# Patient Record
Sex: MALE | Race: WHITE | Employment: OTHER | URBAN - METROPOLITAN AREA
[De-identification: names, ages, dates, MRNs, and addresses within clinical notes are randomized per-mention and may not be internally consistent; named-entity substitution may affect disease eponyms.]

---

## 2021-03-04 ENCOUNTER — HOSPITAL ENCOUNTER (INPATIENT)
Facility: CLINIC | Age: 81
LOS: 32 days | Discharge: NURSING, REHAB OR LONG-TERM CARE | DRG: 003 | End: 2021-04-05
Attending: INTERNAL MEDICINE | Admitting: INTERNAL MEDICINE
Payer: MEDICARE

## 2021-03-04 ENCOUNTER — TRANSFERRED RECORDS (OUTPATIENT)
Dept: HEALTH INFORMATION MANAGEMENT | Facility: CLINIC | Age: 81
End: 2021-03-04

## 2021-03-04 DIAGNOSIS — Z95.2 S/P MVR (MITRAL VALVE REPLACEMENT): Primary | ICD-10-CM

## 2021-03-04 DIAGNOSIS — Z95.1 S/P CABG (CORONARY ARTERY BYPASS GRAFT): ICD-10-CM

## 2021-03-04 DIAGNOSIS — Z98.890 STATUS POST CARDIAC SURGERY: ICD-10-CM

## 2021-03-04 DIAGNOSIS — R57.0 CARDIOGENIC SHOCK (H): ICD-10-CM

## 2021-03-04 DIAGNOSIS — I48.0 PAROXYSMAL ATRIAL FIBRILLATION (H): ICD-10-CM

## 2021-03-04 LAB
ALT SERPL-CCNC: 14 U/L (ref 18–65)
APTT PPP: 42 SEC (ref 22–37)
AST SERPL-CCNC: 35 U/L (ref 10–40)
CREAT SERPL-MCNC: 2.15 MG/DL (ref 0.8–1.5)
EJECTION FRACTION: 57 %
ERYTHROCYTE [DISTWIDTH] IN BLOOD BY AUTOMATED COUNT: 13.5 % (ref 10–15)
GLUCOSE BLDC GLUCOMTR-MCNC: 256 MG/DL (ref 70–99)
GLUCOSE SERPL-MCNC: 115 MG/DL (ref 60–99)
HCT VFR BLD AUTO: 31.3 % (ref 40–53)
HGB BLD-MCNC: 9.8 G/DL (ref 13.3–17.7)
INR PPP: 1 (ref 0.9–1.2)
KCT BLD-ACNC: 196 SEC (ref 75–150)
MCH RBC QN AUTO: 27.8 PG (ref 26.5–33)
MCHC RBC AUTO-ENTMCNC: 31.3 G/DL (ref 31.5–36.5)
MCV RBC AUTO: 89 FL (ref 78–100)
PLATELET # BLD AUTO: 323 10E9/L (ref 150–450)
POTASSIUM SERPL-SCNC: 3.7 MMOL/L (ref 3.5–5.1)
RBC # BLD AUTO: 3.53 10E12/L (ref 4.4–5.9)
WBC # BLD AUTO: 24.1 10E9/L (ref 4–11)

## 2021-03-04 PROCEDURE — B2111ZZ FLUOROSCOPY OF MULTIPLE CORONARY ARTERIES USING LOW OSMOLAR CONTRAST: ICD-10-PCS | Performed by: INTERNAL MEDICINE

## 2021-03-04 PROCEDURE — 93799 UNLISTED CV SVC/PROCEDURE: CPT

## 2021-03-04 PROCEDURE — 250N000009 HC RX 250: Performed by: INTERNAL MEDICINE

## 2021-03-04 PROCEDURE — 83605 ASSAY OF LACTIC ACID: CPT

## 2021-03-04 PROCEDURE — 250N000009 HC RX 250: Performed by: STUDENT IN AN ORGANIZED HEALTH CARE EDUCATION/TRAINING PROGRAM

## 2021-03-04 PROCEDURE — 250N000011 HC RX IP 250 OP 636: Performed by: STUDENT IN AN ORGANIZED HEALTH CARE EDUCATION/TRAINING PROGRAM

## 2021-03-04 PROCEDURE — 85027 COMPLETE CBC AUTOMATED: CPT | Performed by: STUDENT IN AN ORGANIZED HEALTH CARE EDUCATION/TRAINING PROGRAM

## 2021-03-04 PROCEDURE — 272N000001 HC OR GENERAL SUPPLY STERILE: Performed by: INTERNAL MEDICINE

## 2021-03-04 PROCEDURE — 99153 MOD SED SAME PHYS/QHP EA: CPT | Performed by: INTERNAL MEDICINE

## 2021-03-04 PROCEDURE — 84295 ASSAY OF SERUM SODIUM: CPT

## 2021-03-04 PROCEDURE — 3E043XZ INTRODUCTION OF VASOPRESSOR INTO CENTRAL VEIN, PERCUTANEOUS APPROACH: ICD-10-PCS | Performed by: INTERNAL MEDICINE

## 2021-03-04 PROCEDURE — 5A1955Z RESPIRATORY VENTILATION, GREATER THAN 96 CONSECUTIVE HOURS: ICD-10-PCS | Performed by: INTERNAL MEDICINE

## 2021-03-04 PROCEDURE — 250N000013 HC RX MED GY IP 250 OP 250 PS 637: Performed by: INTERNAL MEDICINE

## 2021-03-04 PROCEDURE — C1887 CATHETER, GUIDING: HCPCS | Performed by: INTERNAL MEDICINE

## 2021-03-04 PROCEDURE — 258N000003 HC RX IP 258 OP 636: Performed by: INTERNAL MEDICINE

## 2021-03-04 PROCEDURE — C1769 GUIDE WIRE: HCPCS | Performed by: INTERNAL MEDICINE

## 2021-03-04 PROCEDURE — 93010 ELECTROCARDIOGRAM REPORT: CPT | Performed by: INTERNAL MEDICINE

## 2021-03-04 PROCEDURE — 33990 INSJ PERQ VAD L HRT ARTERIAL: CPT | Performed by: INTERNAL MEDICINE

## 2021-03-04 PROCEDURE — 258N000003 HC RX IP 258 OP 636: Performed by: STUDENT IN AN ORGANIZED HEALTH CARE EDUCATION/TRAINING PROGRAM

## 2021-03-04 PROCEDURE — 93456 R HRT CORONARY ARTERY ANGIO: CPT | Performed by: INTERNAL MEDICINE

## 2021-03-04 PROCEDURE — 999N000157 HC STATISTIC RCP TIME EA 10 MIN

## 2021-03-04 PROCEDURE — 02703ZZ DILATION OF CORONARY ARTERY, ONE ARTERY, PERCUTANEOUS APPROACH: ICD-10-PCS | Performed by: INTERNAL MEDICINE

## 2021-03-04 PROCEDURE — 85730 THROMBOPLASTIN TIME PARTIAL: CPT | Performed by: STUDENT IN AN ORGANIZED HEALTH CARE EDUCATION/TRAINING PROGRAM

## 2021-03-04 PROCEDURE — 82803 BLOOD GASES ANY COMBINATION: CPT

## 2021-03-04 PROCEDURE — 99152 MOD SED SAME PHYS/QHP 5/>YRS: CPT | Performed by: INTERNAL MEDICINE

## 2021-03-04 PROCEDURE — 92920 PRQ TRLUML C ANGIOP 1ART&/BR: CPT | Performed by: INTERNAL MEDICINE

## 2021-03-04 PROCEDURE — 250N000011 HC RX IP 250 OP 636

## 2021-03-04 PROCEDURE — C1894 INTRO/SHEATH, NON-LASER: HCPCS | Performed by: INTERNAL MEDICINE

## 2021-03-04 PROCEDURE — 250N000011 HC RX IP 250 OP 636: Performed by: INTERNAL MEDICINE

## 2021-03-04 PROCEDURE — 94002 VENT MGMT INPAT INIT DAY: CPT

## 2021-03-04 PROCEDURE — 999N001017 HC STATISTIC GLUCOSE BY METER IP

## 2021-03-04 PROCEDURE — 99291 CRITICAL CARE FIRST HOUR: CPT | Mod: 25 | Performed by: INTERNAL MEDICINE

## 2021-03-04 PROCEDURE — C1757 CATH, THROMBECTOMY/EMBOLECT: HCPCS | Performed by: INTERNAL MEDICINE

## 2021-03-04 PROCEDURE — 93005 ELECTROCARDIOGRAM TRACING: CPT

## 2021-03-04 PROCEDURE — 02HA3RZ INSERTION OF SHORT-TERM EXTERNAL HEART ASSIST SYSTEM INTO HEART, PERCUTANEOUS APPROACH: ICD-10-PCS | Performed by: INTERNAL MEDICINE

## 2021-03-04 PROCEDURE — 82947 ASSAY GLUCOSE BLOOD QUANT: CPT

## 2021-03-04 PROCEDURE — 02C03ZZ EXTIRPATION OF MATTER FROM CORONARY ARTERY, ONE ARTERY, PERCUTANEOUS APPROACH: ICD-10-PCS | Performed by: INTERNAL MEDICINE

## 2021-03-04 PROCEDURE — 5A0221D ASSISTANCE WITH CARDIAC OUTPUT USING IMPELLER PUMP, CONTINUOUS: ICD-10-PCS | Performed by: INTERNAL MEDICINE

## 2021-03-04 PROCEDURE — 84132 ASSAY OF SERUM POTASSIUM: CPT

## 2021-03-04 PROCEDURE — 82330 ASSAY OF CALCIUM: CPT

## 2021-03-04 PROCEDURE — 82805 BLOOD GASES W/O2 SATURATION: CPT | Performed by: INTERNAL MEDICINE

## 2021-03-04 PROCEDURE — 200N000002 HC R&B ICU UMMC

## 2021-03-04 PROCEDURE — 82810 BLOOD GASES O2 SAT ONLY: CPT

## 2021-03-04 PROCEDURE — 99292 CRITICAL CARE ADDL 30 MIN: CPT | Mod: 25 | Performed by: INTERNAL MEDICINE

## 2021-03-04 PROCEDURE — 85347 COAGULATION TIME ACTIVATED: CPT

## 2021-03-04 PROCEDURE — C1725 CATH, TRANSLUMIN NON-LASER: HCPCS | Performed by: INTERNAL MEDICINE

## 2021-03-04 RX ORDER — LIDOCAINE 40 MG/G
CREAM TOPICAL
Status: DISCONTINUED | OUTPATIENT
Start: 2021-03-04 | End: 2021-03-08

## 2021-03-04 RX ORDER — NITROGLYCERIN 0.4 MG/1
0.4 TABLET SUBLINGUAL EVERY 5 MIN PRN
Status: DISCONTINUED | OUTPATIENT
Start: 2021-03-04 | End: 2021-03-05

## 2021-03-04 RX ORDER — ASPIRIN 325 MG
TABLET ORAL
Status: DISCONTINUED | OUTPATIENT
Start: 2021-03-04 | End: 2021-03-05 | Stop reason: HOSPADM

## 2021-03-04 RX ORDER — NALOXONE HYDROCHLORIDE 0.4 MG/ML
0.4 INJECTION, SOLUTION INTRAMUSCULAR; INTRAVENOUS; SUBCUTANEOUS
Status: DISCONTINUED | OUTPATIENT
Start: 2021-03-04 | End: 2021-04-05 | Stop reason: HOSPADM

## 2021-03-04 RX ORDER — MAGNESIUM HYDROXIDE/ALUMINUM HYDROXICE/SIMETHICONE 120; 1200; 1200 MG/30ML; MG/30ML; MG/30ML
30 SUSPENSION ORAL EVERY 4 HOURS PRN
Status: DISCONTINUED | OUTPATIENT
Start: 2021-03-04 | End: 2021-04-05 | Stop reason: HOSPADM

## 2021-03-04 RX ORDER — HEPARIN SODIUM 10000 [USP'U]/100ML
0-5000 INJECTION, SOLUTION INTRAVENOUS CONTINUOUS
Status: DISCONTINUED | OUTPATIENT
Start: 2021-03-04 | End: 2021-03-05 | Stop reason: ALTCHOICE

## 2021-03-04 RX ORDER — FENTANYL CITRATE 50 UG/ML
INJECTION, SOLUTION INTRAMUSCULAR; INTRAVENOUS
Status: COMPLETED
Start: 2021-03-04 | End: 2021-03-04

## 2021-03-04 RX ORDER — NOREPINEPHRINE BITARTRATE 0.06 MG/ML
0.03-0.4 INJECTION, SOLUTION INTRAVENOUS CONTINUOUS
Status: DISCONTINUED | OUTPATIENT
Start: 2021-03-04 | End: 2021-03-04

## 2021-03-04 RX ORDER — HEPARIN SODIUM 1000 [USP'U]/ML
INJECTION, SOLUTION INTRAVENOUS; SUBCUTANEOUS
Status: DISCONTINUED | OUTPATIENT
Start: 2021-03-04 | End: 2021-03-05 | Stop reason: HOSPADM

## 2021-03-04 RX ORDER — ACETAMINOPHEN 650 MG/1
650 SUPPOSITORY RECTAL EVERY 4 HOURS PRN
Status: DISCONTINUED | OUTPATIENT
Start: 2021-03-04 | End: 2021-03-05

## 2021-03-04 RX ORDER — EPINEPHRINE IN SOD CHLOR,ISO 1 MG/10 ML
SYRINGE (ML) INTRAVENOUS
Status: DISCONTINUED | OUTPATIENT
Start: 2021-03-04 | End: 2021-03-05 | Stop reason: HOSPADM

## 2021-03-04 RX ORDER — CALCIUM CHLORIDE 100 MG/ML
INJECTION INTRAVENOUS; INTRAVENTRICULAR
Status: DISCONTINUED | OUTPATIENT
Start: 2021-03-04 | End: 2021-03-05 | Stop reason: HOSPADM

## 2021-03-04 RX ORDER — MIDAZOLAM (PF) 1 MG/ML IN 0.9 % SODIUM CHLORIDE INTRAVENOUS SOLUTION
1-8 CONTINUOUS
Status: DISCONTINUED | OUTPATIENT
Start: 2021-03-04 | End: 2021-03-05

## 2021-03-04 RX ORDER — ACETAMINOPHEN 325 MG/1
650 TABLET ORAL EVERY 4 HOURS PRN
Status: DISCONTINUED | OUTPATIENT
Start: 2021-03-04 | End: 2021-03-05

## 2021-03-04 RX ORDER — NALOXONE HYDROCHLORIDE 0.4 MG/ML
0.2 INJECTION, SOLUTION INTRAMUSCULAR; INTRAVENOUS; SUBCUTANEOUS
Status: DISCONTINUED | OUTPATIENT
Start: 2021-03-04 | End: 2021-04-05 | Stop reason: HOSPADM

## 2021-03-04 RX ADMIN — FENTANYL CITRATE 50 MCG: 50 INJECTION, SOLUTION INTRAMUSCULAR; INTRAVENOUS at 22:28

## 2021-03-04 RX ADMIN — NOREPINEPHRINE BITARTRATE 0.5 MCG/KG/MIN: 1 INJECTION, SOLUTION, CONCENTRATE INTRAVENOUS at 22:31

## 2021-03-04 RX ADMIN — Medication 4 MG/HR: at 22:39

## 2021-03-04 RX ADMIN — Medication 4 UNITS/HR: at 22:25

## 2021-03-04 RX ADMIN — KETAMINE HYDROCHLORIDE 395 MG/HR: 100 INJECTION, SOLUTION, CONCENTRATE INTRAMUSCULAR; INTRAVENOUS at 22:24

## 2021-03-04 RX ADMIN — Medication 50 MCG: at 22:28

## 2021-03-04 RX ADMIN — EPINEPHRINE 0.5 MCG/KG/MIN: 1 INJECTION PARENTERAL at 22:30

## 2021-03-04 RX ADMIN — FENTANYL CITRATE 50 MCG: 50 INJECTION, SOLUTION INTRAMUSCULAR; INTRAVENOUS at 22:34

## 2021-03-04 ASSESSMENT — MIFFLIN-ST. JEOR: SCORE: 1522.13

## 2021-03-04 NOTE — Clinical Note
Impella catheter, RFA sheath, RIJV sheath and swan catheter all sutured into place. Sterile dressing applied.

## 2021-03-04 NOTE — Clinical Note
Pt arrived from floor with epi, norepi, dobutamine, vaso and versed iv gtt as of yet w/o changes to doses/rates

## 2021-03-04 NOTE — LETTER
Transition Communication Hand-off for Care Transitions to Next Level of Care Provider    Name: Jin Garrett  : 1940  MRN #: [id number]  Primary Care Provider: Ritesh Kumar     Primary Clinic: [facility] [address] [facility] [address]     Reason for Hospitalization:  Cardiogenic shock (H) [R57.0]  Admit Date/Time: 3/4/2021  9:54 PM  Discharge Date: 21  Payor Source: Payor: BCBS / Plan: BCBS PLATINUM BLUE / Product Type: PPO /            Reason for Communication Hand-off Referral: Transition of care to acute rehab    Discharge Plan:  [facility] [place]       Discharge Needs Assessment:  Needs      Most Recent Value   Equipment Currently Used at Home  tub bench, walker, standard   # of Referrals Placed by CTS  Post Acute Facilities          LEONEL Barton    AVS/Discharge Summary is the source of truth; this is a helpful guide for improved communication of patient story

## 2021-03-05 ENCOUNTER — APPOINTMENT (OUTPATIENT)
Dept: CT IMAGING | Facility: CLINIC | Age: 81
DRG: 003 | End: 2021-03-05
Attending: STUDENT IN AN ORGANIZED HEALTH CARE EDUCATION/TRAINING PROGRAM
Payer: MEDICARE

## 2021-03-05 ENCOUNTER — APPOINTMENT (OUTPATIENT)
Dept: CARDIOLOGY | Facility: CLINIC | Age: 81
DRG: 003 | End: 2021-03-05
Attending: STUDENT IN AN ORGANIZED HEALTH CARE EDUCATION/TRAINING PROGRAM
Payer: MEDICARE

## 2021-03-05 ENCOUNTER — APPOINTMENT (OUTPATIENT)
Dept: GENERAL RADIOLOGY | Facility: CLINIC | Age: 81
DRG: 003 | End: 2021-03-05
Attending: STUDENT IN AN ORGANIZED HEALTH CARE EDUCATION/TRAINING PROGRAM
Payer: MEDICARE

## 2021-03-05 ENCOUNTER — APPOINTMENT (OUTPATIENT)
Dept: GENERAL RADIOLOGY | Facility: CLINIC | Age: 81
DRG: 003 | End: 2021-03-05
Attending: INTERNAL MEDICINE
Payer: MEDICARE

## 2021-03-05 ENCOUNTER — APPOINTMENT (OUTPATIENT)
Dept: GENERAL RADIOLOGY | Facility: CLINIC | Age: 81
DRG: 003 | End: 2021-03-05
Attending: THORACIC SURGERY (CARDIOTHORACIC VASCULAR SURGERY)
Payer: MEDICARE

## 2021-03-05 ENCOUNTER — APPOINTMENT (OUTPATIENT)
Dept: ULTRASOUND IMAGING | Facility: CLINIC | Age: 81
DRG: 003 | End: 2021-03-05
Attending: INTERNAL MEDICINE
Payer: MEDICARE

## 2021-03-05 ENCOUNTER — ANESTHESIA EVENT (OUTPATIENT)
Dept: SURGERY | Facility: CLINIC | Age: 81
DRG: 003 | End: 2021-03-05
Payer: MEDICARE

## 2021-03-05 ENCOUNTER — ANESTHESIA (OUTPATIENT)
Dept: SURGERY | Facility: CLINIC | Age: 81
DRG: 003 | End: 2021-03-05
Payer: MEDICARE

## 2021-03-05 ENCOUNTER — APPOINTMENT (OUTPATIENT)
Dept: ULTRASOUND IMAGING | Facility: CLINIC | Age: 81
DRG: 003 | End: 2021-03-05
Attending: STUDENT IN AN ORGANIZED HEALTH CARE EDUCATION/TRAINING PROGRAM
Payer: MEDICARE

## 2021-03-05 LAB
ALBUMIN SERPL-MCNC: 1.5 G/DL (ref 3.4–5)
ALBUMIN SERPL-MCNC: 1.5 G/DL (ref 3.4–5)
ALBUMIN SERPL-MCNC: 1.8 G/DL (ref 3.4–5)
ALBUMIN SERPL-MCNC: 1.8 G/DL (ref 3.4–5)
ALBUMIN SERPL-MCNC: 1.9 G/DL (ref 3.4–5)
ALBUMIN UR-MCNC: NEGATIVE MG/DL
ALP SERPL-CCNC: 46 U/L (ref 40–150)
ALP SERPL-CCNC: 46 U/L (ref 40–150)
ALP SERPL-CCNC: 51 U/L (ref 40–150)
ALP SERPL-CCNC: 68 U/L (ref 40–150)
ALP SERPL-CCNC: 74 U/L (ref 40–150)
ALT SERPL W P-5'-P-CCNC: 18 U/L (ref 0–70)
ALT SERPL W P-5'-P-CCNC: 19 U/L (ref 0–70)
ALT SERPL W P-5'-P-CCNC: 19 U/L (ref 0–70)
ALT SERPL W P-5'-P-CCNC: 20 U/L (ref 0–70)
ALT SERPL W P-5'-P-CCNC: 22 U/L (ref 0–70)
AMORPH CRY #/AREA URNS HPF: ABNORMAL /HPF
ANGLE RATE OF CLOT GROWTH: 58.1 DEG (ref 59–74)
ANGLE RATE OF CLOT GROWTH: 74.9 DEG (ref 59–74)
ANGLE RATE OF CLOT GROWTH: 75 DEG (ref 59–74)
ANGLE RATE OF CLOT GROWTH: ABNORMAL DEG (ref 59–74)
ANGLE RATE OF CLOT STRENGTH: 64.3 DEGREES (ref 53–72)
ANION GAP SERPL CALCULATED.3IONS-SCNC: 10 MMOL/L (ref 3–14)
ANION GAP SERPL CALCULATED.3IONS-SCNC: 10 MMOL/L (ref 3–14)
ANION GAP SERPL CALCULATED.3IONS-SCNC: 13 MMOL/L (ref 3–14)
ANION GAP SERPL CALCULATED.3IONS-SCNC: 13 MMOL/L (ref 3–14)
ANION GAP SERPL CALCULATED.3IONS-SCNC: 8 MMOL/L (ref 3–14)
APPEARANCE UR: ABNORMAL
APTT PPP: 42 SEC (ref 22–37)
APTT PPP: 51 SEC (ref 22–37)
APTT PPP: 97 SEC (ref 22–37)
APTT PPP: >240 SEC (ref 22–37)
APTT PPP: >240 SEC (ref 22–37)
AST SERPL W P-5'-P-CCNC: 125 U/L (ref 0–45)
AST SERPL W P-5'-P-CCNC: 143 U/L (ref 0–45)
AST SERPL W P-5'-P-CCNC: 155 U/L (ref 0–45)
AST SERPL W P-5'-P-CCNC: 62 U/L (ref 0–45)
AST SERPL W P-5'-P-CCNC: 78 U/L (ref 0–45)
BACTERIA #/AREA URNS HPF: ABNORMAL /HPF
BASE DEFICIT BLDA-SCNC: 1.7 MMOL/L
BASE DEFICIT BLDA-SCNC: 10.3 MMOL/L
BASE DEFICIT BLDA-SCNC: 10.3 MMOL/L
BASE DEFICIT BLDA-SCNC: 13.2 MMOL/L
BASE DEFICIT BLDA-SCNC: 13.2 MMOL/L
BASE DEFICIT BLDA-SCNC: 2.7 MMOL/L
BASE DEFICIT BLDA-SCNC: 8.9 MMOL/L
BASE DEFICIT BLDV-SCNC: 12.4 MMOL/L
BASE DEFICIT BLDV-SCNC: 14.6 MMOL/L
BASE DEFICIT BLDV-SCNC: 15 MMOL/L
BASE DEFICIT BLDV-SCNC: 15 MMOL/L
BASE DEFICIT BLDV-SCNC: 8.1 MMOL/L
BASE EXCESS BLDA CALC-SCNC: 0.3 MMOL/L
BASE EXCESS BLDA CALC-SCNC: 0.6 MMOL/L
BASE EXCESS BLDV CALC-SCNC: 0.3 MMOL/L
BASE EXCESS BLDV CALC-SCNC: 2 MMOL/L
BASOPHILS # BLD AUTO: 0 10E9/L (ref 0–0.2)
BASOPHILS # BLD AUTO: 0 10E9/L (ref 0–0.2)
BASOPHILS NFR BLD AUTO: 0.1 %
BASOPHILS NFR BLD AUTO: 0.2 %
BILIRUB SERPL-MCNC: 0.5 MG/DL (ref 0.2–1.3)
BILIRUB SERPL-MCNC: 0.6 MG/DL (ref 0.2–1.3)
BILIRUB SERPL-MCNC: 0.8 MG/DL (ref 0.2–1.3)
BILIRUB SERPL-MCNC: 1 MG/DL (ref 0.2–1.3)
BILIRUB SERPL-MCNC: 1.1 MG/DL (ref 0.2–1.3)
BILIRUB UR QL STRIP: NEGATIVE
BLD PROD TYP BPU: NORMAL
BLD UNIT ID BPU: 0
BLOOD PRODUCT CODE: NORMAL
BPU ID: NORMAL
BUN SERPL-MCNC: 39 MG/DL (ref 7–30)
BUN SERPL-MCNC: 40 MG/DL (ref 7–30)
BUN SERPL-MCNC: 42 MG/DL (ref 7–30)
BUN SERPL-MCNC: 49 MG/DL (ref 7–30)
BUN SERPL-MCNC: 49 MG/DL (ref 7–30)
CA-I BLD-MCNC: 4.4 MG/DL (ref 4.4–5.2)
CA-I BLD-MCNC: 4.5 MG/DL (ref 4.4–5.2)
CA-I BLD-MCNC: 4.6 MG/DL (ref 4.4–5.2)
CA-I BLD-MCNC: 4.7 MG/DL (ref 4.4–5.2)
CALCIUM SERPL-MCNC: 7.6 MG/DL (ref 8.5–10.1)
CALCIUM SERPL-MCNC: 7.8 MG/DL (ref 8.5–10.1)
CALCIUM SERPL-MCNC: 7.8 MG/DL (ref 8.5–10.1)
CALCIUM SERPL-MCNC: 8 MG/DL (ref 8.5–10.1)
CALCIUM SERPL-MCNC: 8 MG/DL (ref 8.5–10.1)
CHLORIDE SERPL-SCNC: 109 MMOL/L (ref 94–109)
CHLORIDE SERPL-SCNC: 111 MMOL/L (ref 94–109)
CHLORIDE SERPL-SCNC: 113 MMOL/L (ref 94–109)
CHLORIDE SERPL-SCNC: 114 MMOL/L (ref 94–109)
CHLORIDE SERPL-SCNC: 115 MMOL/L (ref 94–109)
CI HYPERCOAGULATION INDEX: 1.9 RATIO (ref 0–3)
CI HYPERCOAGULATION INDEX: 1.9 RATIO (ref 0–3)
CI HYPERCOAGULATION INDEX: ABNORMAL RATIO (ref 0–3)
CI HYPOCOAGULATION INDEX: 1.1 RATIO (ref 0–3)
CI HYPOCOAGULATION INDEX: 7.4 RATIO (ref 0–3)
CLOT LYSIS 30M P MA LENFR BLD TEG: 0 % (ref 0–8)
CLOT LYSIS 30M P MA LENFR BLD TEG: 0 % (ref 0–8)
CLOT LYSIS 30M P MA LENFR BLD TEG: 18 % (ref 0–8)
CLOT LYSIS 30M P MA LENFR BLD TEG: ABNORMAL % (ref 0–8)
CLOT STRENGTH BLD TEG: 15.2 KD/SC (ref 5.3–13.2)
CLOT STRENGTH BLD TEG: 15.8 KD/SC (ref 5.3–13.2)
CLOT STRENGTH BLD TEG: 8.4 KD/SC (ref 5.3–13.2)
CLOT STRENGTH BLD TEG: ABNORMAL KD/SC (ref 5.3–13.2)
CO2 SERPL-SCNC: 16 MMOL/L (ref 20–32)
CO2 SERPL-SCNC: 17 MMOL/L (ref 20–32)
CO2 SERPL-SCNC: 22 MMOL/L (ref 20–32)
CO2 SERPL-SCNC: 23 MMOL/L (ref 20–32)
CO2 SERPL-SCNC: 24 MMOL/L (ref 20–32)
COLOR UR AUTO: ABNORMAL
CORTIS SERPL-MCNC: 31.8 UG/DL (ref 4–22)
CREAT SERPL-MCNC: 2.03 MG/DL (ref 0.66–1.25)
CREAT SERPL-MCNC: 2.13 MG/DL (ref 0.66–1.25)
CREAT SERPL-MCNC: 2.14 MG/DL (ref 0.66–1.25)
CREAT SERPL-MCNC: 2.49 MG/DL (ref 0.66–1.25)
CREAT SERPL-MCNC: 2.52 MG/DL (ref 0.66–1.25)
CRP SERPL-MCNC: 160 MG/L (ref 0–8)
D DIMER PPP FEU-MCNC: 1.8 UG/ML FEU (ref 0–0.5)
DIFFERENTIAL METHOD BLD: ABNORMAL
DIFFERENTIAL METHOD BLD: ABNORMAL
EOSINOPHIL # BLD AUTO: 0 10E9/L (ref 0–0.7)
EOSINOPHIL # BLD AUTO: 0.1 10E9/L (ref 0–0.7)
EOSINOPHIL NFR BLD AUTO: 0 %
EOSINOPHIL NFR BLD AUTO: 0.2 %
ERYTHROCYTE [DISTWIDTH] IN BLOOD BY AUTOMATED COUNT: 13.5 % (ref 10–15)
ERYTHROCYTE [DISTWIDTH] IN BLOOD BY AUTOMATED COUNT: 13.6 % (ref 10–15)
ERYTHROCYTE [DISTWIDTH] IN BLOOD BY AUTOMATED COUNT: 13.7 % (ref 10–15)
ERYTHROCYTE [DISTWIDTH] IN BLOOD BY AUTOMATED COUNT: 13.7 % (ref 10–15)
ERYTHROCYTE [DISTWIDTH] IN BLOOD BY AUTOMATED COUNT: 13.8 % (ref 10–15)
ERYTHROCYTE [DISTWIDTH] IN BLOOD BY AUTOMATED COUNT: 14.3 % (ref 10–15)
ERYTHROCYTE [SEDIMENTATION RATE] IN BLOOD BY WESTERGREN METHOD: 18 MM/H (ref 0–20)
FIBRINOGEN PPP-MCNC: 377 MG/DL (ref 200–420)
FIBRINOGEN PPP-MCNC: 428 MG/DL (ref 200–420)
FIBRINOGEN PPP-MCNC: 668 MG/DL (ref 200–420)
G ACTUAL CLOT STRENGTH: 9.7 KD/SC (ref 4.5–11)
GFR SERPL CREATININE-BSD FRML MDRD: 23 ML/MIN/{1.73_M2}
GFR SERPL CREATININE-BSD FRML MDRD: 23 ML/MIN/{1.73_M2}
GFR SERPL CREATININE-BSD FRML MDRD: 28 ML/MIN/{1.73_M2}
GFR SERPL CREATININE-BSD FRML MDRD: 28 ML/MIN/{1.73_M2}
GFR SERPL CREATININE-BSD FRML MDRD: 30 ML/MIN/{1.73_M2}
GLUCOSE BLD-MCNC: 119 MG/DL (ref 70–99)
GLUCOSE BLD-MCNC: 125 MG/DL (ref 70–99)
GLUCOSE BLDC GLUCOMTR-MCNC: 108 MG/DL (ref 70–99)
GLUCOSE BLDC GLUCOMTR-MCNC: 113 MG/DL (ref 70–99)
GLUCOSE BLDC GLUCOMTR-MCNC: 123 MG/DL (ref 70–99)
GLUCOSE BLDC GLUCOMTR-MCNC: 127 MG/DL (ref 70–99)
GLUCOSE BLDC GLUCOMTR-MCNC: 267 MG/DL (ref 70–99)
GLUCOSE BLDC GLUCOMTR-MCNC: 300 MG/DL (ref 70–99)
GLUCOSE BLDC GLUCOMTR-MCNC: 330 MG/DL (ref 70–99)
GLUCOSE BLDC GLUCOMTR-MCNC: 330 MG/DL (ref 70–99)
GLUCOSE BLDC GLUCOMTR-MCNC: 374 MG/DL (ref 70–99)
GLUCOSE BLDC GLUCOMTR-MCNC: 375 MG/DL (ref 70–99)
GLUCOSE BLDC GLUCOMTR-MCNC: 383 MG/DL (ref 70–99)
GLUCOSE BLDC GLUCOMTR-MCNC: 392 MG/DL (ref 70–99)
GLUCOSE BLDC GLUCOMTR-MCNC: 69 MG/DL (ref 70–99)
GLUCOSE SERPL-MCNC: 120 MG/DL (ref 70–99)
GLUCOSE SERPL-MCNC: 130 MG/DL (ref 70–99)
GLUCOSE SERPL-MCNC: 356 MG/DL (ref 70–99)
GLUCOSE SERPL-MCNC: 371 MG/DL (ref 70–99)
GLUCOSE SERPL-MCNC: 66 MG/DL (ref 70–99)
GLUCOSE UR STRIP-MCNC: NEGATIVE MG/DL
GRAN CASTS #/AREA URNS LPF: 1 /LPF
HBA1C MFR BLD: 5.7 % (ref 0–5.6)
HCO3 BLD-SCNC: 14 MMOL/L (ref 21–28)
HCO3 BLD-SCNC: 14 MMOL/L (ref 21–28)
HCO3 BLD-SCNC: 17 MMOL/L (ref 21–28)
HCO3 BLD-SCNC: 17 MMOL/L (ref 21–28)
HCO3 BLD-SCNC: 18 MMOL/L (ref 21–28)
HCO3 BLD-SCNC: 22 MMOL/L (ref 21–28)
HCO3 BLD-SCNC: 24 MMOL/L (ref 21–28)
HCO3 BLD-SCNC: 25 MMOL/L (ref 21–28)
HCO3 BLD-SCNC: 25 MMOL/L (ref 21–28)
HCO3 BLDV-SCNC: 12 MMOL/L (ref 21–28)
HCO3 BLDV-SCNC: 13 MMOL/L (ref 21–28)
HCO3 BLDV-SCNC: 14 MMOL/L (ref 21–28)
HCO3 BLDV-SCNC: 15 MMOL/L (ref 21–28)
HCO3 BLDV-SCNC: 19 MMOL/L (ref 21–28)
HCO3 BLDV-SCNC: 19 MMOL/L (ref 21–28)
HCO3 BLDV-SCNC: 26 MMOL/L (ref 21–28)
HCT VFR BLD AUTO: 25.7 % (ref 40–53)
HCT VFR BLD AUTO: 26.9 % (ref 40–53)
HCT VFR BLD AUTO: 27.1 % (ref 40–53)
HCT VFR BLD AUTO: 28.4 % (ref 40–53)
HCT VFR BLD AUTO: 30.5 % (ref 40–53)
HCT VFR BLD AUTO: 31.2 % (ref 40–53)
HGB BLD-MCNC: 10.5 G/DL (ref 13.3–17.7)
HGB BLD-MCNC: 10.5 G/DL (ref 13.3–17.7)
HGB BLD-MCNC: 8 G/DL (ref 13.3–17.7)
HGB BLD-MCNC: 8.4 G/DL (ref 13.3–17.7)
HGB BLD-MCNC: 8.9 G/DL (ref 13.3–17.7)
HGB BLD-MCNC: 9 G/DL (ref 13.3–17.7)
HGB FREE PLAS-MCNC: 70 MG/DL
HGB UR QL STRIP: ABNORMAL
IMM GRANULOCYTES # BLD: 0.2 10E9/L (ref 0–0.4)
IMM GRANULOCYTES # BLD: 0.3 10E9/L (ref 0–0.4)
IMM GRANULOCYTES NFR BLD: 0.7 %
IMM GRANULOCYTES NFR BLD: 1 %
INR PPP: 1.49 (ref 0.86–1.14)
INR PPP: 1.51 (ref 0.86–1.14)
INR PPP: 1.68 (ref 0.86–1.14)
INR PPP: 2.28 (ref 0.86–1.14)
INTERPRETATION ECG - MUSE: NORMAL
K TIME TO SPEC CLOT STRENGTH: 1 MIN (ref 1–3)
K TIME TO SPEC CLOT STRENGTH: 1.1 MIN (ref 1–3)
K TIME TO SPEC CLOT STRENGTH: 1.8 MINUTE (ref 1–3)
K TIME TO SPEC CLOT STRENGTH: 2.8 MIN (ref 1–3)
K TIME TO SPEC CLOT STRENGTH: ABNORMAL MIN (ref 1–3)
KCT BLD-ACNC: 123 SEC (ref 75–150)
KCT BLD-ACNC: 144 SEC (ref 75–150)
KCT BLD-ACNC: 164 SEC (ref 75–150)
KCT BLD-ACNC: 221 SEC (ref 75–150)
KETONES UR STRIP-MCNC: NEGATIVE MG/DL
LACTATE BLD-SCNC: 2.1 MMOL/L (ref 0.7–2)
LACTATE BLD-SCNC: 2.1 MMOL/L (ref 0.7–2)
LACTATE BLD-SCNC: 2.6 MMOL/L (ref 0.7–2)
LACTATE BLD-SCNC: 5.9 MMOL/L (ref 0.7–2)
LACTATE BLD-SCNC: 6.6 MMOL/L (ref 0.7–2)
LACTATE BLD-SCNC: 7.6 MMOL/L (ref 0.7–2)
LACTATE BLD-SCNC: 7.6 MMOL/L (ref 0.7–2)
LACTATE BLD-SCNC: 7.7 MMOL/L (ref 0.7–2)
LACTATE BLD-SCNC: 7.8 MMOL/L (ref 0.7–2)
LDH SERPL L TO P-CCNC: 330 U/L (ref 85–227)
LDH SERPL L TO P-CCNC: 402 U/L (ref 85–227)
LDH SERPL L TO P-CCNC: 663 U/L (ref 85–227)
LEUKOCYTE ESTERASE UR QL STRIP: ABNORMAL
LY30 LYSIS AT 30 MINUTES: 1.1 % (ref 0–8)
LY60 LYSIS AT 60 MINUTES: 2.2 % (ref 0–15)
LY60 LYSIS AT 60 MINUTES: 2.6 % (ref 0–15)
LY60 LYSIS AT 60 MINUTES: 26.9 % (ref 0–15)
LY60 LYSIS AT 60 MINUTES: 4.7 % (ref 0–15)
LY60 LYSIS AT 60 MINUTES: ABNORMAL % (ref 0–15)
LYMPHOCYTES # BLD AUTO: 1.3 10E9/L (ref 0.8–5.3)
LYMPHOCYTES # BLD AUTO: 1.4 10E9/L (ref 0.8–5.3)
LYMPHOCYTES NFR BLD AUTO: 5.3 %
LYMPHOCYTES NFR BLD AUTO: 5.7 %
MA MAXIMUM CLOT STRENGTH: 62.7 MM (ref 55–74)
MA MAXIMUM CLOT STRENGTH: 66 MM (ref 50–70)
MA MAXIMUM CLOT STRENGTH: 75.2 MM (ref 55–74)
MA MAXIMUM CLOT STRENGTH: 75.9 MM (ref 55–74)
MA MAXIMUM CLOT STRENGTH: ABNORMAL MM (ref 55–74)
MAGNESIUM SERPL-MCNC: 1.7 MG/DL (ref 1.6–2.3)
MAGNESIUM SERPL-MCNC: 1.7 MG/DL (ref 1.6–2.3)
MAGNESIUM SERPL-MCNC: 2 MG/DL (ref 1.6–2.3)
MAGNESIUM SERPL-MCNC: 2.2 MG/DL (ref 1.6–2.3)
MAGNESIUM SERPL-MCNC: 2.4 MG/DL (ref 1.6–2.3)
MCH RBC QN AUTO: 27.9 PG (ref 26.5–33)
MCH RBC QN AUTO: 28 PG (ref 26.5–33)
MCH RBC QN AUTO: 28.6 PG (ref 26.5–33)
MCH RBC QN AUTO: 28.9 PG (ref 26.5–33)
MCH RBC QN AUTO: 29.1 PG (ref 26.5–33)
MCH RBC QN AUTO: 29.5 PG (ref 26.5–33)
MCHC RBC AUTO-ENTMCNC: 31 G/DL (ref 31.5–36.5)
MCHC RBC AUTO-ENTMCNC: 31.1 G/DL (ref 31.5–36.5)
MCHC RBC AUTO-ENTMCNC: 31.7 G/DL (ref 31.5–36.5)
MCHC RBC AUTO-ENTMCNC: 33.1 G/DL (ref 31.5–36.5)
MCHC RBC AUTO-ENTMCNC: 33.7 G/DL (ref 31.5–36.5)
MCHC RBC AUTO-ENTMCNC: 34.4 G/DL (ref 31.5–36.5)
MCV RBC AUTO: 86 FL (ref 78–100)
MCV RBC AUTO: 86 FL (ref 78–100)
MCV RBC AUTO: 87 FL (ref 78–100)
MCV RBC AUTO: 90 FL (ref 78–100)
MONOCYTES # BLD AUTO: 1.5 10E9/L (ref 0–1.3)
MONOCYTES # BLD AUTO: 1.7 10E9/L (ref 0–1.3)
MONOCYTES NFR BLD AUTO: 6.2 %
MONOCYTES NFR BLD AUTO: 6.7 %
MUCOUS THREADS #/AREA URNS LPF: PRESENT /LPF
NEUTROPHILS # BLD AUTO: 18.9 10E9/L (ref 1.6–8.3)
NEUTROPHILS # BLD AUTO: 23.4 10E9/L (ref 1.6–8.3)
NEUTROPHILS NFR BLD AUTO: 86.5 %
NEUTROPHILS NFR BLD AUTO: 87.4 %
NITRATE UR QL: NEGATIVE
NRBC # BLD AUTO: 0 10*3/UL
NRBC BLD AUTO-RTO: 0 /100
NUM BPU REQUESTED: 4
NUM BPU REQUESTED: 8
O2/TOTAL GAS SETTING VFR VENT: 100 %
O2/TOTAL GAS SETTING VFR VENT: 50 %
O2/TOTAL GAS SETTING VFR VENT: ABNORMAL %
OXYHGB MFR BLD: 93 % (ref 92–100)
OXYHGB MFR BLD: 97 % (ref 92–100)
OXYHGB MFR BLD: 98 % (ref 92–100)
OXYHGB MFR BLD: 98 % (ref 92–100)
OXYHGB MFR BLD: 99 % (ref 92–100)
OXYHGB MFR BLDV: 59 %
OXYHGB MFR BLDV: 67 %
OXYHGB MFR BLDV: 73 %
OXYHGB MFR BLDV: 74 %
OXYHGB MFR BLDV: 78 %
OXYHGB MFR BLDV: 80 %
OXYHGB MFR BLDV: 98 %
PCO2 BLD: 37 MM HG (ref 35–45)
PCO2 BLD: 37 MM HG (ref 35–45)
PCO2 BLD: 38 MM HG (ref 35–45)
PCO2 BLD: 39 MM HG (ref 35–45)
PCO2 BLD: 39 MM HG (ref 35–45)
PCO2 BLD: 40 MM HG (ref 35–45)
PCO2 BLD: 40 MM HG (ref 35–45)
PCO2 BLD: 41 MM HG (ref 35–45)
PCO2 BLD: 43 MM HG (ref 35–45)
PCO2 BLDV: 13 MM HG (ref 40–50)
PCO2 BLDV: 33 MM HG (ref 40–50)
PCO2 BLDV: 36 MM HG (ref 40–50)
PCO2 BLDV: 42 MM HG (ref 40–50)
PCO2 BLDV: 43 MM HG (ref 40–50)
PCO2 BLDV: 46 MM HG (ref 40–50)
PCO2 BLDV: 47 MM HG (ref 40–50)
PH BLD: 7.18 PH (ref 7.35–7.45)
PH BLD: 7.19 PH (ref 7.35–7.45)
PH BLD: 7.21 PH (ref 7.35–7.45)
PH BLD: 7.22 PH (ref 7.35–7.45)
PH BLD: 7.24 PH (ref 7.35–7.45)
PH BLD: 7.37 PH (ref 7.35–7.45)
PH BLD: 7.37 PH (ref 7.35–7.45)
PH BLD: 7.42 PH (ref 7.35–7.45)
PH BLD: 7.44 PH (ref 7.35–7.45)
PH BLDV: 7.1 PH (ref 7.32–7.43)
PH BLDV: 7.15 PH (ref 7.32–7.43)
PH BLDV: 7.17 PH (ref 7.32–7.43)
PH BLDV: 7.17 PH (ref 7.32–7.43)
PH BLDV: 7.23 PH (ref 7.32–7.43)
PH BLDV: 7.38 PH (ref 7.32–7.43)
PH BLDV: 7.78 PH (ref 7.32–7.43)
PH UR STRIP: 5 PH (ref 5–7)
PHOSPHATE SERPL-MCNC: 1.9 MG/DL (ref 2.5–4.5)
PHOSPHATE SERPL-MCNC: 3 MG/DL (ref 2.5–4.5)
PHOSPHATE SERPL-MCNC: 3.6 MG/DL (ref 2.5–4.5)
PHOSPHATE SERPL-MCNC: 4.5 MG/DL (ref 2.5–4.5)
PLATELET # BLD AUTO: 130 10E9/L (ref 150–450)
PLATELET # BLD AUTO: 132 10E9/L (ref 150–450)
PLATELET # BLD AUTO: 151 10E9/L (ref 150–450)
PLATELET # BLD AUTO: 152 10E9/L (ref 150–450)
PLATELET # BLD AUTO: 277 10E9/L (ref 150–450)
PLATELET # BLD AUTO: 288 10E9/L (ref 150–450)
PLATELET # BLD AUTO: 293 10E9/L (ref 150–450)
PO2 BLD: 124 MM HG (ref 80–105)
PO2 BLD: 129 MM HG (ref 80–105)
PO2 BLD: 131 MM HG (ref 80–105)
PO2 BLD: 204 MM HG (ref 80–105)
PO2 BLD: 264 MM HG (ref 80–105)
PO2 BLD: 63 MM HG (ref 80–105)
PO2 BLD: 72 MM HG (ref 80–105)
PO2 BLD: 78 MM HG (ref 80–105)
PO2 BLD: 94 MM HG (ref 80–105)
PO2 BLDV: 249 MM HG (ref 25–47)
PO2 BLDV: 38 MM HG (ref 25–47)
PO2 BLDV: 42 MM HG (ref 25–47)
PO2 BLDV: 44 MM HG (ref 25–47)
PO2 BLDV: 47 MM HG (ref 25–47)
PO2 BLDV: 52 MM HG (ref 25–47)
PO2 BLDV: 54 MM HG (ref 25–47)
POTASSIUM SERPL-SCNC: 3.2 MMOL/L (ref 3.4–5.3)
POTASSIUM SERPL-SCNC: 3.2 MMOL/L (ref 3.4–5.3)
POTASSIUM SERPL-SCNC: 3.4 MMOL/L (ref 3.4–5.3)
POTASSIUM SERPL-SCNC: 4 MMOL/L (ref 3.4–5.3)
POTASSIUM SERPL-SCNC: 5 MMOL/L (ref 3.4–5.3)
PROCALCITONIN SERPL-MCNC: 30.59 NG/ML
PROT SERPL-MCNC: 3.6 G/DL (ref 6.8–8.8)
PROT SERPL-MCNC: 3.8 G/DL (ref 6.8–8.8)
PROT SERPL-MCNC: 3.8 G/DL (ref 6.8–8.8)
PROT SERPL-MCNC: 5 G/DL (ref 6.8–8.8)
PROT SERPL-MCNC: 5.2 G/DL (ref 6.8–8.8)
R TIME UNTIL CLOT FORMS: 16.4 MIN (ref 4–9)
R TIME UNTIL CLOT FORMS: 7.3 MIN (ref 4–9)
R TIME UNTIL CLOT FORMS: 7.6 MIN (ref 4–9)
R TIME UNTIL CLOT FORMS: 8.5 MINUTE (ref 5–10)
R TIME UNTIL CLOT FORMS: >90 MIN (ref 4–9)
RADIOLOGIST FLAGS: ABNORMAL
RBC # BLD AUTO: 2.86 10E12/L (ref 4.4–5.9)
RBC # BLD AUTO: 3.01 10E12/L (ref 4.4–5.9)
RBC # BLD AUTO: 3.08 10E12/L (ref 4.4–5.9)
RBC # BLD AUTO: 3.15 10E12/L (ref 4.4–5.9)
RBC # BLD AUTO: 3.56 10E12/L (ref 4.4–5.9)
RBC # BLD AUTO: 3.61 10E12/L (ref 4.4–5.9)
RBC #/AREA URNS AUTO: 21 /HPF (ref 0–2)
SODIUM SERPL-SCNC: 139 MMOL/L (ref 133–144)
SODIUM SERPL-SCNC: 141 MMOL/L (ref 133–144)
SODIUM SERPL-SCNC: 146 MMOL/L (ref 133–144)
SODIUM SERPL-SCNC: 146 MMOL/L (ref 133–144)
SODIUM SERPL-SCNC: 147 MMOL/L (ref 133–144)
SOURCE: ABNORMAL
SP GR UR STRIP: 1.02 (ref 1–1.03)
SQUAMOUS #/AREA URNS AUTO: 2 /HPF (ref 0–1)
TRANSFUSION STATUS PATIENT QL: NORMAL
TROPONIN I SERPL-MCNC: 27.75 UG/L (ref 0–0.04)
TROPONIN I SERPL-MCNC: 8.93 UG/L (ref 0–0.04)
TROPONIN I SERPL-MCNC: 9.48 UG/L (ref 0–0.04)
UFH PPP CHRO-ACNC: 0.24 IU/ML
UFH PPP CHRO-ACNC: 0.35 IU/ML
UFH PPP CHRO-ACNC: <0.1 IU/ML
UFH PPP CHRO-ACNC: >1.1 IU/ML
UFH PPP CHRO-ACNC: >1.1 IU/ML
UROBILINOGEN UR STRIP-MCNC: NORMAL MG/DL (ref 0–2)
WBC # BLD AUTO: 18.9 10E9/L (ref 4–11)
WBC # BLD AUTO: 21.8 10E9/L (ref 4–11)
WBC # BLD AUTO: 22.4 10E9/L (ref 4–11)
WBC # BLD AUTO: 25.6 10E9/L (ref 4–11)
WBC # BLD AUTO: 26.8 10E9/L (ref 4–11)
WBC # BLD AUTO: 29.5 10E9/L (ref 4–11)
WBC #/AREA URNS AUTO: 12 /HPF (ref 0–5)

## 2021-03-05 PROCEDURE — 80347 BENZODIAZEPINES 13 OR MORE: CPT | Performed by: INTERNAL MEDICINE

## 2021-03-05 PROCEDURE — 258N000003 HC RX IP 258 OP 636: Performed by: THORACIC SURGERY (CARDIOTHORACIC VASCULAR SURGERY)

## 2021-03-05 PROCEDURE — C1713 ANCHOR/SCREW BN/BN,TIS/BN: HCPCS | Performed by: THORACIC SURGERY (CARDIOTHORACIC VASCULAR SURGERY)

## 2021-03-05 PROCEDURE — 93005 ELECTROCARDIOGRAM TRACING: CPT

## 2021-03-05 PROCEDURE — 250N000009 HC RX 250: Performed by: STUDENT IN AN ORGANIZED HEALTH CARE EDUCATION/TRAINING PROGRAM

## 2021-03-05 PROCEDURE — 86900 BLOOD TYPING SEROLOGIC ABO: CPT | Performed by: SURGERY

## 2021-03-05 PROCEDURE — 82330 ASSAY OF CALCIUM: CPT | Performed by: STUDENT IN AN ORGANIZED HEALTH CARE EDUCATION/TRAINING PROGRAM

## 2021-03-05 PROCEDURE — 86900 BLOOD TYPING SEROLOGIC ABO: CPT | Performed by: THORACIC SURGERY (CARDIOTHORACIC VASCULAR SURGERY)

## 2021-03-05 PROCEDURE — 85027 COMPLETE CBC AUTOMATED: CPT | Performed by: INTERNAL MEDICINE

## 2021-03-05 PROCEDURE — 86850 RBC ANTIBODY SCREEN: CPT | Performed by: THORACIC SURGERY (CARDIOTHORACIC VASCULAR SURGERY)

## 2021-03-05 PROCEDURE — 88305 TISSUE EXAM BY PATHOLOGIST: CPT | Mod: TC | Performed by: THORACIC SURGERY (CARDIOTHORACIC VASCULAR SURGERY)

## 2021-03-05 PROCEDURE — 80053 COMPREHEN METABOLIC PANEL: CPT | Performed by: STUDENT IN AN ORGANIZED HEALTH CARE EDUCATION/TRAINING PROGRAM

## 2021-03-05 PROCEDURE — 85025 COMPLETE CBC W/AUTO DIFF WBC: CPT | Performed by: INTERNAL MEDICINE

## 2021-03-05 PROCEDURE — 250N000009 HC RX 250

## 2021-03-05 PROCEDURE — 258N000003 HC RX IP 258 OP 636: Performed by: STUDENT IN AN ORGANIZED HEALTH CARE EDUCATION/TRAINING PROGRAM

## 2021-03-05 PROCEDURE — 80354 DRUG SCREENING FENTANYL: CPT | Performed by: INTERNAL MEDICINE

## 2021-03-05 PROCEDURE — P9016 RBC LEUKOCYTES REDUCED: HCPCS | Performed by: THORACIC SURGERY (CARDIOTHORACIC VASCULAR SURGERY)

## 2021-03-05 PROCEDURE — 85396 CLOTTING ASSAY WHOLE BLOOD: CPT | Performed by: INTERNAL MEDICINE

## 2021-03-05 PROCEDURE — 250N000011 HC RX IP 250 OP 636: Performed by: STUDENT IN AN ORGANIZED HEALTH CARE EDUCATION/TRAINING PROGRAM

## 2021-03-05 PROCEDURE — 74018 RADEX ABDOMEN 1 VIEW: CPT

## 2021-03-05 PROCEDURE — 250N000011 HC RX IP 250 OP 636: Performed by: INTERNAL MEDICINE

## 2021-03-05 PROCEDURE — 83605 ASSAY OF LACTIC ACID: CPT | Performed by: SURGERY

## 2021-03-05 PROCEDURE — 85520 HEPARIN ASSAY: CPT | Performed by: SURGERY

## 2021-03-05 PROCEDURE — 93970 EXTREMITY STUDY: CPT | Mod: 26 | Performed by: RADIOLOGY

## 2021-03-05 PROCEDURE — 250N000009 HC RX 250: Performed by: NURSE ANESTHETIST, CERTIFIED REGISTERED

## 2021-03-05 PROCEDURE — 250N000009 HC RX 250: Performed by: INTERNAL MEDICINE

## 2021-03-05 PROCEDURE — 250N000011 HC RX IP 250 OP 636: Performed by: PHYSICIAN ASSISTANT

## 2021-03-05 PROCEDURE — 85652 RBC SED RATE AUTOMATED: CPT | Performed by: INTERNAL MEDICINE

## 2021-03-05 PROCEDURE — 999N000065 XR CHEST PORT 1 VW

## 2021-03-05 PROCEDURE — 93925 LOWER EXTREMITY STUDY: CPT | Mod: 26 | Performed by: RADIOLOGY

## 2021-03-05 PROCEDURE — 71045 X-RAY EXAM CHEST 1 VIEW: CPT | Mod: 26 | Performed by: RADIOLOGY

## 2021-03-05 PROCEDURE — 84484 ASSAY OF TROPONIN QUANT: CPT | Performed by: INTERNAL MEDICINE

## 2021-03-05 PROCEDURE — 82805 BLOOD GASES W/O2 SATURATION: CPT | Performed by: STUDENT IN AN ORGANIZED HEALTH CARE EDUCATION/TRAINING PROGRAM

## 2021-03-05 PROCEDURE — 410N000003 HC PER-PERFUSION 1ST 30 MIN: Performed by: THORACIC SURGERY (CARDIOTHORACIC VASCULAR SURGERY)

## 2021-03-05 PROCEDURE — 84100 ASSAY OF PHOSPHORUS: CPT | Performed by: INTERNAL MEDICINE

## 2021-03-05 PROCEDURE — 06BQ4ZZ EXCISION OF LEFT SAPHENOUS VEIN, PERCUTANEOUS ENDOSCOPIC APPROACH: ICD-10-PCS | Performed by: THORACIC SURGERY (CARDIOTHORACIC VASCULAR SURGERY)

## 2021-03-05 PROCEDURE — 84100 ASSAY OF PHOSPHORUS: CPT | Performed by: STUDENT IN AN ORGANIZED HEALTH CARE EDUCATION/TRAINING PROGRAM

## 2021-03-05 PROCEDURE — 999N001017 HC STATISTIC GLUCOSE BY METER IP

## 2021-03-05 PROCEDURE — 272N000001 HC OR GENERAL SUPPLY STERILE: Performed by: THORACIC SURGERY (CARDIOTHORACIC VASCULAR SURGERY)

## 2021-03-05 PROCEDURE — 85049 AUTOMATED PLATELET COUNT: CPT | Performed by: INTERNAL MEDICINE

## 2021-03-05 PROCEDURE — 83735 ASSAY OF MAGNESIUM: CPT | Performed by: STUDENT IN AN ORGANIZED HEALTH CARE EDUCATION/TRAINING PROGRAM

## 2021-03-05 PROCEDURE — 258N000003 HC RX IP 258 OP 636: Performed by: INTERNAL MEDICINE

## 2021-03-05 PROCEDURE — 86140 C-REACTIVE PROTEIN: CPT | Performed by: INTERNAL MEDICINE

## 2021-03-05 PROCEDURE — 250N000011 HC RX IP 250 OP 636: Performed by: ANESTHESIOLOGY

## 2021-03-05 PROCEDURE — 93306 TTE W/DOPPLER COMPLETE: CPT | Mod: 26 | Performed by: INTERNAL MEDICINE

## 2021-03-05 PROCEDURE — 258N000003 HC RX IP 258 OP 636: Performed by: PHYSICIAN ASSISTANT

## 2021-03-05 PROCEDURE — 250N000009 HC RX 250: Performed by: PHYSICIAN ASSISTANT

## 2021-03-05 PROCEDURE — 80053 COMPREHEN METABOLIC PANEL: CPT | Performed by: INTERNAL MEDICINE

## 2021-03-05 PROCEDURE — 272N000088 HC PUMP APP ADULT PERFUSION: Performed by: THORACIC SURGERY (CARDIOTHORACIC VASCULAR SURGERY)

## 2021-03-05 PROCEDURE — 93925 LOWER EXTREMITY STUDY: CPT

## 2021-03-05 PROCEDURE — 200N000002 HC R&B ICU UMMC

## 2021-03-05 PROCEDURE — 82330 ASSAY OF CALCIUM: CPT | Performed by: SURGERY

## 2021-03-05 PROCEDURE — 87086 URINE CULTURE/COLONY COUNT: CPT | Performed by: INTERNAL MEDICINE

## 2021-03-05 PROCEDURE — 250N000009 HC RX 250: Performed by: SURGERY

## 2021-03-05 PROCEDURE — 83735 ASSAY OF MAGNESIUM: CPT | Performed by: INTERNAL MEDICINE

## 2021-03-05 PROCEDURE — 250N000025 HC SEVOFLURANE, PER MIN: Performed by: THORACIC SURGERY (CARDIOTHORACIC VASCULAR SURGERY)

## 2021-03-05 PROCEDURE — 278N000051 HC OR IMPLANT GENERAL: Performed by: THORACIC SURGERY (CARDIOTHORACIC VASCULAR SURGERY)

## 2021-03-05 PROCEDURE — 999N000063 XR CHEST PORT 1 VW

## 2021-03-05 PROCEDURE — 82803 BLOOD GASES ANY COMBINATION: CPT | Performed by: STUDENT IN AN ORGANIZED HEALTH CARE EDUCATION/TRAINING PROGRAM

## 2021-03-05 PROCEDURE — 85610 PROTHROMBIN TIME: CPT | Performed by: INTERNAL MEDICINE

## 2021-03-05 PROCEDURE — 999N000065 XR ABDOMEN PORT 1 VW

## 2021-03-05 PROCEDURE — 88305 TISSUE EXAM BY PATHOLOGIST: CPT | Mod: 26 | Performed by: PATHOLOGY

## 2021-03-05 PROCEDURE — C9132 KCENTRA, PER I.U.: HCPCS | Performed by: NURSE ANESTHETIST, CERTIFIED REGISTERED

## 2021-03-05 PROCEDURE — 86316 IMMUNOASSAY TUMOR OTHER: CPT | Performed by: INTERNAL MEDICINE

## 2021-03-05 PROCEDURE — 84484 ASSAY OF TROPONIN QUANT: CPT | Performed by: STUDENT IN AN ORGANIZED HEALTH CARE EDUCATION/TRAINING PROGRAM

## 2021-03-05 PROCEDURE — 02PA0RZ REMOVAL OF SHORT-TERM EXTERNAL HEART ASSIST SYSTEM FROM HEART, OPEN APPROACH: ICD-10-PCS | Performed by: THORACIC SURGERY (CARDIOTHORACIC VASCULAR SURGERY)

## 2021-03-05 PROCEDURE — 410N000004: Performed by: THORACIC SURGERY (CARDIOTHORACIC VASCULAR SURGERY)

## 2021-03-05 PROCEDURE — 250N000013 HC RX MED GY IP 250 OP 250 PS 637: Performed by: INTERNAL MEDICINE

## 2021-03-05 PROCEDURE — 85730 THROMBOPLASTIN TIME PARTIAL: CPT | Performed by: INTERNAL MEDICINE

## 2021-03-05 PROCEDURE — 85520 HEPARIN ASSAY: CPT | Performed by: INTERNAL MEDICINE

## 2021-03-05 PROCEDURE — 83605 ASSAY OF LACTIC ACID: CPT | Performed by: STUDENT IN AN ORGANIZED HEALTH CARE EDUCATION/TRAINING PROGRAM

## 2021-03-05 PROCEDURE — 272N000555 HC SENSOR NIRS OXIMETER, ADULT

## 2021-03-05 PROCEDURE — 94003 VENT MGMT INPAT SUBQ DAY: CPT

## 2021-03-05 PROCEDURE — 83615 LACTATE (LD) (LDH) ENZYME: CPT | Performed by: INTERNAL MEDICINE

## 2021-03-05 PROCEDURE — 86901 BLOOD TYPING SEROLOGIC RH(D): CPT | Performed by: THORACIC SURGERY (CARDIOTHORACIC VASCULAR SURGERY)

## 2021-03-05 PROCEDURE — 83615 LACTATE (LD) (LDH) ENZYME: CPT | Performed by: STUDENT IN AN ORGANIZED HEALTH CARE EDUCATION/TRAINING PROGRAM

## 2021-03-05 PROCEDURE — 999N000157 HC STATISTIC RCP TIME EA 10 MIN

## 2021-03-05 PROCEDURE — 85004 AUTOMATED DIFF WBC COUNT: CPT | Performed by: STUDENT IN AN ORGANIZED HEALTH CARE EDUCATION/TRAINING PROGRAM

## 2021-03-05 PROCEDURE — 999N001063 HC STATISTIC THAWING COMPONENT: Performed by: INTERNAL MEDICINE

## 2021-03-05 PROCEDURE — P9041 ALBUMIN (HUMAN),5%, 50ML: HCPCS | Performed by: STUDENT IN AN ORGANIZED HEALTH CARE EDUCATION/TRAINING PROGRAM

## 2021-03-05 PROCEDURE — 272N000231 HC CANNULA, VENOUS CENTRAL

## 2021-03-05 PROCEDURE — 74018 RADEX ABDOMEN 1 VIEW: CPT | Mod: 26 | Performed by: RADIOLOGY

## 2021-03-05 PROCEDURE — 272N000085 HC PACK CELL SAVER CSP: Performed by: THORACIC SURGERY (CARDIOTHORACIC VASCULAR SURGERY)

## 2021-03-05 PROCEDURE — 85730 THROMBOPLASTIN TIME PARTIAL: CPT | Performed by: SURGERY

## 2021-03-05 PROCEDURE — 85347 COAGULATION TIME ACTIVATED: CPT

## 2021-03-05 PROCEDURE — 85520 HEPARIN ASSAY: CPT | Performed by: STUDENT IN AN ORGANIZED HEALTH CARE EDUCATION/TRAINING PROGRAM

## 2021-03-05 PROCEDURE — P9037 PLATE PHERES LEUKOREDU IRRAD: HCPCS | Performed by: INTERNAL MEDICINE

## 2021-03-05 PROCEDURE — 85027 COMPLETE CBC AUTOMATED: CPT | Performed by: SURGERY

## 2021-03-05 PROCEDURE — 258N000001 HC RX 258: Performed by: STUDENT IN AN ORGANIZED HEALTH CARE EDUCATION/TRAINING PROGRAM

## 2021-03-05 PROCEDURE — 83051 HEMOGLOBIN PLASMA: CPT | Performed by: INTERNAL MEDICINE

## 2021-03-05 PROCEDURE — 85610 PROTHROMBIN TIME: CPT | Performed by: SURGERY

## 2021-03-05 PROCEDURE — 87040 BLOOD CULTURE FOR BACTERIA: CPT | Performed by: INTERNAL MEDICINE

## 2021-03-05 PROCEDURE — 5A1522G EXTRACORPOREAL OXYGENATION, MEMBRANE, PERIPHERAL VENO-ARTERIAL: ICD-10-PCS | Performed by: THORACIC SURGERY (CARDIOTHORACIC VASCULAR SURGERY)

## 2021-03-05 PROCEDURE — 250N000009 HC RX 250: Performed by: THORACIC SURGERY (CARDIOTHORACIC VASCULAR SURGERY)

## 2021-03-05 PROCEDURE — C9460 INJECTION, CANGRELOR: HCPCS | Performed by: INTERNAL MEDICINE

## 2021-03-05 PROCEDURE — 85379 FIBRIN DEGRADATION QUANT: CPT | Performed by: INTERNAL MEDICINE

## 2021-03-05 PROCEDURE — 250N000011 HC RX IP 250 OP 636: Performed by: SURGERY

## 2021-03-05 PROCEDURE — 93010 ELECTROCARDIOGRAM REPORT: CPT | Mod: 76 | Performed by: INTERNAL MEDICINE

## 2021-03-05 PROCEDURE — 36415 COLL VENOUS BLD VENIPUNCTURE: CPT | Performed by: INTERNAL MEDICINE

## 2021-03-05 PROCEDURE — 999N000185 HC STATISTIC TRANSPORT TIME EA 15 MIN

## 2021-03-05 PROCEDURE — 85384 FIBRINOGEN ACTIVITY: CPT | Performed by: SURGERY

## 2021-03-05 PROCEDURE — 85730 THROMBOPLASTIN TIME PARTIAL: CPT | Performed by: STUDENT IN AN ORGANIZED HEALTH CARE EDUCATION/TRAINING PROGRAM

## 2021-03-05 PROCEDURE — 81001 URINALYSIS AUTO W/SCOPE: CPT | Performed by: STUDENT IN AN ORGANIZED HEALTH CARE EDUCATION/TRAINING PROGRAM

## 2021-03-05 PROCEDURE — 86923 COMPATIBILITY TEST ELECTRIC: CPT | Performed by: THORACIC SURGERY (CARDIOTHORACIC VASCULAR SURGERY)

## 2021-03-05 PROCEDURE — 83036 HEMOGLOBIN GLYCOSYLATED A1C: CPT | Performed by: STUDENT IN AN ORGANIZED HEALTH CARE EDUCATION/TRAINING PROGRAM

## 2021-03-05 PROCEDURE — 360N000079 HC SURGERY LEVEL 6, PER MIN: Performed by: THORACIC SURGERY (CARDIOTHORACIC VASCULAR SURGERY)

## 2021-03-05 PROCEDURE — 71250 CT THORAX DX C-: CPT

## 2021-03-05 PROCEDURE — 80307 DRUG TEST PRSMV CHEM ANLYZR: CPT | Performed by: INTERNAL MEDICINE

## 2021-03-05 PROCEDURE — 99207 PR NON-BILLABLE SERV PER CHARTING: CPT | Performed by: SURGERY

## 2021-03-05 PROCEDURE — 82533 TOTAL CORTISOL: CPT | Performed by: SURGERY

## 2021-03-05 PROCEDURE — 85027 COMPLETE CBC AUTOMATED: CPT | Performed by: STUDENT IN AN ORGANIZED HEALTH CARE EDUCATION/TRAINING PROGRAM

## 2021-03-05 PROCEDURE — 021009W BYPASS CORONARY ARTERY, ONE ARTERY FROM AORTA WITH AUTOLOGOUS VENOUS TISSUE, OPEN APPROACH: ICD-10-PCS | Performed by: THORACIC SURGERY (CARDIOTHORACIC VASCULAR SURGERY)

## 2021-03-05 PROCEDURE — 93970 EXTREMITY STUDY: CPT

## 2021-03-05 PROCEDURE — 02RG08Z REPLACEMENT OF MITRAL VALVE WITH ZOOPLASTIC TISSUE, OPEN APPROACH: ICD-10-PCS | Performed by: THORACIC SURGERY (CARDIOTHORACIC VASCULAR SURGERY)

## 2021-03-05 PROCEDURE — 80053 COMPREHEN METABOLIC PANEL: CPT | Performed by: SURGERY

## 2021-03-05 PROCEDURE — 999N000208 ECHOCARDIOGRAM COMPLETE

## 2021-03-05 PROCEDURE — 82947 ASSAY GLUCOSE BLOOD QUANT: CPT | Performed by: SURGERY

## 2021-03-05 PROCEDURE — 250N000024 HC ISOFLURANE, PER MIN: Performed by: THORACIC SURGERY (CARDIOTHORACIC VASCULAR SURGERY)

## 2021-03-05 PROCEDURE — 85396 CLOTTING ASSAY WHOLE BLOOD: CPT | Performed by: STUDENT IN AN ORGANIZED HEALTH CARE EDUCATION/TRAINING PROGRAM

## 2021-03-05 PROCEDURE — 84145 PROCALCITONIN (PCT): CPT | Performed by: INTERNAL MEDICINE

## 2021-03-05 PROCEDURE — 36415 COLL VENOUS BLD VENIPUNCTURE: CPT | Performed by: THORACIC SURGERY (CARDIOTHORACIC VASCULAR SURGERY)

## 2021-03-05 PROCEDURE — 258N000003 HC RX IP 258 OP 636: Performed by: ANESTHESIOLOGY

## 2021-03-05 PROCEDURE — P9059 PLASMA, FRZ BETWEEN 8-24HOUR: HCPCS | Performed by: INTERNAL MEDICINE

## 2021-03-05 PROCEDURE — 82805 BLOOD GASES W/O2 SATURATION: CPT | Performed by: SURGERY

## 2021-03-05 PROCEDURE — 71250 CT THORAX DX C-: CPT | Mod: 26 | Performed by: RADIOLOGY

## 2021-03-05 PROCEDURE — 250N000015 HC RX FACTOR IP MED 636 OP 636: Performed by: NURSE ANESTHETIST, CERTIFIED REGISTERED

## 2021-03-05 PROCEDURE — 250N000011 HC RX IP 250 OP 636: Performed by: THORACIC SURGERY (CARDIOTHORACIC VASCULAR SURGERY)

## 2021-03-05 PROCEDURE — 83735 ASSAY OF MAGNESIUM: CPT | Performed by: SURGERY

## 2021-03-05 PROCEDURE — 370N000017 HC ANESTHESIA TECHNICAL FEE, PER MIN: Performed by: THORACIC SURGERY (CARDIOTHORACIC VASCULAR SURGERY)

## 2021-03-05 PROCEDURE — 272N000237 HC CARDIOHELP CIRCUIT

## 2021-03-05 PROCEDURE — 250N000011 HC RX IP 250 OP 636: Performed by: NURSE ANESTHETIST, CERTIFIED REGISTERED

## 2021-03-05 PROCEDURE — 74176 CT ABD & PELVIS W/O CONTRAST: CPT | Mod: 26 | Performed by: RADIOLOGY

## 2021-03-05 PROCEDURE — 5A1221Z PERFORMANCE OF CARDIAC OUTPUT, CONTINUOUS: ICD-10-PCS | Performed by: THORACIC SURGERY (CARDIOTHORACIC VASCULAR SURGERY)

## 2021-03-05 PROCEDURE — 272N000225 HC CANNULA, ARTERIAL CENTRAL

## 2021-03-05 PROCEDURE — 85384 FIBRINOGEN ACTIVITY: CPT | Performed by: INTERNAL MEDICINE

## 2021-03-05 DEVICE — SU DEVICE COR-KNOT MINI 4X14MM 031350: Type: IMPLANTABLE DEVICE | Site: CHEST | Status: FUNCTIONAL

## 2021-03-05 DEVICE — VALVE MITRAL EPIC STENTED PORCINE 31MM E100-31M-00
Type: IMPLANTABLE DEVICE | Site: CHEST | Status: NON-FUNCTIONAL
Removed: 2021-03-09

## 2021-03-05 RX ORDER — NICOTINE POLACRILEX 4 MG
15-30 LOZENGE BUCCAL
Status: CANCELLED | OUTPATIENT
Start: 2021-03-05

## 2021-03-05 RX ORDER — LIDOCAINE 40 MG/G
CREAM TOPICAL
Status: DISCONTINUED | OUTPATIENT
Start: 2021-03-05 | End: 2021-03-14

## 2021-03-05 RX ORDER — VANCOMYCIN HYDROCHLORIDE 1 G/200ML
1000 INJECTION, SOLUTION INTRAVENOUS SEE ADMIN INSTRUCTIONS
Status: DISCONTINUED | OUTPATIENT
Start: 2021-03-05 | End: 2021-03-05 | Stop reason: HOSPADM

## 2021-03-05 RX ORDER — SODIUM CHLORIDE 9 MG/ML
INJECTION, SOLUTION INTRAVENOUS CONTINUOUS
Status: DISCONTINUED | OUTPATIENT
Start: 2021-03-05 | End: 2021-03-08

## 2021-03-05 RX ORDER — NICOTINE POLACRILEX 4 MG
15-30 LOZENGE BUCCAL
Status: DISCONTINUED | OUTPATIENT
Start: 2021-03-05 | End: 2021-03-16

## 2021-03-05 RX ORDER — AMLODIPINE BESYLATE 5 MG/1
5 TABLET ORAL DAILY
Status: ON HOLD | COMMUNITY
End: 2021-04-05

## 2021-03-05 RX ORDER — DEXTROSE MONOHYDRATE, SODIUM CHLORIDE, AND POTASSIUM CHLORIDE 50; 1.49; 4.5 G/1000ML; G/1000ML; G/1000ML
INJECTION, SOLUTION INTRAVENOUS CONTINUOUS
Status: DISCONTINUED | OUTPATIENT
Start: 2021-03-05 | End: 2021-03-06

## 2021-03-05 RX ORDER — DEXTROSE MONOHYDRATE 100 MG/ML
INJECTION, SOLUTION INTRAVENOUS CONTINUOUS PRN
Status: DISCONTINUED | OUTPATIENT
Start: 2021-03-05 | End: 2021-03-08

## 2021-03-05 RX ORDER — ASPIRIN 81 MG/1
162 TABLET, CHEWABLE ORAL
Status: DISCONTINUED | OUTPATIENT
Start: 2021-03-05 | End: 2021-03-05 | Stop reason: HOSPADM

## 2021-03-05 RX ORDER — OXYCODONE HYDROCHLORIDE 5 MG/1
5-10 TABLET ORAL EVERY 6 HOURS PRN
Status: ON HOLD | COMMUNITY
End: 2021-04-05

## 2021-03-05 RX ORDER — CEFAZOLIN SODIUM 1 G/3ML
1 INJECTION, POWDER, FOR SOLUTION INTRAMUSCULAR; INTRAVENOUS SEE ADMIN INSTRUCTIONS
Status: DISCONTINUED | OUTPATIENT
Start: 2021-03-05 | End: 2021-03-05 | Stop reason: HOSPADM

## 2021-03-05 RX ORDER — NOREPINEPHRINE BITARTRATE 0.06 MG/ML
INJECTION, SOLUTION INTRAVENOUS CONTINUOUS PRN
Status: COMPLETED | OUTPATIENT
Start: 2021-03-05 | End: 2021-03-05

## 2021-03-05 RX ORDER — ASPIRIN 81 MG/1
81 TABLET, CHEWABLE ORAL
Status: DISCONTINUED | OUTPATIENT
Start: 2021-03-05 | End: 2021-03-05 | Stop reason: HOSPADM

## 2021-03-05 RX ORDER — CHLORHEXIDINE GLUCONATE ORAL RINSE 1.2 MG/ML
15 SOLUTION DENTAL 2 TIMES DAILY
Status: DISCONTINUED | OUTPATIENT
Start: 2021-03-05 | End: 2021-03-16

## 2021-03-05 RX ORDER — POTASSIUM CHLORIDE 29.8 MG/ML
20 INJECTION INTRAVENOUS ONCE
Status: COMPLETED | OUTPATIENT
Start: 2021-03-05 | End: 2021-03-05

## 2021-03-05 RX ORDER — HEPARIN SODIUM 200 [USP'U]/100ML
3 INJECTION, SOLUTION INTRAVENOUS CONTINUOUS
Status: DISCONTINUED | OUTPATIENT
Start: 2021-03-05 | End: 2021-03-05

## 2021-03-05 RX ORDER — VANCOMYCIN HYDROCHLORIDE 1 G/200ML
1000 INJECTION, SOLUTION INTRAVENOUS
Status: COMPLETED | OUTPATIENT
Start: 2021-03-05 | End: 2021-03-05

## 2021-03-05 RX ORDER — METHOCARBAMOL 750 MG/1
750 TABLET, FILM COATED ORAL 4 TIMES DAILY PRN
Status: ON HOLD | COMMUNITY
End: 2021-04-05

## 2021-03-05 RX ORDER — ACETAMINOPHEN 325 MG/1
975 TABLET ORAL ONCE
Status: DISCONTINUED | OUTPATIENT
Start: 2021-03-05 | End: 2021-03-05 | Stop reason: HOSPADM

## 2021-03-05 RX ORDER — SODIUM CHLORIDE, SODIUM LACTATE, POTASSIUM CHLORIDE, CALCIUM CHLORIDE 600; 310; 30; 20 MG/100ML; MG/100ML; MG/100ML; MG/100ML
INJECTION, SOLUTION INTRAVENOUS CONTINUOUS
Status: DISCONTINUED | OUTPATIENT
Start: 2021-03-05 | End: 2021-03-08 | Stop reason: CLARIF

## 2021-03-05 RX ORDER — HEPARIN SODIUM 10000 [USP'U]/100ML
INJECTION, SOLUTION INTRAVENOUS CONTINUOUS
Status: DISCONTINUED | OUTPATIENT
Start: 2021-03-05 | End: 2021-03-06

## 2021-03-05 RX ORDER — ASPIRIN 81 MG/1
162 TABLET, CHEWABLE ORAL DAILY
Status: DISCONTINUED | OUTPATIENT
Start: 2021-03-06 | End: 2021-03-06

## 2021-03-05 RX ORDER — OXYCODONE HYDROCHLORIDE 5 MG/1
5 TABLET ORAL
Status: DISCONTINUED | OUTPATIENT
Start: 2021-03-05 | End: 2021-03-14

## 2021-03-05 RX ORDER — ALBUMIN, HUMAN INJ 5% 5 %
12.5 SOLUTION INTRAVENOUS
Status: DISCONTINUED | OUTPATIENT
Start: 2021-03-05 | End: 2021-04-05 | Stop reason: HOSPADM

## 2021-03-05 RX ORDER — NOREPINEPHRINE BITARTRATE 0.06 MG/ML
0.03-0.4 INJECTION, SOLUTION INTRAVENOUS CONTINUOUS
Status: DISCONTINUED | OUTPATIENT
Start: 2021-03-05 | End: 2021-03-05

## 2021-03-05 RX ORDER — LIDOCAINE 40 MG/G
CREAM TOPICAL
Status: DISCONTINUED | OUTPATIENT
Start: 2021-03-05 | End: 2021-03-05 | Stop reason: HOSPADM

## 2021-03-05 RX ORDER — DEXMEDETOMIDINE HYDROCHLORIDE 4 UG/ML
0.2-1.2 INJECTION, SOLUTION INTRAVENOUS CONTINUOUS
Status: DISCONTINUED | OUTPATIENT
Start: 2021-03-05 | End: 2021-03-05 | Stop reason: HOSPADM

## 2021-03-05 RX ORDER — PROTAMINE SULFATE 10 MG/ML
INJECTION, SOLUTION INTRAVENOUS PRN
Status: DISCONTINUED | OUTPATIENT
Start: 2021-03-05 | End: 2021-03-05

## 2021-03-05 RX ORDER — MAGNESIUM SULFATE HEPTAHYDRATE 40 MG/ML
2 INJECTION, SOLUTION INTRAVENOUS DAILY PRN
Status: DISCONTINUED | OUTPATIENT
Start: 2021-03-05 | End: 2021-03-05

## 2021-03-05 RX ORDER — CALCIUM CHLORIDE 100 MG/ML
INJECTION INTRAVENOUS; INTRAVENTRICULAR PRN
Status: DISCONTINUED | OUTPATIENT
Start: 2021-03-05 | End: 2021-03-05

## 2021-03-05 RX ORDER — METOPROLOL SUCCINATE 25 MG/1
25 TABLET, EXTENDED RELEASE ORAL DAILY
Status: ON HOLD | COMMUNITY
End: 2021-04-05

## 2021-03-05 RX ORDER — DEXTROSE MONOHYDRATE 25 G/50ML
25-50 INJECTION, SOLUTION INTRAVENOUS
Status: CANCELLED | OUTPATIENT
Start: 2021-03-05

## 2021-03-05 RX ORDER — MAGNESIUM SULFATE HEPTAHYDRATE 40 MG/ML
2 INJECTION, SOLUTION INTRAVENOUS ONCE
Status: COMPLETED | OUTPATIENT
Start: 2021-03-05 | End: 2021-03-05

## 2021-03-05 RX ORDER — ACETAMINOPHEN 325 MG/1
650 TABLET ORAL EVERY 4 HOURS PRN
Status: DISCONTINUED | OUTPATIENT
Start: 2021-03-08 | End: 2021-03-14

## 2021-03-05 RX ORDER — CEFAZOLIN SODIUM 2 G/100ML
2 INJECTION, SOLUTION INTRAVENOUS
Status: DISCONTINUED | OUTPATIENT
Start: 2021-03-05 | End: 2021-03-05 | Stop reason: HOSPADM

## 2021-03-05 RX ORDER — MAGNESIUM SULFATE HEPTAHYDRATE 40 MG/ML
4 INJECTION, SOLUTION INTRAVENOUS EVERY 4 HOURS PRN
Status: DISCONTINUED | OUTPATIENT
Start: 2021-03-05 | End: 2021-03-05

## 2021-03-05 RX ORDER — NOREPINEPHRINE BITARTRATE 0.06 MG/ML
INJECTION, SOLUTION INTRAVENOUS
Status: COMPLETED
Start: 2021-03-05 | End: 2021-03-05

## 2021-03-05 RX ORDER — POTASSIUM CHLORIDE 29.8 MG/ML
20 INJECTION INTRAVENOUS
Status: DISCONTINUED | OUTPATIENT
Start: 2021-03-05 | End: 2021-03-05

## 2021-03-05 RX ORDER — MUPIROCIN 20 MG/G
0.5 OINTMENT TOPICAL 2 TIMES DAILY
Status: DISPENSED | OUTPATIENT
Start: 2021-03-05 | End: 2021-03-10

## 2021-03-05 RX ORDER — FUROSEMIDE 10 MG/ML
40 INJECTION INTRAMUSCULAR; INTRAVENOUS ONCE
Status: COMPLETED | OUTPATIENT
Start: 2021-03-05 | End: 2021-03-05

## 2021-03-05 RX ORDER — SODIUM CHLORIDE, SODIUM GLUCONATE, SODIUM ACETATE, POTASSIUM CHLORIDE AND MAGNESIUM CHLORIDE 526; 502; 368; 37; 30 MG/100ML; MG/100ML; MG/100ML; MG/100ML; MG/100ML
INJECTION, SOLUTION INTRAVENOUS CONTINUOUS PRN
Status: DISCONTINUED | OUTPATIENT
Start: 2021-03-05 | End: 2021-03-05

## 2021-03-05 RX ORDER — ACETAMINOPHEN 325 MG/1
975 TABLET ORAL EVERY 8 HOURS
Status: DISPENSED | OUTPATIENT
Start: 2021-03-05 | End: 2021-03-08

## 2021-03-05 RX ORDER — FAMOTIDINE 20 MG/1
20 TABLET, FILM COATED ORAL
Status: DISCONTINUED | OUTPATIENT
Start: 2021-03-05 | End: 2021-03-05

## 2021-03-05 RX ORDER — PANTOPRAZOLE SODIUM 40 MG/1
40 TABLET, DELAYED RELEASE ORAL DAILY
Status: DISCONTINUED | OUTPATIENT
Start: 2021-03-06 | End: 2021-04-05 | Stop reason: HOSPADM

## 2021-03-05 RX ORDER — HEPARIN SODIUM 10000 [USP'U]/100ML
10-50 INJECTION, SOLUTION INTRAVENOUS CONTINUOUS
Status: DISCONTINUED | OUTPATIENT
Start: 2021-03-05 | End: 2021-03-06

## 2021-03-05 RX ORDER — SODIUM CHLORIDE, SODIUM GLUCONATE, SODIUM ACETATE, POTASSIUM CHLORIDE AND MAGNESIUM CHLORIDE 526; 502; 368; 37; 30 MG/100ML; MG/100ML; MG/100ML; MG/100ML; MG/100ML
1-3 INJECTION, SOLUTION INTRAVENOUS CONTINUOUS
Status: DISCONTINUED | OUTPATIENT
Start: 2021-03-05 | End: 2021-03-06

## 2021-03-05 RX ORDER — HYDRALAZINE HYDROCHLORIDE 20 MG/ML
10 INJECTION INTRAMUSCULAR; INTRAVENOUS EVERY 30 MIN PRN
Status: DISCONTINUED | OUTPATIENT
Start: 2021-03-05 | End: 2021-04-05 | Stop reason: HOSPADM

## 2021-03-05 RX ORDER — FIBRINOGEN (HUMAN) 700-1300MG
1050 KIT INTRAVENOUS
Status: DISCONTINUED | OUTPATIENT
Start: 2021-03-05 | End: 2021-03-06

## 2021-03-05 RX ORDER — NOREPINEPHRINE BITARTRATE 0.06 MG/ML
0.03-0.4 INJECTION, SOLUTION INTRAVENOUS CONTINUOUS
Status: DISCONTINUED | OUTPATIENT
Start: 2021-03-05 | End: 2021-03-10

## 2021-03-05 RX ORDER — PHENYLEPHRINE HCL IN 0.9% NACL 50MG/250ML
0.5-6 PLASTIC BAG, INJECTION (ML) INTRAVENOUS CONTINUOUS
Status: DISCONTINUED | OUTPATIENT
Start: 2021-03-05 | End: 2021-03-05

## 2021-03-05 RX ORDER — DOBUTAMINE HYDROCHLORIDE 200 MG/100ML
10 INJECTION INTRAVENOUS CONTINUOUS
Status: DISCONTINUED | OUTPATIENT
Start: 2021-03-05 | End: 2021-03-06

## 2021-03-05 RX ORDER — HEPARIN SODIUM 1000 [USP'U]/ML
INJECTION, SOLUTION INTRAVENOUS; SUBCUTANEOUS PRN
Status: DISCONTINUED | OUTPATIENT
Start: 2021-03-05 | End: 2021-03-05

## 2021-03-05 RX ORDER — LIDOCAINE 40 MG/G
CREAM TOPICAL
Status: DISCONTINUED | OUTPATIENT
Start: 2021-03-05 | End: 2021-03-10

## 2021-03-05 RX ORDER — IOPAMIDOL 755 MG/ML
INJECTION, SOLUTION INTRAVASCULAR
Status: DISCONTINUED | OUTPATIENT
Start: 2021-03-05 | End: 2021-03-05 | Stop reason: HOSPADM

## 2021-03-05 RX ORDER — LOSARTAN POTASSIUM AND HYDROCHLOROTHIAZIDE 25; 100 MG/1; MG/1
1 TABLET ORAL DAILY
Status: ON HOLD | COMMUNITY
End: 2021-04-05

## 2021-03-05 RX ORDER — PHENYLEPHRINE HCL IN 0.9% NACL 50MG/250ML
0.5-6 PLASTIC BAG, INJECTION (ML) INTRAVENOUS CONTINUOUS
Status: DISCONTINUED | OUTPATIENT
Start: 2021-03-05 | End: 2021-03-06

## 2021-03-05 RX ORDER — GABAPENTIN 100 MG/1
100 CAPSULE ORAL
Status: DISCONTINUED | OUTPATIENT
Start: 2021-03-05 | End: 2021-03-05 | Stop reason: HOSPADM

## 2021-03-05 RX ORDER — MIDAZOLAM (PF) 1 MG/ML IN 0.9 % SODIUM CHLORIDE INTRAVENOUS SOLUTION
1-8 CONTINUOUS
Status: DISCONTINUED | OUTPATIENT
Start: 2021-03-05 | End: 2021-03-07

## 2021-03-05 RX ORDER — DEXTROSE MONOHYDRATE 100 MG/ML
INJECTION, SOLUTION INTRAVENOUS CONTINUOUS PRN
Status: CANCELLED | OUTPATIENT
Start: 2021-03-05

## 2021-03-05 RX ORDER — NOREPINEPHRINE BITARTRATE 0.06 MG/ML
.01-.4 INJECTION, SOLUTION INTRAVENOUS CONTINUOUS PRN
Status: DISCONTINUED | OUTPATIENT
Start: 2021-03-05 | End: 2021-03-05

## 2021-03-05 RX ORDER — DEXTROSE MONOHYDRATE 25 G/50ML
25-50 INJECTION, SOLUTION INTRAVENOUS
Status: DISCONTINUED | OUTPATIENT
Start: 2021-03-05 | End: 2021-03-16

## 2021-03-05 RX ORDER — CHLORHEXIDINE GLUCONATE ORAL RINSE 1.2 MG/ML
10 SOLUTION DENTAL ONCE
Status: DISCONTINUED | OUTPATIENT
Start: 2021-03-05 | End: 2021-03-05 | Stop reason: HOSPADM

## 2021-03-05 RX ADMIN — DEXTROSE MONOHYDRATE 25 ML: 500 INJECTION PARENTERAL at 15:39

## 2021-03-05 RX ADMIN — PROTAMINE SULFATE 300 MG: 10 INJECTION, SOLUTION INTRAVENOUS at 12:28

## 2021-03-05 RX ADMIN — CANGRELOR 4 MCG/KG/MIN: 50 INJECTION, POWDER, LYOPHILIZED, FOR SOLUTION INTRAVENOUS at 00:53

## 2021-03-05 RX ADMIN — AMINOCAPROIC ACID 1 G/HR: 250 INJECTION, SOLUTION INTRAVENOUS at 10:19

## 2021-03-05 RX ADMIN — HUMAN INSULIN 20 UNITS/HR: 100 INJECTION, SOLUTION SUBCUTANEOUS at 07:14

## 2021-03-05 RX ADMIN — HUMAN INSULIN 8 UNITS/HR: 100 INJECTION, SOLUTION SUBCUTANEOUS at 04:12

## 2021-03-05 RX ADMIN — POTASSIUM CHLORIDE 20 MEQ: 29.8 INJECTION, SOLUTION INTRAVENOUS at 04:36

## 2021-03-05 RX ADMIN — SODIUM CHLORIDE, POTASSIUM CHLORIDE, SODIUM LACTATE AND CALCIUM CHLORIDE 1000 ML: 600; 310; 30; 20 INJECTION, SOLUTION INTRAVENOUS at 21:54

## 2021-03-05 RX ADMIN — POTASSIUM CHLORIDE 20 MEQ: 29.8 INJECTION, SOLUTION INTRAVENOUS at 07:23

## 2021-03-05 RX ADMIN — ANGIOTENSIN II 20 NG/KG/MIN: 2.5 INJECTION INTRAVENOUS at 09:53

## 2021-03-05 RX ADMIN — HEPARIN SODIUM 900 UNITS/HR: 10000 INJECTION, SOLUTION INTRAVENOUS at 03:33

## 2021-03-05 RX ADMIN — SODIUM BICARBONATE 50 MEQ: 84 INJECTION INTRAVENOUS at 03:07

## 2021-03-05 RX ADMIN — ROCURONIUM BROMIDE 30 MG: 10 INJECTION INTRAVENOUS at 13:09

## 2021-03-05 RX ADMIN — ALBUMIN HUMAN 12.5 G: 0.05 INJECTION, SOLUTION INTRAVENOUS at 23:38

## 2021-03-05 RX ADMIN — CEFEPIME HYDROCHLORIDE 2 G: 2 INJECTION, POWDER, FOR SOLUTION INTRAVENOUS at 17:17

## 2021-03-05 RX ADMIN — HEPARIN SODIUM 12.1 ML/HR: 10000 INJECTION INTRAVENOUS; SUBCUTANEOUS at 01:17

## 2021-03-05 RX ADMIN — PROTHROMBIN, COAGULATION FACTOR VII HUMAN, COAGULATION FACTOR IX HUMAN, COAGULATION FACTOR X HUMAN, PROTEIN C, PROTEIN S HUMAN, AND WATER 1110 UNITS: KIT at 13:12

## 2021-03-05 RX ADMIN — DOBUTAMINE HYDROCHLORIDE 10 MCG/KG/MIN: 200 INJECTION INTRAVENOUS at 07:14

## 2021-03-05 RX ADMIN — HUMAN INSULIN: 100 INJECTION, SOLUTION SUBCUTANEOUS at 09:59

## 2021-03-05 RX ADMIN — PROTHROMBIN, COAGULATION FACTOR VII HUMAN, COAGULATION FACTOR IX HUMAN, COAGULATION FACTOR X HUMAN, PROTEIN C, PROTEIN S HUMAN, AND WATER 1110 UNITS: KIT at 12:32

## 2021-03-05 RX ADMIN — DOBUTAMINE HYDROCHLORIDE 10 MCG/KG/MIN: 200 INJECTION INTRAVENOUS at 02:22

## 2021-03-05 RX ADMIN — POTASSIUM CHLORIDE 20 MEQ: 29.8 INJECTION, SOLUTION INTRAVENOUS at 17:38

## 2021-03-05 RX ADMIN — Medication 6 UNITS/HR: at 07:14

## 2021-03-05 RX ADMIN — FUROSEMIDE 40 MG: 10 INJECTION, SOLUTION INTRAVENOUS at 06:18

## 2021-03-05 RX ADMIN — Medication 0.5 MCG/KG/MIN: at 06:34

## 2021-03-05 RX ADMIN — Medication: at 14:15

## 2021-03-05 RX ADMIN — CALCIUM CHLORIDE 1000 MG: 100 INJECTION INTRAVENOUS; INTRAVENTRICULAR at 12:45

## 2021-03-05 RX ADMIN — SODIUM BICARBONATE 50 MEQ: 84 INJECTION INTRAVENOUS at 07:49

## 2021-03-05 RX ADMIN — FUROSEMIDE 40 MG: 10 INJECTION, SOLUTION INTRAVENOUS at 03:38

## 2021-03-05 RX ADMIN — Medication 50 MCG/HR: at 06:08

## 2021-03-05 RX ADMIN — SODIUM CHLORIDE, SODIUM GLUCONATE, SODIUM ACETATE, POTASSIUM CHLORIDE AND MAGNESIUM CHLORIDE: 526; 502; 368; 37; 30 INJECTION, SOLUTION INTRAVENOUS at 08:15

## 2021-03-05 RX ADMIN — AMINOCAPROIC ACID 7.5 G: 250 INJECTION, SOLUTION INTRAVENOUS at 09:18

## 2021-03-05 RX ADMIN — EPINEPHRINE 0.5 MCG/KG/MIN: 1 INJECTION PARENTERAL at 02:55

## 2021-03-05 RX ADMIN — SODIUM CHLORIDE, POTASSIUM CHLORIDE, SODIUM LACTATE AND CALCIUM CHLORIDE 1000 ML: 600; 310; 30; 20 INJECTION, SOLUTION INTRAVENOUS at 18:54

## 2021-03-05 RX ADMIN — Medication 4 MG/HR: at 16:59

## 2021-03-05 RX ADMIN — MUPIROCIN 0.5 G: 20 OINTMENT TOPICAL at 23:38

## 2021-03-05 RX ADMIN — VANCOMYCIN HYDROCHLORIDE 1000 MG: 1 INJECTION, SOLUTION INTRAVENOUS at 09:43

## 2021-03-05 RX ADMIN — HUMAN INSULIN 20 UNITS/HR: 100 INJECTION, SOLUTION SUBCUTANEOUS at 08:15

## 2021-03-05 RX ADMIN — HEPARIN SODIUM 32000 UNITS: 1000 INJECTION INTRAVENOUS; SUBCUTANEOUS at 09:17

## 2021-03-05 RX ADMIN — ALBUMIN HUMAN 12.5 G: 0.05 INJECTION, SOLUTION INTRAVENOUS at 21:53

## 2021-03-05 RX ADMIN — CALCIUM CHLORIDE 500 MG: 100 INJECTION INTRAVENOUS; INTRAVENTRICULAR at 13:28

## 2021-03-05 RX ADMIN — Medication 8 UNITS/HR: at 02:18

## 2021-03-05 RX ADMIN — ROCURONIUM BROMIDE 50 MG: 10 INJECTION INTRAVENOUS at 09:40

## 2021-03-05 RX ADMIN — SODIUM CHLORIDE, SODIUM GLUCONATE, SODIUM ACETATE, POTASSIUM CHLORIDE AND MAGNESIUM CHLORIDE: 526; 502; 368; 37; 30 INJECTION, SOLUTION INTRAVENOUS at 13:55

## 2021-03-05 RX ADMIN — SODIUM CHLORIDE: 9 INJECTION, SOLUTION INTRAVENOUS at 03:07

## 2021-03-05 RX ADMIN — CHLORHEXIDINE GLUCONATE 0.12% ORAL RINSE 15 ML: 1.2 LIQUID ORAL at 19:48

## 2021-03-05 RX ADMIN — Medication 0.5 MCG/KG/MIN: at 06:35

## 2021-03-05 RX ADMIN — MAGNESIUM SULFATE HEPTAHYDRATE 2 G: 40 INJECTION, SOLUTION INTRAVENOUS at 05:41

## 2021-03-05 RX ADMIN — SODIUM CHLORIDE, POTASSIUM CHLORIDE, SODIUM LACTATE AND CALCIUM CHLORIDE 1000 ML: 600; 310; 30; 20 INJECTION, SOLUTION INTRAVENOUS at 18:26

## 2021-03-05 RX ADMIN — ALBUMIN HUMAN 12.5 G: 0.05 INJECTION, SOLUTION INTRAVENOUS at 21:41

## 2021-03-05 RX ADMIN — SODIUM CHLORIDE, POTASSIUM CHLORIDE, SODIUM LACTATE AND CALCIUM CHLORIDE 1000 ML: 600; 310; 30; 20 INJECTION, SOLUTION INTRAVENOUS at 17:06

## 2021-03-05 RX ADMIN — SODIUM BICARBONATE 50 MEQ: 84 INJECTION INTRAVENOUS at 07:50

## 2021-03-05 RX ADMIN — Medication 50 MEQ: at 07:49

## 2021-03-05 ASSESSMENT — ACTIVITIES OF DAILY LIVING (ADL)
ADLS_ACUITY_SCORE: 20
ADLS_ACUITY_SCORE: 22
ADLS_ACUITY_SCORE: 22
ADLS_ACUITY_SCORE: 20

## 2021-03-05 NOTE — PROGRESS NOTES
ETT pulled out 3cm per MD order. ETT moved from 26 to 23 @ lips. BBS. No issues noted post reposition.     Mary Jane Abilio, RT

## 2021-03-05 NOTE — H&P
CV ICU H&P  3/5/2021      CO-MORBIDITIES:   Patient Active Problem List   Diagnosis     Cardiogenic shock (H)       ASSESSMENT: Jin Garrett is a 80 year old male with PMH of HTN, AAA, HLD and rheumatoid arthritis. He presented to an OSH with shortness of breath and atypical chest pain. Work-up revealed an elevated troponin, hypotension and hypoxia. TTE showed severe MR with concern for posterior flail leaflet due to to either papillary muscle rupture or torn chordae, EF was preserved. On arrival at [facility] an Impella was emergently placed in cath lab. 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 Porras on 3/5/21. Returned from the OR on central ECMO with an open chest, intubated and sedated.     PLAN:  Neuro/ pain/ sedation:  Post-operative pain  - Monitor neurological status. Notify the MD for any acute changes in exam.  - Fentanyl gtt for pain.  - Midazolam gtt for sedation.    Pulmonary care:  Acute post-operative respiratory failure  - MV  - Titrate FiO2 for SpO2 >92%    Cardiovascular:  HTN  AAA  HLD  A fib  Severe mitral regurgitation s/p MVR  CAD with 100% occlusion of the mid LCx s/p single vessel CABG   Cardiogenic shock  Vasoplegic shock   VA ECMO (central)   - Monitor hemodynamic status.   - MAP >65  - Vasopressors: epi, norepi, vaso and angiotensin gtts    GI/Nutrition:   - NPO except ice chips and medications.  - Nutrition consult for tomorrow    Fluids/ Electrolytes/Renal:  ADAN   - TKO for IV fluid hydration.  - Urine output is adequate so far.  - Will continue to monitor intake and output.  - BMP in the AM    Endocrine:  Stress hyperglycemia    - Insulin gtt  - Keep blood glucose <180    ID/ Antibiotics:  Leukocytosis - likely secondary to physiologic stress  - Complete perioperative antibiotics    Heme:  Hemorraghic shock      - Hgb goal >8  - Post-op Hgb 10.5  - CBC in the AM   - Restart cangrelor when cleared by CVTS  - Restart heparin gtt tomorrow    Prophylaxis:    - Mechanical  prophylaxis for DVT.   - No chemical DVT prophylaxis due to high risk of bleeding.   - Protonix qd    Lines/ tubes/ drains:  - MAC  - [employer]  - Arterial line  - Central ECMO cannulas  - 3 mediastinal CTs  - 2 pleural CTs  - Alfaro    Disposition:  - CV ICU.     Patient seen, findings and plan discussed with CV ICU staff, Dr. León.    BRYANT Tinajero (CA2)  Anesthesiology Resident  *[phone]      ====================================    HPI:   Jin Garrett is a 80 year old male with PMH of HTN, AAA, HLD and rheumatoid arthritis. He presented to an OSH with shortness of breath and atypical chest pain. Work-up revealed an elevated troponin, hypotension and hypoxia. TTE showed severe MR with concern for posterior flail leaflet due to to either papillary muscle rupture or torn chordae, EF was preserved. On arrival at [facility] an Impella was emergently placed in cath lab. 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 Porras on 3/5/21.    Intraoperative course was notable for being unable to come off VA ECMO. EBL was 1L for which the patient received 6 units of pRBCs, 2 units of platelets and 2L crystalloid.      PAST MEDICAL HISTORY: No past medical history on file.    PAST SURGICAL HISTORY: No past surgical history on file.    FAMILY HISTORY: No family history on file.    SOCIAL HISTORY:   Social History     Tobacco Use     Smoking status: Not on file   Substance Use Topics     Alcohol use: Not on file         OBJECTIVE:  1. VITAL SIGNS:   Temp:  [95.5  F (35.3  C)-100.6  F (38.1  C)] 100.6  F (38.1  C)  Pulse:  [] 109  Resp:  [] 20  BP: (84-99)/(51-61) 99/61  MAP:  [41 mmHg-73 mmHg] 66 mmHg  Arterial Line BP: ()/(15-57) 104/50  FiO2 (%):  [50 %-100 %] 50 %  SpO2:  [78 %-100 %] 93 %    Ventilation Mode: CMV/AC  (Continuous Mandatory Ventilation/ Assist Control)  FiO2 (%): 50 %  Rate Set (breaths/minute): 14 breaths/min  Tidal Volume Set (mL): 400 mL  PEEP (cm H2O): 5 cmH2O  Oxygen Concentration  (%): 50 %  Resp: 20        2. INTAKE/ OUTPUT:   I/O last 3 completed shifts:  In: 6395.63 [I.V.:2619.63; Other:459]  Out: 2660 [Urine:2610; Emesis/NG output:50]        3. PHYSICAL EXAMINATION:   General: Sedated, lying in bed  Neuro: Sedated, RASS -4  Resp: Intubated and mechanically ventilated  CV: Paced  Abdomen: Soft, Non-distended, Non-tender  Incisions: Open chest, dressing intact  Extremities: warm and well perfused upper extremities, bilateral feet cool to the touch    4. INVESTIGATIONS:   Arterial Blood Gases   Recent Labs   Lab 03/05/21  1534 03/05/21  1242 03/05/21  0900 03/05/21  0546 03/05/21  0256 03/05/21  0256   WBC 21.8*  --  25.6*  --    < >  --    HGB 10.5*  --  8.4*  --    < >  --     132* 288  --    < >  --    INR  --  2.28* 1.68*  --   --   --    NA  --   --   --  141  --  139   POTASSIUM  --   --   --  3.4  --  3.2*   BUN  --   --   --  49*  --  49*   CR  --   --   --  2.52*  --  2.49*    < > = values in this interval not displayed.           5. RADIOLOGY:     =========================================

## 2021-03-05 NOTE — ANESTHESIA PROCEDURE NOTES
Perioperative TASH Report  Anesthesia Information  TASH probe placed and report generated by: : Roshan Moeller MD Tolly, Brian Thomas, MD  Images for this study have been archived.     Echocardiogram Comments: PreCPB: lvef 50%, all regions moving with slight hypokinesis of lateral region. Normal RV systolic function. Severe MR, anterior jet, coanda. Flail posterior leaflet, prolapsing pap muscle attached. PV flow reversal during systole. In a fib, RHETT quivering, no clot or smoke. Mild TR. Mild AI likely due to impella in place. impella tip~4.7 cm into LV cavity directed towards mitral valve as opposed to IVS. Outflow color clearing in asc aorta. Large pleural effusions bilaterally. Atheromas in thoracic desc aorta. Unable to appreciate any PFO on CFD preCPB and surgeon notified of this.    Preanesthesia Checklist:  Patient identified, IV assessed, risks and benefits discussed, monitors and equipment assessed, procedure being performed at surgeon's request and anesthesia consent obtained.  General Procedure Information  Modalities:  3D, CW Doppler, PW Doppler, Color flow mapping and 2D        Post Intervention Findings  Procedure(s) performed:  Mitral Valve Repair/Replace and ECMO Placement. .   Regional wall motion:   Surgeon(s) notified of all postintervention findings:  Yes  .  .  .   .  .  .  .  .  .  .    PostCPB: on central VA ecmo, s/p bioMVR, single vessel cabg to Cx territory - lvef 20%, severe hypokinesis in infterior anterolatero and inferolatero regions. LV impella in place, tip difficult to see with bioMVR shadowing, but appears to be near the inferolateral wall under MV. Normal RV systolic function. BioMVR well seated, all leaflets moving, no PVL noted, laminar flow, MG 2 mmHg (assessed while off full CPB before ecmo conversion). Mild TR. AV unchanged. Pleural effusion small on L, gone on Right. No ao dissection.     Echocardiogram Comments  PreCPB: lvef 50%, all regions moving with slight  hypokinesis of lateral region. Normal RV systolic function. Severe MR, anterior jet, coanda. Flail posterior leaflet, prolapsing pap muscle attached. PV flow reversal during systole. In a fib, RHETT quivering, no clot or smoke. Mild TR. Mild AI likely due to impella in place. impella tip~4.7 cm into LV cavity directed towards mitral valve as opposed to IVS. Outflow color clearing in asc aorta. Large pleural effusions bilaterally. Atheromas in thoracic desc aorta. Unable to appreciate any PFO on CFD preCPB and surgeon notified of this.

## 2021-03-05 NOTE — PLAN OF CARE
Admitted/transferred from: [facility]  Reason for admission/transfer: cardiogenic shock  2 RN skin assessment: completed by Lillie TOM & Cherri ESPINOZA  Result of skin assessment and interventions/actions: No PU present  Height, weight, drug calc weight: done  Patient belongings (see Flowsheet)   MDRO education added to care plan: yes    Admitted 2230, Sent to cath lab around 2330 for angio & Impella placement.     -4 RASS, sedated w/ 5 versed, 50 fent. -120s w/ frequent PACs. Levo @ 0.5, epi @ 0.5, vaso @ 6, dobutamine @ 10. Phenyl available if needed. Impella in L. Groin @ P7, frequent suction alarms, Fellow aware. L groin site continues to ooze. CVP 13, PA 46/20, wedge 18, SvO2 67%, K+ 3.2 & Mag 1.7 replaced. CMV - 100% FiO2, , RR 14, PEEP 16. OG to LIS. Abd & chest CT completed, no critical results. Heparin @ 900 units/hr, 10A recheck @ 1000. Insulin gtt @ 20 units/hr. 80mg lasix given, moderate UO through izquierdo.       ?

## 2021-03-05 NOTE — ANESTHESIA CARE TRANSFER NOTE
Patient: Jin Garrett    Procedure(s):  REPLACEMENT, MITRAL VALVE OPEN ENDOSCOPIC VEIN HARVEST LEFT LEG CORONARY ARTERY BYPASS GRAFT X1 AND REMOVAL OF IMPELLA PLACEMENT OF EXTRACORPOREAL MEMBRANE OXYGENATOR AND REPAIR OF LEFT FEMORAL ARTERY  CORONARY ARTERY BYPASS GRAFT (CABG)    Diagnosis: Mitral valve regurgitation due to acute myocardial infarction without rupture of papillary muscle or chordae tendineae (H) [I34.0, I21.9]  Diagnosis Additional Information: No value filed.    Anesthesia Type:   No value filed.     Note:    Oropharynx: ventilatory support and oral gastic tube in place  Level of Consciousness: iatrogenic sedation      Independent Airway: airway patency not satisfactory and stable  Dentition: dentition unchanged  Vital Signs Stable: post-procedure vital signs reviewed and stable  Report to RN Given: handoff report given  Patient transferred to: ICU  Comments: Pt remains on the ventilator on full ECMO support. VSS throughout transport and upon arrival to the PACU. Pt care report given to receiving RN. All questions answered.   ICU Handoff: Call for PAUSE to initiate/utilize ICU HANDOFF, Identified Patient, Identified Responsible Provider, Reviewed the Pertinent Medical History, Discussed Surgical Course, Reviewed Intra-OP Anesthesia Management and Issues during Anesthesia, Set Expectations for Post Procedure Period and Allowed Opportunity for Questions and Acknowledgement of Understanding      Vitals: (Last set prior to Anesthesia Care Transfer)  CRNA VITALS  3/5/2021 1445 - 3/5/2021 1525      3/5/2021             Resp Rate (observed):  14    Resp Rate (set):  14        Electronically Signed By: TIMOTHY Cardona CRNA  March 5, 2021  3:25 PM

## 2021-03-05 NOTE — PHARMACY-ADMISSION MEDICATION HISTORY
Admission medication history interview status for the 3/4/2021 admission is complete. See Epic admission navigator for allergy information, pharmacy, prior to admission medications and immunization status.     Medication history interview sources:  [employer] insurance fills. Unable to interview patient at this time given clinical status & care everywhere has no recent records    Changes made to PTA medication list (reason)  Added: all medicaitons  Deleted: n/a  Changed: n/a    Additional medication history information (including reliability of information, actions taken by pharmacist): could clarify medications with patient after extubation.       Prior to Admission medications    Medication Sig Last Dose Taking? Auth Provider   amitriptyline (ELAVIL) 25 MG tablet Take 25 mg by mouth At Bedtime  Yes Unknown, Entered By History   amLODIPine (NORVASC) 5 MG tablet Take 5 mg by mouth daily  Yes Unknown, Entered By History   losartan-hydrochlorothiazide (HYZAAR) 100-25 MG tablet Take 1 tablet by mouth daily  Yes Unknown, Entered By History   methocarbamol (ROBAXIN) 750 MG tablet Take 750 mg by mouth 4 times daily as needed for muscle spasms . . Filled 2/23/2021 x60 tablets  Yes Unknown, Entered By History   metoprolol succinate ER (TOPROL-XL) 25 MG 24 hr tablet Take 25 mg by mouth daily  Yes Unknown, Entered By History   oxyCODONE (ROXICODONE) 5 MG tablet Take 5-10 mg by mouth every 6 hours as needed for severe pain . Filled 2/23/2021 x30 tablets  Yes Unknown, Entered By History         Medication history completed by: Alena Fernandez [facility]

## 2021-03-05 NOTE — ANESTHESIA PREPROCEDURE EVALUATION
Anesthesia Pre-Procedure Evaluation    Late entry due to emergency nature of case. Did consent daugther over phone    Patient: Jin Garrett   MRN: [id number] : 1940        Preoperative Diagnosis: Mitral valve regurgitation due to acute myocardial infarction without rupture of papillary muscle or chordae tendineae (H) [I34.0, I21.9]   Procedure : Procedure(s):  REPLACEMENT, MITRAL VALVE, OPEN  CORONARY ARTERY BYPASS GRAFT (CABG)     No past medical history on file.   No past surgical history on file.   Not on File   Social History     Tobacco Use     Smoking status: Not on file   Substance Use Topics     Alcohol use: Not on file      Wt Readings from Last 1 Encounters:   21 79 kg (174 lb 2.6 oz)        Anesthesia Evaluation   Pt has had prior anesthetic.     No history of anesthetic complications       ROS/MED HX  ENT/Pulmonary: Comment: Inbated, sedated      Neurologic:  - neg neurologic ROS  (-) no CVA   Cardiovascular: Comment: Acute MR, likely pap rupture, 2/2 periop MI after recent hip surgery. ballon angio of LCx last night. LV impella. Max vasopressors. Also in a fib?    (+) --CAD ---Previous cardiac testing   Echo: Date: Results:     Interpretation Summary  Global and regional left ventricular function is normal with an EF of 55-60%.  Right ventricular function, chamber size, wall motion, and thickness are  normal.  Ruptured Chordae with flail posterior mitral leaflet. MR difficult to assess.  Impella device in place.  Ascending aorta 4 cm.  Dilation of the inferior vena cava is present with abnormal respiratory  variation in diameter.  No pericardial effusion is present.  The atrial septum is intact as assessed by agitated saline bubble study .  There is no prior study for direct comparison.  _____________________________________________________________________________  Stress Test: Date: Results:    ECG Reviewed: Date: Results:    Cath: Date: Results:  Impella Insertion:                 Successful insertion of an Impella CP mechanical assist device via the left femoral artery with device flow of 3.8 L/min.     Coronary angiogram: right-dominant circulation.                LM: angiographically normal                LAD: 30% distal LAD lesion                Circumflex: 100% occlusion of the mid circumflex just after bifurcation of first obtuse marginal                RCA: 40% proximal RCA stenosis     Percutaneous coronary intervention:                (1) Aspiration thrombectomy using a Penumbra catheter                (2) Balloon angioplasty (Emerge 2.0x12mm) of the mid-circumflex with residual 70% stenosis and restoration of PRETTY grade III flow     Pulmonary artery catheterization:                Moderately elevated left and right ventricular filling pressures and mild pulmonary hypertension.      Iliofemoral angiogram:                Small iatrogenic retrograde dissection of the left common iliac artery without compromise of flow in the vessel distal to it.    METS/Exercise Tolerance:  Comment: >4 mets prior to surgery according to family.   Hematologic:       Musculoskeletal: Comment: ra per family, unclear which meds is on      GI/Hepatic: Comment: daugther denied gasttric or esophageal pathology that would preclude maria del carmen    (+) GERD, Asymptomatic on medication,     Renal/Genitourinary:  - neg Renal ROS     Endo:  - neg endo ROS     Psychiatric/Substance Use:    (-) psychiatric history   Infectious Disease:     (+) Recent Fever,     Malignancy:  - neg malignancy ROS     Other:            Physical Exam    Airway   unable to assess          Respiratory Devices and Support    ETT:      Dental    unable to assess        Cardiovascular    unable to assess         Pulmonary           (+) decreased breath sounds           OUTSIDE LABS:  CBC:   Lab Results   Component Value Date    WBC 25.6 (H) 03/05/2021    WBC 29.5 (H) 03/05/2021    HGB 8.4 (L) 03/05/2021    HGB 9.0 (L) 03/05/2021    HCT 27.1 (L)  03/05/2021    HCT 28.4 (L) 03/05/2021     03/05/2021     03/05/2021     BMP:   Lab Results   Component Value Date     03/05/2021     03/05/2021    POTASSIUM 3.4 03/05/2021    POTASSIUM 3.2 (L) 03/05/2021    CHLORIDE 111 (H) 03/05/2021    CHLORIDE 109 03/05/2021    CO2 17 (L) 03/05/2021    CO2 16 (L) 03/05/2021    BUN 49 (H) 03/05/2021    BUN 49 (H) 03/05/2021    CR 2.52 (H) 03/05/2021    CR 2.49 (H) 03/05/2021     (H) 03/05/2021     (H) 03/05/2021     COAGS:   Lab Results   Component Value Date    PTT >240 (HH) 03/05/2021    INR 1.68 (H) 03/05/2021    FIBR 668 (H) 03/05/2021     POC:   Lab Results   Component Value Date     (H) 03/05/2021     HEPATIC:   Lab Results   Component Value Date    ALBUMIN 1.8 (L) 03/05/2021    PROTTOTAL 5.2 (L) 03/05/2021    ALT 22 03/05/2021    AST 78 (H) 03/05/2021    ALKPHOS 74 03/05/2021    BILITOTAL 0.6 03/05/2021     OTHER:   Lab Results   Component Value Date    PH 7.24 (L) 03/05/2021    LACT 5.9 (HH) 03/05/2021    A1C 5.7 (H) 03/05/2021    PALLAVI 7.8 (L) 03/05/2021    PHOS 3.0 03/05/2021    MAG 1.7 03/05/2021       Anesthesia Plan    ASA Status:  4, emergent    NPO Status:  NPO Appropriate    Anesthesia Type: General.     - Airway: ETT   Induction: Inhalation.   Maintenance: Inhalation.   Techniques and Equipment:     - Lines/Monitors: 2nd IV, Arterial Line, Central Line, PAC, CVP, BIS, NIRS, TASH            TASH Absolute Contra-indication: NONE            TASH Relative Contra-indication: NONE     - Blood: Blood in Room, T&S, T&C, PRBC, Cell Saver, FFP, PLT     Consents    Anesthesia Plan(s) and associated risks, benefits, and realistic alternatives discussed. Questions answered and patient/representative(s) expressed understanding.     - Discussed with:  Other (See Comment)      - Extended Intubation/Ventilatory Support Discussed: Yes.      - Patient is DNR/DNI Status: No    Use of blood products discussed: Yes.     - Discussed with: Other  (see comment).     - Consented: consented to blood products     Postoperative Care    Pain management: IV analgesics.     - Plan for long acting post-op opioid use        Comments:    Consented daughter jenna who is designated decision maker and signed surgical consent            Roshan Moeller MD

## 2021-03-05 NOTE — PHARMACY-VANCOMYCIN DOSING SERVICE
Pharmacy Vancomycin Initial Note  Date of Service 2021  Patient's  1940  80 year old, male    Indication: open chest prophylaxis    Current estimated CrCl = Estimated Creatinine Clearance: 30.8 mL/min (A) (based on SCr of 2.14 mg/dL (H)).    Creatinine for last 3 days  3/5/2021:  2:56 AM Creatinine 2.49 mg/dL;  5:46 AM Creatinine 2.52 mg/dL;  3:34 PM Creatinine 2.14 mg/dL    Recent Vancomycin Level(s) for last 3 days  No results found for requested labs within last 72 hours.      Vancomycin IV Administrations (past 72 hours)                   vancomycin (VANCOCIN) 1000 mg in dextrose 5% 200 mL PREMIX (mg) 1,000 mg Given 21 0943                Nephrotoxins and other renal medications (From now, onward)    Start     Dose/Rate Route Frequency Ordered Stop    21 164  vancomycin place john - receiving intermittent dosing      1 each Intravenous SEE ADMIN INSTRUCTIONS 21 16421 0630  norepinephrine (LEVOPHED) 16 mg in  mL infusion CENTRAL LINE      0.03-0.4 mcg/kg/min × 79 kg (Dosing Weight)  2.2-29.6 mL/hr  Intravenous CONTINUOUS 21 0603      21 2230  vasopressin 40 units in NS 40 mL (PITRESSIN) infusion      0.5-6 Units/hr  0.5-6 mL/hr  Intravenous CONTINUOUS 21 2219            Contrast Orders - past 72 hours (72h ago, onward)    Start     Dose/Rate Route Frequency Ordered Stop    21 0041  iopamidol (ISOVUE-370) solution  Status:  Discontinued        ONCE PRN 21 0042 21 0108                Plan:  1.  Start vancomycin  Intermittent dosing in setting of ADAN. Recieved 1 dose already today in the OR.   2.  Goal Trough Level: 15-20 mg/L empirically  3.  Pharmacy will check trough levels as appropriate in 1-3 Days.    4. Serum creatinine levels will be ordered daily for the first week of therapy and at least twice weekly for subsequent weeks.    5. Shunk method utilized to dose vancomycin therapy: Method 2    Alena Fernandez  RPH

## 2021-03-05 NOTE — ANESTHESIA PROCEDURE NOTES
TASH Probe Insertion Note:    Staff -   Anesthesiologist:  Roshan Moeller MD  Performed By: anesthesiologist    Probe Status PRE Insertion: NO obvious damage  Probe type:  Adult 3D    Bite block used:   Yes  Insertion Technique: Jaw Lift  Insertion complications: None obvious    Billing Report:TSAH report by Anesthesiologist (See Separate Report note)    Probe Status POST Removal: NO obvious damage    Comments: Lube packet in mouth, NG suctioned and removed prior to insertion

## 2021-03-05 NOTE — BRIEF OP NOTE
[facility]    Brief Operative Note    Pre-operative diagnosis: Mitral valve regurgitation due to acute myocardial infarction without rupture of papillary muscle or chordae tendineae (H) [I34.0, I21.9]  Post-operative diagnosis Same as pre-operative diagnosis    Procedure: Procedure(s):  REPLACEMENT, MITRAL VALVE OPEN ENDOSCOPIC VEIN HARVEST LEFT LEG CORONARY ARTERY BYPASS GRAFT X1 AND REMOVAL OF EMPELLA PLACEMENT OF EXTRACORPOREAL MEMBRANE OXYGENATOR  CORONARY ARTERY BYPASS GRAFT (CABG), repair of left femoral artery  Surgeon: Surgeon(s) and Role:     * Daquan Porras MD - Primary     * David Paulino MD - Assisting     * Ranjana Rush PA-C - Assisting  Anesthesia: General   Estimated blood loss: 1L  Drains: 3 mediastinal tubes, 2 pleural tubes  Specimens:   ID Type Source Tests Collected by Time Destination   A : MITRAL VALVE PAPILLARY MUSCLE Tissue Heart SURGICAL PATHOLOGY EXAM Daquan Porras MD 3/5/2021 10:34 AM    B : POSTERIOR MITRAL VALVE LEAFLET Tissue Heart SURGICAL PATHOLOGY EXAM Daquan Porras MD 3/5/2021 10:43 AM      Findings:   See final operative note.  Complications: None.  Implants:   Implant Name Type Inv. Item Serial No.  Lot No. LRB No. Used Action   IMP KIT SUTURE COR-KNOT MINI [id number] Metallic Hardware/[place] IMP KIT SUTURE COR-KNOT MINI 4X14MM [id number]  [employer]  N/A 16 Implanted   VALVE MITRAL EPIC STENTED PORCINE 31MM [id number] Valve VALVE MITRAL EPIC STENTED PORCINE 31MM [id number]  [employer] [id number] N/A 1 Implanted

## 2021-03-05 NOTE — H&P
CARDIAC ICU  History & Physical    Jin Garrett MRN: [id number]  Age: 80 year old, : 1940  Date of Admission:3/4/2021  Primary care provider: No primary care provider on file.         History of Present Illness     81 y/o with PMHx significant for HTN, AAA, ?PFO, RA and HLD was transferred from OSH because of cardiogenic shock secondary to acute mitral regurgitation.     History obtained from paper chart and family as patient intubated. About one week ago patient had procedure on left leg/hip that family was unable to name. There were no complication with this procedure. However, on the day of admission patient woke up at around midnight feeling clammy, cold and sweaty. Daughter was with him. He took BP measurement and it was 80/50s. He also felt short of breath. He had mild right sided chest pain. Because of this, daughter took patient to the ED. Upon arrival, his chest pain had worsened and was radiating across his chest. In the ED he also reported cough and fatigue over the two days. Vital signs on arrival were HR in the 120s and in afib, BP 80/60 and O2 sats in the 80s. He was placed on BiPAP which improved his breathing. Echocardiogram at OSH showed normal LVEF, but severe mitral regurg with posterior flail leaflet and ruptured papillary muscle or torn chordae. Labs were significant for troponin of 13.     His respiratory status became more tenuous as he was tachypneic and therefore rapid sequence intubation was performed. He was then transferred here. On arrival, he had MAP in the 40s, HR 120sin afib and O2 sats were 95%. Patient was on dobutamine, norepi and ketamine. Bedside echo indeed showed hyperdynamic LV without wall motion abnormality and severe eccentric MR with posterior valve excursion into the LA. Patient taken urgently to cath lab for coronary angiogram and placement of mechanical support device.         Past Medical History     HTN  HLD  AAA  PFO  RA      Past Surgical History     I  have reviewed this patient's past surgical history and commented on sigificant events within the hospitals        Social History     This patient has no significant social history       Family History     This patient has no significant family history       Allergies     All allergies reviewed and addressed       Medications     Current Facility-Administered Medications   Medication     acetaminophen (TYLENOL) Suppository 650 mg     acetaminophen (TYLENOL) tablet 650 mg     alum & mag hydroxide-simethicone (MAALOX) suspension 30 mL     cangrelor (KENGREAL) 50 mg in sodium chloride 0.9 % 250 mL infusion     DOBUTamine 500 mg in dextrose 5% 250 mL (adult std conc) premix     EPINEPHrine (ADRENALIN) 16 mg in sodium chloride 0.9 % 250 mL infusion     fentaNYL (SUBLIMAZE) infusion     Give   of usual dose of LONG ACTING insulin AM of procedure IF diabetic     heparin (porcine) 100 unit/mL in 0.45% Sodium Chloride ANTICOAGULANT infusion     heparin 12,500 Units in D5W 500 mL LEFT Impella purge infusion     heparin bolus from infusion pump     HOLD: Insulin - RAPID/SHORT acting AM of procedure IF diabetic     HOLD: Insulin - REGULAR AM of procedure IF diabetic     HOLD: Oral hypoglycemics AM of procedure IF diabetic     lidocaine (LMX4) cream     lidocaine 1 % 0.1-1 mL     May take oral meds with a sip of water, the morning of TASH procedure.     Medication Considerations - To maintain platelet inhibition after discontinuation of cangrelor (KENGREAL) [see admin instructions]     medication instruction     midazolam (VERSED) drip - ADULT 100 mg/100 mL in NS PRE-MIX     naloxone (NARCAN) injection 0.2 mg    Or     naloxone (NARCAN) injection 0.4 mg    Or     naloxone (NARCAN) injection 0.2 mg    Or     naloxone (NARCAN) injection 0.4 mg     nitroGLYcerin (NITROSTAT) sublingual tablet 0.4 mg     norepinephrine (LEVOPHED) 4 mg/250 mL infusion ADULT (PERIPHERAL)     Patient is already receiving anticoagulation with heparin, enoxaparin  "(LOVENOX), warfarin (COUMADIN)  or other anticoagulant medication     phenylephrine (JESUS-SYNEPHRINE) 50 mg in NaCl 0.9 % 250 mL infusion     sodium chloride (PF) 0.9% PF flush 3 mL     sodium chloride (PF) 0.9% PF flush 3 mL     sodium chloride (PF) 0.9% PF flush 3 mL     sodium chloride (PF) 0.9% PF flush 3 mL     sodium chloride 0.9% infusion     vasopressin 40 units in NS 40 mL (PITRESSIN) infusion            Ventilator     Ventilation Mode: CMV/AC  (Continuous Mandatory Ventilation/ Assist Control)  FiO2 (%): 70 %  Rate Set (breaths/minute): 12 breaths/min  Tidal Volume Set (mL): 480 mL  PEEP (cm H2O): 12 cmH2O  Oxygen Concentration (%): 60 %  Resp: 26       Intake/Output    Intake/Output Summary (Last 24 hours) at 3/5/2021 0033  Last data filed at 3/4/2021 2300  Gross per 24 hour   Intake 644.64 ml   Output 20 ml   Net 624.64 ml           Physical Exam     Vital signs:  Temp: 98.5  F (36.9  C) Temp src: Axillary BP: 92/51 Pulse: 122   Resp: 26 SpO2: 91 % O2 Device: Mechanical Ventilator   Height: 180.3 cm (5' 11\") Weight: 79 kg (174 lb 2.6 oz)  Estimated body mass index is 24.29 kg/m  as calculated from the following:    Height as of this encounter: 1.803 m (5' 11\").    Weight as of this encounter: 79 kg (174 lb 2.6 oz).      Gen: Intubated, responds to noxious stimuli,  NEURO: GCS 7  HEENT:AT/ NC, PERRL b/l,  sclera anicteric  PULM/THORAX:Coarse breath sounds anteriorly  CV: tachycardic, irregular rhythm, 3/6 systolic murmur in left lower sternal border  ABD:  soft, nontender, non-distended. +bs, bowel sounds x 4, no organomegaly  SKIN: no petechiae, no evidence of trauma  EXT:  Cold extremities, palpable pulses bilaterally, no edema, no cyanosis  SKIN: Capillary refill sluggish     Labs       Pertinent Admission Labs:  Results for orders placed or performed during the hospital encounter of 03/04/21 (from the past 24 hour(s))   Partial thromboplastin time   Result Value Ref Range    PTT 42 (H) 22 - 37 sec "   CBC with platelets   Result Value Ref Range    WBC 24.1 (H) 4.0 - 11.0 10e9/L    RBC Count 3.53 (L) 4.4 - 5.9 10e12/L    Hemoglobin 9.8 (L) 13.3 - 17.7 g/dL    Hematocrit 31.3 (L) 40.0 - 53.0 %    MCV 89 78 - 100 fl    MCH 27.8 26.5 - 33.0 pg    MCHC 31.3 (L) 31.5 - 36.5 g/dL    RDW 13.5 10.0 - 15.0 %    Platelet Count 323 150 - 450 10e9/L   Blood gas arterial and oxyhgb   Result Value Ref Range    pH Arterial 7.21 (L) 7.35 - 7.45 pH    pCO2 Arterial 43 35 - 45 mm Hg    pO2 Arterial 78 (L) 80 - 105 mm Hg    Bicarbonate Arterial 17 (L) 21 - 28 mmol/L    FIO2 100.0     Oxyhemoglobin Arterial 93 92 - 100 %    Base Deficit Art 10.3 mmol/L   Glucose by meter   Result Value Ref Range    Glucose 256 (H) 70 - 99 mg/dL   Activated clotting time POCT   Result Value Ref Range    Activated Clot Time 196 (H) 75 - 150 sec   Activated clotting time POCT   Result Value Ref Range    Activated Clot Time 221 (H) 75 - 150 sec         Coronary angiogram 03/05/21  [facility] 03/05/21  Coronary angiogram: right-dominant circulation.                LM: angiographically normal                LAD: 30% distal LAD lesion                Circumflex: 100% occlusion of the mid circumflex just after bifurcation of first obtuse marginal                RCA: 40% proximal RCA stenosis    Percutaneous coronary intervention:                (1) Aspiration thrombectomy using a Penumbra catheter                (2) Balloon angioplasty (Emerge 2.0x12mm) of the mid-circumflex with residual 70% stenosis and restoration of PRETTY grade III flow     RA 17/15/15  PA 40/25/32  PCWP 20/25/20      Assessment and Plan      81 y/o with PMHx significant for HTN, AAA, ?PFO, RA and HLD was transferred from OSH because of cardiogenic shock secondary to acute mitral regurgitation.     Neurologic  #Pain   #Sedation  - Fentanyl infusion with PRN  - Midazolam with RAAS -1 -2    Cardiovascular  #Cardiogenic shock stage D/E  #Acute mitral regurgitation  #Possible acute MI  No ST  elevation on EKG at outside hospital. However, he did present in cardiogenic shock with worsening chest pain and acute mitral regurg. Troponin was 13 at OSH as well. Interestingly, echo did not show any wall motion abnormality and LV function was hyperdynamic. On arrival here he was taken urgently to the Cath lab and Impella was placed. This improved his BP and overall hemodynamics. Coronary angiogram showed 100% of mid LCx. Impella currently at P8. As for mitral regurg, it is possible rupture was due to ischemic causes. However, without wall motion abnormality and the degree of cardiogenic shock makes this questionable. Endocarditis can't be ruled out, daughter stated patient did not have fever or signs of sepsis at home. At this point ischemic causes appear the most plausibly.   - Hemodynamics every 4hrs  - Lactic acid every 2hrs  - CVTS consulted for MV repair/replacement with possible CABG  - TTE/TASH pending  - Blood cultures pending  - Strict I/Os  - Daily weights  - Wean inotropes as able  - Continue regular intensity heparin drip  - Continue cangrelor per protocol until time of surgery  - No oral antiplt  - Groin sites to be checked   - Daily LDH and serial Hgb due to Impella  - Lasix 40mg IV given, will assess further diuresing based on hemodynamics      #Afib (CHADVASC 4)  - Continue to monitor for now, rate at acceptable range  - Anticoagulation and rhythm to be addressed following surgery    #Left CFA dissection, small  Iatrogenic during urgent angiogram.   - Monitor extremities    #?PFO  This was mentioned during signout from OSH. However echo report from such hospital showed negative color doppler evidence of atrial shunting.   - Echo with bubble ordered    Respiratory  #Acute hypoxic respiratory failure  #Pulmonary edema  Secondary to acute severe MR.   - Lung protective vent settings  - Conditional ABGs  - Diuresis as above    Gastrointestinal  No active issues.     Tube Feeds  Will consult  nutritionist once out of OR    Genitourinary  Alfaro per nursing.    Infectious Disease  No evidence of sepsis, however blood cultures pending due to sudden MR presentation.     Hematology  #Leukocytosis  #Neutrophilia  Probably from physiological stress.   - Continue to trend  - Blood cultures as above    #Acute anemia  Multifactorial, some degree of hemolysis from Impella, blood loss from procedures, oozing at impella site.   - Trend Hgb  - Transfuse if < 8g/dL    Endocrine  #Hyperglycemia  - Insulin drip    Renal/Electolytes/FEN  #ADAN, prerenal  #HAGMA with respiratory Acidosis  #Elevated lactic acid, type A  Patient with increased Cr due to cardiogenic shock. This is driving lactic acid as well. Does have concomitant respiratory acidosis.    - Adjust vent settings to allow compensation  - Inotrope as above  - Diuresis as above  - Trend Cr    #Hypokalemia  From transcellular shifts by b-agonists, possible renal loss as well.  - On replacement    Skin Care  Per nursing    PPX: DVT on Heparin drip, GI none    CODE: FULL    Family: Daughter Pamela updated @ 3900        Thom Go, [facility]  Cardiology Fellow

## 2021-03-05 NOTE — CONSULTS
Consult Note    The vascular service team was called to OR 12 to evaluate arterial flow in both lower extremities. The patient is under general anesthesia, and Dr. Porras has just completed an emergency mitral valve repair for acute MR. The patient is currently on AV ECMO. There is a 6 Fr sheath in the right femoral artery and an Impela device through a sheath in the left femoral artery. The patient is currently on multiple inotropic agents for cardiovascular support. There is no history of claudication by report. Examination of both feet demonstrates minor trophic changes with loss of skin appendages bilaterally, but no evidence of tissue loss. There is a two-phase Doppler signal obtained in both popliteal arteries but no signals at the DP or PT arteries distally. The clinical picture suggests severe distal arterial vasospasm associated with ECMO and inotropic agents. Recommend: (1) removal of the Impela device from the left femoral artery and weaning the inotropic agents as soon as possible and (2) obtain formal lower extremity arterial ultrasound studies to document flow in the distal extremities. No indication for vascular intervention at this time.    David Paulino MD

## 2021-03-05 NOTE — PROGRESS NOTES
ECLS Cannulation Note:    Date on: 3/5/2021  Time on: 1221  Surgeon: Vern    Arterial Cannula: 20 Fr. In the Aorta      Venous Cannula: 34 Fr. In the Right Atrium      Card[employer]Help Oxygenator Lot #: [id number]  Circuit Lot #: [id number]  Console Serial Number: [id number] / console #4    Cannulation was performed in the OR, placement was verified by CXR, cannulas are in good position.  ISTAT and blood cooler are at bedside. Patient's blood type is O, positive.    Lebron Lynne, RT  ECMO Specialist  3/5/2021 4:32 PM

## 2021-03-05 NOTE — OP NOTE
OPERATIVE DATE: 3/5/2021    PRE-OPERATIVE DIAGNOSIS:  1) Acute severe mitral insufficiency  2) Acute coronary syndrome with 100% mid-circumflex occlusion  3) Cardiogenic shock requiring Impella  Patient Active Problem List   Diagnosis     Cardiogenic shock (H)     POST-OPERATIVE DIAGNOSIS:  1) Acute severe mitral insufficiency  2) Acute coronary syndrome with 100% mid-circumflex occlusion  3) Cardiogenic shock requiring Impella  Patient Active Problem List   Diagnosis     Cardiogenic shock (H)     PROCEDURE:  1) Mitral valve replacement - 31mm St. Tee Epic   2) Coronary artery bypass grafting x 1 - reversed saphenous vein graft to obtuse marginal artery 3  3) Endoscopic vein harvest left leg  4) Removal of Impella and repair of left femoral artery  5) Transesophageal echocardiogram  6) Conversion to central VA-ECMO    SURGEON: Daquan Porras MD    ASSISTANT: Beverly Mckeon MD; Ranjana Rush PA-C    ANESTHESIA: GETA    ESTIMATED BLOOD LOSS: 1000cc    OPERATIVE FINDINGS:  1) 100% occlusion of mid-circumflex artery  2) Ruptured posterior papillary muscle head  3) Flail segment of P2 mitral valve leaflet  4) Severe mitral insufficiency    INDICATIONS:  Mr. Jin Garrett is a 80 year old male admitted with acute severe mitral insufficiency due to ischemic papillary muscle rupture.  We were asked to evaluate for emergency surgery.  Risks and benefits of the operation were explained to the patients family including, but not limited to, bleeding, infection, stroke and even death.  They understood these risks and agreed to proceed electively.    OPERATIVE REPORT:  The patient was transferred to the operating room and positioned supine on the OR table.  General anesthesia was initiated by the anesthesia team.  Endotracheal intubation and central venous access was performed by anesthesia.  The patients neck, chest, abdomen and bilateral lower extremities were clipped, prepped and draped in sterile fashion.  A pre-procedure  time-out was performed confirming the correct patient, correct site and correct procedure.    Median sternotomy was made with reciprocating saw.  The bone was made hemostatic with cautery and bone wax.        The patient was given 300 mg/kg IV heparin.  Pledgeted 2-0 ethibond pursestring was made in the ascending aorta.  A 20F EOPA arterial cannula was placed here and connected to the bypass circuit.  Bicaval venous canulation was performed with a 24F right angle venous cannula in the SVC and 28F straight venous cannula in the IVC.  Next the aorto-pulmonary window was developed.  A 4-0 prolene pursestring was made in the ascending aorta.  A DLP root vent / antegrade cardioplegia catheter was placed here and connected to the cardioplegia circuit.  Cardiopulmonary bypass was initiated with good flows.  Flow on the circuit was decreased.  A large aortic cross clamp was applied.  Flow on the circuit was resumed.  1000cc of antegrade cold blood cardioplegia was delivered with good diastolic arrest.  Caval snares were secured.  A right atriotomy was made.  Retrograde cardioplegia catheter was placed directly.  An additional 300cc of retrograde cold blood cardioplegia was used to test the retrograde.  We focused our attention on the bypass grafting first.  An arteriotomy was made in the obtuse marginal artery 3.  A segment of reversed saphenous vein was anastomosed to the arteriotomy in end to side fashion using running 7-0 prolene.  The graft was flushed with cardioplegia.  The graft was sized to fit to the ascending aorta.  An arteriotomy was made in the ascending aorta and extended with 4mm punch.  The vein graft was then anastomosed to the ascending aorta using running 6-0 prolene. Next a left atriotomy was made via trans-septal approach.  The mitral valve was exposed.  There was a ruptured segment of posterior papillary muscle and flail segment of P2.  This was resected and sent to pathology.  The anterior leaflet was  excised.  The chordal apparatus was preserved.  Pledgeted 2-0 Tevdek stitches were placed in the annulus circumferentially.  The annulus was sized to 31mm.  Next a 31mm St. Tee Epic Bioprosthetic Mitral Valve was opened.  The valve stitches were placed through the sewing cuff.  The valve was seated and secured with CoreKnot device.  The left atriotomy was closed in 2 layers using running 4-0 prolene.  The left side of the heart was de-aired with a vent in the right atrium, and de-airing maneuvers.    A retrograde hot shot of warm blood cardiplegia was delivered.  After completion of the hot shot, flow on the bypass circuit was decreased and the aortic cross clamp was removed.  Flow on the bypass circuit was resumed.  The retrograde cardioplegia catheter was removed.  The pursestring was tied.  The site was over-sewn with a 4-0 prolene.  Ventricular pacing wires were placed and delivered through the anterior abdominal wall and secured to the skin with 0-ethibond stitches.  The patient had normal sinus rhythm and was backup paced at 50 bpm.      The left pleural space was suctioned dry.  The lungs were ventilated bilaterally.  TASH showed no intra-cardiac air.  The DLP root vent / antegrade cardioplegia catheter was removed.  Intravenous calcium was administered.  Flow on the bypass circuit was weaned to off.    We attempted to wean vasopressor and inotropic support, however, the patient was requiring excessively high doses.  I decided to convert to central VA ECMO.  Cardiopulmonary bypass was reinitiated.  A 34F right angle venous cannula was placed in the right atrium.  This was connected to the ECMO circuit.  The patient was weaned from cardiopulmonary bypass.  The arterial cannula was then connected to the ECMO circuit.  ECMO was initiated with good flows.  The heparin was reversed with protamine.  The venous cannulae were removed.  Pursestring at this site was tied.  This was over-sewed with 4-0 prolene.  The  remaining pump blood volume was returned.      The chest was packed open with laparotomy sponges.  Bilateral 28F pleural chest tubes were placed.  Two 32F and one 28F mediastinal chest tubes were placed. The sternal retractor was removed.  These were delivered through the anterior abdominal wall and secured to the skin with 0-ethibond stitches.      The wound was made hemostatic with cautery.  The subcutaneous tissue, sternal edges, thymic fat, pericardial edges and all anastomotic and cannulation sites were inspected and hemostatic.    The peripheral arterial signals were checked and there was no doppler signal present in either extremity.  Dr. Paulino was called to the room for evaluation.  We decided to remove the left femoral impella.  A longitudinal skin incision was made.  The artery was isolated.  The impella and sheath were removed.  The artery was clamped.  The arteriotomy was repaired using 5-0 prolene. The wound was made hemostatic then closed in layers using vicryl stitches.  The subcutaneous tissue was closed with 2-0 vicryl stitches.  The skin was closed with 3-0 vicryl.  Dermabond skin glue was applied.  A sterile dressing was applied.      The patient was then transferred from the operating bed to an ICU bed and transferred to the ICU in critical, but stable, condition.    All needle, sponge and instrument counts were correct at the end of the case.    Daquan Porras  Cardiothoracic Surgery  Pager: [phone]

## 2021-03-05 NOTE — ANESTHESIA POSTPROCEDURE EVALUATION
Patient: Jin Garrett    Procedure(s):  REPLACEMENT, MITRAL VALVE OPEN ENDOSCOPIC VEIN HARVEST LEFT LEG CORONARY ARTERY BYPASS GRAFT X1 AND REMOVAL OF IMPELLA PLACEMENT OF EXTRACORPOREAL MEMBRANE OXYGENATOR AND REPAIR OF LEFT FEMORAL ARTERY  CORONARY ARTERY BYPASS GRAFT (CABG)    Diagnosis:Mitral valve regurgitation due to acute myocardial infarction without rupture of papillary muscle or chordae tendineae (H) [I34.0, I21.9]  Diagnosis Additional Information: No value filed.    Anesthesia Type:  No value filed.    Note:  Disposition: ICU            ICU Sign Out: Anesthesiologist/ICU physician sign out WAS performed   Postop Pain Control: Uneventful            Sign Out: Well controlled pain   PONV: No   Neuro/Psych: Uneventful            Sign Out: PLANNED postop sedation   Airway/Respiratory: Uneventful            Sign Out: AIRWAY IN SITU/Resp. Support               Airway in situ/Resp. Support: ETT                 Reason: Planned Pre-op   CV/Hemodynamics: Uneventful            Sign Out: OTHER CV status               Blood Pressure: Pressors               Rate/Rhythm: AFib               Perfusion:  ECMO   Other NRE: NONE   DID A NON-ROUTINE EVENT OCCUR? No         Last vitals:  Vitals:    03/05/21 0800 03/05/21 0815 03/05/21 1532   BP:      Pulse: 123 120 109   Resp: 16 20    Temp: 38  C (100.4  F) 38.1  C (100.6  F)    SpO2: 99% 99% 93%       Last vitals prior to Anesthesia Care Transfer:  CRNA VITALS  3/5/2021 1445 - 3/5/2021 1535      3/5/2021             Resp Rate (observed):  14    Resp Rate (set):  14          Electronically Signed By: Roshan Moeller MD  March 5, 2021  3:35 PM

## 2021-03-06 ENCOUNTER — APPOINTMENT (OUTPATIENT)
Dept: GENERAL RADIOLOGY | Facility: CLINIC | Age: 81
DRG: 003 | End: 2021-03-06
Attending: INTERNAL MEDICINE
Payer: MEDICARE

## 2021-03-06 LAB
ALBUMIN SERPL-MCNC: 2.5 G/DL (ref 3.4–5)
ALBUMIN SERPL-MCNC: 2.5 G/DL (ref 3.4–5)
ALP SERPL-CCNC: 46 U/L (ref 40–150)
ALP SERPL-CCNC: 47 U/L (ref 40–150)
ALT SERPL W P-5'-P-CCNC: 20 U/L (ref 0–70)
ALT SERPL W P-5'-P-CCNC: 21 U/L (ref 0–70)
ANGLE RATE OF CLOT STRENGTH: 74.2 DEGREES (ref 53–72)
ANION GAP SERPL CALCULATED.3IONS-SCNC: 10 MMOL/L (ref 3–14)
ANION GAP SERPL CALCULATED.3IONS-SCNC: 13 MMOL/L (ref 3–14)
ANION GAP SERPL CALCULATED.3IONS-SCNC: 14 MMOL/L (ref 3–14)
ANION GAP SERPL CALCULATED.3IONS-SCNC: 16 MMOL/L (ref 3–14)
APTT PPP: 38 SEC (ref 22–37)
APTT PPP: 45 SEC (ref 22–37)
APTT PPP: 46 SEC (ref 22–37)
APTT PPP: 96 SEC (ref 22–37)
APTT PPP: >240 SEC (ref 22–37)
AST SERPL W P-5'-P-CCNC: 109 U/L (ref 0–45)
AST SERPL W P-5'-P-CCNC: 94 U/L (ref 0–45)
AT III ACT/NOR PPP CHRO: 49 % (ref 85–135)
BACTERIA SPEC CULT: NO GROWTH
BASE DEFICIT BLDA-SCNC: 3.1 MMOL/L
BASE DEFICIT BLDA-SCNC: 4.4 MMOL/L
BASE DEFICIT BLDA-SCNC: 4.8 MMOL/L
BASE DEFICIT BLDA-SCNC: 4.8 MMOL/L
BASE DEFICIT BLDA-SCNC: 4.9 MMOL/L
BASE DEFICIT BLDA-SCNC: 5.2 MMOL/L
BASE DEFICIT BLDA-SCNC: 5.7 MMOL/L
BASE DEFICIT BLDA-SCNC: 5.9 MMOL/L
BASE DEFICIT BLDA-SCNC: 5.9 MMOL/L
BASE DEFICIT BLDA-SCNC: 6 MMOL/L
BASE DEFICIT BLDA-SCNC: 6.2 MMOL/L
BASE DEFICIT BLDA-SCNC: 6.5 MMOL/L
BASE DEFICIT BLDA-SCNC: 7.1 MMOL/L
BASE DEFICIT BLDV-SCNC: 3.7 MMOL/L
BASE DEFICIT BLDV-SCNC: 4.9 MMOL/L
BASE DEFICIT BLDV-SCNC: 6.8 MMOL/L
BILIRUB SERPL-MCNC: 1.3 MG/DL (ref 0.2–1.3)
BILIRUB SERPL-MCNC: 1.8 MG/DL (ref 0.2–1.3)
BLD PROD TYP BPU: NORMAL
BLD UNIT ID BPU: 0
BLOOD PRODUCT CODE: NORMAL
BPU ID: NORMAL
BUN SERPL-MCNC: 40 MG/DL (ref 7–30)
BUN SERPL-MCNC: 41 MG/DL (ref 7–30)
BUN SERPL-MCNC: 44 MG/DL (ref 7–30)
BUN SERPL-MCNC: 46 MG/DL (ref 7–30)
CA-I BLD-MCNC: 4.3 MG/DL (ref 4.4–5.2)
CA-I BLD-MCNC: 4.4 MG/DL (ref 4.4–5.2)
CALCIUM SERPL-MCNC: 7.8 MG/DL (ref 8.5–10.1)
CALCIUM SERPL-MCNC: 7.8 MG/DL (ref 8.5–10.1)
CALCIUM SERPL-MCNC: 7.9 MG/DL (ref 8.5–10.1)
CALCIUM SERPL-MCNC: 8 MG/DL (ref 8.5–10.1)
CHLORIDE SERPL-SCNC: 111 MMOL/L (ref 94–109)
CHLORIDE SERPL-SCNC: 112 MMOL/L (ref 94–109)
CI HYPERCOAGULATION INDEX: 1.8 RATIO (ref 0–3)
CO2 SERPL-SCNC: 17 MMOL/L (ref 20–32)
CO2 SERPL-SCNC: 19 MMOL/L (ref 20–32)
CO2 SERPL-SCNC: 20 MMOL/L (ref 20–32)
CO2 SERPL-SCNC: 20 MMOL/L (ref 20–32)
CREAT SERPL-MCNC: 2.11 MG/DL (ref 0.66–1.25)
CREAT SERPL-MCNC: 2.34 MG/DL (ref 0.66–1.25)
CREAT SERPL-MCNC: 2.56 MG/DL (ref 0.66–1.25)
CREAT SERPL-MCNC: 2.66 MG/DL (ref 0.66–1.25)
CRP SERPL-MCNC: 250 MG/L (ref 0–8)
ERYTHROCYTE [DISTWIDTH] IN BLOOD BY AUTOMATED COUNT: 14.7 % (ref 10–15)
ERYTHROCYTE [DISTWIDTH] IN BLOOD BY AUTOMATED COUNT: 14.9 % (ref 10–15)
ERYTHROCYTE [DISTWIDTH] IN BLOOD BY AUTOMATED COUNT: 15 % (ref 10–15)
ERYTHROCYTE [DISTWIDTH] IN BLOOD BY AUTOMATED COUNT: 15 % (ref 10–15)
ERYTHROCYTE [DISTWIDTH] IN BLOOD BY AUTOMATED COUNT: 15.2 % (ref 10–15)
ERYTHROCYTE [SEDIMENTATION RATE] IN BLOOD BY WESTERGREN METHOD: 39 MM/H (ref 0–20)
FIBRINOGEN PPP-MCNC: 439 MG/DL (ref 200–420)
FIBRINOGEN PPP-MCNC: 470 MG/DL (ref 200–420)
G ACTUAL CLOT STRENGTH: 9.2 KD/SC (ref 4.5–11)
GFR SERPL CREATININE-BSD FRML MDRD: 22 ML/MIN/{1.73_M2}
GFR SERPL CREATININE-BSD FRML MDRD: 23 ML/MIN/{1.73_M2}
GFR SERPL CREATININE-BSD FRML MDRD: 25 ML/MIN/{1.73_M2}
GFR SERPL CREATININE-BSD FRML MDRD: 29 ML/MIN/{1.73_M2}
GLUCOSE BLD-MCNC: 111 MG/DL (ref 70–99)
GLUCOSE BLD-MCNC: 118 MG/DL (ref 70–99)
GLUCOSE BLD-MCNC: 93 MG/DL (ref 70–99)
GLUCOSE BLD-MCNC: 97 MG/DL (ref 70–99)
GLUCOSE BLDC GLUCOMTR-MCNC: 114 MG/DL (ref 70–99)
GLUCOSE BLDC GLUCOMTR-MCNC: 117 MG/DL (ref 70–99)
GLUCOSE BLDC GLUCOMTR-MCNC: 120 MG/DL (ref 70–99)
GLUCOSE BLDC GLUCOMTR-MCNC: 120 MG/DL (ref 70–99)
GLUCOSE BLDC GLUCOMTR-MCNC: 125 MG/DL (ref 70–99)
GLUCOSE BLDC GLUCOMTR-MCNC: 96 MG/DL (ref 70–99)
GLUCOSE BLDC GLUCOMTR-MCNC: 97 MG/DL (ref 70–99)
GLUCOSE BLDC GLUCOMTR-MCNC: 97 MG/DL (ref 70–99)
GLUCOSE SERPL-MCNC: 110 MG/DL (ref 70–99)
GLUCOSE SERPL-MCNC: 123 MG/DL (ref 70–99)
GLUCOSE SERPL-MCNC: 94 MG/DL (ref 70–99)
GLUCOSE SERPL-MCNC: 96 MG/DL (ref 70–99)
HCO3 BLD-SCNC: 17 MMOL/L (ref 21–28)
HCO3 BLD-SCNC: 18 MMOL/L (ref 21–28)
HCO3 BLD-SCNC: 19 MMOL/L (ref 21–28)
HCO3 BLD-SCNC: 19 MMOL/L (ref 21–28)
HCO3 BLD-SCNC: 20 MMOL/L (ref 21–28)
HCO3 BLD-SCNC: 21 MMOL/L (ref 21–28)
HCO3 BLD-SCNC: 22 MMOL/L (ref 21–28)
HCO3 BLDA-SCNC: 19 MMOL/L (ref 21–28)
HCO3 BLDA-SCNC: 20 MMOL/L (ref 21–28)
HCO3 BLDV-SCNC: 18 MMOL/L (ref 21–28)
HCO3 BLDV-SCNC: 20 MMOL/L (ref 21–28)
HCO3 BLDV-SCNC: 22 MMOL/L (ref 21–28)
HCT VFR BLD AUTO: 23.6 % (ref 40–53)
HCT VFR BLD AUTO: 25 % (ref 40–53)
HCT VFR BLD AUTO: 25.4 % (ref 40–53)
HCT VFR BLD AUTO: 25.6 % (ref 40–53)
HCT VFR BLD AUTO: 25.6 % (ref 40–53)
HGB BLD-MCNC: 7.9 G/DL (ref 13.3–17.7)
HGB BLD-MCNC: 8.4 G/DL (ref 13.3–17.7)
HGB BLD-MCNC: 8.4 G/DL (ref 13.3–17.7)
HGB BLD-MCNC: 8.6 G/DL (ref 13.3–17.7)
HGB BLD-MCNC: 8.7 G/DL (ref 13.3–17.7)
HGB FREE PLAS-MCNC: 40 MG/DL
INR PPP: 1.3 (ref 0.86–1.14)
INR PPP: 1.47 (ref 0.86–1.14)
INR PPP: 1.48 (ref 0.86–1.14)
INR PPP: 1.51 (ref 0.86–1.14)
K TIME TO SPEC CLOT STRENGTH: 1.1 MINUTE (ref 1–3)
KCT BLD-ACNC: 119 SEC (ref 75–150)
KCT BLD-ACNC: 184 SEC (ref 75–150)
KCT BLD-ACNC: 188 SEC (ref 75–150)
LACTATE BLD-SCNC: 1.4 MMOL/L (ref 0.7–2)
LACTATE BLD-SCNC: 1.6 MMOL/L (ref 0.7–2)
LACTATE BLD-SCNC: 1.7 MMOL/L (ref 0.7–2)
LDH SERPL L TO P-CCNC: 545 U/L (ref 85–227)
LY30 LYSIS AT 30 MINUTES: 0.4 % (ref 0–8)
LY60 LYSIS AT 60 MINUTES: 2.9 % (ref 0–15)
Lab: NORMAL
MA MAXIMUM CLOT STRENGTH: 64.7 MM (ref 50–70)
MAGNESIUM SERPL-MCNC: 2.2 MG/DL (ref 1.6–2.3)
MAGNESIUM SERPL-MCNC: 2.2 MG/DL (ref 1.6–2.3)
MAGNESIUM SERPL-MCNC: 2.3 MG/DL (ref 1.6–2.3)
MCH RBC QN AUTO: 28.8 PG (ref 26.5–33)
MCH RBC QN AUTO: 29.1 PG (ref 26.5–33)
MCH RBC QN AUTO: 29.4 PG (ref 26.5–33)
MCH RBC QN AUTO: 29.4 PG (ref 26.5–33)
MCH RBC QN AUTO: 29.8 PG (ref 26.5–33)
MCHC RBC AUTO-ENTMCNC: 33.1 G/DL (ref 31.5–36.5)
MCHC RBC AUTO-ENTMCNC: 33.5 G/DL (ref 31.5–36.5)
MCHC RBC AUTO-ENTMCNC: 33.6 G/DL (ref 31.5–36.5)
MCHC RBC AUTO-ENTMCNC: 33.6 G/DL (ref 31.5–36.5)
MCHC RBC AUTO-ENTMCNC: 34 G/DL (ref 31.5–36.5)
MCV RBC AUTO: 87 FL (ref 78–100)
MCV RBC AUTO: 88 FL (ref 78–100)
MCV RBC AUTO: 88 FL (ref 78–100)
O2/TOTAL GAS SETTING VFR VENT: 50 %
OXYHGB MFR BLD: 94 % (ref 92–100)
OXYHGB MFR BLD: 96 % (ref 92–100)
OXYHGB MFR BLD: 97 % (ref 92–100)
OXYHGB MFR BLD: 97 % (ref 92–100)
OXYHGB MFR BLD: 98 % (ref 92–100)
OXYHGB MFR BLDA: 97 % (ref 75–100)
OXYHGB MFR BLDA: 98 % (ref 75–100)
OXYHGB MFR BLDV: 61 %
OXYHGB MFR BLDV: 63 %
OXYHGB MFR BLDV: 70 %
PCO2 BLD: 29 MM HG (ref 35–45)
PCO2 BLD: 30 MM HG (ref 35–45)
PCO2 BLD: 30 MM HG (ref 35–45)
PCO2 BLD: 31 MM HG (ref 35–45)
PCO2 BLD: 31 MM HG (ref 35–45)
PCO2 BLD: 36 MM HG (ref 35–45)
PCO2 BLD: 36 MM HG (ref 35–45)
PCO2 BLD: 37 MM HG (ref 35–45)
PCO2 BLD: 39 MM HG (ref 35–45)
PCO2 BLDA: 32 MM HG (ref 35–45)
PCO2 BLDA: 39 MM HG (ref 35–45)
PCO2 BLDV: 34 MM HG (ref 40–50)
PCO2 BLDV: 35 MM HG (ref 40–50)
PCO2 BLDV: 43 MM HG (ref 40–50)
PH BLD: 7.31 PH (ref 7.35–7.45)
PH BLD: 7.33 PH (ref 7.35–7.45)
PH BLD: 7.34 PH (ref 7.35–7.45)
PH BLD: 7.35 PH (ref 7.35–7.45)
PH BLD: 7.36 PH (ref 7.35–7.45)
PH BLD: 7.36 PH (ref 7.35–7.45)
PH BLD: 7.37 PH (ref 7.35–7.45)
PH BLD: 7.38 PH (ref 7.35–7.45)
PH BLD: 7.39 PH (ref 7.35–7.45)
PH BLD: 7.4 PH (ref 7.35–7.45)
PH BLD: 7.42 PH (ref 7.35–7.45)
PH BLDA: 7.33 PH (ref 7.35–7.45)
PH BLDA: 7.38 PH (ref 7.35–7.45)
PH BLDV: 7.32 PH (ref 7.32–7.43)
PH BLDV: 7.34 PH (ref 7.32–7.43)
PH BLDV: 7.37 PH (ref 7.32–7.43)
PHOSPHATE SERPL-MCNC: 4.4 MG/DL (ref 2.5–4.5)
PHOSPHATE SERPL-MCNC: 5.2 MG/DL (ref 2.5–4.5)
PLATELET # BLD AUTO: 106 10E9/L (ref 150–450)
PLATELET # BLD AUTO: 107 10E9/L (ref 150–450)
PLATELET # BLD AUTO: 125 10E9/L (ref 150–450)
PLATELET # BLD AUTO: 96 10E9/L (ref 150–450)
PLATELET # BLD AUTO: 98 10E9/L (ref 150–450)
PO2 BLD: 102 MM HG (ref 80–105)
PO2 BLD: 105 MM HG (ref 80–105)
PO2 BLD: 107 MM HG (ref 80–105)
PO2 BLD: 109 MM HG (ref 80–105)
PO2 BLD: 119 MM HG (ref 80–105)
PO2 BLD: 130 MM HG (ref 80–105)
PO2 BLD: 158 MM HG (ref 80–105)
PO2 BLD: 76 MM HG (ref 80–105)
PO2 BLD: 91 MM HG (ref 80–105)
PO2 BLD: 97 MM HG (ref 80–105)
PO2 BLD: 98 MM HG (ref 80–105)
PO2 BLDA: 113 MM HG (ref 80–105)
PO2 BLDA: 120 MM HG (ref 80–105)
PO2 BLDV: 35 MM HG (ref 25–47)
PO2 BLDV: 35 MM HG (ref 25–47)
PO2 BLDV: 38 MM HG (ref 25–47)
POTASSIUM SERPL-SCNC: 5.1 MMOL/L (ref 3.4–5.3)
POTASSIUM SERPL-SCNC: 5.2 MMOL/L (ref 3.4–5.3)
POTASSIUM SERPL-SCNC: 5.4 MMOL/L (ref 3.4–5.3)
POTASSIUM SERPL-SCNC: 5.4 MMOL/L (ref 3.4–5.3)
POTASSIUM SERPL-SCNC: 5.5 MMOL/L (ref 3.4–5.3)
PROT SERPL-MCNC: 4.6 G/DL (ref 6.8–8.8)
PROT SERPL-MCNC: 4.6 G/DL (ref 6.8–8.8)
R TIME UNTIL CLOT FORMS: 5.5 MINUTE (ref 5–10)
RBC # BLD AUTO: 2.69 10E12/L (ref 4.4–5.9)
RBC # BLD AUTO: 2.86 10E12/L (ref 4.4–5.9)
RBC # BLD AUTO: 2.92 10E12/L (ref 4.4–5.9)
RBC # BLD AUTO: 2.92 10E12/L (ref 4.4–5.9)
RBC # BLD AUTO: 2.96 10E12/L (ref 4.4–5.9)
SODIUM SERPL-SCNC: 143 MMOL/L (ref 133–144)
SODIUM SERPL-SCNC: 144 MMOL/L (ref 133–144)
SODIUM SERPL-SCNC: 144 MMOL/L (ref 133–144)
SODIUM SERPL-SCNC: 145 MMOL/L (ref 133–144)
SPECIMEN SOURCE: NORMAL
TRANSFUSION STATUS PATIENT QL: NORMAL
TRANSFUSION STATUS PATIENT QL: NORMAL
UFH PPP CHRO-ACNC: 0.82 IU/ML
UFH PPP CHRO-ACNC: <0.1 IU/ML
VANCOMYCIN SERPL-MCNC: 5.8 MG/L
WBC # BLD AUTO: 19 10E9/L (ref 4–11)
WBC # BLD AUTO: 19.3 10E9/L (ref 4–11)
WBC # BLD AUTO: 19.9 10E9/L (ref 4–11)
WBC # BLD AUTO: 21.8 10E9/L (ref 4–11)
WBC # BLD AUTO: 21.9 10E9/L (ref 4–11)

## 2021-03-06 PROCEDURE — 85027 COMPLETE CBC AUTOMATED: CPT | Performed by: INTERNAL MEDICINE

## 2021-03-06 PROCEDURE — 85300 ANTITHROMBIN III ACTIVITY: CPT | Performed by: STUDENT IN AN ORGANIZED HEALTH CARE EDUCATION/TRAINING PROGRAM

## 2021-03-06 PROCEDURE — 99291 CRITICAL CARE FIRST HOUR: CPT | Mod: GC | Performed by: ANESTHESIOLOGY

## 2021-03-06 PROCEDURE — 85520 HEPARIN ASSAY: CPT | Performed by: STUDENT IN AN ORGANIZED HEALTH CARE EDUCATION/TRAINING PROGRAM

## 2021-03-06 PROCEDURE — 85396 CLOTTING ASSAY WHOLE BLOOD: CPT | Performed by: SURGERY

## 2021-03-06 PROCEDURE — 71045 X-RAY EXAM CHEST 1 VIEW: CPT | Mod: 26 | Performed by: RADIOLOGY

## 2021-03-06 PROCEDURE — 85384 FIBRINOGEN ACTIVITY: CPT | Performed by: STUDENT IN AN ORGANIZED HEALTH CARE EDUCATION/TRAINING PROGRAM

## 2021-03-06 PROCEDURE — 99231 SBSQ HOSP IP/OBS SF/LOW 25: CPT | Performed by: SURGERY

## 2021-03-06 PROCEDURE — 85610 PROTHROMBIN TIME: CPT | Performed by: SURGERY

## 2021-03-06 PROCEDURE — 83735 ASSAY OF MAGNESIUM: CPT | Performed by: STUDENT IN AN ORGANIZED HEALTH CARE EDUCATION/TRAINING PROGRAM

## 2021-03-06 PROCEDURE — 80053 COMPREHEN METABOLIC PANEL: CPT | Performed by: SURGERY

## 2021-03-06 PROCEDURE — 999N000157 HC STATISTIC RCP TIME EA 10 MIN

## 2021-03-06 PROCEDURE — 85610 PROTHROMBIN TIME: CPT | Performed by: STUDENT IN AN ORGANIZED HEALTH CARE EDUCATION/TRAINING PROGRAM

## 2021-03-06 PROCEDURE — 99221 1ST HOSP IP/OBS SF/LOW 40: CPT | Mod: GC | Performed by: INTERNAL MEDICINE

## 2021-03-06 PROCEDURE — 80048 BASIC METABOLIC PNL TOTAL CA: CPT | Performed by: INTERNAL MEDICINE

## 2021-03-06 PROCEDURE — 80053 COMPREHEN METABOLIC PANEL: CPT | Performed by: STUDENT IN AN ORGANIZED HEALTH CARE EDUCATION/TRAINING PROGRAM

## 2021-03-06 PROCEDURE — 82947 ASSAY GLUCOSE BLOOD QUANT: CPT | Performed by: SURGERY

## 2021-03-06 PROCEDURE — 200N000002 HC R&B ICU UMMC

## 2021-03-06 PROCEDURE — 258N000003 HC RX IP 258 OP 636: Performed by: SURGERY

## 2021-03-06 PROCEDURE — 82805 BLOOD GASES W/O2 SATURATION: CPT | Performed by: SURGERY

## 2021-03-06 PROCEDURE — 250N000013 HC RX MED GY IP 250 OP 250 PS 637: Performed by: SURGERY

## 2021-03-06 PROCEDURE — 84100 ASSAY OF PHOSPHORUS: CPT | Performed by: STUDENT IN AN ORGANIZED HEALTH CARE EDUCATION/TRAINING PROGRAM

## 2021-03-06 PROCEDURE — 85730 THROMBOPLASTIN TIME PARTIAL: CPT | Performed by: STUDENT IN AN ORGANIZED HEALTH CARE EDUCATION/TRAINING PROGRAM

## 2021-03-06 PROCEDURE — 250N000013 HC RX MED GY IP 250 OP 250 PS 637: Performed by: INTERNAL MEDICINE

## 2021-03-06 PROCEDURE — 85730 THROMBOPLASTIN TIME PARTIAL: CPT | Performed by: INTERNAL MEDICINE

## 2021-03-06 PROCEDURE — 84132 ASSAY OF SERUM POTASSIUM: CPT | Performed by: STUDENT IN AN ORGANIZED HEALTH CARE EDUCATION/TRAINING PROGRAM

## 2021-03-06 PROCEDURE — 82805 BLOOD GASES W/O2 SATURATION: CPT | Performed by: THORACIC SURGERY (CARDIOTHORACIC VASCULAR SURGERY)

## 2021-03-06 PROCEDURE — 85652 RBC SED RATE AUTOMATED: CPT | Performed by: STUDENT IN AN ORGANIZED HEALTH CARE EDUCATION/TRAINING PROGRAM

## 2021-03-06 PROCEDURE — 82947 ASSAY GLUCOSE BLOOD QUANT: CPT | Performed by: THORACIC SURGERY (CARDIOTHORACIC VASCULAR SURGERY)

## 2021-03-06 PROCEDURE — 85730 THROMBOPLASTIN TIME PARTIAL: CPT | Performed by: SURGERY

## 2021-03-06 PROCEDURE — 85610 PROTHROMBIN TIME: CPT | Performed by: INTERNAL MEDICINE

## 2021-03-06 PROCEDURE — P9041 ALBUMIN (HUMAN),5%, 50ML: HCPCS | Performed by: STUDENT IN AN ORGANIZED HEALTH CARE EDUCATION/TRAINING PROGRAM

## 2021-03-06 PROCEDURE — 250N000011 HC RX IP 250 OP 636: Performed by: THORACIC SURGERY (CARDIOTHORACIC VASCULAR SURGERY)

## 2021-03-06 PROCEDURE — 85027 COMPLETE CBC AUTOMATED: CPT | Performed by: STUDENT IN AN ORGANIZED HEALTH CARE EDUCATION/TRAINING PROGRAM

## 2021-03-06 PROCEDURE — 258N000003 HC RX IP 258 OP 636: Performed by: STUDENT IN AN ORGANIZED HEALTH CARE EDUCATION/TRAINING PROGRAM

## 2021-03-06 PROCEDURE — 250N000011 HC RX IP 250 OP 636: Performed by: STUDENT IN AN ORGANIZED HEALTH CARE EDUCATION/TRAINING PROGRAM

## 2021-03-06 PROCEDURE — 250N000009 HC RX 250: Performed by: INTERNAL MEDICINE

## 2021-03-06 PROCEDURE — 83605 ASSAY OF LACTIC ACID: CPT | Performed by: THORACIC SURGERY (CARDIOTHORACIC VASCULAR SURGERY)

## 2021-03-06 PROCEDURE — 80048 BASIC METABOLIC PNL TOTAL CA: CPT | Performed by: STUDENT IN AN ORGANIZED HEALTH CARE EDUCATION/TRAINING PROGRAM

## 2021-03-06 PROCEDURE — 82330 ASSAY OF CALCIUM: CPT | Performed by: SURGERY

## 2021-03-06 PROCEDURE — 85347 COAGULATION TIME ACTIVATED: CPT

## 2021-03-06 PROCEDURE — 87070 CULTURE OTHR SPECIMN AEROBIC: CPT | Performed by: SURGERY

## 2021-03-06 PROCEDURE — 999N001017 HC STATISTIC GLUCOSE BY METER IP

## 2021-03-06 PROCEDURE — P9016 RBC LEUKOCYTES REDUCED: HCPCS | Performed by: THORACIC SURGERY (CARDIOTHORACIC VASCULAR SURGERY)

## 2021-03-06 PROCEDURE — 85027 COMPLETE CBC AUTOMATED: CPT | Performed by: SURGERY

## 2021-03-06 PROCEDURE — 80202 ASSAY OF VANCOMYCIN: CPT | Performed by: STUDENT IN AN ORGANIZED HEALTH CARE EDUCATION/TRAINING PROGRAM

## 2021-03-06 PROCEDURE — 83615 LACTATE (LD) (LDH) ENZYME: CPT | Performed by: STUDENT IN AN ORGANIZED HEALTH CARE EDUCATION/TRAINING PROGRAM

## 2021-03-06 PROCEDURE — 33949 ECMO/ECLS DAILY MGMT ARTERY: CPT

## 2021-03-06 PROCEDURE — 90947 DIALYSIS REPEATED EVAL: CPT

## 2021-03-06 PROCEDURE — 83735 ASSAY OF MAGNESIUM: CPT | Performed by: SURGERY

## 2021-03-06 PROCEDURE — 93005 ELECTROCARDIOGRAM TRACING: CPT

## 2021-03-06 PROCEDURE — 250N000011 HC RX IP 250 OP 636: Performed by: INTERNAL MEDICINE

## 2021-03-06 PROCEDURE — 83605 ASSAY OF LACTIC ACID: CPT | Performed by: SURGERY

## 2021-03-06 PROCEDURE — 94003 VENT MGMT INPAT SUBQ DAY: CPT

## 2021-03-06 PROCEDURE — 82330 ASSAY OF CALCIUM: CPT | Performed by: THORACIC SURGERY (CARDIOTHORACIC VASCULAR SURGERY)

## 2021-03-06 PROCEDURE — 272N000078 HC NUTRITION PRODUCT INTERMEDIATE LITER

## 2021-03-06 PROCEDURE — 82805 BLOOD GASES W/O2 SATURATION: CPT | Performed by: STUDENT IN AN ORGANIZED HEALTH CARE EDUCATION/TRAINING PROGRAM

## 2021-03-06 PROCEDURE — 83051 HEMOGLOBIN PLASMA: CPT | Performed by: STUDENT IN AN ORGANIZED HEALTH CARE EDUCATION/TRAINING PROGRAM

## 2021-03-06 PROCEDURE — 86140 C-REACTIVE PROTEIN: CPT | Performed by: STUDENT IN AN ORGANIZED HEALTH CARE EDUCATION/TRAINING PROGRAM

## 2021-03-06 PROCEDURE — 272N000083 HC NUTRITION PRODUCT SEMIELEM INTERMED LITER

## 2021-03-06 PROCEDURE — 71045 X-RAY EXAM CHEST 1 VIEW: CPT

## 2021-03-06 PROCEDURE — 258N000003 HC RX IP 258 OP 636: Performed by: THORACIC SURGERY (CARDIOTHORACIC VASCULAR SURGERY)

## 2021-03-06 PROCEDURE — 250N000011 HC RX IP 250 OP 636: Performed by: SURGERY

## 2021-03-06 PROCEDURE — 999N000015 HC STATISTIC ARTERIAL MONITORING DAILY

## 2021-03-06 PROCEDURE — G0463 HOSPITAL OUTPT CLINIC VISIT: HCPCS

## 2021-03-06 PROCEDURE — 250N000013 HC RX MED GY IP 250 OP 250 PS 637: Performed by: STUDENT IN AN ORGANIZED HEALTH CARE EDUCATION/TRAINING PROGRAM

## 2021-03-06 RX ORDER — MAGNESIUM SULFATE HEPTAHYDRATE 40 MG/ML
2 INJECTION, SOLUTION INTRAVENOUS EVERY 6 HOURS PRN
Status: DISCONTINUED | OUTPATIENT
Start: 2021-03-06 | End: 2021-03-11

## 2021-03-06 RX ORDER — HEPARIN SODIUM 10000 [USP'U]/100ML
500 INJECTION, SOLUTION INTRAVENOUS CONTINUOUS
Status: DISCONTINUED | OUTPATIENT
Start: 2021-03-06 | End: 2021-03-06

## 2021-03-06 RX ORDER — ASPIRIN 81 MG/1
81 TABLET, CHEWABLE ORAL DAILY
Status: DISCONTINUED | OUTPATIENT
Start: 2021-03-07 | End: 2021-04-05 | Stop reason: HOSPADM

## 2021-03-06 RX ORDER — FUROSEMIDE 10 MG/ML
120 INJECTION INTRAMUSCULAR; INTRAVENOUS ONCE
Status: DISCONTINUED | OUTPATIENT
Start: 2021-03-06 | End: 2021-03-06

## 2021-03-06 RX ORDER — AMIODARONE HYDROCHLORIDE 200 MG/1
200 TABLET ORAL 3 TIMES DAILY
Status: DISCONTINUED | OUTPATIENT
Start: 2021-03-06 | End: 2021-03-12

## 2021-03-06 RX ORDER — POTASSIUM CHLORIDE 29.8 MG/ML
20 INJECTION INTRAVENOUS EVERY 6 HOURS PRN
Status: DISCONTINUED | OUTPATIENT
Start: 2021-03-06 | End: 2021-03-11

## 2021-03-06 RX ORDER — FUROSEMIDE 10 MG/ML
20 INJECTION INTRAMUSCULAR; INTRAVENOUS ONCE
Status: DISCONTINUED | OUTPATIENT
Start: 2021-03-06 | End: 2021-03-06

## 2021-03-06 RX ORDER — DEXTROSE MONOHYDRATE 100 MG/ML
INJECTION, SOLUTION INTRAVENOUS CONTINUOUS PRN
Status: DISCONTINUED | OUTPATIENT
Start: 2021-03-06 | End: 2021-03-17

## 2021-03-06 RX ORDER — POLYETHYLENE GLYCOL 3350 17 G/17G
17 POWDER, FOR SOLUTION ORAL DAILY
Status: DISCONTINUED | OUTPATIENT
Start: 2021-03-06 | End: 2021-03-07

## 2021-03-06 RX ORDER — ALBUMIN, HUMAN INJ 5% 5 %
12.5 SOLUTION INTRAVENOUS ONCE
Status: COMPLETED | OUTPATIENT
Start: 2021-03-06 | End: 2021-03-06

## 2021-03-06 RX ORDER — DEXTROSE MONOHYDRATE 100 MG/ML
INJECTION, SOLUTION INTRAVENOUS CONTINUOUS PRN
Status: DISCONTINUED | OUTPATIENT
Start: 2021-03-06 | End: 2021-03-06

## 2021-03-06 RX ORDER — AMOXICILLIN 250 MG
1 CAPSULE ORAL AT BEDTIME
Status: DISCONTINUED | OUTPATIENT
Start: 2021-03-06 | End: 2021-03-07

## 2021-03-06 RX ORDER — FUROSEMIDE 10 MG/ML
100 INJECTION INTRAMUSCULAR; INTRAVENOUS ONCE
Status: DISCONTINUED | OUTPATIENT
Start: 2021-03-06 | End: 2021-03-06

## 2021-03-06 RX ORDER — HEPARIN SODIUM 10000 [USP'U]/100ML
10-50 INJECTION, SOLUTION INTRAVENOUS CONTINUOUS
Status: DISCONTINUED | OUTPATIENT
Start: 2021-03-06 | End: 2021-03-10

## 2021-03-06 RX ADMIN — HEPARIN SODIUM 500 UNITS/HR: 10000 INJECTION, SOLUTION INTRAVENOUS at 09:16

## 2021-03-06 RX ADMIN — CALCIUM CHLORIDE, MAGNESIUM CHLORIDE, SODIUM CHLORIDE, SODIUM BICARBONATE, POTASSIUM CHLORIDE AND SODIUM PHOSPHATE DIBASIC DIHYDRATE 12.5 ML/KG/HR: 3.68; 3.05; 6.34; 3.09; .314; .187 INJECTION INTRAVENOUS at 19:02

## 2021-03-06 RX ADMIN — ALBUMIN HUMAN 12.5 G: 0.05 INJECTION, SOLUTION INTRAVENOUS at 06:29

## 2021-03-06 RX ADMIN — ACETAMINOPHEN 975 MG: 325 TABLET, FILM COATED ORAL at 16:26

## 2021-03-06 RX ADMIN — CHLORHEXIDINE GLUCONATE 0.12% ORAL RINSE 15 ML: 1.2 LIQUID ORAL at 08:26

## 2021-03-06 RX ADMIN — DOCUSATE SODIUM 50 MG AND SENNOSIDES 8.6 MG 1 TABLET: 8.6; 5 TABLET, FILM COATED ORAL at 22:14

## 2021-03-06 RX ADMIN — CALCIUM CHLORIDE, MAGNESIUM CHLORIDE, SODIUM CHLORIDE, SODIUM BICARBONATE, POTASSIUM CHLORIDE AND SODIUM PHOSPHATE DIBASIC DIHYDRATE: 3.68; 3.05; 6.34; 3.09; .314; .187 INJECTION INTRAVENOUS at 13:34

## 2021-03-06 RX ADMIN — VANCOMYCIN HYDROCHLORIDE 1500 MG: 10 INJECTION, POWDER, LYOPHILIZED, FOR SOLUTION INTRAVENOUS at 09:12

## 2021-03-06 RX ADMIN — AMIODARONE HYDROCHLORIDE 200 MG: 200 TABLET ORAL at 13:42

## 2021-03-06 RX ADMIN — EPINEPHRINE 0.05 MCG/KG/MIN: 1 INJECTION PARENTERAL at 18:30

## 2021-03-06 RX ADMIN — CHLORHEXIDINE GLUCONATE 0.12% ORAL RINSE 15 ML: 1.2 LIQUID ORAL at 19:42

## 2021-03-06 RX ADMIN — POLYETHYLENE GLYCOL 3350 17 G: 17 POWDER, FOR SOLUTION ORAL at 09:57

## 2021-03-06 RX ADMIN — ALBUMIN HUMAN 12.5 G: 0.05 INJECTION, SOLUTION INTRAVENOUS at 01:25

## 2021-03-06 RX ADMIN — Medication 2 MG/HR: at 19:30

## 2021-03-06 RX ADMIN — ALBUMIN HUMAN 12.5 G: 0.05 INJECTION, SOLUTION INTRAVENOUS at 01:24

## 2021-03-06 RX ADMIN — AMIODARONE HYDROCHLORIDE 200 MG: 200 TABLET ORAL at 19:42

## 2021-03-06 RX ADMIN — MUPIROCIN 0.5 G: 20 OINTMENT TOPICAL at 19:42

## 2021-03-06 RX ADMIN — Medication 2 UNITS/HR: at 05:15

## 2021-03-06 RX ADMIN — CEFEPIME 2 G: 2 INJECTION, POWDER, FOR SOLUTION INTRAVENOUS at 16:26

## 2021-03-06 RX ADMIN — ASPIRIN 162 MG: 81 TABLET, CHEWABLE ORAL at 08:24

## 2021-03-06 RX ADMIN — SODIUM CHLORIDE 500 ML: 9 INJECTION, SOLUTION INTRAVENOUS at 08:25

## 2021-03-06 RX ADMIN — CALCIUM CHLORIDE, MAGNESIUM CHLORIDE, SODIUM CHLORIDE, SODIUM BICARBONATE, POTASSIUM CHLORIDE AND SODIUM PHOSPHATE DIBASIC DIHYDRATE 12.5 ML/KG/HR: 3.68; 3.05; 6.34; 3.09; .314; .187 INJECTION INTRAVENOUS at 13:33

## 2021-03-06 RX ADMIN — MUPIROCIN 0.5 G: 20 OINTMENT TOPICAL at 08:25

## 2021-03-06 RX ADMIN — ACETAMINOPHEN 975 MG: 325 TABLET, FILM COATED ORAL at 08:24

## 2021-03-06 RX ADMIN — PANTOPRAZOLE SODIUM 40 MG: 40 TABLET, DELAYED RELEASE ORAL at 08:25

## 2021-03-06 RX ADMIN — AMIODARONE HYDROCHLORIDE 200 MG: 200 TABLET ORAL at 09:57

## 2021-03-06 ASSESSMENT — ACTIVITIES OF DAILY LIVING (ADL)
ADLS_ACUITY_SCORE: 22

## 2021-03-06 ASSESSMENT — MIFFLIN-ST. JEOR: SCORE: 1643.13

## 2021-03-06 NOTE — CONSULTS
[facility]  WOC Nurse Inpatient Adult Pressure Injury Prevention Assessment: ECMO  Initial     Positioning Tolerance: Fair  Date of ECMO cannulation: 3/5/21  Presence of Ischemia: No  Location of ischemia: N/A    Pressure Injury Prevention Interventions In Place:  Optifoam Dressing under ECMO Cannula, Z flow Positioner under head, Pillows for repositioning, TAPs Wedge Positioners in use, Heel off-loading boots, Pillows under calves for heel suspension, Mepilex Sacral Dressing and Dressing to sacrum for prevention   Current support surface: Standard  Low air loss mattress       Pressure Injury Prevention Interventions Added:  None and all interventions in place currently        Plan of Care for Positioning and Pressure Injury Prevention  Reposition patient every 1-2 hours using TAP Wedges  Position head on Z flow positioner, mold indentation at areas of pressure points.  Pad ECMO IJ cannula with Optifoam (#[id number]) along face and scalp between skin and cannula and under Coban head wraps    Pad ECMO groin and chest cannula under rigid connectors with Optifoam or Soft cloth  Heel off-loading Boots at all times  Sacral Mepilex for Prevention, change every 5 days and prn  Low Air loss mattress    Patient History:   According to medical record: Jin Garrett is a 80 year old male with PMH of HTN, AAA, HLD and rheumatoid arthritis. He presented to an OSH with shortness of breath and atypical chest pain. Work-up revealed an elevated troponin, hypotension and hypoxia. TTE showed severe MR with concern for posterior flail leaflet due to to either papillary muscle rupture or torn chordae, EF was preserved. On arrival at [facility] an Impella was emergently placed in cath lab. 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 Porras on 3/5/21. Returned from the OR on central ECMO with an open chest, intubated and sedated.     Current Diet / Nutrition:     None      Output:    I/O  last 3 completed shifts:  In: 8072.49 [I.V.:1717.49; NG/GT:275; IV Piggyback:4500]  Out: 4399 [Urine:3529; Chest Tube:870]  Containment: of urine/stool: Incontinence Protocol and Urinary Catheter    Risk Assessment:   Sensory Perception: 1-->completely limited  Moisture: 4-->rarely moist  Activity: 1-->bedfast  Mobility: 1-->completely immobile  Nutrition: 1-->very poor  Friction and Shear: 1-->problem  Raman Score: 9    Labs:    Recent Labs   Lab 03/06/21  1612 03/06/21  1154 03/06/21  0342 03/06/21  0342 03/05/21  0409 03/05/21  0409   ALBUMIN  --  2.5*  --  2.5*   < >  --    HGB 8.7* 8.4*   < > 8.6*   < > 9.0*   INR 1.51* 1.48*  --  1.47*   < >  --    WBC 21.9* 19.9*   < > 19.3*   < > 29.5*   A1C  --   --   --   --   --  5.7*   CRP  --   --   --  250.0*   < >  --     < > = values in this interval not displayed.       Focused Assessment:  Chest ECMO cannula; patient also with small scab on left upper lip present on admission.     Pressure Injury Present::No    Education provided to: nurse  Discussed importance of:their role in pressure injury prevention  Discussed plan of care with Nurse  WOC Nurse follow-up plan:weekly    Ioana Rothman RN, CWOCN

## 2021-03-06 NOTE — PHARMACY-CONSULT NOTE
Pharmacy Tube Feeding Consult    Medication reviewed for administration by feeding tube and for potential food/drug interactions.    Recommendation: No changes are needed at this time.     Pharmacy will continue to follow as new medications are ordered.    Messi Gupta, PharmD, BCCCP

## 2021-03-06 NOTE — PROGRESS NOTES
Admitted/transferred from: OR  Reason for admission/transfer: post op CABG/MV replacement  2 RN skin assessment: completed by Dominique STONE   Result of skin assessment and interventions/actions: drains/devices, incisions, scar/incision from recent back surgery  Height, weight, drug calc weight: Done  Patient belongings: none  MDRO education added to care plan: NA    Pt arrived from OR around 1530, in critical condition. Chest open, on central  ECMO. Sedated, RASS -4/-5 on versed/fent. HR paced at 100. Few episodes of chugging, gave 3L of LR total between 1530 and 1900. Flows around 4. 1 short run of VT, self resolved. CMV 12/450/7 50%. CT output from meds thick and needing frequent stripping. Having 200-600ml/hr of UOP- MDs aware. Venous gas from [place] port not accurate - MDs notified. Plan to pull swan tonight. Daughter updated on plan of care.   ?

## 2021-03-06 NOTE — PROGRESS NOTES
CLINICAL NUTRITION SERVICES - BRIEF NOTE    Received provider consult for nutrition education with comments post op cardiovascular surgery (automatic consult on post-op order set). S/p mitral valve replacement and single vessel CABG on 3/5. Nutrition education to be completed as able/appropriate (as pt s/p CABG), pt currently intubated/sedated and on ECMO.    RD will follow per LOS protocol or if re-consulted.     Bessy Jorge RD, LD  Weekend/Holiday RD Pager: [phone]

## 2021-03-06 NOTE — PROGRESS NOTES
ECMO [place] STUDY  3/6/2021    Pressors: levophed 0.06, epinephrine 0.06    Flow  MAP HR O2 Sat  MVO2    4L 73 85 93%  62    3L 59 88 95%  56  2.5L 53 87 92%  56    Hemodynamic study only; no echo.     Tisha Veronica DNP APRN CNP   [phone]

## 2021-03-06 NOTE — PROVIDER NOTIFICATION
Patient has MAP decline to mid 30's when ECMO circuit began to chug. LR bolus and Albumin Bolus started with pressor increase. MD notified and to bedside. Recovered with fluid and able to restore previous ECMO flows.

## 2021-03-06 NOTE — CONSULTS
Nephrology Initial Consult  March 6, 2021      Jin Garrett MRN:[id number] YOB: 1940  Date of Admission:3/4/2021  Primary care provider: Ritesh Kumar  Requesting physician: Daquan Porras    ASSESSMENT AND RECOMMENDATIONS:   ADAN in the setting of cardiogenic shock on ECMO  Hyperkalemia  Unknown baseline, no h/o CKD per daughter. 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 Cr 2.5 at presentation, decreased to 2.1 today which was probably dilutional as pt was aggressively resuscitated yesterday and now back up to 2.6. Etiology likely ATN in the setting of shock, no obstruction per CT and urinalysis showed with granular casts. UOP has declined and K rising. pH remains normal but bicarbonate with decreasing trend. Will elect to start CRRT and discussed with daughter Pamela who agreed to proceed. Will give high dose IV diuretic in the interim to address hyperkalemia.  - IV lasix 120 mg x1 ordered  - CRRT orders placed with 4K baths and standard clearance  - Goal UF I=Os over next 24 hrs, pt appears close to euvolemic on exam but remains net pos 7L for admission  - Labs per CRRT protocol  - Avoid nephrotoxins  - Renally dose medications  - Renal diet  - Daily weights  - Strict I/Os    Recommendations were communicated to primary team verbally    Seen and discussed with Dr. Jeramie Potts MD   [phone]  I have seen and examined the patient and reviewed all current labs and findings. I concur with the assessment and plan as it is outlined. Any changes to the note made by me are in bold. Kamla Bobo MD MS FNKF    REASON FOR CONSULT: ADAN    HISTORY OF PRESENT ILLNESS:  Admitting provider and nursing notes reviewed  Jin Garrett is a 80 year old with PMH HTN, AAA, HLD, RA who presented to OSH with SOB and atypical CP. TTE showed severe MR with ?posterior flail leaflet, EF preserved. On arrival at [facility] an  Impella was emergently placed. S/p MVR and single vessel CABG on 3/5/21. Returned from the OR on central ECMO with an open chest. Pt remains intubated and history limited. Had good UOP yesterday in response to lasix but UOP declined today.     PAST MEDICAL HISTORY:  See above    MEDICATIONS:  PTA Meds  Prior to Admission medications    Medication Sig Last Dose Taking? Auth Provider   amitriptyline (ELAVIL) 25 MG tablet Take 25 mg by mouth At Bedtime  Yes Unknown, Entered By History   amLODIPine (NORVASC) 5 MG tablet Take 5 mg by mouth daily  Yes Unknown, Entered By History   losartan-hydrochlorothiazide (HYZAAR) 100-25 MG tablet Take 1 tablet by mouth daily  Yes Unknown, Entered By History   methocarbamol (ROBAXIN) 750 MG tablet Take 750 mg by mouth 4 times daily as needed for muscle spasms . . Filled 2/23/2021 x60 tablets  Yes Unknown, Entered By History   metoprolol succinate ER (TOPROL-XL) 25 MG 24 hr tablet Take 25 mg by mouth daily  Yes Unknown, Entered By History   oxyCODONE (ROXICODONE) 5 MG tablet Take 5-10 mg by mouth every 6 hours as needed for severe pain . Filled 2/23/2021 x30 tablets  Yes Unknown, Entered By History      Current Meds    acetaminophen  975 mg Oral or Feeding Tube Q8H     amiodarone  200 mg Oral or NG Tube TID     artificial tears   Both Eyes Q8H     [START ON 3/7/2021] aspirin  81 mg Oral or NG Tube Daily     ceFEPIme (MAXIPIME) IV  2 g Intravenous Q24H     chlorhexidine  15 mL Swish & Spit BID     furosemide  120 mg Intravenous Once     mupirocin  0.5 g Both Nostrils BID     pantoprazole  40 mg Oral or NG Tube Daily    Or     pantoprazole  40 mg Oral Daily     polyethylene glycol  17 g Oral Daily     senna-docusate  1 tablet Oral At Bedtime     vancomycin place john - receiving intermittent dosing  1 each Intravenous See Admin Instructions     Infusion Meds    dextrose       dextrose       CRRT replacement solution       EPINEPHrine 0.06 mcg/kg/min (03/06/21 1300)     fentaNYL 50  mcg/hr (03/06/21 1300)     heparin 500 Units/hr (03/06/21 1200)     insulin (regular) 20 Units/hr (03/05/21 0800)     lactated ringers       - MEDICATION INSTRUCTIONS -       midazolam 2 mg/hr (03/06/21 1300)     - MEDICATION INSTRUCTIONS -       norepinephrine 0.05 mcg/kg/min (03/06/21 1300)     CRRT replacement solution       CRRT replacement solution       BETA BLOCKER NOT PRESCRIBED       sodium chloride 75 mL/hr at 03/05/21 0307     vasopressin 0.5 Units/hr (03/06/21 1300)       ALLERGIES:  No Known Allergies    REVIEW OF SYSTEMS:  Unable to obtain as pt intubated    SOCIAL HISTORY:   Social History     Socioeconomic History     Marital status: Single     Spouse name: Not on file     Number of children: Not on file     Years of education: Not on file     Highest education level: Not on file   Occupational History     Not on file   Social Needs     Financial resource strain: Not on file     Food insecurity     Worry: Not on file     Inability: Not on file     Transportation needs     Medical: Not on file     Non-medical: Not on file   Tobacco Use     Smoking status: Not on file   Substance and Sexual Activity     Alcohol use: Not on file     Drug use: Not on file     Sexual activity: Not on file   Lifestyle     Physical activity     Days per week: Not on file     Minutes per session: Not on file     Stress: Not on file   Relationships     Social connections     Talks on phone: Not on file     Gets together: Not on file     Attends Jew service: Not on file     Active member of club or organization: Not on file     Attends meetings of clubs or organizations: Not on file     Relationship status: Not on file     Intimate partner violence     Fear of current or ex partner: Not on file     Emotionally abused: Not on file     Physically abused: Not on file     Forced sexual activity: Not on file   Other Topics Concern     Not on file   Social History Narrative     Not on file       FAMILY MEDICAL HISTORY:  "  Unable to obtain as pt intubated    PHYSICAL EXAM:   Temp  Av.4  F (36.9  C)  Min: 95.5  F (35.3  C)  Max: 100.6  F (38.1  C)  Arterial Line BP  Min: 41/36  Max: 115/78  Arterial Line MAP (mmHg)  Av mmHg  Min: 38 mmHg  Max: 95 mmHg Arterial Line Location 2: Right radial    Pulse  Av.1  Min: 83  Max: 129 Resp  Av.8  Min: 12  Max: 105  FiO2 (%)  Av.9 %  Min: 50 %  Max: 100 %  SpO2  Av.3 %  Min: 78 %  Max: 100 %    CVP (mmHg): 10 mmHg  BP 99/61   Pulse 87   Temp 98.4  F (36.9  C)   Resp 12   Ht 1.803 m (5' 11\")   Wt 91.1 kg (200 lb 13.4 oz)   SpO2 94%   BMI 28.01 kg/m     Date 21 07 - 21 0659   Shift 4879-8007 7351-0515 8526-3061 24 Hour Total   INTAKE   I.V. 422.25   422.25   NG/   245   IV Piggyback 500   500   Colloid 250   250   Enteral 20   20   Blood Components 300   300   Shift Total(mL/kg) 1737.25(19.07)   1737.25(19.07)   OUTPUT   Urine 79   79   Chest Tube(mL/kg) 120(1.32)   120(1.32)   Shift Total(mL/kg) 199(2.18)   199(2.18)   Weight (kg) 91.1 91.1 91.1 91.1      Admit Weight: 79 kg (174 lb 2.6 oz)     GENERAL APPEARANCE: lying in bed, sedated  EYES:  pupils equal  PULM: lungs clear to auscultation bilaterally, intubated  CV: regular rhythm, normal rate, no rub     -edema none  GI: soft, non-tender, non-distended, bowel sounds normal  INTEGUMENT: no obvious rash on exposed surfaces  NEURO:  Sedated  Access: ECMO      LABS:   CMP  Recent Labs   Lab 21  1154 21  0704 21  0342 21  2232 21  1814 21  1534 21  0546   *  --  144 146* 147* 146* 141   POTASSIUM 5.5* 5.4* 5.4* 5.0 4.0 3.2* 3.4   CHLORIDE 112*  --  111* 113* 115* 114* 111*   CO2 17*  --  20 22 24 23 17*   ANIONGAP 16*  --  13 10 8 10 13   *  111*  --  123*  118* 130*  125* 120*  119* 66* 356*   BUN 46*  --  40* 39* 42* 40* 49*   CR 2.66*  --  2.11* 2.03* 2.13* 2.14* 2.52*   GFRESTIMATED 22*  --  29* 30* 28* 28* 23*   GFRESTBLACK 25*  " --  33* 35* 33* 33* 27*   PALLAVI 7.9*  --  8.0* 7.8* 8.0* 8.0* 7.8*   MAG 2.2  --  2.2 2.0 2.2 2.4* 1.7   PHOS  --   --  4.4 3.6  --  1.9* 3.0   PROTTOTAL 4.6*  --  4.6* 3.8* 3.6* 3.8* 5.2*   ALBUMIN 2.5*  --  2.5* 1.9* 1.5* 1.5* 1.8*   BILITOTAL 1.8*  --  1.3 1.1 1.0 0.8 0.6   ALKPHOS 47  --  46 46 51 46 74   AST 94*  --  109* 125* 155* 143* 78*   ALT 20  --  21 18 20 19 22     CBC  Recent Labs   Lab 03/06/21  1154 03/06/21  0910 03/06/21 0342 03/05/21 2232   HGB 8.4* 7.9* 8.6* 8.9*   WBC 19.9* 19.0* 19.3* 18.9*   RBC 2.92* 2.69* 2.96* 3.08*   HCT 25.4* 23.6* 25.6* 26.9*   MCV 87 88 87 87   MCH 28.8 29.4 29.1 28.9   MCHC 33.1 33.5 33.6 33.1   RDW 14.9 15.0 14.7 14.3   PLT 98* 96* 125* 130*     INR  Recent Labs   Lab 03/06/21  1154 03/06/21  1008 03/06/21  0342 03/05/21  2232 03/05/21  1814   INR 1.48*  --  1.47* 1.49* 1.51*   PTT 45* 46* 38* 42* 97*     ABG  Recent Labs   Lab 03/06/21  1004 03/06/21  0818 03/06/21  0607 03/06/21  0342   PH 7.39 7.33* 7.31* 7.35   PCO2 29* 36 39 37   PO2 158* 105 98 119*   HCO3 18* 19* 20* 20*   O2PER 50  50 50.0 50.0 50.0      URINE STUDIES  Recent Labs   Lab Test 03/05/21  0755   COLOR Light Yellow   APPEARANCE Slightly Cloudy   URINEGLC Negative   URINEBILI Negative   URINEKETONE Negative   SG 1.019   UBLD Moderate*   URINEPH 5.0   PROTEIN Negative   NITRITE Negative   LEUKEST Small*   RBCU 21*   WBCU 12*     No lab results found.  PTH  No lab results found.  IRON STUDIES  No lab results found.    IMAGING:  All imaging studies reviewed by me.     EXAMINATION: CT CHEST ABDOMEN PELVIS W/O CONTRAST, 3/5/2021 5:35 AM     TECHNIQUE:  Helical CT images from the lung apices through the  symphysis pubis were obtained without contrast.  Coronal and sagittal  reformatted images were generated at a workstation for further  assessment.     COMPARISON: Same day chest and abdominal radiographs     HISTORY: Concern for pneumoperitoneum on abdominal radiograph     FINDINGS:     Lines and tubes:  Endotracheal tube tip approximately 1 cm above the  tito. Enteric tube tip and side-port in the stomach. Left femoral  approach Impella device with inflow in the left ventricle and outflow  in the proximal ascending aorta. Right IJ Gerber-Vimal catheter in the  right interlobar artery. Right femoral approach arterial catheter tip  in the distal aorta. Alfaro catheter within the bladder.     Lungs and pleural: No pneumothorax. Moderate bilateral pleural  effusions, left greater than right. Diffuse smooth interlobular septal  thickening with left greater than right predominantly bronchocentric  groundglass and consolidative opacities. Bilateral dependent  atelectasis.  Heart and mediastinum: Central tracheobronchial tree is patent.  Asymmetrically enlarged left atrium. No pericardial effusion.   Moderate coronary calcifications.  Thoracic vasculature: Limited assessment on this noncontrast exam.  Ectatic ascending aorta up to 4.2 cm. The pulmonary artery is dilated  to 3.7 cm. Severe aortic atherosclerosis.   Lymph nodes: Prominent mediastinal lymph nodes, likely reactive. No  suspicious lymphadenopathy.  Thyroid: No suspicious nodules.     Liver: Subcentimeter hypodensities throughout the right lobe, too  small to characterize on CT.  Gallbladder: Distended gallbladder with few punctate cholelithiasis  versus sludge without evidence of acute cholecystitis.  Spleen: Unremarkable.  Pancreas: Unremarkable.  Adrenal glands: No discrete nodules.  Kidneys: Persistent bilateral nephrograms. No hydronephrosis. Contrast excretion in the bilateral collecting systems. Approximately 1 cm indeterminate right interpolar cyst. Additional bilateral  subcentimeter hypodensities, too small to characterize though favored  benign cysts. Bilateral perirenal stranding.  Bladder / Pelvic organs: Post-instrumentation air in the bladder.  Prostatomegaly. No pelvic masses.  Bowel: No abnormally distended large or small bowel.  Diffusely  thickened colon without significant stranding or pneumatosis. Colonic  diverticulosis without evidence of acute diverticulitis.  Lymph nodes: No suspicious lymphadenopathy.  Peritoneum / Retroperitoneum: No pneumoperitoneum. Trace fluid about the mesentery and along the bilateral lateral conal fascia. Small  fat-containing umbilical hernia.  Abdominal vasculature: Limited assessment on this noncontrast exam. No abdominal aortic aneurysm. Severe aortoiliac atherosclerosis. Engorged  IVC. Small 3.0 x 1.4 cm hematoma overlying the left external iliac  artery.     Bones and soft tissues: Degenerative changes of the spine. Left  sacroiliac hardware without evidence of acute complication. No acute  fracture or suspicious appearing osseous lesion. Mild diffuse  anasarca.    IMPRESSION:   1. No pneumoperitoneum.  2. Moderate bilateral pleural effusions, left greater than right, with  diffuse smooth interlobular septal thickening and left greater than  right bronchocentric groundglass and consolidative opacities,  consistent with pulmonary edema in the setting of acute mitral  regurgitation.   3. Nonspecific diffusely thickened appearance of the colon may be due  to underdistention. No pericolonic fat stranding to suggest colitis.  4. Endotracheal tube to approximately 1 cm above the tito. Recommend retracting 2-4 cm.  5.  Additional incidental/chronic findings as noted above.    __________________________________________________________  Procedure  Bubble Echocardiogram with two-dimensional, color and spectral Doppler performed.  __________________________________________________________     Interpretation Summary  Global and regional left ventricular function is normal with an EF of 55-60%. Right ventricular function, chamber size, wall motion, and thickness are normal.  Ruptured Chordae with flail posterior mitral leaflet. MR difficult to assess. Impella device in place. Ascending aorta 4 cm.  Dilation of the  inferior vena cava is present with abnormal respiratory  variation in diameter.  No pericardial effusion is present.  The atrial septum is intact as assessed by agitated saline bubble study .  There is no prior study for direct comparison.    Ramses Potts MD

## 2021-03-06 NOTE — PHARMACY-VANCOMYCIN DOSING SERVICE
Pharmacy Vancomycin Note  Date of Service 2021  Patient's  1940   80 year old, male    Indication: open chest prophylaxis  Goal Trough Level: 15-20 mg/L  Day of Therapy: 2  Current Vancomycin regimen:  Intermittent dosing     Current estimated CrCl = Estimated Creatinine Clearance: 32.2 mL/min (A) (based on SCr of 2.11 mg/dL (H)).    Creatinine for last 3 days  3/5/2021:  2:56 AM Creatinine 2.49 mg/dL;  5:46 AM Creatinine 2.52 mg/dL;  3:34 PM Creatinine 2.14 mg/dL;  6:14 PM Creatinine 2.13 mg/dL; 10:32 PM Creatinine 2.03 mg/dL  3/6/2021:  3:42 AM Creatinine 2.11 mg/dL    Recent Vancomycin Levels (past 3 days)  3/6/2021:  3:42 AM Vancomycin Level 5.8 mg/L    Vancomycin IV Administrations (past 72 hours)                   vancomycin (VANCOCIN) 1000 mg in dextrose 5% 200 mL PREMIX (mg) 1,000 mg Given 21 0943                Nephrotoxins and other renal medications (From now, onward)    Start     Dose/Rate Route Frequency Ordered Stop    21 0700  vancomycin 1500 mg in 0.9% NaCl 250 ml intermittent infusion 1,500 mg      1,500 mg  over 90 Minutes Intravenous ONCE 21 0658      21  vasopressin 40 units in NS 40 mL (PITRESSIN) infusion      0.5-4 Units/hr  0.5-4 mL/hr  Intravenous CONTINUOUS PRN 21 164  vancomycin place john - receiving intermittent dosing      1 each Intravenous SEE ADMIN INSTRUCTIONS 21 1647      21 0630  norepinephrine (LEVOPHED) 16 mg in  mL infusion CENTRAL LINE      0.03-0.4 mcg/kg/min × 79 kg (Dosing Weight)  2.2-29.6 mL/hr  Intravenous CONTINUOUS 21 0603               Contrast Orders - past 72 hours (72h ago, onward)    Start     Dose/Rate Route Frequency Ordered Stop    21 0041  iopamidol (ISOVUE-370) solution  Status:  Discontinued        ONCE PRN 21 0042 21 0108          Interpretation of levels and current regimen:  Trough level is  Subtherapeutic (18 hr level = 5.8 mg/L)    Has  serum creatinine changed > 50% in last 72 hours: No    Urine output:  good urine output      Plan:  1.  Will give 1500 mg IV once. Future dosing based on levels and renal funciton trend  2.  Pharmacy will check trough levels as appropriate in 1-3 Days.    3. Serum creatinine levels will be ordered daily for the first week of therapy and at least twice weekly for subsequent weeks.      Messi Gupta RPH        .

## 2021-03-06 NOTE — PROGRESS NOTES
ECMO Shift Summary:      ECMO Equipment:  Console serial number: [id number]  #4  Circuit Lot number: [id number]  Oxygenator Lot number: [id number]      Patient remains on V/A ECMO, all equipment is functioning and alarms are appropriately set. RPM's 3300 with flow range 4.04-4.16 L/min. Sweep gas is at 3 LPM and FiO2 70%. Circuit remains free of air, clot and fibrin. Cannulas are secure with no bleeding from site. Pt has an open chest and is centrally cannulated.  Extremities are warm.     Significant Shift Events: Pt arrived this afternoon from the OR. 3rd Lactated Ringers being given for circuit chugging.    Vent settings:  Ventilation Mode: CMV/AC  (Continuous Mandatory Ventilation/ Assist Control)  FiO2 (%): 50 %  Rate Set (breaths/minute): 14 breaths/min  Tidal Volume Set (mL): 400 mL  PEEP (cm H2O): 5 cmH2O  Oxygen Concentration (%): 50 %  Resp: 12  .    Heparin is off per Dr Porras, .    Urine output is brisk, blood loss was from chest tubes 260ml. No product given.      Intake/Output Summary (Last 24 hours) at 3/5/2021 1830  Last data filed at 3/5/2021 1800  Gross per 24 hour   Intake 8301.6 ml   Output 4045 ml   Net 4256.6 ml       ECHO:  No results found for this or any previous visit.No results found for this or any previous visit.    CXR:  Recent Results (from the past 24 hour(s))   Cardiac Catheterization    Narrative    Impella Insertion:   Successful insertion of an Impella CP mechanical assist device via the   left femoral artery with device flow of 3.8 L/min.    Coronary angiogram: right-dominant circulation.   LM: angiographically normal   LAD: 30% distal LAD lesion   Circumflex: 100% occlusion of the mid circumflex just after bifurcation   of first obtuse marginal   RCA: 40% proximal RCA stenosis    Percutaneous coronary intervention:   (1) Aspiration thrombectomy using a Penumbra catheter   (2) Balloon angioplasty (Emerge 2.0x12mm) of the mid-circumflex with   residual 70% stenosis and  restoration of PRETTY grade III flow    Pulmonary artery catheterization:   Moderately elevated left and right ventricular filling pressures and mild   pulmonary hypertension.     Iliofemoral angiogram:   Small iatrogenic retrograde dissection of the right common iliac artery   without compromise of flow in the vessel distal to it.   XR Chest Port 1 View    Narrative    Chest radiograph 3/5/2021 3:50 AM    HISTORY: Impella, ET tube, Collins-Vimal    COMPARISON: None available    FINDINGS:  Single AP view of the chest. Endotracheal tube tip 2.5 cm above the  tito. Right IJ Collins-Vimal catheter tip overlying the right pulmonary  artery. Inferior approach Impella inflow tip overlying the left  ventricle with outflow marker over the proximal ascending aorta.  Enteric tube courses beyond the field-of-view. Trachea is midline.  Cardiac silhouette is within normal limits. Enlarged pulmonary  arteries. No pneumothorax. The costophrenic angles are collimated out  of view. Left greater than right diffuse mixed interstitial and  airspace opacities.      Impression    IMPRESSION:  1. Endotracheal tube tip 2.5 cm above the tito.  2. Inferior approach Impella inflow tip overlying the left ventricle  with outflow marker over the proximal ascending aorta.   3. Right IJ Collins-Vimal catheter tip overlying the right pulmonary  artery.  4. Enlarged pulmonary arteries with left greater than right diffuse  mixed interstitial and airspace opacities, favored pulmonary edema  given known acute mitral regurgitation.    I have personally reviewed the examination and initial interpretation  and I agree with the findings.    LYNNE RASMUSSEN MD   XR Abdomen Port 1 View   Result Value    Radiologist flags Unexplained pneumoperitoneum (AA)    Narrative    Abdominal radiograph 3/5/2021 4:24 AM    HISTORY: OG placement    COMPARISON: Same-day chest x-ray    FINDINGS:  Supine view of the abdomen. Impella catheter tip overlying the left  ventricle. Enteric  tube tip and side-port overlying the stomach. No  cardiomegaly. Mild mixed interstitial and airspace opacities in the  visualized lung bases. Gaseous distention of small and large bowel  with suspicion of pneumoperitoneum. No definite portal venous gas or  pneumatosis. Scoliotic and degenerative spine.      Impression    IMPRESSION:  1. Gaseous distention of small and large bowel with suspicion of  pneumoperitoneum. Recommend CT for further evaluation.  2. Enteric tube tip and side-port overlying the stomach.    [Critical Result: Unexplained pneumoperitoneum]    Finding was identified on 3/5/2021 4:24 AM.     Dr. Valentine was contacted by Dr. Zuniga at 3/5/2021 4:32 AM and  verbalized understanding of the critical finding.     I have personally reviewed the examination and initial interpretation  and I agree with the findings.    LYNNE RASMUSSEN MD   CT Chest Abdomen Pelvis w/o Contrast    Narrative    EXAMINATION: CT CHEST ABDOMEN PELVIS W/O CONTRAST, 3/5/2021 5:35 AM    TECHNIQUE:  Helical CT images from the lung apices through the  symphysis pubis were obtained without contrast.  Coronal and sagittal  reformatted images were generated at a workstation for further  assessment.    COMPARISON: Same day chest and abdominal radiographs    HISTORY: Concern for pneumoperitoneum on abdominal radiograph    FINDINGS:    Lines and tubes: Endotracheal tube tip approximately 1 cm above the  tito. Enteric tube tip and side-port in the stomach. Left femoral  approach Impella device with inflow in the left ventricle and outflow  in the proximal ascending aorta. Right IJ Austin-Vimal catheter in the  right interlobar artery. Right femoral approach arterial catheter tip  in the distal aorta. Alfaro catheter within the bladder.    Lungs and pleural: No pneumothorax. Moderate bilateral pleural  effusions, left greater than right. Diffuse smooth interlobular septal  thickening with left greater than right predominantly  bronchocentric  groundglass and consolidative opacities. Bilateral dependent  atelectasis.  Heart and mediastinum: Central tracheobronchial tree is patent.  Asymmetrically enlarged left atrium. No pericardial effusion.   Moderate coronary calcifications.  Thoracic vasculature: Limited assessment on this noncontrast exam.  Ectatic ascending aorta up to 4.2 cm. The pulmonary artery is dilated  to 3.7 cm. Severe aortic atherosclerosis.   Lymph nodes: Prominent mediastinal lymph nodes, likely reactive. No  suspicious lymphadenopathy.  Thyroid: No suspicious nodules.    Liver: Subcentimeter hypodensities throughout the right lobe, too  small to characterize on CT.  Gallbladder: Distended gallbladder with few punctate cholelithiasis  versus sludge without evidence of acute cholecystitis.  Spleen: Unremarkable.  Pancreas: Unremarkable.  Adrenal glands: No discrete nodules.  Kidneys: Persistent bilateral nephrograms. No hydronephrosis. Contrast  excretion in the bilateral collecting systems. Approximately 1 cm  indeterminate right interpolar cyst. Additional bilateral  subcentimeter hypodensities, too small to characterize though favored  benign cysts. Bilateral perirenal stranding.  Bladder / Pelvic organs: Post-instrumentation air in the bladder.  Prostatomegaly. No pelvic masses.  Bowel: No abnormally distended large or small bowel. Diffusely  thickened colon without significant stranding or pneumatosis. Colonic  diverticulosis without evidence of acute diverticulitis.  Lymph nodes: No suspicious lymphadenopathy.  Peritoneum / Retroperitoneum: No pneumoperitoneum. Trace fluid about  the mesentery and along the bilateral lateral conal fascia. Small  fat-containing umbilical hernia.  Abdominal vasculature: Limited assessment on this noncontrast exam. No  abdominal aortic aneurysm. Severe aortoiliac atherosclerosis. Engorged  IVC. Small 3.0 x 1.4 cm hematoma overlying the left external iliac  artery.    Bones and soft  tissues: Degenerative changes of the spine. Left  sacroiliac hardware without evidence of acute complication. No acute  fracture or suspicious appearing osseous lesion. Mild diffuse  anasarca.      Impression    IMPRESSION:   1. No pneumoperitoneum.  2. Moderate bilateral pleural effusions, left greater than right, with  diffuse smooth interlobular septal thickening and left greater than  right bronchocentric groundglass and consolidative opacities,  consistent with pulmonary edema in the setting of acute mitral  regurgitation.   3. Nonspecific diffusely thickened appearance of the colon may be due  to underdistention. No pericolonic fat stranding to suggest colitis.  4. Endotracheal tube to approximately 1 cm above the tito. Recommend  retracting 2-4 cm.  5.  Additional incidental/chronic findings as noted above.    I have personally reviewed the examination and initial interpretation  and I agree with the findings.    LYNNE RASMUSSEN MD   Echocardiogram Complete    Narrative    [id number]  [id number]  [id number]  [id number]^MANN^JEANETTE                                                                          Version 2        [facility],[place]  Echocardiography Laboratory  [address] [place] [id number]     Name: YADY BARNES  MRN: [id number]  : 1940  Study Date: 2021 07:00 AM  Age: 80 yrs  Gender: Male  Patient Location: [facility]  Reason For Study: Shock, Mitral Regurgitation  Ordering Physician: JEANTETE DARNELL  Referring Physician: BEKAH BOWIE  Performed By: Saad Deleon     BSA: 2.0 m2  Height: 71 in  Weight: 174 lb  HR: 111  BP: 98/50 mmHg  _____________________________________________________________________________  __        Procedure  Bubble Echocardiogram with two-dimensional, color and spectral Doppler  performed.  _____________________________________________________________________________  __        Interpretation Summary  Global and regional left ventricular  function is normal with an EF of 55-60%.  Right ventricular function, chamber size, wall motion, and thickness are  normal.  Ruptured Chordae with flail posterior mitral leaflet. MR difficult to assess.  Impella device in place.  Ascending aorta 4 cm.  Dilation of the inferior vena cava is present with abnormal respiratory  variation in diameter.  No pericardial effusion is present.  The atrial septum is intact as assessed by agitated saline bubble study .  There is no prior study for direct comparison.  _____________________________________________________________________________  __        Left Ventricle  Global and regional left ventricular function is normal with an EF of 55-60%.  Left ventricular wall thickness is normal. Left ventricular size is normal.  Left ventricular diastolic function is not assessable. No regional wall motion  abnormalities are seen.     Right Ventricle  Right ventricular function, chamber size, wall motion, and thickness are  normal.     Atria  The atria cannot be assessed. The atrial septum is intact as assessed by  agitated saline bubble study .     Mitral Valve  Ruptured Chordae with flail posterior mitral leaflet. MR difficult to assess.  Mild mitral annular calcification is present.        Aortic Valve  Mild aortic valve calcification is present. Impella device in place.     Tricuspid Valve  Trace to mild tricuspid insufficiency is present. Pulmonary artery systolic  pressure cannot be assessed.     Pulmonic Valve  The pulmonic valve cannot be assessed.     Vessels  The pulmonary artery cannot be assessed. Ascending aorta 4 cm. Dilation of the  inferior vena cava is present with abnormal respiratory variation in diameter.     Pericardium  No pericardial effusion is present.        Compared to Previous Study  There is no prior study for direct comparison.  _____________________________________________________________________________  __  MMode/2D Measurements & Calculations  IVSd:  0.78 cm     LVIDd: 5.2 cm  LVIDs: 4.3 cm  LVPWd: 1.1 cm  FS: 18.4 %  LV mass(C)d: 181.3 grams  LV mass(C)dI: 91.2 grams/m2  RWT: 0.43           _____________________________________________________________________________  __           Report approved by: Kaci Marcano 03/05/2021 08:31 AM      XR Chest Port 1 View    Narrative    EXAM: XR CHEST PORT 1 VW  3/5/2021 1:39 PM     HISTORY:  incorrect needle count       COMPARISON:  Chest x-ray 3/5/2021    FINDINGS: AP radiograph of the chest. Endotracheal tube projecting  over the mid trachea approximately 4 cm cephalic to the tito. Right  IJ Frederick-Vimal catheter with tip in right main pulmonary artery.  Bilateral chest tubes overlying the mid to lower lung fields  bilaterally. Mediastinal drains. Surgical clip overlying the  mediastinum. No curvilinear metallic density is visualized in the  chest or visualized upper abdomen.    The cardiomediastinal silhouette is within normal limits.  Atherosclerotic calcifications of the aortic arch. Perihilar and left  lung predominant mixed interstitial and airspace opacities likely  representing pulmonary edema versus infection. No appreciable  pneumothorax. No significant pleural effusion. Chronic appearing right  second rib fracture. Visualized upper abdomen is unremarkable.      Impression    IMPRESSION:  1. No curvilinear foreign body in the chest are partially visualized  upper abdomen.  2. Perihilar and left lung mixed interstitial and airspace opacities  concerning for pulmonary edema versus infection.  3. No appreciable pneumothorax with bilateral chest tubes and  mediastinal drains in place.  4. Right IJ Frederick-Vimal catheter with tip in the right main pulmonary  artery.    Findings were discussed via telephone with operating room personnel  with OLGA Hammer by Dr. Nuñez on 3/5/2021 at 1400 hours.    I have personally reviewed the examination and initial interpretation  and I agree with the findings.    HERMELINDA KANG MD    XR Chest Port 1 View    Narrative    Exam: XR CHEST PORT 1 , 3/5/2021 4:14 PM    Indication: Check endotracheal tube placement and ECLS cannula  placement. DO NOT log-roll patient.  Place film under patient using  patient safety handling process.    Comparison: 3/5/2021    Findings:   Endotracheal tube at the tito and could be pulled back. Enteric tube  seen coursing through the mediastinum. Tip projects off the film.  There are bibasilar chest tubes and 2 mediastinal chest tubes. Chest  appears open with a lap sponge marker along the right mediastinum.  ECMO cannulas are in place. Pulmonary arterial catheter tip in the  right lower lobe pulmonary artery. This is from a right internal  jugular vein approach.    Heart is within normal limits. Overall improved aeration of the lung  but still with perihilar opacities suggesting edema.      Impression    Impression:   1. Chest is open. LAP-BAND sponge marker along the right mediastinum.  2. Endotracheal tube at the tito and should be pulled back.  3. Chest tubes appear stable.  4. Enteric tube seen coursing through the mediastinum. Tip projects  off the film.  5. Improved pulmonary edema.    PEPE NARVAEZ MD   XR Abdomen Port 1 View    Narrative    Exam: XR ABDOMEN PORT 1 , 3/5/2021 4:15 PM    Indication: og placement    Comparison: Same day CT    Findings:   Portable views of the abdomen. Enteric tube tip in sidehole project  over the stomach. Multiple partially visualized mediastinal drains and  chest tubes. Postoperative changes in the chest with mitral valve  replacement. Nonobstructive bowel gas pattern. Advanced degenerative  changes of the spine. Pelvic phleboliths. Right groin catheter tip  projects in the head low abdomen.      Impression    Impression:   1. Enteric tube tip and sidehole projected over the stomach.  2. Nonobstructive bowel gas pattern.    I have personally reviewed the examination and initial interpretation  and I agree with the  findings.    REJI LYNN MD   US Lower Extremity Arterial Duplex Bilateral    Narrative    Exam: Duplex ultrasound of bilateral lower extremity arteries dated  3/5/2021 4:45 PM     Clinical information: Baseline to assess blood flow to Lower  Extremities (VA ECMO)     Comparison: None    Technique: Grayscale (B-mode), color Doppler, and duplex spectral  Doppler ultrasound of the lower extremity arteries. Velocity  measurements obtained with angle correction of 60 degrees or less.    Ordering provider: BEKAH BOWIE    Findings:     Right lower extremity:   The right common femoral and profunda femoris arteries are obscured by  overlying sheath.    Proximal SFA: Velocity: 39 cm/sec.   Mid SFA:Velocity:  38 cm/sec.   Distal SFA: Velocity: 26 cm/sec.   Popliteal artery: Velocity: 26 cm/sec.   PTA ankle: Velocity: 29 cm/sec.   PATRICIA ankle: Velocity: 12 cm/sec.     Diffusely abnormal monophasic waveforms from VA ECMO.    Left lower extremity:    Common femoral artery: Velocity: 36 cm/sec.   Deep femoral artery: Velocity: 42 cm/sec.   Proximal SFA: Velocity: 46 cm/sec.   Mid SFA:Velocity:  36 cm/sec.   Distal SFA: Velocity: 18 cm/sec.   Popliteal artery: Velocity: 21 cm/sec.   PTA ankle: Velocity: 11 cm/sec.   PATRICIA ankle: Velocity: 10 cm/sec.     Diffusely abnormal monophasic waveforms from VA ECMO.      Impression    Impression:   Patent arterial Doppler ultrasound evaluation of the bilateral lower  extremities with diffusely abnormal waveforms, which is expected in  the setting of VA ECMO.     I have personally reviewed the examination and initial interpretation  and I agree with the findings.    SAPPHIRE PANTOJA,    US Lower Extremity Venous Duplex Bilateral    Narrative    EXAMINATION: US LOWER EXTREMITY VENOUS DUPLEX BILATERAL  3/5/2021 4:46  PM      CLINICAL HISTORY: dvt    COMPARISON: None        PROCEDURE COMMENTS: Ultrasound was performed of the deep venous system  of the right and left lower extremity  using grayscale, color, and  spectral Doppler.    FINDINGS:  The right common femoral vein was obscured by overlying bandage. The  right femoral, popliteal, and posterior tibial veins are visualized  and are patent. Venous waveforms are abnormal due to ECMO cannulation.  There is normal response to compression.    The left common femoral, femoral, popliteal, and posterior tibial  veins are visualized and are patent. Venous waveforms are abnormal due  to ECMO cannulation. There is normal response to compression.      Impression    IMPRESSION:  No deep vein thrombosis in the visualized right or left lower  extremity.    I have personally reviewed the examination and initial interpretation  and I agree with the findings.    SAPPHIRE PANTOJA, DO       Labs:  Recent Labs   Lab 03/05/21  1713 03/05/21  1534 03/05/21  0547 03/05/21  0546 03/05/21  0409 03/05/21  0256   PH  --  7.42  --  7.24* 7.22* 7.18*   PCO2  --  38  --  41 40 39   PO2  --  264*  --  124* 63* 72*   HCO3  --  25  --  18* 17* 14*   O2PER 50 50 100.0 100.0 100.0 100.0  100.0       Lab Results   Component Value Date    HGB 10.5 (L) 03/05/2021    PHGB 70 (H) 03/05/2021     03/05/2021    FIBR 377 03/05/2021    INR 2.28 (H) 03/05/2021    PTT 51 (H) 03/05/2021    DD 1.8 (H) 03/05/2021         Plan is possible turndown and washout on Sunday.      Lebron Lynne, RT  ECMO Specialist  3/5/2021 6:30 PM

## 2021-03-06 NOTE — PROVIDER NOTIFICATION
03/06/21 1000 03/06/21 1100 03/06/21 1200   Urethral Catheter   Placement Date: 03/04/21   Pre-existing: Yes  Inserted by: OSH   Urine Output 7 mL 4 mL 1 mL   Dr. Britney Arriola notified of continued low UOP - Nephrology consult placed.

## 2021-03-06 NOTE — PROGRESS NOTES
ECMO Shift Summary: 12N      ECMO Equipment:  Console serial number: [id number]  #4  Circuit Lot number: [id number]  Oxygenator Lot number: [id number]      Patient remains on [facility] ECMO, all equipment is functioning and alarms are appropriately set. RPM's 3250 with flow range 4-4.29 L/min. Sweep gas is at 3 LPM and FiO2 60%. Circuit remains free of air and clot. Fibrin at connectors, remains off heparin. Cannulas are secure with no bleeding from site. Extremities are cool to touch.     Significant Shift Events: Patient has need 1 L of Albumin for chugging and pressors were increased for low blood pressures.  RPMs were decreased until volume went in to stabilize flow.    Vent settings:  Ventilation Mode: CMV/AC  (Continuous Mandatory Ventilation/ Assist Control)  FiO2 (%): 50 %  Rate Set (breaths/minute): 12 breaths/min  Tidal Volume Set (mL): 400 mL  PEEP (cm H2O): 5 cmH2O  Oxygen Concentration (%): 50 %  Resp: 13  .    No heparin running.  ACT range 119-144.    Urine output is brisk approximately 155, blood loss was none.1 liter of Albumin    Intake/Output Summary (Last 24 hours) at 3/6/2021 0503  Last data filed at 3/6/2021 0500  Gross per 24 hour   Intake 54187.94 ml   Output 6265 ml   Net 4538.94 ml       ECHO:  No results found for this or any previous visit.No results found for this or any previous visit.    CXR:  Recent Results (from the past 24 hour(s))   CT Chest Abdomen Pelvis w/o Contrast    Narrative    EXAMINATION: CT CHEST ABDOMEN PELVIS W/O CONTRAST, 3/5/2021 5:35 AM    TECHNIQUE:  Helical CT images from the lung apices through the  symphysis pubis were obtained without contrast.  Coronal and sagittal  reformatted images were generated at a workstation for further  assessment.    COMPARISON: Same day chest and abdominal radiographs    HISTORY: Concern for pneumoperitoneum on abdominal radiograph    FINDINGS:    Lines and tubes: Endotracheal tube tip approximately 1 cm above the  tito. Enteric tube tip  and side-port in the stomach. Left femoral  approach Impella device with inflow in the left ventricle and outflow  in the proximal ascending aorta. Right IJ [place]-Vimal catheter in the  right interlobar artery. Right femoral approach arterial catheter tip  in the distal aorta. Alfaro catheter within the bladder.    Lungs and pleural: No pneumothorax. Moderate bilateral pleural  effusions, left greater than right. Diffuse smooth interlobular septal  thickening with left greater than right predominantly bronchocentric  groundglass and consolidative opacities. Bilateral dependent  atelectasis.  Heart and mediastinum: Central tracheobronchial tree is patent.  Asymmetrically enlarged left atrium. No pericardial effusion.   Moderate coronary calcifications.  Thoracic vasculature: Limited assessment on this noncontrast exam.  Ectatic ascending aorta up to 4.2 cm. The pulmonary artery is dilated  to 3.7 cm. Severe aortic atherosclerosis.   Lymph nodes: Prominent mediastinal lymph nodes, likely reactive. No  suspicious lymphadenopathy.  Thyroid: No suspicious nodules.    Liver: Subcentimeter hypodensities throughout the right lobe, too  small to characterize on CT.  Gallbladder: Distended gallbladder with few punctate cholelithiasis  versus sludge without evidence of acute cholecystitis.  Spleen: Unremarkable.  Pancreas: Unremarkable.  Adrenal glands: No discrete nodules.  Kidneys: Persistent bilateral nephrograms. No hydronephrosis. Contrast  excretion in the bilateral collecting systems. Approximately 1 cm  indeterminate right interpolar cyst. Additional bilateral  subcentimeter hypodensities, too small to characterize though favored  benign cysts. Bilateral perirenal stranding.  Bladder / Pelvic organs: Post-instrumentation air in the bladder.  Prostatomegaly. No pelvic masses.  Bowel: No abnormally distended large or small bowel. Diffusely  thickened colon without significant stranding or pneumatosis.  Colonic  diverticulosis without evidence of acute diverticulitis.  Lymph nodes: No suspicious lymphadenopathy.  Peritoneum / Retroperitoneum: No pneumoperitoneum. Trace fluid about  the mesentery and along the bilateral lateral conal fascia. Small  fat-containing umbilical hernia.  Abdominal vasculature: Limited assessment on this noncontrast exam. No  abdominal aortic aneurysm. Severe aortoiliac atherosclerosis. Engorged  IVC. Small 3.0 x 1.4 cm hematoma overlying the left external iliac  artery.    Bones and soft tissues: Degenerative changes of the spine. Left  sacroiliac hardware without evidence of acute complication. No acute  fracture or suspicious appearing osseous lesion. Mild diffuse  anasarca.      Impression    IMPRESSION:   1. No pneumoperitoneum.  2. Moderate bilateral pleural effusions, left greater than right, with  diffuse smooth interlobular septal thickening and left greater than  right bronchocentric groundglass and consolidative opacities,  consistent with pulmonary edema in the setting of acute mitral  regurgitation.   3. Nonspecific diffusely thickened appearance of the colon may be due  to underdistention. No pericolonic fat stranding to suggest colitis.  4. Endotracheal tube to approximately 1 cm above the tito. Recommend  retracting 2-4 cm.  5.  Additional incidental/chronic findings as noted above.    I have personally reviewed the examination and initial interpretation  and I agree with the findings.    LYNNE RASMUSSEN MD   Echocardiogram Complete    Narrative    [id number]  [id number]  [id number]  [id number]^DARNELL^JEANETTE                                                                          Version 2        [facility],[place]  Echocardiography Laboratory  [address] [place] [id number]     Name: YADY BARNES  MRN: [id number]  : 1940  Study Date: 2021 07:00 AM  Age: 80 yrs  Gender: Male  Patient Location: [facility]  Reason For Study: Shock, Mitral  Regurgitation  Ordering Physician: JEANETTE DARNELL  Referring Physician: BEKAH BOWIE  Performed By: Saad Deleon     BSA: 2.0 m2  Height: 71 in  Weight: 174 lb  HR: 111  BP: 98/50 mmHg  _____________________________________________________________________________  __        Procedure  Bubble Echocardiogram with two-dimensional, color and spectral Doppler  performed.  _____________________________________________________________________________  __        Interpretation Summary  Global and regional left ventricular function is normal with an EF of 55-60%.  Right ventricular function, chamber size, wall motion, and thickness are  normal.  Ruptured Chordae with flail posterior mitral leaflet. MR difficult to assess.  Impella device in place.  Ascending aorta 4 cm.  Dilation of the inferior vena cava is present with abnormal respiratory  variation in diameter.  No pericardial effusion is present.  The atrial septum is intact as assessed by agitated saline bubble study .  There is no prior study for direct comparison.  _____________________________________________________________________________  __        Left Ventricle  Global and regional left ventricular function is normal with an EF of 55-60%.  Left ventricular wall thickness is normal. Left ventricular size is normal.  Left ventricular diastolic function is not assessable. No regional wall motion  abnormalities are seen.     Right Ventricle  Right ventricular function, chamber size, wall motion, and thickness are  normal.     Atria  The atria cannot be assessed. The atrial septum is intact as assessed by  agitated saline bubble study .     Mitral Valve  Ruptured Chordae with flail posterior mitral leaflet. MR difficult to assess.  Mild mitral annular calcification is present.        Aortic Valve  Mild aortic valve calcification is present. Impella device in place.     Tricuspid Valve  Trace to mild tricuspid insufficiency is present. Pulmonary artery  systolic  pressure cannot be assessed.     Pulmonic Valve  The pulmonic valve cannot be assessed.     Vessels  The pulmonary artery cannot be assessed. Ascending aorta 4 cm. Dilation of the  inferior vena cava is present with abnormal respiratory variation in diameter.     Pericardium  No pericardial effusion is present.        Compared to Previous Study  There is no prior study for direct comparison.  _____________________________________________________________________________  __  MMode/2D Measurements & Calculations  IVSd: 0.78 cm     LVIDd: 5.2 cm  LVIDs: 4.3 cm  LVPWd: 1.1 cm  FS: 18.4 %  LV mass(C)d: 181.3 grams  LV mass(C)dI: 91.2 grams/m2  RWT: 0.43           _____________________________________________________________________________  __           Report approved by: Kaci Marcano 03/05/2021 08:31 AM      XR Chest Port 1 View    Narrative    EXAM: XR CHEST PORT 1 VW  3/5/2021 1:39 PM     HISTORY:  incorrect needle count       COMPARISON:  Chest x-ray 3/5/2021    FINDINGS: AP radiograph of the chest. Endotracheal tube projecting  over the mid trachea approximately 4 cm cephalic to the tito. Right  IJ [place]-Vimal catheter with tip in right main pulmonary artery.  Bilateral chest tubes overlying the mid to lower lung fields  bilaterally. Mediastinal drains. Surgical clip overlying the  mediastinum. No curvilinear metallic density is visualized in the  chest or visualized upper abdomen.    The cardiomediastinal silhouette is within normal limits.  Atherosclerotic calcifications of the aortic arch. Perihilar and left  lung predominant mixed interstitial and airspace opacities likely  representing pulmonary edema versus infection. No appreciable  pneumothorax. No significant pleural effusion. Chronic appearing right  second rib fracture. Visualized upper abdomen is unremarkable.      Impression    IMPRESSION:  1. No curvilinear foreign body in the chest are partially visualized  upper abdomen.  2. Perihilar  and left lung mixed interstitial and airspace opacities  concerning for pulmonary edema versus infection.  3. No appreciable pneumothorax with bilateral chest tubes and  mediastinal drains in place.  4. Right IJ [place]-Vimal catheter with tip in the right main pulmonary  artery.    Findings were discussed via telephone with operating room personnel  with OLGA Hammer by Dr. Nuñez on 3/5/2021 at 1400 hours.    I have personally reviewed the examination and initial interpretation  and I agree with the findings.    HERMELINDA KANG MD   XR Chest Port 1 View    Narrative    Exam: XR CHEST PORT 1 VW, 3/5/2021 4:14 PM    Indication: Check endotracheal tube placement and ECLS cannula  placement. DO NOT log-roll patient.  Place film under patient using  patient safety handling process.    Comparison: 3/5/2021    Findings:   Endotracheal tube at the tito and could be pulled back. Enteric tube  seen coursing through the mediastinum. Tip projects off the film.  There are bibasilar chest tubes and 2 mediastinal chest tubes. Chest  appears open with a lap sponge marker along the right mediastinum.  ECMO cannulas are in place. Pulmonary arterial catheter tip in the  right lower lobe pulmonary artery. This is from a right internal  jugular vein approach.    Heart is within normal limits. Overall improved aeration of the lung  but still with perihilar opacities suggesting edema.      Impression    Impression:   1. Chest is open. LAP-BAND sponge marker along the right mediastinum.  2. Endotracheal tube at the tito and should be pulled back.  3. Chest tubes appear stable.  4. Enteric tube seen coursing through the mediastinum. Tip projects  off the film.  5. Improved pulmonary edema.    PEPE NARVAEZ MD   XR Abdomen Port 1 View    Narrative    Exam: XR ABDOMEN PORT 1 VW, 3/5/2021 4:15 PM    Indication: og placement    Comparison: Same day CT    Findings:   Portable views of the abdomen. Enteric tube tip in sidehole project  over the  stomach. Multiple partially visualized mediastinal drains and  chest tubes. Postoperative changes in the chest with mitral valve  replacement. Nonobstructive bowel gas pattern. Advanced degenerative  changes of the spine. Pelvic phleboliths. Right groin catheter tip  projects in the head low abdomen.      Impression    Impression:   1. Enteric tube tip and sidehole projected over the stomach.  2. Nonobstructive bowel gas pattern.    I have personally reviewed the examination and initial interpretation  and I agree with the findings.    REJI LYNN MD   US Lower Extremity Arterial Duplex Bilateral    Narrative    Exam: Duplex ultrasound of bilateral lower extremity arteries dated  3/5/2021 4:45 PM     Clinical information: Baseline to assess blood flow to Lower  Extremities ([facility] ECMO)     Comparison: None    Technique: Grayscale (B-mode), color Doppler, and duplex spectral  Doppler ultrasound of the lower extremity arteries. Velocity  measurements obtained with angle correction of 60 degrees or less.    Ordering provider: BEKAH BOWIE    Findings:     Right lower extremity:   The right common femoral and profunda femoris arteries are obscured by  overlying sheath.    Proximal SFA: Velocity: 39 cm/sec.   Mid SFA:Velocity:  38 cm/sec.   Distal SFA: Velocity: 26 cm/sec.   Popliteal artery: Velocity: 26 cm/sec.   PTA ankle: Velocity: 29 cm/sec.   PATRICIA ankle: Velocity: 12 cm/sec.     Diffusely abnormal monophasic waveforms from VA ECMO.    Left lower extremity:    Common femoral artery: Velocity: 36 cm/sec.   Deep femoral artery: Velocity: 42 cm/sec.   Proximal SFA: Velocity: 46 cm/sec.   Mid SFA:Velocity:  36 cm/sec.   Distal SFA: Velocity: 18 cm/sec.   Popliteal artery: Velocity: 21 cm/sec.   PTA ankle: Velocity: 11 cm/sec.   PATRICIA ankle: Velocity: 10 cm/sec.     Diffusely abnormal monophasic waveforms from VA ECMO.      Impression    Impression:   Patent arterial Doppler ultrasound evaluation of the bilateral  lower  extremities with diffusely abnormal waveforms, which is expected in  the setting of VA ECMO.     I have personally reviewed the examination and initial interpretation  and I agree with the findings.    SAPPHIRE PANTOJA, DO   US Lower Extremity Venous Duplex Bilateral    Narrative    EXAMINATION: US LOWER EXTREMITY VENOUS DUPLEX BILATERAL  3/5/2021 4:46  PM      CLINICAL HISTORY: dvt    COMPARISON: None        PROCEDURE COMMENTS: Ultrasound was performed of the deep venous system  of the right and left lower extremity using grayscale, color, and  spectral Doppler.    FINDINGS:  The right common femoral vein was obscured by overlying bandage. The  right femoral, popliteal, and posterior tibial veins are visualized  and are patent. Venous waveforms are abnormal due to ECMO cannulation.  There is normal response to compression.    The left common femoral, femoral, popliteal, and posterior tibial  veins are visualized and are patent. Venous waveforms are abnormal due  to ECMO cannulation. There is normal response to compression.      Impression    IMPRESSION:  No deep vein thrombosis in the visualized right or left lower  extremity.    I have personally reviewed the examination and initial interpretation  and I agree with the findings.    SAPPHIRE PANTOJA DO   XR Chest Port 1 View    Narrative    Exam: XR CHEST PORT 1 VW, 3/5/2021 7:07 PM    Indication: Mallard placement (pulled back 2 cm)    Comparison: Same-day    Findings:   Right internal jugular [place]-Vimal catheter tip projects over the distal  right pulmonary artery. Stable ECMO cannulae, mediastinal drains, and  bilateral chest tubes. Lap sponges project over the mediastinum.  Endotracheal tube tip at the mid thoracic trachea. Enteric tube  courses into the stomach with tip at the fundus. Stable  cardiomediastinal silhouette. No appreciable pleural effusion or  pneumothorax. Stable perihilar predominant mixed interstitial and  patchy airspace opacities.       Impression    Impression: Right internal jugular [place]-Vimal catheter tip now projects  at the distal right pulmonary artery, slightly retracted since prior.  No other significant change.    SAPPHIRE PANTOJA, DO       Labs:  Recent Labs   Lab 03/06/21  0342 03/06/21  0212 03/06/21  0007 03/05/21  2232   PH 7.35 7.34* 7.36 7.37   PCO2 37 39 39 39   PO2 119* 76* 97 129*   HCO3 20* 21 22 22   O2PER 50.0 50.0 50.0 50       Lab Results   Component Value Date    HGB 8.6 (L) 03/06/2021    PHGB 40 (H) 03/06/2021     (L) 03/06/2021    FIBR 439 (H) 03/06/2021    INR 1.47 (H) 03/06/2021    PTT 38 (H) 03/06/2021    DD 1.8 (H) 03/05/2021         Plan is remain on [facility] ECMO.      Haydee Marquez, RRT  ECMO Specialist  3/6/2021 5:03 AM

## 2021-03-06 NOTE — PROGRESS NOTES
Major Shift Events:  One episode of chugging overnight with significant map drop. Responded to fluid and pressor. Able to titrate down to doses more consistent to the start of the shift. 1 Liter of LR and 1 L of albumin for chugging and now 250 albumin for low UoP. Patient did follow simple commands around 4am. Sedation increased after for comfort. Chest tube output minimal    Plan: watch electrolytes closely as potassium has been rising. Evaluate for turndown when able.     For vital signs and complete assessments, please see documentation flowsheets.

## 2021-03-06 NOTE — PROGRESS NOTES
"VASCULAR SURGERY PROGRESS NOTE    Subjective:  Unable to elicit given he is sedated on ECMO.     Objective:    Intake/Output Summary (Last 24 hours) at 3/6/2021 1120  Last data filed at 3/6/2021 1100  Gross per 24 hour   Intake 71028.95 ml   Output 5778 ml   Net 5921.95 ml     PHYSICAL EXAM:  BP 99/61   Pulse 88   Temp 98.2  F (36.8  C)   Resp 12   Ht 1.803 m (5' 11\")   Wt 91.1 kg (200 lb 13.4 oz)   SpO2 95%   BMI 28.01 kg/m    Patient intubated on ECMO   Mechanical ventilation  Patient with DP, PT signals bilaterally       Imaging:   Duplex ultrasound of bilateral lower extremity arteries (3/5/2021)  Exam: Duplex ultrasound of bilateral lower extremity arteries dated  3/5/2021 4:45 PM      Clinical information: Baseline to assess blood flow to Lower  Extremities (VA ECMO)      Comparison: None     Technique: Grayscale (B-mode), color Doppler, and duplex spectral  Doppler ultrasound of the lower extremity arteries. Velocity  measurements obtained with angle correction of 60 degrees or less.     Ordering provider: BEKAH BOWIE     Findings:      Right lower extremity:   The right common femoral and profunda femoris arteries are obscured by  overlying sheath.     Proximal SFA: Velocity: 39 cm/sec.   Mid SFA:Velocity:  38 cm/sec.   Distal SFA: Velocity: 26 cm/sec.   Popliteal artery: Velocity: 26 cm/sec.   PTA ankle: Velocity: 29 cm/sec.   PATRICIA ankle: Velocity: 12 cm/sec.      Diffusely abnormal monophasic waveforms from VA ECMO.     Left lower extremity:     Common femoral artery: Velocity: 36 cm/sec.   Deep femoral artery: Velocity: 42 cm/sec.   Proximal SFA: Velocity: 46 cm/sec.   Mid SFA:Velocity:  36 cm/sec.   Distal SFA: Velocity: 18 cm/sec.   Popliteal artery: Velocity: 21 cm/sec.   PTA ankle: Velocity: 11 cm/sec.   PATRICIA ankle: Velocity: 10 cm/sec.      Diffusely abnormal monophasic waveforms from VA ECMO.                                                                      Impression:   Patent arterial " Doppler ultrasound evaluation of the bilateral lower  extremities with diffusely abnormal waveforms, which is expected in  the setting of VA ECMO.      I have personally reviewed the examination and initial interpretation  and I agree with the findings.    ASSESSMENT:  80 year old male with PMH of HTN, AAA, HLD and rheumatoid arthritis. He presented to an OSH with shortness of breath and atypical chest pain. Work-up revealed an elevated troponin, hypotension and hypoxia. TTE showed severe MR with concern for posterior flail leaflet due to to either papillary muscle rupture or torn chordae, EF was preserved. On arrival at [facility] an Impella was emergently placed in cath lab. 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 Porras on 3/5/21. Returned from the OR on central ECMO with an open chest, intubated and sedated.       PLAN:  Reviewed arterial duplex from 3/5/2021. He has  patent arterial doppler ultrasound evaluation of the bilateral lower extremities with diffusely abnormal waveforms, which is expected in the setting of VA ECMO.     No intervention by Vascular Surgery  Care per primary team  Please call with any questions or concerns    Discussed him with Dr. Oconnell and Dr. Ty.    Law Rapp MD  Vascular Surgery

## 2021-03-06 NOTE — PROGRESS NOTES
CV ICU PROGRESS NOTE  March 6, 2021      CO-MORBIDITIES:   Cardiogenic shock (H)  (primary encounter diagnosis)    ASSESSMENT: Jin Garrett is a 80 year old male with PMH of HTN, AAA, HLD and rheumatoid arthritis. He presented to an OSH with shortness of breath and atypical chest pain. TTE showed severe MR with concern for posterior flail leaflet, EF was preserved. On arrival at [facility] an Impella was emergently placed in cath lab. 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 Porras on 3/5/21. Returned from the OR on central ECMO with an open chest, intubated and sedated.     TODAY'S PROGRESS:   - Fluid resuscitate as needed  - Prophylactic amiodarone  - Start fixed rate heparin gtt   - Consult nephrology  - Nutrition consult, okay to feed via NG  - Possible ECMO turn down study per CVTS     PLAN:  Neuro/ pain/ sedation:  Post-operative pain  - Monitor neurological status. Notify the MD for any acute changes in exam.  - Fentanyl gtt for pain.  - Midazolam gtt for sedation.     Pulmonary care:  Acute post-operative respiratory failure  - MV  - Titrate FiO2 for SpO2 >92%     Cardiovascular:  HTN  AAA  HLD  A fib  Severe mitral regurgitation s/p MVR  CAD with 100% occlusion of the mid LCx s/p single vessel CABG   Cardiogenic shock  Vasoplegic shock   VA ECMO (central)   - Monitor hemodynamic status.   - MAP >65  - Vasopressors: epi, norepi, vaso and angiotensin gtts  - Prophylactic amiodarone  - Possible ECMO turn down study per CVTS      GI/Nutrition:   - Start TF via OG     Fluids/ Electrolytes/Renal:  ADAN   Hyperkalemia  - TKO for IV fluid hydration.  - Urine output downtrending overnight  - Will continue to monitor intake and output.  - Continue to monitor electrolytes  - Consult nephrology      Endocrine:  Stress hyperglycemia    - Insulin gtt  - Keep blood glucose <180     ID/ Antibiotics:  Leukocytosis - likely secondary to physiologic stress  - Complete perioperative antibiotics: cefepime   -  Monitor CBC and fever curve     Heme:  Hemorraghic shock      - Hgb goal >8  - Post-op Hgb 10.5  - CBC q6 hours  - Start heparin gtt 500 fixed rate     Prophylaxis:    - Mechanical prophylaxis for DVT.   - Bowel regimen  - Heparin gtt  - Protonix qd     Lines/ tubes/ drains:  - MAC    - Arterial line  - ETT  - OG  - Central ECMO cannulas  - 3 mediastinal CTs  - 2 pleural CTs  - Alfaro     Disposition:  - CV ICU.      Patient seen, findings and plan discussed with CV ICU staff, Dr. León.     BRYANT Tinajero ([facility])  Anesthesiology Resident  *[id number]    ====================================    SUBJECTIVE:   Intubated and sedated    OBJECTIVE:   1. VITAL SIGNS:   Temp:  [97.7  F (36.5  C)-100.6  F (38.1  C)] 98.6  F (37  C)  Pulse:  [100-123] 100  Resp:  [12-24] 14  BP: (99)/(61) 99/61  MAP:  [38 mmHg-95 mmHg] 80 mmHg  Arterial Line BP: ()/(36-93) 90/78  FiO2 (%):  [50 %-99 %] 50 %  SpO2:  [88 %-100 %] 98 %  Ventilation Mode: CMV/AC  (Continuous Mandatory Ventilation/ Assist Control)  FiO2 (%): 50 %  Rate Set (breaths/minute): 12 breaths/min  Tidal Volume Set (mL): 400 mL  PEEP (cm H2O): 5 cmH2O  Oxygen Concentration (%): 50 %  Resp: 14      2. INTAKE/ OUTPUT:   I/O last 3 completed shifts:  In: 05088.01 [I.V.:3157.01; Other:459; IV Piggyback:4000]  Out: 5640 [Urine:5165; Emesis/NG output:50; Chest Tube:425]    3. PHYSICAL EXAMINATION:   General: lying in bed  Neuro: Sedated, not following commands at this time  Resp: Mechanically ventilated CTAB  CV: RRR  Abdomen: Soft, Non-distended, Non-tender  Incisions: open chest, dressing in place  Extremities: warm and and well perfused upper extremities, lower extremities warn, difficult to feel pulses in the feet    4. INVESTIGATIONS:   Arterial Blood Gases   Recent Labs   Lab 03/06/21  0607 03/06/21  0342 03/06/21  0212 03/06/21  0007   PH 7.31* 7.35 7.34* 7.36   PCO2 39 37 39 39   PO2 98 119* 76* 97   HCO3 20* 20* 21 22     Complete Blood Count   Recent Labs   Lab  21  1534   WBC 19.3* 18.9* 22.4* 21.8*   HGB 8.6* 8.9* 10.5* 10.5*   * 130* 151 152     Basic Metabolic Panel  Recent Labs   Lab 21  1534    146* 147* 146*   POTASSIUM 5.4* 5.0 4.0 3.2*   CHLORIDE 111* 113* 115* 114*   CO2 20 22 24 23   BUN 40* 39* 42* 40*   CR 2.11* 2.03* 2.13* 2.14*   *  118* 130*  125* 120*  119* 66*     Liver Function Tests  Recent Labs   Lab 21  1534 21  1242   * 125* 155* 143*  --    ALT   --    ALKPHOS 46 46 51 46  --    BILITOTAL 1.3 1.1 1.0 0.8  --    ALBUMIN 2.5* 1.9* 1.5* 1.5*  --    INR 1.47* 1.49* 1.51*  --  2.28*     Pancreatic Enzymes  No lab results found in last 7 days.  Coagulation Profile  Recent Labs   Lab 21  1242   INR 1.47* 1.49* 1.51* 2.28*   PTT 38* 42* 97* 51*         5. RADIOLOGY:   Recent Results (from the past 24 hour(s))   Echocardiogram Complete    Narrative    [id number]  [id number]  [id number]  [id number]^MANN^JEANETTE                                                                          Version 2        [facility],[place]  Echocardiography Laboratory  [address] [place] [id number]     Name: YADY BARNES  MRN: [id number]  : 1940  Study Date: 2021 07:00 AM  Age: 80 yrs  Gender: Male  Patient Location: [facility]  Reason For Study: Shock, Mitral Regurgitation  Ordering Physician: JEANETTE DARNELL  Referring Physician: BEKAH BOWIE By: Saad Deleon     BSA: 2.0 m2  Height: 71 in  Weight: 174 lb  HR: 111  BP: 98/50 mmHg  _____________________________________________________________________________  __        Procedure  Bubble Echocardiogram with two-dimensional, color and spectral  Doppler  performed.  _____________________________________________________________________________  __        Interpretation Summary  Global and regional left ventricular function is normal with an EF of 55-60%.  Right ventricular function, chamber size, wall motion, and thickness are  normal.  Ruptured Chordae with flail posterior mitral leaflet. MR difficult to assess.  Impella device in place.  Ascending aorta 4 cm.  Dilation of the inferior vena cava is present with abnormal respiratory  variation in diameter.  No pericardial effusion is present.  The atrial septum is intact as assessed by agitated saline bubble study .  There is no prior study for direct comparison.  _____________________________________________________________________________  __        Left Ventricle  Global and regional left ventricular function is normal with an EF of 55-60%.  Left ventricular wall thickness is normal. Left ventricular size is normal.  Left ventricular diastolic function is not assessable. No regional wall motion  abnormalities are seen.     Right Ventricle  Right ventricular function, chamber size, wall motion, and thickness are  normal.     Atria  The atria cannot be assessed. The atrial septum is intact as assessed by  agitated saline bubble study .     Mitral Valve  Ruptured Chordae with flail posterior mitral leaflet. MR difficult to assess.  Mild mitral annular calcification is present.        Aortic Valve  Mild aortic valve calcification is present. Impella device in place.     Tricuspid Valve  Trace to mild tricuspid insufficiency is present. Pulmonary artery systolic  pressure cannot be assessed.     Pulmonic Valve  The pulmonic valve cannot be assessed.     Vessels  The pulmonary artery cannot be assessed. Ascending aorta 4 cm. Dilation of the  inferior vena cava is present with abnormal respiratory variation in diameter.     Pericardium  No pericardial effusion is present.        Compared to Previous Study  There  is no prior study for direct comparison.  _____________________________________________________________________________  __  MMode/2D Measurements & Calculations  IVSd: 0.78 cm     LVIDd: 5.2 cm  LVIDs: 4.3 cm  LVPWd: 1.1 cm  FS: 18.4 %  LV mass(C)d: 181.3 grams  LV mass(C)dI: 91.2 grams/m2  RWT: 0.43           _____________________________________________________________________________  __           Report approved by: Kaci Marcano 03/05/2021 08:31 AM      XR Chest Port 1 View    Narrative    EXAM: XR CHEST PORT 1 VW  3/5/2021 1:39 PM     HISTORY:  incorrect needle count       COMPARISON:  Chest x-ray 3/5/2021    FINDINGS: AP radiograph of the chest. Endotracheal tube projecting  over the mid trachea approximately 4 cm cephalic to the tito. Right  IJ Olney-Vimal catheter with tip in right main pulmonary artery.  Bilateral chest tubes overlying the mid to lower lung fields  bilaterally. Mediastinal drains. Surgical clip overlying the  mediastinum. No curvilinear metallic density is visualized in the  chest or visualized upper abdomen.    The cardiomediastinal silhouette is within normal limits.  Atherosclerotic calcifications of the aortic arch. Perihilar and left  lung predominant mixed interstitial and airspace opacities likely  representing pulmonary edema versus infection. No appreciable  pneumothorax. No significant pleural effusion. Chronic appearing right  second rib fracture. Visualized upper abdomen is unremarkable.      Impression    IMPRESSION:  1. No curvilinear foreign body in the chest are partially visualized  upper abdomen.  2. Perihilar and left lung mixed interstitial and airspace opacities  concerning for pulmonary edema versus infection.  3. No appreciable pneumothorax with bilateral chest tubes and  mediastinal drains in place.  4. Right IJ Olney-Vimal catheter with tip in the right main pulmonary  artery.    Findings were discussed via telephone with operating room personnel  with Harman  RN by Dr. Nuñez on 3/5/2021 at 1400 hours.    I have personally reviewed the examination and initial interpretation  and I agree with the findings.    HERMELINDA KANG MD   XR Chest Port 1 View    Narrative    Exam: XR CHEST PORT 1 VW, 3/5/2021 4:14 PM    Indication: Check endotracheal tube placement and ECLS cannula  placement. DO NOT log-roll patient.  Place film under patient using  patient safety handling process.    Comparison: 3/5/2021    Findings:   Endotracheal tube at the tito and could be pulled back. Enteric tube  seen coursing through the mediastinum. Tip projects off the film.  There are bibasilar chest tubes and 2 mediastinal chest tubes. Chest  appears open with a lap sponge marker along the right mediastinum.  ECMO cannulas are in place. Pulmonary arterial catheter tip in the  right lower lobe pulmonary artery. This is from a right internal  jugular vein approach.    Heart is within normal limits. Overall improved aeration of the lung  but still with perihilar opacities suggesting edema.      Impression    Impression:   1. Chest is open. LAP-BAND sponge marker along the right mediastinum.  2. Endotracheal tube at the tito and should be pulled back.  3. Chest tubes appear stable.  4. Enteric tube seen coursing through the mediastinum. Tip projects  off the film.  5. Improved pulmonary edema.    PEPE NARVAEZ MD   XR Abdomen Port 1 View    Narrative    Exam: XR ABDOMEN PORT 1 , 3/5/2021 4:15 PM    Indication: og placement    Comparison: Same day CT    Findings:   Portable views of the abdomen. Enteric tube tip in sidehole project  over the stomach. Multiple partially visualized mediastinal drains and  chest tubes. Postoperative changes in the chest with mitral valve  replacement. Nonobstructive bowel gas pattern. Advanced degenerative  changes of the spine. Pelvic phleboliths. Right groin catheter tip  projects in the head low abdomen.      Impression    Impression:   1. Enteric tube tip and  sidehole projected over the stomach.  2. Nonobstructive bowel gas pattern.    I have personally reviewed the examination and initial interpretation  and I agree with the findings.    REJI LYNN MD   US Lower Extremity Arterial Duplex Bilateral    Narrative    Exam: Duplex ultrasound of bilateral lower extremity arteries dated  3/5/2021 4:45 PM     Clinical information: Baseline to assess blood flow to Lower  Extremities (VA ECMO)     Comparison: None    Technique: Grayscale (B-mode), color Doppler, and duplex spectral  Doppler ultrasound of the lower extremity arteries. Velocity  measurements obtained with angle correction of 60 degrees or less.    Ordering provider: BEKAH BOWIE    Findings:     Right lower extremity:   The right common femoral and profunda femoris arteries are obscured by  overlying sheath.    Proximal SFA: Velocity: 39 cm/sec.   Mid SFA:Velocity:  38 cm/sec.   Distal SFA: Velocity: 26 cm/sec.   Popliteal artery: Velocity: 26 cm/sec.   PTA ankle: Velocity: 29 cm/sec.   PATRICIA ankle: Velocity: 12 cm/sec.     Diffusely abnormal monophasic waveforms from VA ECMO.    Left lower extremity:    Common femoral artery: Velocity: 36 cm/sec.   Deep femoral artery: Velocity: 42 cm/sec.   Proximal SFA: Velocity: 46 cm/sec.   Mid SFA:Velocity:  36 cm/sec.   Distal SFA: Velocity: 18 cm/sec.   Popliteal artery: Velocity: 21 cm/sec.   PTA ankle: Velocity: 11 cm/sec.   PATRICIA ankle: Velocity: 10 cm/sec.     Diffusely abnormal monophasic waveforms from VA ECMO.      Impression    Impression:   Patent arterial Doppler ultrasound evaluation of the bilateral lower  extremities with diffusely abnormal waveforms, which is expected in  the setting of VA ECMO.     I have personally reviewed the examination and initial interpretation  and I agree with the findings.    SAPPHIRE PANTOJA, DO   US Lower Extremity Venous Duplex Bilateral    Narrative    EXAMINATION: US LOWER EXTREMITY VENOUS DUPLEX BILATERAL  3/5/2021  4:46  PM      CLINICAL HISTORY: dvt    COMPARISON: None        PROCEDURE COMMENTS: Ultrasound was performed of the deep venous system  of the right and left lower extremity using grayscale, color, and  spectral Doppler.    FINDINGS:  The right common femoral vein was obscured by overlying bandage. The  right femoral, popliteal, and posterior tibial veins are visualized  and are patent. Venous waveforms are abnormal due to ECMO cannulation.  There is normal response to compression.    The left common femoral, femoral, popliteal, and posterior tibial  veins are visualized and are patent. Venous waveforms are abnormal due  to ECMO cannulation. There is normal response to compression.      Impression    IMPRESSION:  No deep vein thrombosis in the visualized right or left lower  extremity.    I have personally reviewed the examination and initial interpretation  and I agree with the findings.    SAPPHIRE PANTOJA DO   XR Chest Port 1 View    Narrative    Exam: XR CHEST PORT 1 VW, 3/5/2021 7:07 PM    Indication: Java placement (pulled back 2 cm)    Comparison: Same-day    Findings:   Right internal jugular Java-Vimal catheter tip projects over the distal  right pulmonary artery. Stable ECMO cannulae, mediastinal drains, and  bilateral chest tubes. Lap sponges project over the mediastinum.  Endotracheal tube tip at the mid thoracic trachea. Enteric tube  courses into the stomach with tip at the fundus. Stable  cardiomediastinal silhouette. No appreciable pleural effusion or  pneumothorax. Stable perihilar predominant mixed interstitial and  patchy airspace opacities.      Impression    Impression: Right internal jugular Java-Vimal catheter tip now projects  at the distal right pulmonary artery, slightly retracted since prior.  No other significant change.    SAPPHIRE PANTOJA DO   XR Chest Port 1 View    Narrative    Chest radiograph 3/6/2021 5:47 AM    HISTORY: Postop chest    COMPARISON: 3/5/2021    FINDINGS:  Single AP view  of the chest. Endotracheal tube tip overlying the mid  thoracic trachea. Interval removal of Dupree-Vimal catheter with right IJ  central sheath tip overlying the mid SVC. Enteric tube tip and  side-port overlying the stomach. Bilateral chest tubes and mediastinal  drains in similar position. Redemonstrated lap sponge markers  overlying the mediastinum. Central VA ECMO catheters in similar  position. Trachea is midline. Cardiac silhouette is similar in size.  No pneumothorax. No significant right pleural effusion. The left  costophrenic angle is collimated out of view. Largely unchanged left  greater than right perihilar predominant mixed interstitial and  airspace opacities. Increased retrocardiac opacities.      Impression    IMPRESSION:  1. Interval removal of Dupree-Vimal catheter with right IJ central sheath  tip overlying the mid SVC. Remaining support devices in similar  position.  2. Largely unchanged left greater than right perihilar predominant  mixed interstitial and airspace opacities.  3. Increased retrocardiac opacities.       =========================================

## 2021-03-06 NOTE — PLAN OF CARE
Major Shift Events:     1 unit PRBCs and 500ml NS given this AM per Dr. Porras. Sedated w/ Versed/fent, occasionally grimaces, has not followed commands/opened eyes. Pacer put on standby, underlying rhythm sinus. ECMO flow 4L, sweep of 3. 1 chugging event this AM before fluids, none since. Turn down not successful- MAP 50s when at flow of 2.5. TF initiated. UOP diminished throughout the day- CRRT started. Hep gtt initiated.     Plan: continue to titrate pressors for MAP >65. CRRT. Titrate hep gtt to ACT goal of 180-200. Daughter at bedside, updated on POC.       For vital signs and complete assessments, please see documentation flowsheets.        Problem: Dysrhythmia (Acute Coronary Syndrome)  Goal: Normalized Cardiac Rhythm  Outcome: Improving  Intervention: Monitor and Manage Cardiac Rhythm Effect  Recent Flowsheet Documentation  Taken 3/6/2021 1200 by Bridgette Mireles, OLGA  VTE Prevention/Management:    bleeding precautions maintained    bleeding risk assessed    anticoagulant therapy initiated  Taken 3/6/2021 0800 by Bridgette Mireles RN  VTE Prevention/Management:    bleeding precautions maintained    bleeding risk assessed     Problem: Adult Inpatient Plan of Care  Goal: Readiness for Transition of Care  Outcome: No Change     Problem: Hemodynamic Instability (Acute Coronary Syndrome)  Goal: Effective Cardiac Pump Function  Outcome: No Change

## 2021-03-06 NOTE — PLAN OF CARE
OT: Cx, per discussion w/ RN, pt not medically appropriate for therapy today. OT will reschedule eval for 3/8

## 2021-03-06 NOTE — PROGRESS NOTES
"CLINICAL NUTRITION SERVICES - ASSESSMENT NOTE     Nutrition Prescription    RECOMMENDATIONS FOR MDs/PROVIDERS TO ORDER:  Adjustments to free water flushes per provider discretion    Malnutrition Status:    Unable to determine due to unable to interview pt or complete NFPA at this time due to pt requiring other cares    Recommendations already ordered by Registered Dietitian (RD):  Start Impact Peptide 1.5 @ 10 mL/hr and advance by 10 mL q8h as tolerated to goal rate 70 mL/hr  -- Do not advance TF rate unless K+/Mg++ >/= nrml and phos >1.9    -- 30 mL q4h free water flushes for FT patency (does not provide full hydration)    Goal: Impact Peptide @ goal 70 ml/hr (1680 ml/day) to provide 2520 kcals (28 kcal/kg/day), 158 g PRO (1.7 g/kg/day), 1294 ml free H2O, 108 g Fat (50% from MCTs), 235 g CHO and no Fiber daily.     Future/Additional Recommendations:  1. Monitor K+ trends vs need for lower K+ formula  2. Heart healthy diet ed once pt extubates/diet advances as appropriate     REASON FOR ASSESSMENT  Jin Garrett is a/an 80 year old male assessed by the dietitian for Provider Order - Registered Dietitian to Assess and Order TF per Medical Nutrition Therapy Protocol (comments: \"Ok to feed gastric\")    NUTRITION HISTORY  Obtained info from chart, pt intubated/sedated and on central VA ECMO. POD1 mitral valve replacement and CABG.    CURRENT NUTRITION ORDERS  Diet: no diet ordered    Nutrition Support: provider ordered - Nutren 1.5 @ 10 mL/hr via OG (advancement comments \"start trickle feeds and advance as tolerated per nitruion\"    Intake/Tolerance: MD ordered above TF this morning, orders for patient to be in reverse Trendelenburg position    LABS  Labs reviewed  - K+ 5.4 (H), K+ was low yesterday and received replacements at 0436, 0723, and 1738; suspect could be slightly elevated today 2/2 replacements yesterday?  -  Mg++ and Phos WNL    MEDICATIONS  Medications reviewed  - Pressors (epi, levo, vaso)  - Insulin " "gtt    ANTHROPOMETRICS  Height: 180.3 cm (5' 11\")  Most Recent Weight: 91.1 kg (200 lb 13.4 oz)    IBW: 78.2 kg   BMI: Overweight BMI 25-29.9  Weight History:   Wt Readings from Last 10 Encounters:   03/06/21 91.1 kg (200 lb 13.4 oz)      Dosing Weight: 91 kg (actual)    ASSESSED NUTRITION NEEDS  Estimated Energy Needs: 8152-0411 kcals/day (25 - 30 kcals/kg)  Justification: Maintenance  Estimated Protein Needs: 137-182 grams protein/day (1.5 - 2 grams of pro/kg)  Justification: Hypercatabolism with critical illness, ECMO   Estimated Fluid Needs: per provider pending fluid status    PHYSICAL FINDINGS  See malnutrition section below.    MALNUTRITION  % Intake: Unable to assess  % Weight Loss: Unable to assess  Subcutaneous Fat Loss: Unable to assess  Muscle Loss: Unable to assess  Fluid Accumulation/Edema: None noted per chart review  Malnutrition Diagnosis: Unable to determine due to unable to interview pt or complete NFPA at this time due to pt requiring other cares    NUTRITION DIAGNOSIS  Increased nutrient needs (protein) related to increased estimated needs due to hypercatabolism with critical illness and on ECMO as evidenced by Estimated Protein Needs: 137-182 grams protein/day (1.5 - 2 grams of pro/kg)      INTERVENTIONS  Implementation  Nutrition Education: Not appropriate at this time due to patient condition   Enteral Nutrition and Feeding tube flush - initiate    Goals  Once at goal TF rate, total avg nutritional intake to meet a minimum of 25 kcal/kg and 1.5 g PRO/kg daily (per dosing wt 91 kg).     Monitoring/Evaluation  Progress toward goals will be monitored and evaluated per protocol.     Bessy Jorge RD, LD  Weekend/Holiday RD Pager: [phone]   "

## 2021-03-06 NOTE — PROGRESS NOTES
"CRRT INITIATION NOTE    Consent for CRRT Completed:  YES  Patient s Vascular Access: Catheter              Placement Confirmed: YES: ECMO    DATA:  Procedure:  CVVHDF  Start Time:  1359  Machine#:  6  Filter:  M150  Blood Flow:  200  ML/min  Pre-Replacement Solution:  Phoxillum BK 4/2.5  Post-Replacement Solution:  Phoxillum BK 4/2.5  Dialysate Solution:  Phoxillum BK 4/2.5  Pre-Replacement Solution Rate:  1000 mL/hr  Post-Replacement Solution Rate:  200 mL/hr  Dialysate Flow Rate:  1000 mL/hr   Patient Removal Rate:  0 mL/hr  Anticoagulation Type and Rate:  NA    ASSESSMENT:  How Patient Tolerated Initiation:   Vital Signs:  BP 99/61   Pulse 89   Temp 98.4  F (36.9  C)   Resp 12   Ht 1.803 m (5' 11\")   Wt 91.1 kg (200 lb 13.4 oz)   SpO2 99%   BMI 28.01 kg/m    Initial Pressures:  Access:  44  Filter:  98  Return:  64  TMP:  61  Change in Filter Pressure:  13      INTERVENTIONS:  CVVHDF initiated per orders.  Consent obtained by Renal Fellow and scanned into chart.  Access via ECMO circuit.    PLAN:  Continue with current plan of care, please contact CRRT resource with any questions or concerns at [phone]          "

## 2021-03-07 ENCOUNTER — APPOINTMENT (OUTPATIENT)
Dept: GENERAL RADIOLOGY | Facility: CLINIC | Age: 81
DRG: 003 | End: 2021-03-07
Attending: STUDENT IN AN ORGANIZED HEALTH CARE EDUCATION/TRAINING PROGRAM
Payer: MEDICARE

## 2021-03-07 LAB
ABO + RH BLD: NORMAL
ABO + RH BLD: NORMAL
ALBUMIN SERPL-MCNC: 2.3 G/DL (ref 3.4–5)
ALBUMIN UR-MCNC: 30 MG/DL
ALP SERPL-CCNC: 56 U/L (ref 40–150)
ALT SERPL W P-5'-P-CCNC: 24 U/L (ref 0–70)
ANION GAP SERPL CALCULATED.3IONS-SCNC: 10 MMOL/L (ref 3–14)
ANION GAP SERPL CALCULATED.3IONS-SCNC: 4 MMOL/L (ref 3–14)
ANION GAP SERPL CALCULATED.3IONS-SCNC: 5 MMOL/L (ref 3–14)
ANION GAP SERPL CALCULATED.3IONS-SCNC: 8 MMOL/L (ref 3–14)
APPEARANCE UR: ABNORMAL
APTT PPP: 112 SEC (ref 22–37)
APTT PPP: 124 SEC (ref 22–37)
APTT PPP: 152 SEC (ref 22–37)
APTT PPP: 99 SEC (ref 22–37)
AST SERPL W P-5'-P-CCNC: 90 U/L (ref 0–45)
AT III ACT/NOR PPP CHRO: 51 % (ref 85–135)
BASE DEFICIT BLDA-SCNC: 0.4 MMOL/L
BASE DEFICIT BLDA-SCNC: 0.6 MMOL/L
BASE DEFICIT BLDA-SCNC: 1.4 MMOL/L
BASE DEFICIT BLDA-SCNC: 2.6 MMOL/L
BASE DEFICIT BLDA-SCNC: 3.4 MMOL/L
BASE DEFICIT BLDA-SCNC: 3.4 MMOL/L
BASE DEFICIT BLDA-SCNC: 3.5 MMOL/L
BASE DEFICIT BLDA-SCNC: 4.1 MMOL/L
BASE DEFICIT BLDA-SCNC: 4.3 MMOL/L
BASE DEFICIT BLDV-SCNC: 2.2 MMOL/L
BASE EXCESS BLDA CALC-SCNC: 0 MMOL/L
BASE EXCESS BLDA CALC-SCNC: 0.7 MMOL/L
BASE EXCESS BLDA CALC-SCNC: 0.8 MMOL/L
BASE EXCESS BLDA CALC-SCNC: 2 MMOL/L
BASE EXCESS BLDA CALC-SCNC: 2.6 MMOL/L
BASE EXCESS BLDV CALC-SCNC: 1.6 MMOL/L
BILIRUB SERPL-MCNC: 0.8 MG/DL (ref 0.2–1.3)
BILIRUB UR QL STRIP: NEGATIVE
BLD GP AB SCN SERPL QL: NORMAL
BLD PROD TYP BPU: NORMAL
BLD UNIT ID BPU: 0
BLD UNIT ID BPU: 0
BLOOD BANK CMNT PATIENT-IMP: NORMAL
BLOOD PRODUCT CODE: NORMAL
BLOOD PRODUCT CODE: NORMAL
BPU ID: NORMAL
BPU ID: NORMAL
BUN SERPL-MCNC: 35 MG/DL (ref 7–30)
BUN SERPL-MCNC: 36 MG/DL (ref 7–30)
BUN SERPL-MCNC: 38 MG/DL (ref 7–30)
BUN SERPL-MCNC: 38 MG/DL (ref 7–30)
CA-I BLD-MCNC: 4.3 MG/DL (ref 4.4–5.2)
CA-I BLD-MCNC: 4.7 MG/DL (ref 4.4–5.2)
CALCIUM SERPL-MCNC: 7.7 MG/DL (ref 8.5–10.1)
CALCIUM SERPL-MCNC: 7.7 MG/DL (ref 8.5–10.1)
CALCIUM SERPL-MCNC: 8 MG/DL (ref 8.5–10.1)
CALCIUM SERPL-MCNC: 8.6 MG/DL (ref 8.5–10.1)
CHLORIDE SERPL-SCNC: 108 MMOL/L (ref 94–109)
CHLORIDE SERPL-SCNC: 109 MMOL/L (ref 94–109)
CHLORIDE SERPL-SCNC: 110 MMOL/L (ref 94–109)
CHLORIDE SERPL-SCNC: 111 MMOL/L (ref 94–109)
CO2 SERPL-SCNC: 22 MMOL/L (ref 20–32)
CO2 SERPL-SCNC: 24 MMOL/L (ref 20–32)
CO2 SERPL-SCNC: 25 MMOL/L (ref 20–32)
CO2 SERPL-SCNC: 26 MMOL/L (ref 20–32)
COLOR UR AUTO: YELLOW
CREAT SERPL-MCNC: 1.98 MG/DL (ref 0.66–1.25)
CREAT SERPL-MCNC: 2.04 MG/DL (ref 0.66–1.25)
CREAT SERPL-MCNC: 2.13 MG/DL (ref 0.66–1.25)
CREAT SERPL-MCNC: 2.25 MG/DL (ref 0.66–1.25)
CRP SERPL-MCNC: 380 MG/L (ref 0–8)
ERYTHROCYTE [DISTWIDTH] IN BLOOD BY AUTOMATED COUNT: 15.2 % (ref 10–15)
ERYTHROCYTE [DISTWIDTH] IN BLOOD BY AUTOMATED COUNT: 15.6 % (ref 10–15)
ERYTHROCYTE [DISTWIDTH] IN BLOOD BY AUTOMATED COUNT: 16.1 % (ref 10–15)
ERYTHROCYTE [DISTWIDTH] IN BLOOD BY AUTOMATED COUNT: 16.7 % (ref 10–15)
ERYTHROCYTE [SEDIMENTATION RATE] IN BLOOD BY WESTERGREN METHOD: 60 MM/H (ref 0–20)
FIBRINOGEN PPP-MCNC: 543 MG/DL (ref 200–420)
FIBRINOGEN PPP-MCNC: 588 MG/DL (ref 200–420)
GFR SERPL CREATININE-BSD FRML MDRD: 26 ML/MIN/{1.73_M2}
GFR SERPL CREATININE-BSD FRML MDRD: 28 ML/MIN/{1.73_M2}
GFR SERPL CREATININE-BSD FRML MDRD: 30 ML/MIN/{1.73_M2}
GFR SERPL CREATININE-BSD FRML MDRD: 31 ML/MIN/{1.73_M2}
GLUCOSE BLD-MCNC: 115 MG/DL (ref 70–99)
GLUCOSE BLD-MCNC: 123 MG/DL (ref 70–99)
GLUCOSE BLD-MCNC: 123 MG/DL (ref 70–99)
GLUCOSE BLD-MCNC: 92 MG/DL (ref 70–99)
GLUCOSE BLDC GLUCOMTR-MCNC: 102 MG/DL (ref 70–99)
GLUCOSE BLDC GLUCOMTR-MCNC: 107 MG/DL (ref 70–99)
GLUCOSE BLDC GLUCOMTR-MCNC: 124 MG/DL (ref 70–99)
GLUCOSE BLDC GLUCOMTR-MCNC: 139 MG/DL (ref 70–99)
GLUCOSE BLDC GLUCOMTR-MCNC: 91 MG/DL (ref 70–99)
GLUCOSE BLDC GLUCOMTR-MCNC: 94 MG/DL (ref 70–99)
GLUCOSE SERPL-MCNC: 115 MG/DL (ref 70–99)
GLUCOSE SERPL-MCNC: 126 MG/DL (ref 70–99)
GLUCOSE SERPL-MCNC: 127 MG/DL (ref 70–99)
GLUCOSE SERPL-MCNC: 93 MG/DL (ref 70–99)
GLUCOSE UR STRIP-MCNC: NEGATIVE MG/DL
GRAM STN SPEC: NORMAL
GRAM STN SPEC: NORMAL
HCO3 BLD-SCNC: 21 MMOL/L (ref 21–28)
HCO3 BLD-SCNC: 21 MMOL/L (ref 21–28)
HCO3 BLD-SCNC: 22 MMOL/L (ref 21–28)
HCO3 BLD-SCNC: 22 MMOL/L (ref 21–28)
HCO3 BLD-SCNC: 23 MMOL/L (ref 21–28)
HCO3 BLD-SCNC: 23 MMOL/L (ref 21–28)
HCO3 BLD-SCNC: 24 MMOL/L (ref 21–28)
HCO3 BLD-SCNC: 24 MMOL/L (ref 21–28)
HCO3 BLD-SCNC: 25 MMOL/L (ref 21–28)
HCO3 BLD-SCNC: 26 MMOL/L (ref 21–28)
HCO3 BLD-SCNC: 26 MMOL/L (ref 21–28)
HCO3 BLD-SCNC: 27 MMOL/L (ref 21–28)
HCO3 BLDA-SCNC: 22 MMOL/L (ref 21–28)
HCO3 BLDA-SCNC: 26 MMOL/L (ref 21–28)
HCO3 BLDV-SCNC: 23 MMOL/L (ref 21–28)
HCO3 BLDV-SCNC: 27 MMOL/L (ref 21–28)
HCT VFR BLD AUTO: 23.4 % (ref 40–53)
HCT VFR BLD AUTO: 24.1 % (ref 40–53)
HCT VFR BLD AUTO: 24.3 % (ref 40–53)
HCT VFR BLD AUTO: 24.4 % (ref 40–53)
HGB BLD-MCNC: 7.9 G/DL (ref 13.3–17.7)
HGB BLD-MCNC: 7.9 G/DL (ref 13.3–17.7)
HGB BLD-MCNC: 8 G/DL (ref 13.3–17.7)
HGB BLD-MCNC: 8.1 G/DL (ref 13.3–17.7)
HGB FREE PLAS-MCNC: 30 MG/DL
HGB UR QL STRIP: ABNORMAL
INR PPP: 1.28 (ref 0.86–1.14)
INR PPP: 1.32 (ref 0.86–1.14)
INR PPP: 1.33 (ref 0.86–1.14)
INR PPP: 1.34 (ref 0.86–1.14)
INTERPRETATION ECG - MUSE: NORMAL
INTERPRETATION ECG - MUSE: NORMAL
KCT BLD-ACNC: 115 SEC (ref 75–150)
KCT BLD-ACNC: 131 SEC (ref 75–150)
KCT BLD-ACNC: 168 SEC (ref 75–150)
KCT BLD-ACNC: 172 SEC (ref 75–150)
KCT BLD-ACNC: 176 SEC (ref 75–150)
KCT BLD-ACNC: 180 SEC (ref 75–150)
KCT BLD-ACNC: 184 SEC (ref 75–150)
KCT BLD-ACNC: 184 SEC (ref 75–150)
KCT BLD-ACNC: 192 SEC (ref 75–150)
KCT BLD-ACNC: 192 SEC (ref 75–150)
KCT BLD-ACNC: 213 SEC (ref 75–150)
KETONES UR STRIP-MCNC: ABNORMAL MG/DL
LACTATE BLD-SCNC: 1.1 MMOL/L (ref 0.7–2)
LACTATE BLD-SCNC: 1.2 MMOL/L (ref 0.7–2)
LACTATE BLD-SCNC: 1.2 MMOL/L (ref 0.7–2)
LACTATE BLD-SCNC: 1.3 MMOL/L (ref 0.7–2)
LDH SERPL L TO P-CCNC: 523 U/L (ref 85–227)
LEUKOCYTE ESTERASE UR QL STRIP: ABNORMAL
Lab: NORMAL
MAGNESIUM SERPL-MCNC: 2.4 MG/DL (ref 1.6–2.3)
MAGNESIUM SERPL-MCNC: 2.4 MG/DL (ref 1.6–2.3)
MCH RBC QN AUTO: 28.6 PG (ref 26.5–33)
MCH RBC QN AUTO: 28.7 PG (ref 26.5–33)
MCH RBC QN AUTO: 28.9 PG (ref 26.5–33)
MCH RBC QN AUTO: 30 PG (ref 26.5–33)
MCHC RBC AUTO-ENTMCNC: 32.8 G/DL (ref 31.5–36.5)
MCHC RBC AUTO-ENTMCNC: 32.8 G/DL (ref 31.5–36.5)
MCHC RBC AUTO-ENTMCNC: 33.3 G/DL (ref 31.5–36.5)
MCHC RBC AUTO-ENTMCNC: 33.8 G/DL (ref 31.5–36.5)
MCV RBC AUTO: 87 FL (ref 78–100)
MCV RBC AUTO: 87 FL (ref 78–100)
MCV RBC AUTO: 88 FL (ref 78–100)
MCV RBC AUTO: 89 FL (ref 78–100)
MUCOUS THREADS #/AREA URNS LPF: PRESENT /LPF
NITRATE UR QL: NEGATIVE
NUM BPU REQUESTED: 10
O2/TOTAL GAS SETTING VFR VENT: 50 %
OXYHGB MFR BLD: 93 % (ref 92–100)
OXYHGB MFR BLD: 95 % (ref 92–100)
OXYHGB MFR BLD: 95 % (ref 92–100)
OXYHGB MFR BLD: 96 % (ref 92–100)
OXYHGB MFR BLD: 97 % (ref 92–100)
OXYHGB MFR BLD: 98 % (ref 92–100)
OXYHGB MFR BLDA: 96 % (ref 75–100)
OXYHGB MFR BLDA: 98 % (ref 75–100)
OXYHGB MFR BLDV: 58 %
OXYHGB MFR BLDV: 65 %
PCO2 BLD: 35 MM HG (ref 35–45)
PCO2 BLD: 36 MM HG (ref 35–45)
PCO2 BLD: 37 MM HG (ref 35–45)
PCO2 BLD: 39 MM HG (ref 35–45)
PCO2 BLD: 40 MM HG (ref 35–45)
PCO2 BLD: 41 MM HG (ref 35–45)
PCO2 BLD: 49 MM HG (ref 35–45)
PCO2 BLDA: 38 MM HG (ref 35–45)
PCO2 BLDA: 41 MM HG (ref 35–45)
PCO2 BLDV: 39 MM HG (ref 40–50)
PCO2 BLDV: 44 MM HG (ref 40–50)
PH BLD: 7.33 PH (ref 7.35–7.45)
PH BLD: 7.33 PH (ref 7.35–7.45)
PH BLD: 7.35 PH (ref 7.35–7.45)
PH BLD: 7.37 PH (ref 7.35–7.45)
PH BLD: 7.37 PH (ref 7.35–7.45)
PH BLD: 7.38 PH (ref 7.35–7.45)
PH BLD: 7.4 PH (ref 7.35–7.45)
PH BLD: 7.41 PH (ref 7.35–7.45)
PH BLD: 7.43 PH (ref 7.35–7.45)
PH BLD: 7.44 PH (ref 7.35–7.45)
PH BLD: 7.44 PH (ref 7.35–7.45)
PH BLD: 7.45 PH (ref 7.35–7.45)
PH BLDA: 7.37 PH (ref 7.35–7.45)
PH BLDA: 7.41 PH (ref 7.35–7.45)
PH BLDV: 7.37 PH (ref 7.32–7.43)
PH BLDV: 7.39 PH (ref 7.32–7.43)
PH UR STRIP: 5 PH (ref 5–7)
PHOSPHATE SERPL-MCNC: 4.2 MG/DL (ref 2.5–4.5)
PHOSPHATE SERPL-MCNC: 5.7 MG/DL (ref 2.5–4.5)
PLATELET # BLD AUTO: 102 10E9/L (ref 150–450)
PLATELET # BLD AUTO: 112 10E9/L (ref 150–450)
PLATELET # BLD AUTO: 96 10E9/L (ref 150–450)
PLATELET # BLD AUTO: 98 10E9/L (ref 150–450)
PO2 BLD: 102 MM HG (ref 80–105)
PO2 BLD: 102 MM HG (ref 80–105)
PO2 BLD: 108 MM HG (ref 80–105)
PO2 BLD: 108 MM HG (ref 80–105)
PO2 BLD: 116 MM HG (ref 80–105)
PO2 BLD: 140 MM HG (ref 80–105)
PO2 BLD: 76 MM HG (ref 80–105)
PO2 BLD: 83 MM HG (ref 80–105)
PO2 BLD: 88 MM HG (ref 80–105)
PO2 BLD: 96 MM HG (ref 80–105)
PO2 BLD: 98 MM HG (ref 80–105)
PO2 BLD: 99 MM HG (ref 80–105)
PO2 BLDA: 101 MM HG (ref 80–105)
PO2 BLDA: 131 MM HG (ref 80–105)
PO2 BLDV: 33 MM HG (ref 25–47)
PO2 BLDV: 37 MM HG (ref 25–47)
POTASSIUM SERPL-SCNC: 4.8 MMOL/L (ref 3.4–5.3)
POTASSIUM SERPL-SCNC: 4.8 MMOL/L (ref 3.4–5.3)
POTASSIUM SERPL-SCNC: 5 MMOL/L (ref 3.4–5.3)
POTASSIUM SERPL-SCNC: 5.2 MMOL/L (ref 3.4–5.3)
PROCALCITONIN SERPL-MCNC: 23.26 NG/ML
PROT SERPL-MCNC: 4.9 G/DL (ref 6.8–8.8)
RBC # BLD AUTO: 2.63 10E12/L (ref 4.4–5.9)
RBC # BLD AUTO: 2.75 10E12/L (ref 4.4–5.9)
RBC # BLD AUTO: 2.8 10E12/L (ref 4.4–5.9)
RBC # BLD AUTO: 2.8 10E12/L (ref 4.4–5.9)
RBC #/AREA URNS AUTO: 22 /HPF (ref 0–2)
SODIUM SERPL-SCNC: 140 MMOL/L (ref 133–144)
SODIUM SERPL-SCNC: 141 MMOL/L (ref 133–144)
SOURCE: ABNORMAL
SP GR UR STRIP: 1.02 (ref 1–1.03)
SPECIMEN EXP DATE BLD: NORMAL
SPECIMEN SOURCE: NORMAL
SQUAMOUS #/AREA URNS AUTO: 0 /HPF (ref 0–1)
TRANSFUSION STATUS PATIENT QL: NORMAL
UFH PPP CHRO-ACNC: 0.3 IU/ML
UROBILINOGEN UR STRIP-MCNC: NORMAL MG/DL (ref 0–2)
WBC # BLD AUTO: 18.2 10E9/L (ref 4–11)
WBC # BLD AUTO: 19.2 10E9/L (ref 4–11)
WBC # BLD AUTO: 20.1 10E9/L (ref 4–11)
WBC # BLD AUTO: 21.4 10E9/L (ref 4–11)
WBC #/AREA URNS AUTO: 13 /HPF (ref 0–5)

## 2021-03-07 PROCEDURE — 85384 FIBRINOGEN ACTIVITY: CPT | Performed by: INTERNAL MEDICINE

## 2021-03-07 PROCEDURE — 36415 COLL VENOUS BLD VENIPUNCTURE: CPT | Performed by: THORACIC SURGERY (CARDIOTHORACIC VASCULAR SURGERY)

## 2021-03-07 PROCEDURE — 999N000157 HC STATISTIC RCP TIME EA 10 MIN

## 2021-03-07 PROCEDURE — 85300 ANTITHROMBIN III ACTIVITY: CPT | Performed by: STUDENT IN AN ORGANIZED HEALTH CARE EDUCATION/TRAINING PROGRAM

## 2021-03-07 PROCEDURE — 82805 BLOOD GASES W/O2 SATURATION: CPT | Performed by: THORACIC SURGERY (CARDIOTHORACIC VASCULAR SURGERY)

## 2021-03-07 PROCEDURE — 87040 BLOOD CULTURE FOR BACTERIA: CPT | Performed by: THORACIC SURGERY (CARDIOTHORACIC VASCULAR SURGERY)

## 2021-03-07 PROCEDURE — 84100 ASSAY OF PHOSPHORUS: CPT | Performed by: INTERNAL MEDICINE

## 2021-03-07 PROCEDURE — 250N000011 HC RX IP 250 OP 636: Performed by: INTERNAL MEDICINE

## 2021-03-07 PROCEDURE — 85610 PROTHROMBIN TIME: CPT | Performed by: INTERNAL MEDICINE

## 2021-03-07 PROCEDURE — 82805 BLOOD GASES W/O2 SATURATION: CPT | Performed by: SURGERY

## 2021-03-07 PROCEDURE — 250N000013 HC RX MED GY IP 250 OP 250 PS 637: Performed by: INTERNAL MEDICINE

## 2021-03-07 PROCEDURE — 999N000015 HC STATISTIC ARTERIAL MONITORING DAILY

## 2021-03-07 PROCEDURE — 85610 PROTHROMBIN TIME: CPT | Performed by: STUDENT IN AN ORGANIZED HEALTH CARE EDUCATION/TRAINING PROGRAM

## 2021-03-07 PROCEDURE — 85347 COAGULATION TIME ACTIVATED: CPT

## 2021-03-07 PROCEDURE — 82330 ASSAY OF CALCIUM: CPT | Performed by: SURGERY

## 2021-03-07 PROCEDURE — 85520 HEPARIN ASSAY: CPT | Performed by: STUDENT IN AN ORGANIZED HEALTH CARE EDUCATION/TRAINING PROGRAM

## 2021-03-07 PROCEDURE — 83605 ASSAY OF LACTIC ACID: CPT | Performed by: SURGERY

## 2021-03-07 PROCEDURE — P9016 RBC LEUKOCYTES REDUCED: HCPCS | Performed by: THORACIC SURGERY (CARDIOTHORACIC VASCULAR SURGERY)

## 2021-03-07 PROCEDURE — 84145 PROCALCITONIN (PCT): CPT | Performed by: STUDENT IN AN ORGANIZED HEALTH CARE EDUCATION/TRAINING PROGRAM

## 2021-03-07 PROCEDURE — 272N000083 HC NUTRITION PRODUCT SEMIELEM INTERMED LITER

## 2021-03-07 PROCEDURE — 999N001017 HC STATISTIC GLUCOSE BY METER IP

## 2021-03-07 PROCEDURE — 94003 VENT MGMT INPAT SUBQ DAY: CPT

## 2021-03-07 PROCEDURE — 250N000011 HC RX IP 250 OP 636: Performed by: STUDENT IN AN ORGANIZED HEALTH CARE EDUCATION/TRAINING PROGRAM

## 2021-03-07 PROCEDURE — 85652 RBC SED RATE AUTOMATED: CPT | Performed by: STUDENT IN AN ORGANIZED HEALTH CARE EDUCATION/TRAINING PROGRAM

## 2021-03-07 PROCEDURE — 83605 ASSAY OF LACTIC ACID: CPT | Performed by: THORACIC SURGERY (CARDIOTHORACIC VASCULAR SURGERY)

## 2021-03-07 PROCEDURE — 82330 ASSAY OF CALCIUM: CPT | Performed by: THORACIC SURGERY (CARDIOTHORACIC VASCULAR SURGERY)

## 2021-03-07 PROCEDURE — 250N000013 HC RX MED GY IP 250 OP 250 PS 637: Performed by: SURGERY

## 2021-03-07 PROCEDURE — 250N000011 HC RX IP 250 OP 636: Performed by: THORACIC SURGERY (CARDIOTHORACIC VASCULAR SURGERY)

## 2021-03-07 PROCEDURE — 82947 ASSAY GLUCOSE BLOOD QUANT: CPT | Performed by: SURGERY

## 2021-03-07 PROCEDURE — 71045 X-RAY EXAM CHEST 1 VIEW: CPT

## 2021-03-07 PROCEDURE — 99291 CRITICAL CARE FIRST HOUR: CPT | Mod: GC | Performed by: ANESTHESIOLOGY

## 2021-03-07 PROCEDURE — 258N000003 HC RX IP 258 OP 636: Performed by: INTERNAL MEDICINE

## 2021-03-07 PROCEDURE — 258N000003 HC RX IP 258 OP 636: Performed by: STUDENT IN AN ORGANIZED HEALTH CARE EDUCATION/TRAINING PROGRAM

## 2021-03-07 PROCEDURE — 81001 URINALYSIS AUTO W/SCOPE: CPT | Performed by: INTERNAL MEDICINE

## 2021-03-07 PROCEDURE — 87070 CULTURE OTHR SPECIMN AEROBIC: CPT | Performed by: INTERNAL MEDICINE

## 2021-03-07 PROCEDURE — 84100 ASSAY OF PHOSPHORUS: CPT | Performed by: STUDENT IN AN ORGANIZED HEALTH CARE EDUCATION/TRAINING PROGRAM

## 2021-03-07 PROCEDURE — 86140 C-REACTIVE PROTEIN: CPT | Performed by: STUDENT IN AN ORGANIZED HEALTH CARE EDUCATION/TRAINING PROGRAM

## 2021-03-07 PROCEDURE — 83615 LACTATE (LD) (LDH) ENZYME: CPT | Performed by: STUDENT IN AN ORGANIZED HEALTH CARE EDUCATION/TRAINING PROGRAM

## 2021-03-07 PROCEDURE — 33949 ECMO/ECLS DAILY MGMT ARTERY: CPT

## 2021-03-07 PROCEDURE — 87070 CULTURE OTHR SPECIMN AEROBIC: CPT | Performed by: SURGERY

## 2021-03-07 PROCEDURE — 87086 URINE CULTURE/COLONY COUNT: CPT | Performed by: INTERNAL MEDICINE

## 2021-03-07 PROCEDURE — 99233 SBSQ HOSP IP/OBS HIGH 50: CPT | Mod: GC | Performed by: INTERNAL MEDICINE

## 2021-03-07 PROCEDURE — 250N000013 HC RX MED GY IP 250 OP 250 PS 637: Performed by: STUDENT IN AN ORGANIZED HEALTH CARE EDUCATION/TRAINING PROGRAM

## 2021-03-07 PROCEDURE — 83735 ASSAY OF MAGNESIUM: CPT | Performed by: STUDENT IN AN ORGANIZED HEALTH CARE EDUCATION/TRAINING PROGRAM

## 2021-03-07 PROCEDURE — 87205 SMEAR GRAM STAIN: CPT | Performed by: INTERNAL MEDICINE

## 2021-03-07 PROCEDURE — 200N000002 HC R&B ICU UMMC

## 2021-03-07 PROCEDURE — 83735 ASSAY OF MAGNESIUM: CPT | Performed by: INTERNAL MEDICINE

## 2021-03-07 PROCEDURE — 250N000009 HC RX 250: Performed by: INTERNAL MEDICINE

## 2021-03-07 PROCEDURE — 85730 THROMBOPLASTIN TIME PARTIAL: CPT | Performed by: INTERNAL MEDICINE

## 2021-03-07 PROCEDURE — 85384 FIBRINOGEN ACTIVITY: CPT | Performed by: STUDENT IN AN ORGANIZED HEALTH CARE EDUCATION/TRAINING PROGRAM

## 2021-03-07 PROCEDURE — 90947 DIALYSIS REPEATED EVAL: CPT

## 2021-03-07 PROCEDURE — 85027 COMPLETE CBC AUTOMATED: CPT | Performed by: INTERNAL MEDICINE

## 2021-03-07 PROCEDURE — 85027 COMPLETE CBC AUTOMATED: CPT | Performed by: STUDENT IN AN ORGANIZED HEALTH CARE EDUCATION/TRAINING PROGRAM

## 2021-03-07 PROCEDURE — 258N000003 HC RX IP 258 OP 636: Performed by: THORACIC SURGERY (CARDIOTHORACIC VASCULAR SURGERY)

## 2021-03-07 PROCEDURE — 83051 HEMOGLOBIN PLASMA: CPT | Performed by: STUDENT IN AN ORGANIZED HEALTH CARE EDUCATION/TRAINING PROGRAM

## 2021-03-07 PROCEDURE — 85730 THROMBOPLASTIN TIME PARTIAL: CPT | Performed by: STUDENT IN AN ORGANIZED HEALTH CARE EDUCATION/TRAINING PROGRAM

## 2021-03-07 PROCEDURE — 80048 BASIC METABOLIC PNL TOTAL CA: CPT | Performed by: INTERNAL MEDICINE

## 2021-03-07 PROCEDURE — 80053 COMPREHEN METABOLIC PANEL: CPT | Performed by: STUDENT IN AN ORGANIZED HEALTH CARE EDUCATION/TRAINING PROGRAM

## 2021-03-07 PROCEDURE — 71045 X-RAY EXAM CHEST 1 VIEW: CPT | Mod: 26 | Performed by: RADIOLOGY

## 2021-03-07 PROCEDURE — 82947 ASSAY GLUCOSE BLOOD QUANT: CPT | Performed by: THORACIC SURGERY (CARDIOTHORACIC VASCULAR SURGERY)

## 2021-03-07 RX ORDER — POLYETHYLENE GLYCOL 3350 17 G/17G
17 POWDER, FOR SOLUTION ORAL 2 TIMES DAILY
Status: DISCONTINUED | OUTPATIENT
Start: 2021-03-07 | End: 2021-03-12

## 2021-03-07 RX ORDER — AMOXICILLIN 250 MG
1 CAPSULE ORAL 2 TIMES DAILY
Status: DISCONTINUED | OUTPATIENT
Start: 2021-03-07 | End: 2021-03-09

## 2021-03-07 RX ORDER — PROPOFOL 10 MG/ML
5-75 INJECTION, EMULSION INTRAVENOUS CONTINUOUS
Status: DISCONTINUED | OUTPATIENT
Start: 2021-03-07 | End: 2021-03-10

## 2021-03-07 RX ADMIN — CEFEPIME 2 G: 2 INJECTION, POWDER, FOR SOLUTION INTRAVENOUS at 04:09

## 2021-03-07 RX ADMIN — PROPOFOL 10 MCG/KG/MIN: 10 INJECTION, EMULSION INTRAVENOUS at 10:14

## 2021-03-07 RX ADMIN — AMIODARONE HYDROCHLORIDE 200 MG: 200 TABLET ORAL at 19:36

## 2021-03-07 RX ADMIN — AMIODARONE HYDROCHLORIDE 200 MG: 200 TABLET ORAL at 08:29

## 2021-03-07 RX ADMIN — HEPARIN SODIUM 1150 UNITS/HR: 10000 INJECTION, SOLUTION INTRAVENOUS at 08:28

## 2021-03-07 RX ADMIN — CALCIUM CHLORIDE, MAGNESIUM CHLORIDE, SODIUM CHLORIDE, SODIUM BICARBONATE, POTASSIUM CHLORIDE AND SODIUM PHOSPHATE DIBASIC DIHYDRATE 12.5 ML/KG/HR: 3.68; 3.05; 6.34; 3.09; .314; .187 INJECTION INTRAVENOUS at 00:08

## 2021-03-07 RX ADMIN — ACETAMINOPHEN 975 MG: 325 TABLET, FILM COATED ORAL at 16:02

## 2021-03-07 RX ADMIN — ASPIRIN 81 MG CHEWABLE TABLET 81 MG: 81 TABLET CHEWABLE at 08:29

## 2021-03-07 RX ADMIN — POLYETHYLENE GLYCOL 3350 17 G: 17 POWDER, FOR SOLUTION ORAL at 19:36

## 2021-03-07 RX ADMIN — CALCIUM CHLORIDE, MAGNESIUM CHLORIDE, SODIUM CHLORIDE, SODIUM BICARBONATE, POTASSIUM CHLORIDE AND SODIUM PHOSPHATE DIBASIC DIHYDRATE 12.5 ML/KG/HR: 3.68; 3.05; 6.34; 3.09; .314; .187 INJECTION INTRAVENOUS at 05:02

## 2021-03-07 RX ADMIN — CALCIUM CHLORIDE, MAGNESIUM CHLORIDE, SODIUM CHLORIDE, SODIUM BICARBONATE, POTASSIUM CHLORIDE AND SODIUM PHOSPHATE DIBASIC DIHYDRATE 12.5 ML/KG/HR: 3.68; 3.05; 6.34; 3.09; .314; .187 INJECTION INTRAVENOUS at 10:10

## 2021-03-07 RX ADMIN — CEFEPIME 2 G: 2 INJECTION, POWDER, FOR SOLUTION INTRAVENOUS at 16:02

## 2021-03-07 RX ADMIN — CALCIUM CHLORIDE, MAGNESIUM CHLORIDE, SODIUM CHLORIDE, SODIUM BICARBONATE, POTASSIUM CHLORIDE AND SODIUM PHOSPHATE DIBASIC DIHYDRATE 12.5 ML/KG/HR: 3.68; 3.05; 6.34; 3.09; .314; .187 INJECTION INTRAVENOUS at 20:38

## 2021-03-07 RX ADMIN — CHLORHEXIDINE GLUCONATE 0.12% ORAL RINSE 15 ML: 1.2 LIQUID ORAL at 21:15

## 2021-03-07 RX ADMIN — MUPIROCIN 0.5 G: 20 OINTMENT TOPICAL at 08:30

## 2021-03-07 RX ADMIN — PROPOFOL 20 MCG/KG/MIN: 10 INJECTION, EMULSION INTRAVENOUS at 22:01

## 2021-03-07 RX ADMIN — ACETAMINOPHEN 975 MG: 325 TABLET, FILM COATED ORAL at 08:29

## 2021-03-07 RX ADMIN — AMIODARONE HYDROCHLORIDE 200 MG: 200 TABLET ORAL at 14:03

## 2021-03-07 RX ADMIN — CALCIUM CHLORIDE, MAGNESIUM CHLORIDE, SODIUM CHLORIDE, SODIUM BICARBONATE, POTASSIUM CHLORIDE AND SODIUM PHOSPHATE DIBASIC DIHYDRATE 12.5 ML/KG/HR: 3.68; 3.05; 6.34; 3.09; .314; .187 INJECTION INTRAVENOUS at 15:18

## 2021-03-07 RX ADMIN — ACETAMINOPHEN 975 MG: 325 TABLET, FILM COATED ORAL at 00:09

## 2021-03-07 RX ADMIN — PANTOPRAZOLE SODIUM 40 MG: 40 TABLET, DELAYED RELEASE ORAL at 08:29

## 2021-03-07 RX ADMIN — POLYETHYLENE GLYCOL 3350 17 G: 17 POWDER, FOR SOLUTION ORAL at 08:29

## 2021-03-07 RX ADMIN — EPINEPHRINE 0.04 MCG/KG/MIN: 1 INJECTION PARENTERAL at 08:30

## 2021-03-07 RX ADMIN — CALCIUM CHLORIDE, MAGNESIUM CHLORIDE, SODIUM CHLORIDE, SODIUM BICARBONATE, POTASSIUM CHLORIDE AND SODIUM PHOSPHATE DIBASIC DIHYDRATE 12.5 ML/KG/HR: 3.68; 3.05; 6.34; 3.09; .314; .187 INJECTION INTRAVENOUS at 10:12

## 2021-03-07 RX ADMIN — CALCIUM CHLORIDE, MAGNESIUM CHLORIDE, DEXTROSE MONOHYDRATE, LACTIC ACID, SODIUM CHLORIDE, SODIUM BICARBONATE AND POTASSIUM CHLORIDE 12.5 ML/KG/HR: 5.15; 2.03; 22; 5.4; 6.46; 3.09; .157 INJECTION INTRAVENOUS at 22:01

## 2021-03-07 RX ADMIN — MUPIROCIN 0.5 G: 20 OINTMENT TOPICAL at 21:15

## 2021-03-07 RX ADMIN — Medication 750 UNITS: at 02:12

## 2021-03-07 RX ADMIN — CALCIUM CHLORIDE, MAGNESIUM CHLORIDE, DEXTROSE MONOHYDRATE, LACTIC ACID, SODIUM CHLORIDE, SODIUM BICARBONATE AND POTASSIUM CHLORIDE 12.5 ML/KG/HR: 5.15; 2.03; 22; 5.4; 6.46; 3.09; .157 INJECTION INTRAVENOUS at 11:47

## 2021-03-07 RX ADMIN — DOCUSATE SODIUM 50 MG AND SENNOSIDES 8.6 MG 1 TABLET: 8.6; 5 TABLET, FILM COATED ORAL at 19:36

## 2021-03-07 RX ADMIN — CHLORHEXIDINE GLUCONATE 0.12% ORAL RINSE 15 ML: 1.2 LIQUID ORAL at 08:29

## 2021-03-07 RX ADMIN — CALCIUM CHLORIDE 1 G: 100 INJECTION, SOLUTION INTRAVENOUS at 17:01

## 2021-03-07 RX ADMIN — CALCIUM CHLORIDE, MAGNESIUM CHLORIDE, DEXTROSE MONOHYDRATE, LACTIC ACID, SODIUM CHLORIDE, SODIUM BICARBONATE AND POTASSIUM CHLORIDE 12.5 ML/KG/HR: 5.15; 2.03; 22; 5.4; 6.46; 3.09; .157 INJECTION INTRAVENOUS at 17:03

## 2021-03-07 RX ADMIN — CALCIUM CHLORIDE, MAGNESIUM CHLORIDE, SODIUM CHLORIDE, SODIUM BICARBONATE, POTASSIUM CHLORIDE AND SODIUM PHOSPHATE DIBASIC DIHYDRATE: 3.68; 3.05; 6.34; 3.09; .314; .187 INJECTION INTRAVENOUS at 15:28

## 2021-03-07 RX ADMIN — VANCOMYCIN HYDROCHLORIDE 1500 MG: 10 INJECTION, POWDER, LYOPHILIZED, FOR SOLUTION INTRAVENOUS at 13:53

## 2021-03-07 ASSESSMENT — ACTIVITIES OF DAILY LIVING (ADL)
ADLS_ACUITY_SCORE: 22

## 2021-03-07 ASSESSMENT — MIFFLIN-ST. JEOR: SCORE: 1634.13

## 2021-03-07 NOTE — PLAN OF CARE
Major Shift Events:     Pt switched from Versed to Prop. Fent for pain. No eye opening, command following, or movements of extremities. Pt occasionally grimaces. HR Sinus, MAP >65 on epi gtt. TV increased to 500. ECMO flows 4L, sweep 1-2. 1 unit PRBCs given for low Hbg. No chugging. TF advanced per order. No stool. 0-10ml UOP/hr. CRRT goal I=O.     Plan:     Turn down tmrw, possible OR on Tuesday for washout and chest closure per Dr. Porras.       For vital signs and complete assessments, please see documentation flowsheets.    Problem: Adult Inpatient Plan of Care  Goal: Readiness for Transition of Care  Outcome: No Change     Problem: Hemodynamic Instability (Acute Coronary Syndrome)  Goal: Effective Cardiac Pump Function  Outcome: Improving

## 2021-03-07 NOTE — PROGRESS NOTES
"  Nephrology Progress Note  03/07/2021         Assessment & Recommendations:   ADAN in the setting of cardiogenic shock on ECMO  Hyperkalemia  Unknown baseline, no h/o CKD per daughter. 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 Cr 2.5 at presentation, decreased to 2.1 today which was probably dilutional as pt was aggressively resuscitated yesterday and now back up to 2.6. Etiology likely ATN in the setting of shock, no obstruction per CT and urinalysis showed with granular casts. UOP declined and K rising 3/6 and CRRT started  - CRRT ordered  - Goal UF 0-500 net neg over next 24 hrs, pt appears close to euvolemic on exam but remains net pos for admission  - Labs per CRRT protocol  - Avoid nephrotoxins  - Renally dose medications  - Renal diet  - Daily weights  - Strict I/Os  k is 5. Change to 2 K bath for part of our fluid. Bicarbonate is normal today at 25.     Recommendations were communicated to primary team verbally    Seen and discussed with Dr. Jeramie Potts MD   [phone]  I have seen and examined the patient and reviewed all current labs and findings. I concur with the assessment and plan as it is outlined. Any changes to the note made by me are in bold. Kamla Bobo MD MS FNKF      Interval History :   Nursing and provider notes from last 24 hours reviewed.  In the last 24 hours Jin Garrett remained clinically stable and no acute events noted. Remains intubated on CRRT.    Review of Systems:   Unable as pt intubated    Physical Exam:   I/O last 3 completed shifts:  In: 3114.98 [I.V.:1198.98; Other:21; NG/GT:535; IV Piggyback:500]  Out: 1600 [Urine:159; Other:621; Chest Tube:820]   BP 99/61   Pulse 77   Temp 98.2  F (36.8  C)   Resp 13   Ht 1.803 m (5' 11\")   Wt 90.2 kg (198 lb 13.7 oz)   SpO2 99%   BMI 27.73 kg/m       GENERAL APPEARANCE: lying in bed, sedated  PULM: lungs clear to auscultation bilaterally, " intubated  CV: regular rhythm, normal rate     -edema none  GI: soft, non-tender, non-distended  INTEGUMENT: no obvious rash on exposed surfaces  NEURO: Sedated  Access: via ECMO    Labs:   All labs reviewed by me  Electrolytes/Renal -   Recent Labs   Lab Test 03/07/21  1002 03/07/21  0353 03/06/21 2202 03/06/21  1612 03/06/21  1154 03/06/21 0342 03/06/21 0342    140 143 144 145*  --  144   POTASSIUM 5.0 5.2 5.1 5.2 5.5*   < > 5.4*   CHLORIDE 109 108 112* 112* 112*  --  111*   CO2 25 22 20 19* 17*  --  20   BUN 38* 38* 41* 44* 46*  --  40*   CR 2.13* 2.25* 2.34* 2.56* 2.66*  --  2.11*   *  115* 93  92 94  93 96  97 110*  111*  --  123*  118*   PALLAVI 7.7* 7.7* 7.8* 7.8* 7.9*  --  8.0*   MAG  --  2.4*  --  2.3 2.2  --  2.2   PHOS  --  5.7*  --  5.2*  --   --  4.4    < > = values in this interval not displayed.       CBC -   Recent Labs   Lab Test 03/07/21  1002 03/07/21 0353 03/06/21 2202   WBC 20.1* 21.4* 21.8*   HGB 7.9* 7.9* 8.4*   * 112* 106*       LFTs -   Recent Labs   Lab Test 03/07/21  0353 03/06/21  1154 03/06/21 0342   ALKPHOS 56 47 46   BILITOTAL 0.8 1.8* 1.3   ALT 24 20 21   AST 90* 94* 109*   PROTTOTAL 4.9* 4.6* 4.6*   ALBUMIN 2.3* 2.5* 2.5*       Iron Panel - No lab results found.      Imaging:  All imaging studies reviewed by me.     Current Medications:    acetaminophen  975 mg Oral or Feeding Tube Q8H     amiodarone  200 mg Oral or NG Tube TID     aspirin  81 mg Oral or NG Tube Daily     ceFEPIme (MAXIPIME) IV  2 g Intravenous Q12H     chlorhexidine  15 mL Swish & Spit BID     mupirocin  0.5 g Both Nostrils BID     pantoprazole  40 mg Oral or NG Tube Daily    Or     pantoprazole  40 mg Oral Daily     polyethylene glycol  17 g Oral BID     senna-docusate  1 tablet Oral BID     vancomycin (VANCOCIN) IV  1,500 mg Intravenous Q24H       dextrose       dextrose       CRRT replacement solution 12.5 mL/kg/hr (03/07/21 1147)     EPINEPHrine 0.04 mcg/kg/min (03/07/21 1100)      fentaNYL 50 mcg/hr (03/07/21 1100)     HEParin 1,150 Units/hr (03/07/21 1000)     insulin (regular) 20 Units/hr (03/05/21 0800)     lactated ringers       - MEDICATION INSTRUCTIONS -       - MEDICATION INSTRUCTIONS -       norepinephrine Stopped (03/07/21 0845)     CRRT replacement solution 200 mL/hr at 03/07/21 1144     CRRT replacement solution 12.5 mL/kg/hr (03/07/21 1144)     propofol (DIPRIVAN) infusion 10 mcg/kg/min (03/07/21 1100)     BETA BLOCKER NOT PRESCRIBED       sodium chloride 75 mL/hr at 03/05/21 0307     vasopressin Stopped (03/06/21 1345)     Ramses Potts MD

## 2021-03-07 NOTE — PROGRESS NOTES
"CRRT STATUS NOTE    DATA:  Time:  6:20 AM  Pressures WNL: Yes  Filter Status:  WDL    Problems Reported/Alarms Noted:  None    Supplies Present: Yes    ASSESSMENT:  Patient Net Fluid Balance:  Net positive 7.4 liters since admit, net even since midnight  Vital Signs: BP 99/61   Pulse 81   Temp 98.2  F (36.8  C) (Bladder)   Resp 14   Ht 1.803 m (5' 11\")   Wt 91.1 kg (200 lb 13.4 oz)   SpO2 96%   BMI 28.01 kg/m    Labs: K+ 5.2, Creat 2.25, Lactate 1.3, WBC 21.4, Hgb 7.9  Goals of Therapy:  I=O    INTERVENTIONS:   None    PLAN:  Continue current plan of care.  Monitor K+, may need to switch to 2K bath.  Notify CRRT RN with issues and concerns #[phone].     "

## 2021-03-07 NOTE — PROGRESS NOTES
Major Shift Events: No fluid or product given overnight. Able to titrate pressors down slightly. Sedation increased for overbreathing and grimace with cares/turns. CRRT for I=O. Potassium remains above 5, lower K bath?    Plan: continue resting on ECMO re evaluate for turndown Monday.   For vital signs and complete assessments, please see documentation flowsheets.

## 2021-03-07 NOTE — PROGRESS NOTES
ECMO Shift Summary: 0700 to 1900      ECMO Equipment:  Console serial number: [id number]  #4  Circuit Lot number: [id number]  Oxygenator Lot number: [id number]    Patient remains on VA ECMO, all equipment is functioning and alarms are appropriately set. RPM's 3250 with flow range ~4 L/min. Sweep gas is at 3 LPM and FiO2 50%. Circuit remains free of air and clot. Fibrin is at the 3 and 9 o'clock spots on the oxygenator, and at the connectors. Cannulas are secure with no bleeding from site. Extremities are warm.    Significant Shift Events: Hemodynamic turndown was done.    Vent settings:  Ventilation Mode: CMV/AC  (Continuous Mandatory Ventilation/ Assist Control)  FiO2 (%): 50 %  Rate Set (breaths/minute): 12 breaths/min  Tidal Volume Set (mL): 450 mL  PEEP (cm H2O): 5 cmH2O  Oxygen Concentration (%): 50 %  Resp: 12  .    Heparin is running at 750 u/hr, ACT range 192 seconds.    Urine output is small, the patient is on CRRT, blood loss was minimal. Product given included 1 unit of PRBCs, and 500 ml of NS.      Intake/Output Summary (Last 24 hours) at 3/6/2021 1816  Last data filed at 3/6/2021 1800  Gross per 24 hour   Intake 4506.83 ml   Output 3164 ml   Net 1342.83 ml       ECHO:  No results found for this or any previous visit.No results found for this or any previous visit.    CXR:  Recent Results (from the past 24 hour(s))   XR Chest Port 1 View    Narrative    Exam: XR CHEST PORT 1 VW, 3/5/2021 7:07 PM    Indication: Sterrett placement (pulled back 2 cm)    Comparison: Same-day    Findings:   Right internal jugular [place]-Vimal catheter tip projects over the distal  right pulmonary artery. Stable ECMO cannulae, mediastinal drains, and  bilateral chest tubes. Lap sponges project over the mediastinum.  Endotracheal tube tip at the mid thoracic trachea. Enteric tube  courses into the stomach with tip at the fundus. Stable  cardiomediastinal silhouette. No appreciable pleural effusion or  pneumothorax. Stable perihilar  predominant mixed interstitial and  patchy airspace opacities.      Impression    Impression: Right internal jugular [place]-Vimal catheter tip now projects  at the distal right pulmonary artery, slightly retracted since prior.  No other significant change.    SAPPHIRE PANTOJA,    XR Chest Port 1 View    Narrative    Chest radiograph 3/6/2021 5:47 AM    HISTORY: Postop chest    COMPARISON: 3/5/2021    FINDINGS:  Single AP view of the chest. Endotracheal tube tip overlying the mid  thoracic trachea. Interval removal of [place]-Vimal catheter with right IJ  central sheath tip overlying the mid SVC. Enteric tube tip and  side-port overlying the stomach. Bilateral chest tubes and mediastinal  drains in similar position. Redemonstrated lap sponge markers  overlying the mediastinum. Central VA ECMO catheters in similar  position. Trachea is midline. Cardiac silhouette is similar in size.  No pneumothorax. No significant right pleural effusion. The left  costophrenic angle is collimated out of view. Largely unchanged left  greater than right perihilar predominant mixed interstitial and  airspace opacities. Increased retrocardiac opacities.      Impression    IMPRESSION:  1. Interval removal of [place]-Vimal catheter with right IJ central sheath  tip overlying the mid SVC. Remaining support devices in similar  position.  2. Largely unchanged left greater than right perihilar predominant  mixed interstitial and airspace opacities.  3. Increased retrocardiac opacities.    I have personally reviewed the examination and initial interpretation  and I agree with the findings.    KHANH CARDENAS MD       Labs:  Recent Labs   Lab 03/06/21  1801 03/06/21  1612 03/06/21  1154 03/06/21  1004   PH 7.40 7.38 7.37 7.39   PCO2 30* 31* 31* 29*   PO2 107* 102 91 158*   HCO3 18* 18* 17* 18*   O2PER 50.0 50.0 50 50  50       Lab Results   Component Value Date    HGB 8.7 (L) 03/06/2021    PHGB 40 (H) 03/06/2021     (L) 03/06/2021    FIBR 470  (H) 03/06/2021    INR 1.51 (H) 03/06/2021    PTT >240 (HH) 03/06/2021    DD 1.8 (H) 03/05/2021    ANTCH 49 (L) 03/06/2021         Plan is do a washout and possible decannulation on Tuesday.      Rock Mcconnell, RRT  ECMO Specialist  3/6/2021 6:16 PM

## 2021-03-07 NOTE — PROGRESS NOTES
ECMO Shift Summary: 12N        ECMO Equipment:  Console serial number: [id number]  #4  Circuit Lot number: [id number]  Oxygenator Lot number: [id number]      Patient remains on VA ECMO, all equipment is functioning and alarms are appropriately set. RPM's 8856-4707 with flow range 4.03-4.12 L/min. Sweep gas is at 2 LPM and FiO2 50%. Circuit remains free of air, but fibrin and clot are appreciated on corners of oxy as well as both cannulas connector sites. Cannulas are secure with no bleeding from site. Pt is centrally cannulated. Extremities are cool to touch with doppler pulses.    Significant Shift Events: none    Vent settings:  Ventilation Mode: CMV/AC  (Continuous Mandatory Ventilation/ Assist Control)  FiO2 (%): 50 %  Rate Set (breaths/minute): 12 breaths/min  Tidal Volume Set (mL): 450 mL  PEEP (cm H2O): 5 cmH2O  Oxygen Concentration (%): 50 %  Resp: 14  .    Heparin is running at 1150 units/hr, ACT range 168-176. Goal 180-200      Urine output is as RN charted, pt is on CRRT, blood loss was none. Product given included none.      Intake/Output Summary (Last 24 hours) at 3/7/2021 0429  Last data filed at 3/7/2021 0400  Gross per 24 hour   Intake 3034.01 ml   Output 1534 ml   Net 1500.01 ml       ECHO:  No results found for this or any previous visit.No results found for this or any previous visit.    CXR:  Recent Results (from the past 24 hour(s))   XR Chest Port 1 View    Narrative    Chest radiograph 3/7/2021 4:07 AM    HISTORY: Impella, ET tube, [place]-Vimal    COMPARISON: 3/6/2021    FINDINGS:  Single AP view of the chest. Postoperative changes of central VA ECMO  with catheters in similar position. Redemonstrated lab sponge markers  overlying the mediastinum. Endotracheal tube tip overlying the mid  thoracic trachea. Right IJ central line sheath tip overlying the high  SVC. Bilateral chest tubes and mediastinal drains in similar position.  Enteric tube tip and side port overlying the stomach. Trachea  is  midline. Cardiac silhouette is similar in size. No pneumothorax. No  significant pleural effusion. Improved left greater than right upper  lobe and perihilar predominant interstitial and airspace opacities.      Impression    IMPRESSION:  1. Support devices in similar position.  2. Postoperative chest with improved left greater than right upper  lobe and perihilar predominantly interstitial and airspace opacities.       Labs:  Recent Labs   Lab 03/07/21  0353 03/07/21  0206 03/06/21  2356 03/06/21  2202   PH 7.38 7.33* 7.35 7.36   PCO2 35 41 39 36   PO2 108* 83 98 130*   HCO3 21 22 21 20*   O2PER 50.0 50.0 50.0 50.0       Lab Results   Component Value Date    HGB 7.9 (L) 03/07/2021    PHGB 30 (H) 03/07/2021     (L) 03/07/2021    FIBR 470 (H) 03/06/2021    INR 1.30 (H) 03/06/2021    PTT 96 (H) 03/06/2021    DD 1.8 (H) 03/05/2021    ANTCH 49 (L) 03/06/2021         Plan is remain on VA ECMO. Turndown on Tuesday. Continue to support and wean as tolerated. See flowsheets for assessements and details.       Joana Parker RN  ECMO Specialist  3/7/2021 4:29 AM

## 2021-03-07 NOTE — PROGRESS NOTES
"CRRT STATUS NOTE    DATA:  Time:  4:24 PM  Pressures WNL:  YES  Filter Status:  WDL    Problems Reported/Alarms Noted:  none    Supplies Present:  YES    ASSESSMENT:  Patient Net Fluid Balance:  Net + 486 ml since MN 3/7/21  Vital Signs:  BP 99/61   Pulse 75   Temp 98.1  F (36.7  C)   Resp 16   Ht 1.803 m (5' 11\")   Wt 90.2 kg (198 lb 13.7 oz)   SpO2 98%   BMI 27.73 kg/m    Pt is on 1 pressor.  Labs:  K=4.8, Creat= 2.04, Hgb=8.1 and WBCs= 18.2  Goals of Therapy:  I=Os over next 24 hrs, would pull at a fixed rate of about 50-75 cc/hr, NOT negative    INTERVENTIONS:   none    PLAN:  Continue POC. Call CRRT RN at [phone] with any questions or concerns.    "

## 2021-03-08 ENCOUNTER — ANESTHESIA EVENT (OUTPATIENT)
Dept: SURGERY | Facility: CLINIC | Age: 81
DRG: 003 | End: 2021-03-08
Payer: MEDICARE

## 2021-03-08 ENCOUNTER — APPOINTMENT (OUTPATIENT)
Dept: GENERAL RADIOLOGY | Facility: CLINIC | Age: 81
DRG: 003 | End: 2021-03-08
Attending: STUDENT IN AN ORGANIZED HEALTH CARE EDUCATION/TRAINING PROGRAM
Payer: MEDICARE

## 2021-03-08 ENCOUNTER — PREP FOR PROCEDURE (OUTPATIENT)
Dept: CARDIOLOGY | Facility: CLINIC | Age: 81
End: 2021-03-08

## 2021-03-08 DIAGNOSIS — Z98.890 STATUS POST CARDIAC SURGERY: Primary | ICD-10-CM

## 2021-03-08 LAB
ALBUMIN SERPL-MCNC: 2 G/DL (ref 3.4–5)
ALP SERPL-CCNC: 66 U/L (ref 40–150)
ALT SERPL W P-5'-P-CCNC: 24 U/L (ref 0–70)
ANGLE RATE OF CLOT STRENGTH: 66.2 DEGREES (ref 53–72)
ANION GAP SERPL CALCULATED.3IONS-SCNC: 3 MMOL/L (ref 3–14)
ANION GAP SERPL CALCULATED.3IONS-SCNC: 4 MMOL/L (ref 3–14)
ANION GAP SERPL CALCULATED.3IONS-SCNC: 5 MMOL/L (ref 3–14)
ANION GAP SERPL CALCULATED.3IONS-SCNC: 6 MMOL/L (ref 3–14)
APTT PPP: 113 SEC (ref 22–37)
APTT PPP: 149 SEC (ref 22–37)
APTT PPP: 150 SEC (ref 22–37)
APTT PPP: 90 SEC (ref 22–37)
AST SERPL W P-5'-P-CCNC: 62 U/L (ref 0–45)
AT III ACT/NOR PPP CHRO: 57 % (ref 85–135)
BACTERIA SPEC CULT: NO GROWTH
BACTERIA SPEC CULT: NO GROWTH
BASE EXCESS BLDA CALC-SCNC: 0.7 MMOL/L
BASE EXCESS BLDA CALC-SCNC: 0.9 MMOL/L
BASE EXCESS BLDA CALC-SCNC: 1.2 MMOL/L
BASE EXCESS BLDA CALC-SCNC: 1.8 MMOL/L
BASE EXCESS BLDA CALC-SCNC: 1.9 MMOL/L
BASE EXCESS BLDA CALC-SCNC: 1.9 MMOL/L
BASE EXCESS BLDA CALC-SCNC: 2.4 MMOL/L
BASE EXCESS BLDA CALC-SCNC: 2.4 MMOL/L
BASE EXCESS BLDA CALC-SCNC: 2.5 MMOL/L
BASE EXCESS BLDA CALC-SCNC: 2.5 MMOL/L
BASE EXCESS BLDA CALC-SCNC: 2.6 MMOL/L
BASE EXCESS BLDA CALC-SCNC: 2.6 MMOL/L
BASE EXCESS BLDA CALC-SCNC: 2.9 MMOL/L
BASE EXCESS BLDA CALC-SCNC: 3 MMOL/L
BASE EXCESS BLDV CALC-SCNC: 3.3 MMOL/L
BASE EXCESS BLDV CALC-SCNC: 4 MMOL/L
BILIRUB SERPL-MCNC: 0.5 MG/DL (ref 0.2–1.3)
BUN SERPL-MCNC: 33 MG/DL (ref 7–30)
BUN SERPL-MCNC: 34 MG/DL (ref 7–30)
BUN SERPL-MCNC: 34 MG/DL (ref 7–30)
BUN SERPL-MCNC: 35 MG/DL (ref 7–30)
CA-I BLD-MCNC: 4.7 MG/DL (ref 4.4–5.2)
CA-I BLD-MCNC: 4.7 MG/DL (ref 4.4–5.2)
CA-I BLD-MCNC: 4.8 MG/DL (ref 4.4–5.2)
CA-I BLD-MCNC: 4.9 MG/DL (ref 4.4–5.2)
CALCIUM SERPL-MCNC: 8.6 MG/DL (ref 8.5–10.1)
CALCIUM SERPL-MCNC: 8.7 MG/DL (ref 8.5–10.1)
CALCIUM SERPL-MCNC: 8.7 MG/DL (ref 8.5–10.1)
CALCIUM SERPL-MCNC: 8.8 MG/DL (ref 8.5–10.1)
CHLORIDE SERPL-SCNC: 108 MMOL/L (ref 94–109)
CHLORIDE SERPL-SCNC: 108 MMOL/L (ref 94–109)
CHLORIDE SERPL-SCNC: 109 MMOL/L (ref 94–109)
CHLORIDE SERPL-SCNC: 109 MMOL/L (ref 94–109)
CI HYPOCOAGULATION INDEX: 0.7 RATIO (ref 0–3)
CO2 SERPL-SCNC: 24 MMOL/L (ref 20–32)
CO2 SERPL-SCNC: 26 MMOL/L (ref 20–32)
CO2 SERPL-SCNC: 26 MMOL/L (ref 20–32)
CO2 SERPL-SCNC: 28 MMOL/L (ref 20–32)
CREAT SERPL-MCNC: 1.63 MG/DL (ref 0.66–1.25)
CREAT SERPL-MCNC: 1.64 MG/DL (ref 0.66–1.25)
CREAT SERPL-MCNC: 1.75 MG/DL (ref 0.66–1.25)
CREAT SERPL-MCNC: 1.86 MG/DL (ref 0.66–1.25)
CRP SERPL-MCNC: 370 MG/L (ref 0–8)
ERYTHROCYTE [DISTWIDTH] IN BLOOD BY AUTOMATED COUNT: 16.8 % (ref 10–15)
ERYTHROCYTE [DISTWIDTH] IN BLOOD BY AUTOMATED COUNT: 17 % (ref 10–15)
ERYTHROCYTE [DISTWIDTH] IN BLOOD BY AUTOMATED COUNT: 17.1 % (ref 10–15)
ERYTHROCYTE [DISTWIDTH] IN BLOOD BY AUTOMATED COUNT: 17.2 % (ref 10–15)
ERYTHROCYTE [SEDIMENTATION RATE] IN BLOOD BY WESTERGREN METHOD: 83 MM/H (ref 0–20)
FIBRINOGEN PPP-MCNC: 642 MG/DL (ref 200–420)
FIBRINOGEN PPP-MCNC: 737 MG/DL (ref 200–420)
G ACTUAL CLOT STRENGTH: 12.7 KD/SC (ref 4.5–11)
GFR SERPL CREATININE-BSD FRML MDRD: 33 ML/MIN/{1.73_M2}
GFR SERPL CREATININE-BSD FRML MDRD: 36 ML/MIN/{1.73_M2}
GFR SERPL CREATININE-BSD FRML MDRD: 39 ML/MIN/{1.73_M2}
GFR SERPL CREATININE-BSD FRML MDRD: 39 ML/MIN/{1.73_M2}
GLUCOSE BLD-MCNC: 126 MG/DL (ref 70–99)
GLUCOSE BLD-MCNC: 127 MG/DL (ref 70–99)
GLUCOSE BLD-MCNC: 147 MG/DL (ref 70–99)
GLUCOSE BLD-MCNC: 155 MG/DL (ref 70–99)
GLUCOSE BLDC GLUCOMTR-MCNC: 112 MG/DL (ref 70–99)
GLUCOSE BLDC GLUCOMTR-MCNC: 122 MG/DL (ref 70–99)
GLUCOSE BLDC GLUCOMTR-MCNC: 128 MG/DL (ref 70–99)
GLUCOSE BLDC GLUCOMTR-MCNC: 133 MG/DL (ref 70–99)
GLUCOSE BLDC GLUCOMTR-MCNC: 134 MG/DL (ref 70–99)
GLUCOSE SERPL-MCNC: 128 MG/DL (ref 70–99)
GLUCOSE SERPL-MCNC: 130 MG/DL (ref 70–99)
GLUCOSE SERPL-MCNC: 149 MG/DL (ref 70–99)
GLUCOSE SERPL-MCNC: 149 MG/DL (ref 70–99)
HCO3 BLD-SCNC: 26 MMOL/L (ref 21–28)
HCO3 BLD-SCNC: 26 MMOL/L (ref 21–28)
HCO3 BLD-SCNC: 27 MMOL/L (ref 21–28)
HCO3 BLD-SCNC: 28 MMOL/L (ref 21–28)
HCO3 BLDA-SCNC: 28 MMOL/L (ref 21–28)
HCO3 BLDA-SCNC: 28 MMOL/L (ref 21–28)
HCO3 BLDV-SCNC: 29 MMOL/L (ref 21–28)
HCO3 BLDV-SCNC: 29 MMOL/L (ref 21–28)
HCT VFR BLD AUTO: 23.9 % (ref 40–53)
HCT VFR BLD AUTO: 24.3 % (ref 40–53)
HCT VFR BLD AUTO: 25 % (ref 40–53)
HCT VFR BLD AUTO: 27.6 % (ref 40–53)
HGB BLD-MCNC: 7.7 G/DL (ref 13.3–17.7)
HGB BLD-MCNC: 7.9 G/DL (ref 13.3–17.7)
HGB BLD-MCNC: 8.1 G/DL (ref 13.3–17.7)
HGB BLD-MCNC: 8.7 G/DL (ref 13.3–17.7)
HGB FREE PLAS-MCNC: 40 MG/DL
INR PPP: 1.2 (ref 0.86–1.14)
INR PPP: 1.26 (ref 0.86–1.14)
INR PPP: 1.27 (ref 0.86–1.14)
INR PPP: 1.29 (ref 0.86–1.14)
K TIME TO SPEC CLOT STRENGTH: 1.8 MINUTE (ref 1–3)
KCT BLD-ACNC: 176 SEC (ref 75–150)
KCT BLD-ACNC: 176 SEC (ref 75–150)
KCT BLD-ACNC: 188 SEC (ref 75–150)
KCT BLD-ACNC: 192 SEC (ref 75–150)
KCT BLD-ACNC: 200 SEC (ref 75–150)
KCT BLD-ACNC: 201 SEC (ref 75–150)
KCT BLD-ACNC: 209 SEC (ref 75–150)
LACTATE BLD-SCNC: 1 MMOL/L (ref 0.7–2)
LACTATE BLD-SCNC: 1.1 MMOL/L (ref 0.7–2)
LACTATE BLD-SCNC: 1.2 MMOL/L (ref 0.7–2)
LACTATE BLD-SCNC: 1.2 MMOL/L (ref 0.7–2)
LDH SERPL L TO P-CCNC: 446 U/L (ref 85–227)
LY30 LYSIS AT 30 MINUTES: 0 % (ref 0–8)
LY60 LYSIS AT 60 MINUTES: 0.6 % (ref 0–15)
Lab: NORMAL
MA MAXIMUM CLOT STRENGTH: 71.7 MM (ref 50–70)
MAGNESIUM SERPL-MCNC: 2.3 MG/DL (ref 1.6–2.3)
MAGNESIUM SERPL-MCNC: 2.4 MG/DL (ref 1.6–2.3)
MCH RBC QN AUTO: 28.1 PG (ref 26.5–33)
MCH RBC QN AUTO: 28.2 PG (ref 26.5–33)
MCH RBC QN AUTO: 28.6 PG (ref 26.5–33)
MCH RBC QN AUTO: 28.8 PG (ref 26.5–33)
MCHC RBC AUTO-ENTMCNC: 31.5 G/DL (ref 31.5–36.5)
MCHC RBC AUTO-ENTMCNC: 32.2 G/DL (ref 31.5–36.5)
MCHC RBC AUTO-ENTMCNC: 32.4 G/DL (ref 31.5–36.5)
MCHC RBC AUTO-ENTMCNC: 32.5 G/DL (ref 31.5–36.5)
MCV RBC AUTO: 87 FL (ref 78–100)
MCV RBC AUTO: 88 FL (ref 78–100)
MCV RBC AUTO: 89 FL (ref 78–100)
MCV RBC AUTO: 90 FL (ref 78–100)
NSE SERPL-MCNC: 41.5 UG/L (ref 3.7–8.9)
O2/TOTAL GAS SETTING VFR VENT: 50 %
O2/TOTAL GAS SETTING VFR VENT: 55 %
O2/TOTAL GAS SETTING VFR VENT: 60 %
O2/TOTAL GAS SETTING VFR VENT: 70 %
O2/TOTAL GAS SETTING VFR VENT: 80 %
OXYHGB MFR BLD: 92 % (ref 92–100)
OXYHGB MFR BLD: 94 % (ref 92–100)
OXYHGB MFR BLD: 95 % (ref 92–100)
OXYHGB MFR BLD: 96 % (ref 92–100)
OXYHGB MFR BLD: 97 % (ref 92–100)
OXYHGB MFR BLD: 97 % (ref 92–100)
OXYHGB MFR BLD: 98 % (ref 92–100)
OXYHGB MFR BLD: 98 % (ref 92–100)
OXYHGB MFR BLD: 99 % (ref 92–100)
OXYHGB MFR BLD: 99 % (ref 92–100)
OXYHGB MFR BLDA: 94 % (ref 75–100)
OXYHGB MFR BLDA: 97 % (ref 75–100)
OXYHGB MFR BLDV: 47 %
OXYHGB MFR BLDV: 63 %
PCO2 BLD: 38 MM HG (ref 35–45)
PCO2 BLD: 39 MM HG (ref 35–45)
PCO2 BLD: 39 MM HG (ref 35–45)
PCO2 BLD: 40 MM HG (ref 35–45)
PCO2 BLD: 40 MM HG (ref 35–45)
PCO2 BLD: 41 MM HG (ref 35–45)
PCO2 BLD: 42 MM HG (ref 35–45)
PCO2 BLD: 43 MM HG (ref 35–45)
PCO2 BLD: 43 MM HG (ref 35–45)
PCO2 BLD: 44 MM HG (ref 35–45)
PCO2 BLD: 48 MM HG (ref 35–45)
PCO2 BLD: 52 MM HG (ref 35–45)
PCO2 BLDA: 44 MM HG (ref 35–45)
PCO2 BLDA: 49 MM HG (ref 35–45)
PCO2 BLDV: 47 MM HG (ref 40–50)
PCO2 BLDV: 47 MM HG (ref 40–50)
PH BLD: 7.34 PH (ref 7.35–7.45)
PH BLD: 7.36 PH (ref 7.35–7.45)
PH BLD: 7.39 PH (ref 7.35–7.45)
PH BLD: 7.4 PH (ref 7.35–7.45)
PH BLD: 7.42 PH (ref 7.35–7.45)
PH BLD: 7.42 PH (ref 7.35–7.45)
PH BLD: 7.43 PH (ref 7.35–7.45)
PH BLD: 7.43 PH (ref 7.35–7.45)
PH BLD: 7.44 PH (ref 7.35–7.45)
PH BLD: 7.44 PH (ref 7.35–7.45)
PH BLD: 7.45 PH (ref 7.35–7.45)
PH BLD: 7.46 PH (ref 7.35–7.45)
PH BLDA: 7.36 PH (ref 7.35–7.45)
PH BLDA: 7.41 PH (ref 7.35–7.45)
PH BLDV: 7.4 PH (ref 7.32–7.43)
PH BLDV: 7.41 PH (ref 7.32–7.43)
PHOSPHATE SERPL-MCNC: 3 MG/DL (ref 2.5–4.5)
PHOSPHATE SERPL-MCNC: 3.8 MG/DL (ref 2.5–4.5)
PLATELET # BLD AUTO: 102 10E9/L (ref 150–450)
PLATELET # BLD AUTO: 103 10E9/L (ref 150–450)
PLATELET # BLD AUTO: 92 10E9/L (ref 150–450)
PLATELET # BLD AUTO: 95 10E9/L (ref 150–450)
PLATELET INHIBITION WITH AA: 90 %
PLATELET INHIBITION WITH AA: 94 %
PLATELET INHIBITION WITH ADP: 23 %
PLATELET INHIBITION WITH ADP: 46 %
PO2 BLD: 108 MM HG (ref 80–105)
PO2 BLD: 108 MM HG (ref 80–105)
PO2 BLD: 120 MM HG (ref 80–105)
PO2 BLD: 266 MM HG (ref 80–105)
PO2 BLD: 300 MM HG (ref 80–105)
PO2 BLD: 71 MM HG (ref 80–105)
PO2 BLD: 74 MM HG (ref 80–105)
PO2 BLD: 75 MM HG (ref 80–105)
PO2 BLD: 76 MM HG (ref 80–105)
PO2 BLD: 77 MM HG (ref 80–105)
PO2 BLD: 89 MM HG (ref 80–105)
PO2 BLD: 91 MM HG (ref 80–105)
PO2 BLDA: 81 MM HG (ref 80–105)
PO2 BLDA: 98 MM HG (ref 80–105)
PO2 BLDV: 27 MM HG (ref 25–47)
PO2 BLDV: 35 MM HG (ref 25–47)
POTASSIUM SERPL-SCNC: 4.4 MMOL/L (ref 3.4–5.3)
POTASSIUM SERPL-SCNC: 4.6 MMOL/L (ref 3.4–5.3)
POTASSIUM SERPL-SCNC: 4.6 MMOL/L (ref 3.4–5.3)
POTASSIUM SERPL-SCNC: 4.8 MMOL/L (ref 3.4–5.3)
PROT SERPL-MCNC: 4.9 G/DL (ref 6.8–8.8)
R TIME UNTIL CLOT FORMS: 9.2 MINUTE (ref 5–10)
RBC # BLD AUTO: 2.74 10E12/L (ref 4.4–5.9)
RBC # BLD AUTO: 2.76 10E12/L (ref 4.4–5.9)
RBC # BLD AUTO: 2.81 10E12/L (ref 4.4–5.9)
RBC # BLD AUTO: 3.08 10E12/L (ref 4.4–5.9)
SODIUM SERPL-SCNC: 138 MMOL/L (ref 133–144)
SODIUM SERPL-SCNC: 139 MMOL/L (ref 133–144)
SODIUM SERPL-SCNC: 139 MMOL/L (ref 133–144)
SODIUM SERPL-SCNC: 140 MMOL/L (ref 133–144)
SPECIMEN SOURCE: NORMAL
SPECIMEN SOURCE: NORMAL
UFH PPP CHRO-ACNC: 0.48 IU/ML
WBC # BLD AUTO: 17 10E9/L (ref 4–11)
WBC # BLD AUTO: 17.8 10E9/L (ref 4–11)
WBC # BLD AUTO: 18.2 10E9/L (ref 4–11)
WBC # BLD AUTO: 18.2 10E9/L (ref 4–11)

## 2021-03-08 PROCEDURE — 85027 COMPLETE CBC AUTOMATED: CPT | Performed by: STUDENT IN AN ORGANIZED HEALTH CARE EDUCATION/TRAINING PROGRAM

## 2021-03-08 PROCEDURE — 82805 BLOOD GASES W/O2 SATURATION: CPT | Performed by: SURGERY

## 2021-03-08 PROCEDURE — 87070 CULTURE OTHR SPECIMN AEROBIC: CPT | Performed by: SURGERY

## 2021-03-08 PROCEDURE — 86901 BLOOD TYPING SEROLOGIC RH(D): CPT | Performed by: INTERNAL MEDICINE

## 2021-03-08 PROCEDURE — 85396 CLOTTING ASSAY WHOLE BLOOD: CPT | Performed by: THORACIC SURGERY (CARDIOTHORACIC VASCULAR SURGERY)

## 2021-03-08 PROCEDURE — 85610 PROTHROMBIN TIME: CPT | Performed by: INTERNAL MEDICINE

## 2021-03-08 PROCEDURE — 99291 CRITICAL CARE FIRST HOUR: CPT | Mod: 24 | Performed by: INTERNAL MEDICINE

## 2021-03-08 PROCEDURE — 250N000013 HC RX MED GY IP 250 OP 250 PS 637: Performed by: INTERNAL MEDICINE

## 2021-03-08 PROCEDURE — 85730 THROMBOPLASTIN TIME PARTIAL: CPT | Performed by: INTERNAL MEDICINE

## 2021-03-08 PROCEDURE — 90947 DIALYSIS REPEATED EVAL: CPT

## 2021-03-08 PROCEDURE — 85610 PROTHROMBIN TIME: CPT | Performed by: STUDENT IN AN ORGANIZED HEALTH CARE EDUCATION/TRAINING PROGRAM

## 2021-03-08 PROCEDURE — 94003 VENT MGMT INPAT SUBQ DAY: CPT

## 2021-03-08 PROCEDURE — 33949 ECMO/ECLS DAILY MGMT ARTERY: CPT

## 2021-03-08 PROCEDURE — 90945 DIALYSIS ONE EVALUATION: CPT | Mod: GC | Performed by: INTERNAL MEDICINE

## 2021-03-08 PROCEDURE — 86850 RBC ANTIBODY SCREEN: CPT | Performed by: INTERNAL MEDICINE

## 2021-03-08 PROCEDURE — 250N000013 HC RX MED GY IP 250 OP 250 PS 637: Performed by: SURGERY

## 2021-03-08 PROCEDURE — 85027 COMPLETE CBC AUTOMATED: CPT | Performed by: INTERNAL MEDICINE

## 2021-03-08 PROCEDURE — 84100 ASSAY OF PHOSPHORUS: CPT | Performed by: STUDENT IN AN ORGANIZED HEALTH CARE EDUCATION/TRAINING PROGRAM

## 2021-03-08 PROCEDURE — 250N000011 HC RX IP 250 OP 636: Performed by: SURGERY

## 2021-03-08 PROCEDURE — 82947 ASSAY GLUCOSE BLOOD QUANT: CPT | Performed by: SURGERY

## 2021-03-08 PROCEDURE — 86923 COMPATIBILITY TEST ELECTRIC: CPT | Performed by: INTERNAL MEDICINE

## 2021-03-08 PROCEDURE — 85300 ANTITHROMBIN III ACTIVITY: CPT | Performed by: STUDENT IN AN ORGANIZED HEALTH CARE EDUCATION/TRAINING PROGRAM

## 2021-03-08 PROCEDURE — 80053 COMPREHEN METABOLIC PANEL: CPT | Performed by: STUDENT IN AN ORGANIZED HEALTH CARE EDUCATION/TRAINING PROGRAM

## 2021-03-08 PROCEDURE — 999N001017 HC STATISTIC GLUCOSE BY METER IP

## 2021-03-08 PROCEDURE — 85652 RBC SED RATE AUTOMATED: CPT | Performed by: STUDENT IN AN ORGANIZED HEALTH CARE EDUCATION/TRAINING PROGRAM

## 2021-03-08 PROCEDURE — 82330 ASSAY OF CALCIUM: CPT | Performed by: SURGERY

## 2021-03-08 PROCEDURE — 82947 ASSAY GLUCOSE BLOOD QUANT: CPT | Performed by: THORACIC SURGERY (CARDIOTHORACIC VASCULAR SURGERY)

## 2021-03-08 PROCEDURE — 85384 FIBRINOGEN ACTIVITY: CPT | Performed by: STUDENT IN AN ORGANIZED HEALTH CARE EDUCATION/TRAINING PROGRAM

## 2021-03-08 PROCEDURE — 86140 C-REACTIVE PROTEIN: CPT | Performed by: STUDENT IN AN ORGANIZED HEALTH CARE EDUCATION/TRAINING PROGRAM

## 2021-03-08 PROCEDURE — 83735 ASSAY OF MAGNESIUM: CPT | Performed by: INTERNAL MEDICINE

## 2021-03-08 PROCEDURE — 84100 ASSAY OF PHOSPHORUS: CPT | Performed by: INTERNAL MEDICINE

## 2021-03-08 PROCEDURE — 250N000011 HC RX IP 250 OP 636: Performed by: STUDENT IN AN ORGANIZED HEALTH CARE EDUCATION/TRAINING PROGRAM

## 2021-03-08 PROCEDURE — 250N000009 HC RX 250: Performed by: INTERNAL MEDICINE

## 2021-03-08 PROCEDURE — 71045 X-RAY EXAM CHEST 1 VIEW: CPT | Mod: 26 | Performed by: STUDENT IN AN ORGANIZED HEALTH CARE EDUCATION/TRAINING PROGRAM

## 2021-03-08 PROCEDURE — 83735 ASSAY OF MAGNESIUM: CPT | Performed by: STUDENT IN AN ORGANIZED HEALTH CARE EDUCATION/TRAINING PROGRAM

## 2021-03-08 PROCEDURE — 82330 ASSAY OF CALCIUM: CPT | Performed by: THORACIC SURGERY (CARDIOTHORACIC VASCULAR SURGERY)

## 2021-03-08 PROCEDURE — 82805 BLOOD GASES W/O2 SATURATION: CPT | Performed by: THORACIC SURGERY (CARDIOTHORACIC VASCULAR SURGERY)

## 2021-03-08 PROCEDURE — 258N000003 HC RX IP 258 OP 636: Performed by: STUDENT IN AN ORGANIZED HEALTH CARE EDUCATION/TRAINING PROGRAM

## 2021-03-08 PROCEDURE — 86900 BLOOD TYPING SEROLOGIC ABO: CPT | Performed by: INTERNAL MEDICINE

## 2021-03-08 PROCEDURE — 83605 ASSAY OF LACTIC ACID: CPT | Performed by: THORACIC SURGERY (CARDIOTHORACIC VASCULAR SURGERY)

## 2021-03-08 PROCEDURE — 83605 ASSAY OF LACTIC ACID: CPT | Performed by: SURGERY

## 2021-03-08 PROCEDURE — 85384 FIBRINOGEN ACTIVITY: CPT | Performed by: INTERNAL MEDICINE

## 2021-03-08 PROCEDURE — 83051 HEMOGLOBIN PLASMA: CPT | Performed by: STUDENT IN AN ORGANIZED HEALTH CARE EDUCATION/TRAINING PROGRAM

## 2021-03-08 PROCEDURE — 999N000015 HC STATISTIC ARTERIAL MONITORING DAILY

## 2021-03-08 PROCEDURE — 258N000003 HC RX IP 258 OP 636: Performed by: THORACIC SURGERY (CARDIOTHORACIC VASCULAR SURGERY)

## 2021-03-08 PROCEDURE — 80048 BASIC METABOLIC PNL TOTAL CA: CPT | Performed by: INTERNAL MEDICINE

## 2021-03-08 PROCEDURE — 200N000002 HC R&B ICU UMMC

## 2021-03-08 PROCEDURE — 85520 HEPARIN ASSAY: CPT | Performed by: STUDENT IN AN ORGANIZED HEALTH CARE EDUCATION/TRAINING PROGRAM

## 2021-03-08 PROCEDURE — 85730 THROMBOPLASTIN TIME PARTIAL: CPT | Performed by: STUDENT IN AN ORGANIZED HEALTH CARE EDUCATION/TRAINING PROGRAM

## 2021-03-08 PROCEDURE — 272N000083 HC NUTRITION PRODUCT SEMIELEM INTERMED LITER

## 2021-03-08 PROCEDURE — 999N000157 HC STATISTIC RCP TIME EA 10 MIN

## 2021-03-08 PROCEDURE — 250N000013 HC RX MED GY IP 250 OP 250 PS 637: Performed by: STUDENT IN AN ORGANIZED HEALTH CARE EDUCATION/TRAINING PROGRAM

## 2021-03-08 PROCEDURE — 85347 COAGULATION TIME ACTIVATED: CPT

## 2021-03-08 PROCEDURE — 71045 X-RAY EXAM CHEST 1 VIEW: CPT

## 2021-03-08 PROCEDURE — 83615 LACTATE (LD) (LDH) ENZYME: CPT | Performed by: STUDENT IN AN ORGANIZED HEALTH CARE EDUCATION/TRAINING PROGRAM

## 2021-03-08 PROCEDURE — 250N000011 HC RX IP 250 OP 636: Performed by: THORACIC SURGERY (CARDIOTHORACIC VASCULAR SURGERY)

## 2021-03-08 RX ADMIN — PROPOFOL 20 MCG/KG/MIN: 10 INJECTION, EMULSION INTRAVENOUS at 06:25

## 2021-03-08 RX ADMIN — DOCUSATE SODIUM 50 MG AND SENNOSIDES 8.6 MG 1 TABLET: 8.6; 5 TABLET, FILM COATED ORAL at 19:30

## 2021-03-08 RX ADMIN — CALCIUM CHLORIDE, MAGNESIUM CHLORIDE, SODIUM CHLORIDE, SODIUM BICARBONATE, POTASSIUM CHLORIDE AND SODIUM PHOSPHATE DIBASIC DIHYDRATE 12.5 ML/KG/HR: 3.68; 3.05; 6.34; 3.09; .314; .187 INJECTION INTRAVENOUS at 16:58

## 2021-03-08 RX ADMIN — HEPARIN SODIUM 1300 UNITS/HR: 10000 INJECTION, SOLUTION INTRAVENOUS at 05:23

## 2021-03-08 RX ADMIN — CALCIUM CHLORIDE, MAGNESIUM CHLORIDE, SODIUM CHLORIDE, SODIUM BICARBONATE, POTASSIUM CHLORIDE AND SODIUM PHOSPHATE DIBASIC DIHYDRATE 12.5 ML/KG/HR: 3.68; 3.05; 6.34; 3.09; .314; .187 INJECTION INTRAVENOUS at 11:53

## 2021-03-08 RX ADMIN — CALCIUM CHLORIDE, MAGNESIUM CHLORIDE, DEXTROSE MONOHYDRATE, LACTIC ACID, SODIUM CHLORIDE, SODIUM BICARBONATE AND POTASSIUM CHLORIDE 12.5 ML/KG/HR: 5.15; 2.03; 22; 5.4; 6.46; 3.09; .157 INJECTION INTRAVENOUS at 08:21

## 2021-03-08 RX ADMIN — PANTOPRAZOLE SODIUM 40 MG: 40 TABLET, DELAYED RELEASE ORAL at 08:15

## 2021-03-08 RX ADMIN — MUPIROCIN 0.5 G: 20 OINTMENT TOPICAL at 19:31

## 2021-03-08 RX ADMIN — DOCUSATE SODIUM 50 MG AND SENNOSIDES 8.6 MG 1 TABLET: 8.6; 5 TABLET, FILM COATED ORAL at 08:13

## 2021-03-08 RX ADMIN — AMIODARONE HYDROCHLORIDE 200 MG: 200 TABLET ORAL at 08:14

## 2021-03-08 RX ADMIN — CALCIUM CHLORIDE, MAGNESIUM CHLORIDE, DEXTROSE MONOHYDRATE, LACTIC ACID, SODIUM CHLORIDE, SODIUM BICARBONATE AND POTASSIUM CHLORIDE 12.5 ML/KG/HR: 5.15; 2.03; 22; 5.4; 6.46; 3.09; .157 INJECTION INTRAVENOUS at 13:26

## 2021-03-08 RX ADMIN — ACETAMINOPHEN 975 MG: 325 TABLET, FILM COATED ORAL at 08:13

## 2021-03-08 RX ADMIN — CALCIUM CHLORIDE, MAGNESIUM CHLORIDE, SODIUM CHLORIDE, SODIUM BICARBONATE, POTASSIUM CHLORIDE AND SODIUM PHOSPHATE DIBASIC DIHYDRATE 12.5 ML/KG/HR: 3.68; 3.05; 6.34; 3.09; .314; .187 INJECTION INTRAVENOUS at 06:46

## 2021-03-08 RX ADMIN — CHLORHEXIDINE GLUCONATE 0.12% ORAL RINSE 15 ML: 1.2 LIQUID ORAL at 19:30

## 2021-03-08 RX ADMIN — CALCIUM CHLORIDE, MAGNESIUM CHLORIDE, DEXTROSE MONOHYDRATE, LACTIC ACID, SODIUM CHLORIDE, SODIUM BICARBONATE AND POTASSIUM CHLORIDE 12.5 ML/KG/HR: 5.15; 2.03; 22; 5.4; 6.46; 3.09; .157 INJECTION INTRAVENOUS at 23:10

## 2021-03-08 RX ADMIN — CALCIUM CHLORIDE, MAGNESIUM CHLORIDE, SODIUM CHLORIDE, SODIUM BICARBONATE, POTASSIUM CHLORIDE AND SODIUM PHOSPHATE DIBASIC DIHYDRATE 12.5 ML/KG/HR: 3.68; 3.05; 6.34; 3.09; .314; .187 INJECTION INTRAVENOUS at 22:02

## 2021-03-08 RX ADMIN — MUPIROCIN 0.5 G: 20 OINTMENT TOPICAL at 08:17

## 2021-03-08 RX ADMIN — CHLORHEXIDINE GLUCONATE 0.12% ORAL RINSE 15 ML: 1.2 LIQUID ORAL at 08:16

## 2021-03-08 RX ADMIN — VANCOMYCIN HYDROCHLORIDE 1500 MG: 10 INJECTION, POWDER, LYOPHILIZED, FOR SOLUTION INTRAVENOUS at 13:59

## 2021-03-08 RX ADMIN — CALCIUM CHLORIDE, MAGNESIUM CHLORIDE, DEXTROSE MONOHYDRATE, LACTIC ACID, SODIUM CHLORIDE, SODIUM BICARBONATE AND POTASSIUM CHLORIDE 12.5 ML/KG/HR: 5.15; 2.03; 22; 5.4; 6.46; 3.09; .157 INJECTION INTRAVENOUS at 02:48

## 2021-03-08 RX ADMIN — Medication 50 MCG/HR: at 17:14

## 2021-03-08 RX ADMIN — CEFEPIME 2 G: 2 INJECTION, POWDER, FOR SOLUTION INTRAVENOUS at 03:57

## 2021-03-08 RX ADMIN — ASPIRIN 81 MG CHEWABLE TABLET 81 MG: 81 TABLET CHEWABLE at 08:13

## 2021-03-08 RX ADMIN — CEFEPIME 2 G: 2 INJECTION, POWDER, FOR SOLUTION INTRAVENOUS at 15:47

## 2021-03-08 RX ADMIN — EPINEPHRINE 0.01 MCG/KG/MIN: 1 INJECTION PARENTERAL at 22:03

## 2021-03-08 RX ADMIN — CALCIUM CHLORIDE, MAGNESIUM CHLORIDE, DEXTROSE MONOHYDRATE, LACTIC ACID, SODIUM CHLORIDE, SODIUM BICARBONATE AND POTASSIUM CHLORIDE 12.5 ML/KG/HR: 5.15; 2.03; 22; 5.4; 6.46; 3.09; .157 INJECTION INTRAVENOUS at 18:35

## 2021-03-08 RX ADMIN — AMIODARONE HYDROCHLORIDE 200 MG: 200 TABLET ORAL at 13:59

## 2021-03-08 RX ADMIN — EPINEPHRINE 0.03 MCG/KG/MIN: 1 INJECTION PARENTERAL at 03:48

## 2021-03-08 RX ADMIN — AMIODARONE HYDROCHLORIDE 200 MG: 200 TABLET ORAL at 19:30

## 2021-03-08 RX ADMIN — CALCIUM CHLORIDE, MAGNESIUM CHLORIDE, SODIUM CHLORIDE, SODIUM BICARBONATE, POTASSIUM CHLORIDE AND SODIUM PHOSPHATE DIBASIC DIHYDRATE 12.5 ML/KG/HR: 3.68; 3.05; 6.34; 3.09; .314; .187 INJECTION INTRAVENOUS at 01:33

## 2021-03-08 RX ADMIN — ACETAMINOPHEN 975 MG: 325 TABLET, FILM COATED ORAL at 00:45

## 2021-03-08 RX ADMIN — POLYETHYLENE GLYCOL 3350 17 G: 17 POWDER, FOR SOLUTION ORAL at 19:30

## 2021-03-08 RX ADMIN — CALCIUM CHLORIDE, MAGNESIUM CHLORIDE, SODIUM CHLORIDE, SODIUM BICARBONATE, POTASSIUM CHLORIDE AND SODIUM PHOSPHATE DIBASIC DIHYDRATE: 3.68; 3.05; 6.34; 3.09; .314; .187 INJECTION INTRAVENOUS at 16:59

## 2021-03-08 RX ADMIN — POLYETHYLENE GLYCOL 3350 17 G: 17 POWDER, FOR SOLUTION ORAL at 08:13

## 2021-03-08 ASSESSMENT — ACTIVITIES OF DAILY LIVING (ADL)
ADLS_ACUITY_SCORE: 22

## 2021-03-08 ASSESSMENT — MIFFLIN-ST. JEOR: SCORE: 1641.13

## 2021-03-08 NOTE — PROGRESS NOTES
ECMO Shift Summary: 12N        ECMO Equipment:  Console serial number: [id number]  #4  Circuit Lot number: [id number]  Oxygenator Lot number: [id number]        Patient remains on VA ECMO, all equipment is functioning and alarms are appropriately set. RPM's 3250 with flow range 3.83-4.07 L/min. Sweep gas is at 2 LPM and FiO2 50%. Circuit remains free of air, but fibrin and clot are appreciated on corners of oxy as well as both cannulas connector sites. Cannulas are secure with no bleeding from site. Pt is centrally cannulated. Extremities are cool to touch with doppler pulses.   .    Significant Shift Events: no issues overnight    Vent settings:  Ventilation Mode: CMV/AC  (Continuous Mandatory Ventilation/ Assist Control)  FiO2 (%): 50 %  Rate Set (breaths/minute): 12 breaths/min  Tidal Volume Set (mL): 500 mL  PEEP (cm H2O): 5 cmH2O  Oxygen Concentration (%): 50 %  Resp: 12  .    Heparin is running at 1300 units/hr, ACT range 172-200. Goal 180-200.    Urine output is as RN charted, pt is on CRRT, blood loss was none. Product given included none.      Intake/Output Summary (Last 24 hours) at 3/8/2021 0409  Last data filed at 3/8/2021 0400  Gross per 24 hour   Intake 3028.22 ml   Output 2910 ml   Net 118.22 ml       ECHO:  No results found for this or any previous visit.No results found for this or any previous visit.    CXR:  Recent Results (from the past 24 hour(s))   XR Chest Port 1 View    Narrative    Exam: XR CHEST PORT 1 VW, 3/8/2021 2:06 AM    Indication: Impella, ETT, SGz    Comparison: 3/7/2021    Findings:   Single portable supine AP radiograph of the chest. Postoperative  changes of the chest, with open chest again noted. No significant  change in radiopaque surgical sponges overlying the central chest.  Central VA ECMO catheters appear stable in position. Right IJ central  venous catheter appears stable. Bilateral chest tubes and mediastinal  drains appear similar in position. Endotracheal tube tip  projects over  the mid thoracic trachea. Enteric tube courses below the diaphragm,  with side port projecting over the stomach.  [place] Vimal catheter  removed since 3/6/2021.    The trachea is midline. The cardiac silhouette is stable in  appearance. Aortic arch calcifications. No pneumothorax or pleural  effusion. Mild diffuse interstitial opacities, without significant  change. No new opacity. The visualized upper abdomen is unremarkable.      Impression    Impression:   1. Stable support devices.  2. Postoperative chest with underlying diffuse interstitial markings,  left slightly greater than right. No new opacity.  3.  Open chest again noted. No significant change in radiopaque  surgical sponges overlying the central chest.     I have personally reviewed the examination and initial interpretation  and I agree with the findings.    ANTONIO NORWOOD MD       Labs:  Recent Labs   Lab 03/08/21  0209 03/08/21  0014 03/07/21  2158 03/07/21 2003   PH 7.44 7.44 7.45 7.44   PCO2 39 40 39 39   PO2 108* 120* 96 108*   HCO3 27 27 27 26   O2PER 50.0 50.0 50.0 50.0       Lab Results   Component Value Date    HGB 8.0 (L) 03/07/2021    PHGB 30 (H) 03/07/2021    PLT 98 (L) 03/07/2021    FIBR 588 (H) 03/07/2021    INR 1.32 (H) 03/07/2021     (HH) 03/07/2021    DD 1.8 (H) 03/05/2021    ANTCH 51 (L) 03/07/2021         Plan is remain on VA ECMO. Turndown on Tuesday. Continue to support and wean as tolerated. See flowsheets for assessements and details. .      Joana Parker RN  ECMO Specialist  3/8/2021 4:09 AM

## 2021-03-08 NOTE — PROGRESS NOTES
ECMO Shift Summary: 0700 to 1900      ECMO Equipment:  Console serial number: [id number]  #4  Circuit Lot number: [id number]  Oxygenator Lot number: [id number]    Patient remains on VA ECMO, all equipment is functioning and alarms are appropriately set. RPM's 3250 with flow range ~4 L/min. Sweep gas is at 2 LPM and FiO2 50%. Circuit remains free of air. Clot and fibrin in the oxygenator and connections. Cannulas are secure with no bleeding from site. Extremities are warm.    Significant Shift Events: None.    Vent settings:  Ventilation Mode: CMV/AC  (Continuous Mandatory Ventilation/ Assist Control)  FiO2 (%): 50 %  Rate Set (breaths/minute): 12 breaths/min  Tidal Volume Set (mL): (S) 500 mL  PEEP (cm H2O): 5 cmH2O  Oxygen Concentration (%): 50 %  Resp: 15  .    Heparin is running at 1150 u/hr, ACT range 176 to 184 seconds.    Urine output is small, the patient is on CRRT, blood loss was minimal. Product given included a unit of PRBCs.      Intake/Output Summary (Last 24 hours) at 3/7/2021 1836  Last data filed at 3/7/2021 1800  Gross per 24 hour   Intake 2748.38 ml   Output 2486 ml   Net 262.38 ml       ECHO:  No results found for this or any previous visit.No results found for this or any previous visit.    CXR:  Recent Results (from the past 24 hour(s))   XR Chest Port 1 View    Narrative    Chest radiograph 3/7/2021 4:07 AM    HISTORY: Impella, ET tube, [place]-Vimal    COMPARISON: 3/6/2021    FINDINGS:  Single AP view of the chest. Postoperative changes of central VA ECMO  with catheters in similar position. Redemonstrated lab sponge markers  overlying the mediastinum. Endotracheal tube tip overlying the mid  thoracic trachea. Right IJ central line sheath tip overlying the high  SVC. Bilateral chest tubes and mediastinal drains in similar position.  Enteric tube tip and side port overlying the stomach. Trachea is  midline. Cardiac silhouette is similar in size. No pneumothorax. No  significant pleural effusion.  Improved left greater than right upper  lobe and perihilar predominant interstitial and airspace opacities.      Impression    IMPRESSION:  1. Support devices in similar position.  2. Postoperative chest with improved left greater than right upper  lobe and perihilar predominantly interstitial and airspace opacities.    I have personally reviewed the examination and initial interpretation  and I agree with the findings.    REJI LYNN MD       Labs:  Recent Labs   Lab 03/07/21  1805 03/07/21  1620 03/07/21  1403 03/07/21  1205   PH 7.33* 7.44 7.43 7.40   PCO2 49* 37 36 39   PO2 140* 116* 102 99   HCO3 26 25 24 24   O2PER 50.0 50.0 50 50       Lab Results   Component Value Date    HGB 8.1 (L) 03/07/2021    PHGB 30 (H) 03/07/2021    PLT 96 (L) 03/07/2021    FIBR 588 (H) 03/07/2021    INR 1.33 (H) 03/07/2021     (HH) 03/07/2021    DD 1.8 (H) 03/05/2021    ANTCH 51 (L) 03/07/2021         Plan is to remain on VA ECMO through at least Tuesday.      Rock Mcconnell, RRT  ECMO Specialist  3/7/2021 6:36 PM

## 2021-03-08 NOTE — PROGRESS NOTES
ECMO Shift Summary:3858-8742      ECMO Equipment:  Console serial number: [id number]  #4  Circuit Lot number: [id number]  Oxygenator Lot number: [id number]       Patient remains on VA ECMO, all equipment is functioning and alarms are appropriately set. RPM's 2740 with flow range 2-4 L/min. Sweep gas is at 1 LPM and FiO2 70%. Circuit remains free of air, clot and fibrin. Cannulas are secure with no bleeding from site. Extremities are warm. Suctioned ETT for small amount of secretions.    Significant Shift Events: Turned flow down from 4L to 3L in anticipation of w/o and possible decann,possible IABP on Tuesday.    Vent settings:  Ventilation Mode: (S) PCV Plus assist  (Pressure Control Ventilation/ Assist Control)  FiO2 (%): 50 %  Rate Set (breaths/minute): 12 breaths/min  Tidal Volume Set (mL): (S) 600 mL  PEEP (cm H2O): 5 cmH2O  Oxygen Concentration (%): 50 %  Peak Inspiratory Pressure (cm H2O) (Drager Fabiana): 18  Resp: 20  .    Heparin is running at 1100 u/hr, ACT range 188-209.    Urine output is as charted, blood loss was none. Product given included none.      Intake/Output Summary (Last 24 hours) at 3/8/2021 1728  Last data filed at 3/8/2021 1600  Gross per 24 hour   Intake 2586.53 ml   Output 2775 ml   Net -188.47 ml       ECHO:  No results found for this or any previous visit.No results found for this or any previous visit.    CXR:  Recent Results (from the past 24 hour(s))   XR Chest Port 1 View    Narrative    Exam: XR CHEST PORT 1 VW, 3/8/2021 2:06 AM    Indication: Impella, ETT, SGz    Comparison: 3/7/2021    Findings:   Single portable supine AP radiograph of the chest. Postoperative  changes of the chest, with open chest again noted. No significant  change in radiopaque surgical sponges overlying the central chest.  Central VA ECMO catheters appear stable in position. Right IJ central  venous catheter appears stable. Bilateral chest tubes and mediastinal  drains appear similar in position. Endotracheal  tube tip projects over  the mid thoracic trachea. Enteric tube courses below the diaphragm,  with side port projecting over the stomach.  [place] Vimal catheter  removed since 3/6/2021.    The trachea is midline. The cardiac silhouette is stable in  appearance. Aortic arch calcifications. No pneumothorax or pleural  effusion. Mild diffuse interstitial opacities, without significant  change. No new opacity. The visualized upper abdomen is unremarkable.      Impression    Impression:   1. Stable support devices.  2. Postoperative chest with underlying diffuse interstitial markings,  left slightly greater than right. No new opacity.  3.  Open chest again noted. No significant change in radiopaque  surgical sponges overlying the central chest.     I have personally reviewed the examination and initial interpretation  and I agree with the findings.    ANTONIO NORWOOD MD       Labs:  Recent Labs   Lab 03/08/21  1601 03/08/21  1402 03/08/21  1146 03/08/21  1035   PH 7.43 7.46* 7.45 7.42   PCO2 40 38 39 42   PO2 75* 74* 77* 91   HCO3 26 27 27 27   O2PER 50.0 50 50 50       Lab Results   Component Value Date    HGB 8.1 (L) 03/08/2021    PHGB 40 (H) 03/08/2021     (L) 03/08/2021    FIBR 642 (H) 03/08/2021    INR 1.27 (H) 03/08/2021     (HH) 03/08/2021    DD 1.8 (H) 03/05/2021    ANTCH 57 (L) 03/08/2021         Plan is to continue to provide support and wean as able.      Hilaria Stevenson, RT  ECMO Specialist  3/8/2021 5:28 PM

## 2021-03-08 NOTE — PROGRESS NOTES
Major Shift Events:  Epi titrated to 0.03, remain on low dose through night. CRRT continues with I=O goal. NO fluid or product given overnight. Chest tube output approx 20cc/hr.     Plan: formal turndown today and possible decannulation on Tuesday w/ washout.   For vital signs and complete assessments, please see documentation flowsheets.

## 2021-03-08 NOTE — PLAN OF CARE
OT-4E: Hold-pt still on ECMO, and possibly to OR tomorrow for chest closure, will continue to hold therapies and initiate when appropriate.

## 2021-03-08 NOTE — PROGRESS NOTES
"  Nephrology Progress Note  03/08/2021         Assessment & Recommendations:   Jin Garrett is a 80 year old with PMH HTN, AAA, HLD, RA who presented to OSH with SOB and atypical CP. TTE showed severe MR with ?posterior flail leaflet, EF preserved. On arrival at [facility] an Impella was emergently placed. S/p MVR and single vessel CABG on 3/5/21. Returned from the OR on central ECMO with an open chest. Nephrology consulted for ADAN.    AADN in the setting of cardiogenic shock on ECMO  Hyperkalemia, resolved with CRRT  Unknown baseline, no h/o CKD per daughter. 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 Cr 2.5 at presentation, decreased to 2.1 today which was probably dilutional as pt was aggressively resuscitated yesterday and now back up to 2.6. Etiology likely ATN in the setting of shock, no obstruction per CT and urinalysis showed with granular casts. UOP declined and K rising 3/6 and CRRT started  - CRRT ordered  - Goal UF 0-50 net neg with out going up on pressors  - Labs per CRRT protocol  - Avoid nephrotoxins  - Renally dose medications  - Renal diet  - Daily weights  - Strict I/Os      Recommendations were communicated to primary team via this note    Seen and discussed with Dr. Leyda Chester MD   [phone]    Interval History :   Nursing and provider notes from last 24 hours reviewed.  In the last 24 hours Jin Garrett continued to be sedated and intubated    Physical Exam:   I/O last 3 completed shifts:  In: 3182.08 [I.V.:1252.08; NG/GT:430]  Out: 2796 [Urine:173; Other:1953; Chest Tube:670]   BP 99/61   Pulse 80   Temp 98.6  F (37  C)   Resp 20   Ht 1.803 m (5' 11\")   Wt 90.9 kg (200 lb 6.4 oz)   SpO2 96%   BMI 27.95 kg/m       General : Pt sedated and intubated, not in acute distress   Lungs : anterior lung fields are clear  Cardiac : S1, S2 present  Abdomen : Soft/ND/NT  LE : Edema noted    Labs:   All labs " reviewed by me  Electrolytes/Renal -   Recent Labs   Lab Test 03/08/21  1601 03/08/21  1035 03/08/21  0351 03/07/21  2158 03/07/21  1620 03/07/21  0353 03/07/21  0353   NA  --  140 138 140 140   < > 140   POTASSIUM  --  4.6 4.4 4.8 4.8   < > 5.2   CHLORIDE  --  109 108 110* 111*   < > 108   CO2  --  26 26 26 24   < > 22   BUN  --  34* 34* 35* 36*   < > 38*   CR  --  1.75* 1.86* 1.98* 2.04*   < > 2.25*   * 149*  147* 128*  126* 126*  123* 127*  123*   < > 93  92   PALLAVI  --  8.7 8.6 8.6 8.0*   < > 7.7*   MAG  --   --  2.4*  --  2.4*  --  2.4*   PHOS  --   --  3.8  --  4.2  --  5.7*    < > = values in this interval not displayed.       CBC -   Recent Labs   Lab Test 03/08/21  1035 03/08/21  0351 03/07/21  2158   WBC 17.8* 17.0* 19.2*   HGB 7.9* 7.7* 8.0*   PLT 92* 95* 98*       LFTs -   Recent Labs   Lab Test 03/08/21  0351 03/07/21  0353 03/06/21  1154   ALKPHOS 66 56 47   BILITOTAL 0.5 0.8 1.8*   ALT 24 24 20   AST 62* 90* 94*   PROTTOTAL 4.9* 4.9* 4.6*   ALBUMIN 2.0* 2.3* 2.5*       Current Medications:    amiodarone  200 mg Oral or NG Tube TID     aspirin  81 mg Oral or NG Tube Daily     ceFEPIme (MAXIPIME) IV  2 g Intravenous Q12H     chlorhexidine  15 mL Swish & Spit BID     insulin aspart  1-4 Units Subcutaneous Q4H     mupirocin  0.5 g Both Nostrils BID     pantoprazole  40 mg Oral or NG Tube Daily    Or     pantoprazole  40 mg Oral Daily     polyethylene glycol  17 g Oral BID     senna-docusate  1 tablet Oral BID     vancomycin (VANCOCIN) IV  1,500 mg Intravenous Q24H       dextrose       CRRT replacement solution 12.5 mL/kg/hr (03/08/21 1326)     EPINEPHrine 0.03 mcg/kg/min (03/08/21 1400)     fentaNYL 50 mcg/hr (03/08/21 1400)     HEParin 1,100 Units/hr (03/08/21 1600)     - MEDICATION INSTRUCTIONS -       - MEDICATION INSTRUCTIONS -       norepinephrine Stopped (03/07/21 0845)     CRRT replacement solution 200 mL/hr at 03/07/21 1528     CRRT replacement solution 12.5 mL/kg/hr (03/08/21 1153)      propofol (DIPRIVAN) infusion Stopped (03/08/21 1000)     BETA BLOCKER NOT PRESCRIBED       Andie Chester MD

## 2021-03-08 NOTE — CONSULTS
"Care Management Follow Up    Length of Stay (days): 4    Expected Discharge Date: 03/19/21     Concerns to be Addressed:     Lodging   Patient plan of care discussed at interdisciplinary rounds: Yes    Anticipated Discharge Disposition:  TBD     Anticipated Discharge Services:  TBD  Anticipated Discharge DME:  TBD    Patient/family educated on Medicare website which has current facility and service quality ratings:  NA  Education Provided on the Discharge Plan:  NA  Patient/Family in Agreement with the Plan:  NA    Referrals Placed by CM/SW:  none  Private pay costs discussed: Not applicable    Additional Information:  SW met with Pt daughter Pamela at the bedside. Pt and daughter are from the [facility]. Pt daughter had questions on lodging and parking. SW provided Pt daughter a list of hotels in the area that offer a discount to [facility] families. Pt daughter asked which hotels are the closest. SW reported that the graduate offers a discount and is a short walk to the hospital which would also save money on parking costs. SW provided Pt daughter with the parking office number for discounts on parking. The Pt is anticipated to be here for several weeks and family lives 3.5 hours from the [facility].     Pt daughter asked in regards to visiting restrictions. SW noted that the each Pt is allowed one visitor at a time but one switch is often accomodated with discretion to nurse manager. SW reported families can not \"flip flop\" visitors and a switch can only be made once. SW offered reflective listening and emotional support to Pt daughter as this is a difficult place to be in. SW will continue to follow for psychosocial support, resources and advocate on behalf of the patient.    LEONEL Prasad, SW  ICU    [facility]  Phone: [phone]  Pager: [phone]        "

## 2021-03-08 NOTE — PROGRESS NOTES
CLINICAL NUTRITION SERVICES - BRIEF NOTE     Nutrition Prescription    Recommendations  Consider suppository    Malnutrition Status:    Unable to determine due to lack of nutrition and weight history available.    Future Recommendations:  If renal formula indicated: Novasource Renal @ 55 ml/hr (1320 ml) + Prosource (1 pkt TID) provides 2760 kcal (30 kcal/kg/day), 153 g pro (1.7 g/kg/day), 242 g CHO, 946 ml free water, and 0 g fiber daily.        NEW FINDINGS  Impact peptide @ 50 ml/hr via OGT, continuing to adv to goal rate (70 ml/hr). No documented BM this admit, on scheduled bowel regimen.    MALNUTRITION  % Intake: Unable to assess, lack of history available/pt intubated  % Weight Loss: Unable to assess, lack of history available/pt intubated  Subcutaneous Fat Loss: None observed  Muscle Loss: Does not meet criteria. Noted loss of scapular bone: Mild, Upper arm (bicep, tricep): Mild and Lower arm  (forearm): Mild -- (suspect baseline sarcopenia).   Fluid Accumulation/Edema: None noted  Malnutrition Diagnosis: Unable to determine due to lack of nutrition and weight history available.     Ashley Ortiz, RD, LD  [id number]  Pgr: [phone]

## 2021-03-08 NOTE — PROGRESS NOTES
CRRT STATUS NOTE    DATA:  Time:  6:00 PM  Pressures WNL:  YES  Filter Status:  WDL    Problems Reported/Alarms Noted:  none    Supplies Present:  YES    ASSESSMENT:  Patient Net Fluid Balance:  +268 since 0000  Vital Signs:  Temp:  [97.9  F (36.6  C)-98.6  F (37  C)] 98.6  F (37  C)  Pulse:  [65-81] 80  Resp:  [12-27] 20  MAP:  [61 mmHg-89 mmHg] 73 mmHg  Arterial Line BP: ()/(52-85) 104/65  FiO2 (%):  [50 %] 50 %  SpO2:  [75 %-100 %] 96 %    Labs:    Last Comprehensive Metabolic Panel:  Sodium   Date Value Ref Range Status   03/08/2021 139 133 - 144 mmol/L Final     Potassium   Date Value Ref Range Status   03/08/2021 4.6 3.4 - 5.3 mmol/L Final     Chloride   Date Value Ref Range Status   03/08/2021 109 94 - 109 mmol/L Final     Carbon Dioxide   Date Value Ref Range Status   03/08/2021 24 20 - 32 mmol/L Final     Anion Gap   Date Value Ref Range Status   03/08/2021 6 3 - 14 mmol/L Final     Glucose   Date Value Ref Range Status   03/08/2021 155 (H) 70 - 99 mg/dL Final     Urea Nitrogen   Date Value Ref Range Status   03/08/2021 33 (H) 7 - 30 mg/dL Final     Creatinine   Date Value Ref Range Status   03/08/2021 1.64 (H) 0.66 - 1.25 mg/dL Final     GFR Estimate   Date Value Ref Range Status   03/08/2021 39 (L) >60 mL/min/[1.73_m2] Final     Comment:     Non  GFR Calc  Starting 12/18/2018, serum creatinine based estimated GFR (eGFR) will be   calculated using the Chronic Kidney Disease Epidemiology Collaboration   (CKD-EPI) equation.       Calcium   Date Value Ref Range Status   03/08/2021 8.7 8.5 - 10.1 mg/dL Final       Goals of Therapy:  0-50 net negative as tolerated, with out going up on pressors    INTERVENTIONS:   none    PLAN:  Continue fluid removal per goals of care as patient tolerates. Check filter daily. Change filter q72 hours and PRN. Please call CRRT resource RN @ [phone] with any questions or concerns.

## 2021-03-08 NOTE — PROGRESS NOTES
CV ICU PROGRESS NOTE  March 8, 2021      CO-MORBIDITIES:   Cardiogenic shock (H)  (primary encounter diagnosis)    ASSESSMENT: Jin Garrett is a 80 year old male with PMH of HTN, AAA, HLD and rheumatoid arthritis. He presented to an OSH with shortness of breath and atypical chest pain. TTE showed severe MR with concern for posterior flail leaflet, EF was preserved. On arrival at [facility] an Impella was emergently placed in cath lab. 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 Porras on 3/5/21. Returned from the OR on central ECMO with an open chest, intubated and sedated.     TODAY'S PROGRESS:   - ECMO turndown study today  - Continue epi gtt  - Goal net even   - Sedation holiday with neuro exam today    PLAN:  Neuro/ pain/ sedation:  Post-operative pain  - Monitor neurological status. Notify the MD for any acute changes in exam.  - Fentanyl gtt for pain  - Propofol gtt for sedation  - Sedation holiday with neurologic exam today     Pulmonary care:  Acute post-operative respiratory failure  - MV  - Titrate FiO2 for SpO2 >92%     Cardiovascular:  HTN  AAA  HLD  A fib  Severe mitral regurgitation s/p MVR  CAD with 100% occlusion of the mid LCx s/p single vessel CABG   Cardiogenic shock  Vasoplegic shock   VA ECMO (central)   - Monitor hemodynamic status.   - MAP >65  - Vasopressors: epi  - Prophylactic amiodarone  - EMCO turndown study today  - Return to OR tomorrow for washout and possible ECMO decannulation     GI/Nutrition:   - Advance TF towards goal     Fluids/ Electrolytes/Renal:  ADAN   Hyperkalemia  - TKO for IV fluid hydration.  - Will continue to monitor intake and output.  - Continue to monitor electrolytes  - Nephrology consulted appreciate recommendations  - Continue CRRT     Endocrine:  Stress hyperglycemia    - Discontinue insulin gtt  - Start low dose SSI  - Keep blood glucose <180     ID/ Antibiotics:  Leukocytosis - likely secondary to physiologic stress  - Complete perioperative/open  chest antibiotics: cefepime and vancomycin  - Monitor CBC and fever curve  - Pan-culture - NGTD     Heme:  Hemorraghic shock      - Hgb goal >8  - CBC q6 hours  - Heparin gtt   - ACT goal 180-200     Prophylaxis:    - Mechanical prophylaxis for DVT.   - Bowel regimen  - Heparin gtt  - Protonix qd     Lines/ tubes/ drains:  - MAC  - Arterial line  - ETT  - OG  - Central ECMO cannulas  - 3 mediastinal CTs  - 2 pleural CTs  - Alfaro     Disposition:  - CV ICU.      Patient seen, findings and plan discussed with CV ICU staff, Dr. Hurtado.     BRYANT Tinajero (CA2)  Anesthesiology Resident  *[id number]    ====================================    SUBJECTIVE:   Intubated and sedated    OBJECTIVE:   1. VITAL SIGNS:   Temp:  [97.9  F (36.6  C)-98.4  F (36.9  C)] 98.2  F (36.8  C)  Pulse:  [65-80] 70  Resp:  [12-16] 16  MAP:  [61 mmHg-88 mmHg] 69 mmHg  Arterial Line BP: ()/(52-85) 76/66  FiO2 (%):  [50 %] 50 %  SpO2:  [94 %-100 %] 97 %  Ventilation Mode: CMV/AC  (Continuous Mandatory Ventilation/ Assist Control)  FiO2 (%): 50 %  Rate Set (breaths/minute): 12 breaths/min  Tidal Volume Set (mL): 500 mL  PEEP (cm H2O): 5 cmH2O  Oxygen Concentration (%): 50 %  Resp: 16      2. INTAKE/ OUTPUT:   I/O last 3 completed shifts:  In: 3065.87 [I.V.:1245.87; NG/GT:520]  Out: 2939 [Urine:148; Other:1781; Chest Tube:1010]    3. PHYSICAL EXAMINATION:   General: lying in bed  Neuro: Sedated, not following commands at this time  Resp: Mechanically ventilated CTAB  CV: RRR  Abdomen: Soft, Non-distended, Non-tender  Incisions: open chest, dressing in place  Extremities: warm and and well perfused upper extremities, lower extremities warn, difficult to palpate pulses in the feet    4. INVESTIGATIONS:   Arterial Blood Gases   Recent Labs   Lab 03/08/21  0554 03/08/21  0351 03/08/21  0209 03/08/21  0014   PH 7.42 7.43 7.44 7.44   PCO2 43 41 39 40   PO2 76* 108* 108* 120*   HCO3 27 27 27 27     Complete Blood Count   Recent Labs   Lab  03/08/21 0351 03/07/21 2158 03/07/21 1620 03/07/21  1002   WBC 17.0* 19.2* 18.2* 20.1*   HGB 7.7* 8.0* 8.1* 7.9*   PLT 95* 98* 96* 102*     Basic Metabolic Panel  Recent Labs   Lab 03/08/21 0351 03/07/21 2158 03/07/21 1620 03/07/21  1002    140 140 141   POTASSIUM 4.4 4.8 4.8 5.0   CHLORIDE 108 110* 111* 109   CO2 26 26 24 25   BUN 34* 35* 36* 38*   CR 1.86* 1.98* 2.04* 2.13*   *  126* 126*  123* 127*  123* 115*  115*     Liver Function Tests  Recent Labs   Lab 03/08/21 0351 03/07/21 2158 03/07/21 1620 03/07/21  1002 03/07/21  0353 03/06/21  1154 03/06/21  1154 03/06/21  0342   AST 62*  --   --   --  90*  --  94* 109*   ALT 24  --   --   --  24  --  20 21   ALKPHOS 66  --   --   --  56  --  47 46   BILITOTAL 0.5  --   --   --  0.8  --  1.8* 1.3   ALBUMIN 2.0*  --   --   --  2.3*  --  2.5* 2.5*   INR 1.29* 1.32* 1.33* 1.34* 1.28*   < > 1.48* 1.47*    < > = values in this interval not displayed.     Pancreatic Enzymes  No lab results found in last 7 days.  Coagulation Profile  Recent Labs   Lab 03/08/21 0351 03/07/21 2158 03/07/21 1620 03/07/21  1002   INR 1.29* 1.32* 1.33* 1.34*   * 152* 124* 112*         5. RADIOLOGY:   Recent Results (from the past 24 hour(s))   XR Chest Port 1 View    Narrative    Exam: XR CHEST PORT 1 VW, 3/8/2021 2:06 AM    Indication: Impella, ETT, SGz    Comparison: 3/7/2021    Findings:   Single portable supine AP radiograph of the chest. Postoperative  changes of the chest, with open chest again noted. No significant  change in radiopaque surgical sponges overlying the central chest.  Central VA ECMO catheters appear stable in position. Right IJ central  venous catheter appears stable. Bilateral chest tubes and mediastinal  drains appear similar in position. Endotracheal tube tip projects over  the mid thoracic trachea. Enteric tube courses below the diaphragm,  with side port projecting over the stomach.  [place] Vimal catheter  removed since 3/6/2021.    The  trachea is midline. The cardiac silhouette is stable in  appearance. Aortic arch calcifications. No pneumothorax or pleural  effusion. Mild diffuse interstitial opacities, without significant  change. No new opacity. The visualized upper abdomen is unremarkable.      Impression    Impression:   1. Stable support devices.  2. Postoperative chest with underlying diffuse interstitial markings,  left slightly greater than right. No new opacity.  3.  Open chest again noted. No significant change in radiopaque  surgical sponges overlying the central chest.     I have personally reviewed the examination and initial interpretation  and I agree with the findings.    ANTONIO NORWOOD MD       =========================================

## 2021-03-09 ENCOUNTER — APPOINTMENT (OUTPATIENT)
Dept: GENERAL RADIOLOGY | Facility: CLINIC | Age: 81
DRG: 003 | End: 2021-03-09
Attending: THORACIC SURGERY (CARDIOTHORACIC VASCULAR SURGERY)
Payer: MEDICARE

## 2021-03-09 ENCOUNTER — ANESTHESIA (OUTPATIENT)
Dept: SURGERY | Facility: CLINIC | Age: 81
DRG: 003 | End: 2021-03-09
Payer: MEDICARE

## 2021-03-09 ENCOUNTER — APPOINTMENT (OUTPATIENT)
Dept: GENERAL RADIOLOGY | Facility: CLINIC | Age: 81
DRG: 003 | End: 2021-03-09
Attending: INTERNAL MEDICINE
Payer: MEDICARE

## 2021-03-09 LAB
ALBUMIN SERPL-MCNC: 1.7 G/DL (ref 3.4–5)
ALBUMIN SERPL-MCNC: 2 G/DL (ref 3.4–5)
ALP SERPL-CCNC: 78 U/L (ref 40–150)
ALP SERPL-CCNC: 78 U/L (ref 40–150)
ALT SERPL W P-5'-P-CCNC: 25 U/L (ref 0–70)
ALT SERPL W P-5'-P-CCNC: 50 U/L (ref 0–70)
ANGLE RATE OF CLOT GROWTH: 57.3 DEG (ref 59–74)
ANGLE RATE OF CLOT GROWTH: 60.1 DEG (ref 59–74)
ANGLE RATE OF CLOT GROWTH: 69.4 DEG (ref 59–74)
ANION GAP SERPL CALCULATED.3IONS-SCNC: 7 MMOL/L (ref 3–14)
ANION GAP SERPL CALCULATED.3IONS-SCNC: 7 MMOL/L (ref 3–14)
ANION GAP SERPL CALCULATED.3IONS-SCNC: 8 MMOL/L (ref 3–14)
APTT PPP: 36 SEC (ref 22–37)
APTT PPP: 44 SEC (ref 22–37)
APTT PPP: 80 SEC (ref 22–37)
APTT PPP: 87 SEC (ref 22–37)
AST SERPL W P-5'-P-CCNC: 110 U/L (ref 0–45)
AST SERPL W P-5'-P-CCNC: 44 U/L (ref 0–45)
AT III ACT/NOR PPP CHRO: 67 % (ref 85–135)
BACTERIA SPEC CULT: NO GROWTH
BACTERIA SPEC CULT: NO GROWTH
BASE DEFICIT BLDA-SCNC: 0.1 MMOL/L
BASE DEFICIT BLDA-SCNC: 0.7 MMOL/L
BASE DEFICIT BLDA-SCNC: 0.9 MMOL/L
BASE DEFICIT BLDA-SCNC: 1 MMOL/L
BASE DEFICIT BLDA-SCNC: 12.3 MMOL/L
BASE DEFICIT BLDA-SCNC: 2.8 MMOL/L
BASE EXCESS BLDA CALC-SCNC: 0.2 MMOL/L
BASE EXCESS BLDA CALC-SCNC: 0.2 MMOL/L
BASE EXCESS BLDA CALC-SCNC: 0.3 MMOL/L
BASE EXCESS BLDA CALC-SCNC: 0.4 MMOL/L
BASE EXCESS BLDA CALC-SCNC: 1.2 MMOL/L
BASE EXCESS BLDA CALC-SCNC: 1.4 MMOL/L
BASE EXCESS BLDA CALC-SCNC: 1.8 MMOL/L
BASE EXCESS BLDA CALC-SCNC: 1.9 MMOL/L
BASE EXCESS BLDA CALC-SCNC: 1.9 MMOL/L
BASE EXCESS BLDA CALC-SCNC: 2.2 MMOL/L
BASE EXCESS BLDA CALC-SCNC: 2.4 MMOL/L
BASE EXCESS BLDA CALC-SCNC: 2.4 MMOL/L
BASE EXCESS BLDA CALC-SCNC: 2.8 MMOL/L
BASE EXCESS BLDV CALC-SCNC: 0.1 MMOL/L
BASE EXCESS BLDV CALC-SCNC: 3.7 MMOL/L
BILIRUB SERPL-MCNC: 0.6 MG/DL (ref 0.2–1.3)
BILIRUB SERPL-MCNC: 0.8 MG/DL (ref 0.2–1.3)
BLD PROD TYP BPU: NORMAL
BLD UNIT ID BPU: 0
BLOOD PRODUCT CODE: NORMAL
BPU ID: NORMAL
BUN SERPL-MCNC: 33 MG/DL (ref 7–30)
BUN SERPL-MCNC: 35 MG/DL (ref 7–30)
BUN SERPL-MCNC: 42 MG/DL (ref 7–30)
CA-I BLD-MCNC: 4.1 MG/DL (ref 4.4–5.2)
CA-I BLD-MCNC: 4.1 MG/DL (ref 4.4–5.2)
CA-I BLD-MCNC: 4.2 MG/DL (ref 4.4–5.2)
CA-I BLD-MCNC: 4.2 MG/DL (ref 4.4–5.2)
CA-I BLD-MCNC: 4.3 MG/DL (ref 4.4–5.2)
CA-I BLD-MCNC: 4.4 MG/DL (ref 4.4–5.2)
CA-I BLD-MCNC: 4.4 MG/DL (ref 4.4–5.2)
CA-I BLD-MCNC: 4.5 MG/DL (ref 4.4–5.2)
CA-I BLD-MCNC: 4.8 MG/DL (ref 4.4–5.2)
CALCIUM SERPL-MCNC: 7.8 MG/DL (ref 8.5–10.1)
CALCIUM SERPL-MCNC: 8.7 MG/DL (ref 8.5–10.1)
CALCIUM SERPL-MCNC: 9 MG/DL (ref 8.5–10.1)
CHLORIDE SERPL-SCNC: 106 MMOL/L (ref 94–109)
CHLORIDE SERPL-SCNC: 106 MMOL/L (ref 94–109)
CHLORIDE SERPL-SCNC: 108 MMOL/L (ref 94–109)
CI HYPERCOAGULATION INDEX: 0.5 RATIO (ref 0–3)
CI HYPOCOAGULATION INDEX: 1.9 RATIO (ref 0–3)
CI HYPOCOAGULATION INDEX: 1.9 RATIO (ref 0–3)
CLOT LYSIS 30M P MA LENFR BLD TEG: 0 % (ref 0–8)
CLOT LYSIS 30M P MA LENFR BLD TEG: 0 % (ref 0–8)
CLOT LYSIS 30M P MA LENFR BLD TEG: 1 % (ref 0–8)
CLOT STRENGTH BLD TEG: 12 KD/SC (ref 5.3–13.2)
CLOT STRENGTH BLD TEG: 14 KD/SC (ref 5.3–13.2)
CLOT STRENGTH BLD TEG: 14.9 KD/SC (ref 5.3–13.2)
CO2 SERPL-SCNC: 24 MMOL/L (ref 20–32)
CO2 SERPL-SCNC: 25 MMOL/L (ref 20–32)
CO2 SERPL-SCNC: 25 MMOL/L (ref 20–32)
COPATH REPORT: NORMAL
CREAT SERPL-MCNC: 1.46 MG/DL (ref 0.66–1.25)
CREAT SERPL-MCNC: 1.53 MG/DL (ref 0.66–1.25)
CREAT SERPL-MCNC: 1.72 MG/DL (ref 0.66–1.25)
CRP SERPL-MCNC: 290 MG/L (ref 0–8)
ERYTHROCYTE [DISTWIDTH] IN BLOOD BY AUTOMATED COUNT: 16.6 % (ref 10–15)
ERYTHROCYTE [DISTWIDTH] IN BLOOD BY AUTOMATED COUNT: 16.9 % (ref 10–15)
ERYTHROCYTE [DISTWIDTH] IN BLOOD BY AUTOMATED COUNT: 17 % (ref 10–15)
ERYTHROCYTE [SEDIMENTATION RATE] IN BLOOD BY WESTERGREN METHOD: 97 MM/H (ref 0–20)
FIBRINOGEN PPP-MCNC: 532 MG/DL (ref 200–420)
FIBRINOGEN PPP-MCNC: 532 MG/DL (ref 200–420)
FIBRINOGEN PPP-MCNC: 810 MG/DL (ref 200–420)
GFR SERPL CREATININE-BSD FRML MDRD: 37 ML/MIN/{1.73_M2}
GFR SERPL CREATININE-BSD FRML MDRD: 42 ML/MIN/{1.73_M2}
GFR SERPL CREATININE-BSD FRML MDRD: 45 ML/MIN/{1.73_M2}
GLUCOSE BLD-MCNC: 116 MG/DL (ref 70–99)
GLUCOSE BLD-MCNC: 121 MG/DL (ref 70–99)
GLUCOSE BLD-MCNC: 135 MG/DL (ref 70–99)
GLUCOSE BLD-MCNC: 137 MG/DL (ref 70–99)
GLUCOSE BLD-MCNC: 137 MG/DL (ref 70–99)
GLUCOSE BLD-MCNC: 138 MG/DL (ref 70–99)
GLUCOSE BLD-MCNC: 139 MG/DL (ref 70–99)
GLUCOSE BLD-MCNC: 140 MG/DL (ref 70–99)
GLUCOSE BLD-MCNC: 141 MG/DL (ref 70–99)
GLUCOSE BLD-MCNC: 150 MG/DL (ref 70–99)
GLUCOSE BLD-MCNC: 156 MG/DL (ref 70–99)
GLUCOSE BLD-MCNC: 302 MG/DL (ref 70–99)
GLUCOSE BLDC GLUCOMTR-MCNC: 114 MG/DL (ref 70–99)
GLUCOSE BLDC GLUCOMTR-MCNC: 120 MG/DL (ref 70–99)
GLUCOSE BLDC GLUCOMTR-MCNC: 124 MG/DL (ref 70–99)
GLUCOSE BLDC GLUCOMTR-MCNC: 134 MG/DL (ref 70–99)
GLUCOSE BLDC GLUCOMTR-MCNC: 95 MG/DL (ref 70–99)
GLUCOSE SERPL-MCNC: 119 MG/DL (ref 70–99)
GLUCOSE SERPL-MCNC: 132 MG/DL (ref 70–99)
GLUCOSE SERPL-MCNC: 136 MG/DL (ref 70–99)
HCO3 BLD-SCNC: 15 MMOL/L (ref 21–28)
HCO3 BLD-SCNC: 21 MMOL/L (ref 21–28)
HCO3 BLD-SCNC: 24 MMOL/L (ref 21–28)
HCO3 BLD-SCNC: 25 MMOL/L (ref 21–28)
HCO3 BLD-SCNC: 26 MMOL/L (ref 21–28)
HCO3 BLD-SCNC: 27 MMOL/L (ref 21–28)
HCO3 BLD-SCNC: 28 MMOL/L (ref 21–28)
HCO3 BLDA-SCNC: 28 MMOL/L (ref 21–28)
HCO3 BLDV-SCNC: 27 MMOL/L (ref 21–28)
HCO3 BLDV-SCNC: 29 MMOL/L (ref 21–28)
HCT VFR BLD AUTO: 24.2 % (ref 40–53)
HCT VFR BLD AUTO: 27.5 % (ref 40–53)
HCT VFR BLD AUTO: 28 % (ref 40–53)
HGB BLD-MCNC: 6.1 G/DL (ref 13.3–17.7)
HGB BLD-MCNC: 6.6 G/DL (ref 13.3–17.7)
HGB BLD-MCNC: 7.3 G/DL (ref 13.3–17.7)
HGB BLD-MCNC: 7.5 G/DL (ref 13.3–17.7)
HGB BLD-MCNC: 7.7 G/DL (ref 13.3–17.7)
HGB BLD-MCNC: 7.9 G/DL (ref 13.3–17.7)
HGB BLD-MCNC: 8.8 G/DL (ref 13.3–17.7)
HGB BLD-MCNC: 8.9 G/DL (ref 13.3–17.7)
HGB BLD-MCNC: 9 G/DL (ref 13.3–17.7)
HGB BLD-MCNC: 9.2 G/DL (ref 13.3–17.7)
HGB BLD-MCNC: 9.6 G/DL (ref 13.3–17.7)
HGB FREE PLAS-MCNC: <30 MG/DL
INR PPP: 1.13 (ref 0.86–1.14)
INR PPP: 1.2 (ref 0.86–1.14)
INR PPP: 1.46 (ref 0.86–1.14)
INR PPP: 1.82 (ref 0.86–1.14)
K TIME TO SPEC CLOT STRENGTH: 1.5 MIN (ref 1–3)
K TIME TO SPEC CLOT STRENGTH: 2.2 MIN (ref 1–3)
K TIME TO SPEC CLOT STRENGTH: 2.6 MIN (ref 1–3)
KCT BLD-ACNC: 168 SEC (ref 75–150)
KCT BLD-ACNC: 168 SEC (ref 75–150)
KCT BLD-ACNC: 172 SEC (ref 75–150)
KCT BLD-ACNC: 176 SEC (ref 75–150)
KCT BLD-ACNC: 180 SEC (ref 75–150)
KCT BLD-ACNC: 188 SEC (ref 75–150)
KCT BLD-ACNC: 192 SEC (ref 75–150)
LACTATE BLD-SCNC: 1.2 MMOL/L (ref 0.7–2)
LACTATE BLD-SCNC: 1.3 MMOL/L (ref 0.7–2)
LACTATE BLD-SCNC: 1.3 MMOL/L (ref 0.7–2)
LACTATE BLD-SCNC: 1.5 MMOL/L (ref 0.7–2)
LACTATE BLD-SCNC: 1.6 MMOL/L (ref 0.7–2)
LACTATE BLD-SCNC: 1.7 MMOL/L (ref 0.7–2)
LACTATE BLD-SCNC: 2.2 MMOL/L (ref 0.7–2)
LACTATE BLD-SCNC: 2.6 MMOL/L (ref 0.7–2)
LACTATE BLD-SCNC: 6.5 MMOL/L (ref 0.7–2)
LDH SERPL L TO P-CCNC: 453 U/L (ref 85–227)
LY60 LYSIS AT 60 MINUTES: 0.1 % (ref 0–15)
LY60 LYSIS AT 60 MINUTES: 0.8 % (ref 0–15)
LY60 LYSIS AT 60 MINUTES: 2.8 % (ref 0–15)
Lab: NORMAL
MA MAXIMUM CLOT STRENGTH: 70.6 MM (ref 55–74)
MA MAXIMUM CLOT STRENGTH: 73.6 MM (ref 55–74)
MA MAXIMUM CLOT STRENGTH: 74.8 MM (ref 55–74)
MAGNESIUM SERPL-MCNC: 2.4 MG/DL (ref 1.6–2.3)
MAGNESIUM SERPL-MCNC: 3 MG/DL (ref 1.6–2.3)
MCH RBC QN AUTO: 28.5 PG (ref 26.5–33)
MCH RBC QN AUTO: 29.3 PG (ref 26.5–33)
MCH RBC QN AUTO: 29.7 PG (ref 26.5–33)
MCHC RBC AUTO-ENTMCNC: 31.8 G/DL (ref 31.5–36.5)
MCHC RBC AUTO-ENTMCNC: 32.6 G/DL (ref 31.5–36.5)
MCHC RBC AUTO-ENTMCNC: 32.7 G/DL (ref 31.5–36.5)
MCV RBC AUTO: 90 FL (ref 78–100)
MCV RBC AUTO: 90 FL (ref 78–100)
MCV RBC AUTO: 91 FL (ref 78–100)
NUM BPU REQUESTED: 4
O2/TOTAL GAS SETTING VFR VENT: 100 %
O2/TOTAL GAS SETTING VFR VENT: 60 %
O2/TOTAL GAS SETTING VFR VENT: 70 %
O2/TOTAL GAS SETTING VFR VENT: 80 %
O2/TOTAL GAS SETTING VFR VENT: 80 %
O2/TOTAL GAS SETTING VFR VENT: 85 %
O2/TOTAL GAS SETTING VFR VENT: 85 %
OXYHGB MFR BLD: 90 % (ref 92–100)
OXYHGB MFR BLD: 92 % (ref 92–100)
OXYHGB MFR BLD: 93 % (ref 92–100)
OXYHGB MFR BLD: 93 % (ref 92–100)
OXYHGB MFR BLD: 95 % (ref 92–100)
OXYHGB MFR BLD: 97 % (ref 92–100)
OXYHGB MFR BLD: 98 % (ref 92–100)
OXYHGB MFR BLD: 99 % (ref 92–100)
OXYHGB MFR BLDA: 99 % (ref 75–100)
OXYHGB MFR BLDV: 49 %
PCO2 BLD: 33 MM HG (ref 35–45)
PCO2 BLD: 35 MM HG (ref 35–45)
PCO2 BLD: 36 MM HG (ref 35–45)
PCO2 BLD: 37 MM HG (ref 35–45)
PCO2 BLD: 37 MM HG (ref 35–45)
PCO2 BLD: 39 MM HG (ref 35–45)
PCO2 BLD: 41 MM HG (ref 35–45)
PCO2 BLD: 43 MM HG (ref 35–45)
PCO2 BLD: 47 MM HG (ref 35–45)
PCO2 BLD: 47 MM HG (ref 35–45)
PCO2 BLD: 49 MM HG (ref 35–45)
PCO2 BLD: 50 MM HG (ref 35–45)
PCO2 BLD: 51 MM HG (ref 35–45)
PCO2 BLDA: 45 MM HG (ref 35–45)
PCO2 BLDV: 49 MM HG (ref 40–50)
PCO2 BLDV: 59 MM HG (ref 40–50)
PH BLD: 7.18 PH (ref 7.35–7.45)
PH BLD: 7.31 PH (ref 7.35–7.45)
PH BLD: 7.32 PH (ref 7.35–7.45)
PH BLD: 7.33 PH (ref 7.35–7.45)
PH BLD: 7.33 PH (ref 7.35–7.45)
PH BLD: 7.35 PH (ref 7.35–7.45)
PH BLD: 7.38 PH (ref 7.35–7.45)
PH BLD: 7.41 PH (ref 7.35–7.45)
PH BLD: 7.42 PH (ref 7.35–7.45)
PH BLD: 7.43 PH (ref 7.35–7.45)
PH BLD: 7.45 PH (ref 7.35–7.45)
PH BLD: 7.46 PH (ref 7.35–7.45)
PH BLD: 7.46 PH (ref 7.35–7.45)
PH BLDA: 7.39 PH (ref 7.35–7.45)
PH BLDV: 7.27 PH (ref 7.32–7.43)
PH BLDV: 7.39 PH (ref 7.32–7.43)
PHOSPHATE SERPL-MCNC: 3.4 MG/DL (ref 2.5–4.5)
PHOSPHATE SERPL-MCNC: 4.2 MG/DL (ref 2.5–4.5)
PLATELET # BLD AUTO: 103 10E9/L (ref 150–450)
PLATELET # BLD AUTO: 105 10E9/L (ref 150–450)
PLATELET # BLD AUTO: 116 10E9/L (ref 150–450)
PLATELET # BLD AUTO: 54 10E9/L (ref 150–450)
PO2 BLD: 103 MM HG (ref 80–105)
PO2 BLD: 130 MM HG (ref 80–105)
PO2 BLD: 150 MM HG (ref 80–105)
PO2 BLD: 156 MM HG (ref 80–105)
PO2 BLD: 161 MM HG (ref 80–105)
PO2 BLD: 179 MM HG (ref 80–105)
PO2 BLD: 197 MM HG (ref 80–105)
PO2 BLD: 210 MM HG (ref 80–105)
PO2 BLD: 219 MM HG (ref 80–105)
PO2 BLD: 235 MM HG (ref 80–105)
PO2 BLD: 309 MM HG (ref 80–105)
PO2 BLD: 319 MM HG (ref 80–105)
PO2 BLD: 420 MM HG (ref 80–105)
PO2 BLD: 57 MM HG (ref 80–105)
PO2 BLD: 65 MM HG (ref 80–105)
PO2 BLD: 69 MM HG (ref 80–105)
PO2 BLD: 73 MM HG (ref 80–105)
PO2 BLD: 76 MM HG (ref 80–105)
PO2 BLDA: 177 MM HG (ref 80–105)
PO2 BLDV: 28 MM HG (ref 25–47)
PO2 BLDV: 40 MM HG (ref 25–47)
POTASSIUM BLD-SCNC: 3.2 MMOL/L (ref 3.4–5.3)
POTASSIUM BLD-SCNC: 4.8 MMOL/L (ref 3.4–5.3)
POTASSIUM BLD-SCNC: 4.8 MMOL/L (ref 3.4–5.3)
POTASSIUM BLD-SCNC: 5 MMOL/L (ref 3.4–5.3)
POTASSIUM BLD-SCNC: 5 MMOL/L (ref 3.4–5.3)
POTASSIUM BLD-SCNC: 5.5 MMOL/L (ref 3.4–5.3)
POTASSIUM BLD-SCNC: 5.6 MMOL/L (ref 3.4–5.3)
POTASSIUM BLD-SCNC: 5.6 MMOL/L (ref 3.4–5.3)
POTASSIUM SERPL-SCNC: 4.5 MMOL/L (ref 3.4–5.3)
POTASSIUM SERPL-SCNC: 4.6 MMOL/L (ref 3.4–5.3)
POTASSIUM SERPL-SCNC: 5.2 MMOL/L (ref 3.4–5.3)
PROT SERPL-MCNC: 4.2 G/DL (ref 6.8–8.8)
PROT SERPL-MCNC: 5.7 G/DL (ref 6.8–8.8)
R TIME UNTIL CLOT FORMS: 10 MIN (ref 4–9)
R TIME UNTIL CLOT FORMS: 10.7 MIN (ref 4–9)
R TIME UNTIL CLOT FORMS: 7.8 MIN (ref 4–9)
RBC # BLD AUTO: 2.7 10E12/L (ref 4.4–5.9)
RBC # BLD AUTO: 3.03 10E12/L (ref 4.4–5.9)
RBC # BLD AUTO: 3.12 10E12/L (ref 4.4–5.9)
SODIUM BLD-SCNC: 137 MMOL/L (ref 133–144)
SODIUM BLD-SCNC: 138 MMOL/L (ref 133–144)
SODIUM BLD-SCNC: 139 MMOL/L (ref 133–144)
SODIUM SERPL-SCNC: 137 MMOL/L (ref 133–144)
SODIUM SERPL-SCNC: 138 MMOL/L (ref 133–144)
SODIUM SERPL-SCNC: 141 MMOL/L (ref 133–144)
SPECIMEN SOURCE: NORMAL
SPECIMEN SOURCE: NORMAL
TRANSFUSION STATUS PATIENT QL: NORMAL
VANCOMYCIN SERPL-MCNC: 13.7 MG/L
WBC # BLD AUTO: 18.9 10E9/L (ref 4–11)
WBC # BLD AUTO: 18.9 10E9/L (ref 4–11)
WBC # BLD AUTO: 23.6 10E9/L (ref 4–11)

## 2021-03-09 PROCEDURE — 88304 TISSUE EXAM BY PATHOLOGIST: CPT | Mod: TC | Performed by: THORACIC SURGERY (CARDIOTHORACIC VASCULAR SURGERY)

## 2021-03-09 PROCEDURE — 82947 ASSAY GLUCOSE BLOOD QUANT: CPT | Performed by: SURGERY

## 2021-03-09 PROCEDURE — 84100 ASSAY OF PHOSPHORUS: CPT | Performed by: STUDENT IN AN ORGANIZED HEALTH CARE EDUCATION/TRAINING PROGRAM

## 2021-03-09 PROCEDURE — 85049 AUTOMATED PLATELET COUNT: CPT | Performed by: INTERNAL MEDICINE

## 2021-03-09 PROCEDURE — 410N000004: Performed by: THORACIC SURGERY (CARDIOTHORACIC VASCULAR SURGERY)

## 2021-03-09 PROCEDURE — 71045 X-RAY EXAM CHEST 1 VIEW: CPT | Mod: 26 | Performed by: RADIOLOGY

## 2021-03-09 PROCEDURE — 82803 BLOOD GASES ANY COMBINATION: CPT

## 2021-03-09 PROCEDURE — 370N000017 HC ANESTHESIA TECHNICAL FEE, PER MIN: Performed by: THORACIC SURGERY (CARDIOTHORACIC VASCULAR SURGERY)

## 2021-03-09 PROCEDURE — 82805 BLOOD GASES W/O2 SATURATION: CPT | Performed by: THORACIC SURGERY (CARDIOTHORACIC VASCULAR SURGERY)

## 2021-03-09 PROCEDURE — 82330 ASSAY OF CALCIUM: CPT | Performed by: THORACIC SURGERY (CARDIOTHORACIC VASCULAR SURGERY)

## 2021-03-09 PROCEDURE — 999N000157 HC STATISTIC RCP TIME EA 10 MIN

## 2021-03-09 PROCEDURE — 250N000009 HC RX 250: Performed by: THORACIC SURGERY (CARDIOTHORACIC VASCULAR SURGERY)

## 2021-03-09 PROCEDURE — 410N000003 HC PER-PERFUSION 1ST 30 MIN: Performed by: THORACIC SURGERY (CARDIOTHORACIC VASCULAR SURGERY)

## 2021-03-09 PROCEDURE — P9016 RBC LEUKOCYTES REDUCED: HCPCS | Performed by: INTERNAL MEDICINE

## 2021-03-09 PROCEDURE — 85610 PROTHROMBIN TIME: CPT | Performed by: STUDENT IN AN ORGANIZED HEALTH CARE EDUCATION/TRAINING PROGRAM

## 2021-03-09 PROCEDURE — P9037 PLATE PHERES LEUKOREDU IRRAD: HCPCS | Performed by: INTERNAL MEDICINE

## 2021-03-09 PROCEDURE — 82805 BLOOD GASES W/O2 SATURATION: CPT | Performed by: SURGERY

## 2021-03-09 PROCEDURE — 83735 ASSAY OF MAGNESIUM: CPT | Performed by: STUDENT IN AN ORGANIZED HEALTH CARE EDUCATION/TRAINING PROGRAM

## 2021-03-09 PROCEDURE — 278N000051 HC OR IMPLANT GENERAL: Performed by: THORACIC SURGERY (CARDIOTHORACIC VASCULAR SURGERY)

## 2021-03-09 PROCEDURE — 250N000013 HC RX MED GY IP 250 OP 250 PS 637: Performed by: STUDENT IN AN ORGANIZED HEALTH CARE EDUCATION/TRAINING PROGRAM

## 2021-03-09 PROCEDURE — 272N000088 HC PUMP APP ADULT PERFUSION: Performed by: THORACIC SURGERY (CARDIOTHORACIC VASCULAR SURGERY)

## 2021-03-09 PROCEDURE — 85384 FIBRINOGEN ACTIVITY: CPT | Performed by: INTERNAL MEDICINE

## 2021-03-09 PROCEDURE — 86022 PLATELET ANTIBODIES: CPT | Performed by: INTERNAL MEDICINE

## 2021-03-09 PROCEDURE — 71045 X-RAY EXAM CHEST 1 VIEW: CPT

## 2021-03-09 PROCEDURE — 258N000003 HC RX IP 258 OP 636: Performed by: THORACIC SURGERY (CARDIOTHORACIC VASCULAR SURGERY)

## 2021-03-09 PROCEDURE — 250N000024 HC ISOFLURANE, PER MIN: Performed by: THORACIC SURGERY (CARDIOTHORACIC VASCULAR SURGERY)

## 2021-03-09 PROCEDURE — 250N000025 HC SEVOFLURANE, PER MIN: Performed by: THORACIC SURGERY (CARDIOTHORACIC VASCULAR SURGERY)

## 2021-03-09 PROCEDURE — 85652 RBC SED RATE AUTOMATED: CPT | Performed by: STUDENT IN AN ORGANIZED HEALTH CARE EDUCATION/TRAINING PROGRAM

## 2021-03-09 PROCEDURE — 250N000009 HC RX 250: Performed by: NURSE ANESTHETIST, CERTIFIED REGISTERED

## 2021-03-09 PROCEDURE — 999N001063 HC STATISTIC THAWING COMPONENT: Performed by: INTERNAL MEDICINE

## 2021-03-09 PROCEDURE — 94003 VENT MGMT INPAT SUBQ DAY: CPT

## 2021-03-09 PROCEDURE — 999N001017 HC STATISTIC GLUCOSE BY METER IP

## 2021-03-09 PROCEDURE — 250N000011 HC RX IP 250 OP 636: Performed by: THORACIC SURGERY (CARDIOTHORACIC VASCULAR SURGERY)

## 2021-03-09 PROCEDURE — 85610 PROTHROMBIN TIME: CPT | Performed by: INTERNAL MEDICINE

## 2021-03-09 PROCEDURE — 02RG08Z REPLACEMENT OF MITRAL VALVE WITH ZOOPLASTIC TISSUE, OPEN APPROACH: ICD-10-PCS | Performed by: THORACIC SURGERY (CARDIOTHORACIC VASCULAR SURGERY)

## 2021-03-09 PROCEDURE — P9059 PLASMA, FRZ BETWEEN 8-24HOUR: HCPCS | Performed by: INTERNAL MEDICINE

## 2021-03-09 PROCEDURE — 999N000065 XR CHEST PORT 1 VW

## 2021-03-09 PROCEDURE — 90947 DIALYSIS REPEATED EVAL: CPT

## 2021-03-09 PROCEDURE — 85396 CLOTTING ASSAY WHOLE BLOOD: CPT | Performed by: INTERNAL MEDICINE

## 2021-03-09 PROCEDURE — 85027 COMPLETE CBC AUTOMATED: CPT | Performed by: INTERNAL MEDICINE

## 2021-03-09 PROCEDURE — 85730 THROMBOPLASTIN TIME PARTIAL: CPT | Performed by: INTERNAL MEDICINE

## 2021-03-09 PROCEDURE — 84295 ASSAY OF SERUM SODIUM: CPT

## 2021-03-09 PROCEDURE — 0BJ08ZZ INSPECTION OF TRACHEOBRONCHIAL TREE, VIA NATURAL OR ARTIFICIAL OPENING ENDOSCOPIC: ICD-10-PCS | Performed by: THORACIC SURGERY (CARDIOTHORACIC VASCULAR SURGERY)

## 2021-03-09 PROCEDURE — 272N000001 HC OR GENERAL SUPPLY STERILE: Performed by: THORACIC SURGERY (CARDIOTHORACIC VASCULAR SURGERY)

## 2021-03-09 PROCEDURE — 85027 COMPLETE CBC AUTOMATED: CPT | Performed by: STUDENT IN AN ORGANIZED HEALTH CARE EDUCATION/TRAINING PROGRAM

## 2021-03-09 PROCEDURE — 360N000079 HC SURGERY LEVEL 6, PER MIN: Performed by: THORACIC SURGERY (CARDIOTHORACIC VASCULAR SURGERY)

## 2021-03-09 PROCEDURE — 88304 TISSUE EXAM BY PATHOLOGIST: CPT | Mod: 26 | Performed by: PATHOLOGY

## 2021-03-09 PROCEDURE — 250N000013 HC RX MED GY IP 250 OP 250 PS 637: Performed by: INTERNAL MEDICINE

## 2021-03-09 PROCEDURE — 200N000002 HC R&B ICU UMMC

## 2021-03-09 PROCEDURE — 82330 ASSAY OF CALCIUM: CPT | Performed by: SURGERY

## 2021-03-09 PROCEDURE — 250N000011 HC RX IP 250 OP 636: Performed by: NURSE ANESTHETIST, CERTIFIED REGISTERED

## 2021-03-09 PROCEDURE — 85730 THROMBOPLASTIN TIME PARTIAL: CPT | Performed by: STUDENT IN AN ORGANIZED HEALTH CARE EDUCATION/TRAINING PROGRAM

## 2021-03-09 PROCEDURE — 83615 LACTATE (LD) (LDH) ENZYME: CPT | Performed by: STUDENT IN AN ORGANIZED HEALTH CARE EDUCATION/TRAINING PROGRAM

## 2021-03-09 PROCEDURE — 85384 FIBRINOGEN ACTIVITY: CPT | Performed by: STUDENT IN AN ORGANIZED HEALTH CARE EDUCATION/TRAINING PROGRAM

## 2021-03-09 PROCEDURE — 83605 ASSAY OF LACTIC ACID: CPT | Performed by: THORACIC SURGERY (CARDIOTHORACIC VASCULAR SURGERY)

## 2021-03-09 PROCEDURE — 80202 ASSAY OF VANCOMYCIN: CPT | Performed by: THORACIC SURGERY (CARDIOTHORACIC VASCULAR SURGERY)

## 2021-03-09 PROCEDURE — 250N000013 HC RX MED GY IP 250 OP 250 PS 637: Performed by: SURGERY

## 2021-03-09 PROCEDURE — 85396 CLOTTING ASSAY WHOLE BLOOD: CPT | Performed by: ANESTHESIOLOGY

## 2021-03-09 PROCEDURE — 83605 ASSAY OF LACTIC ACID: CPT

## 2021-03-09 PROCEDURE — 999N000015 HC STATISTIC ARTERIAL MONITORING DAILY

## 2021-03-09 PROCEDURE — 84132 ASSAY OF SERUM POTASSIUM: CPT

## 2021-03-09 PROCEDURE — 80048 BASIC METABOLIC PNL TOTAL CA: CPT | Performed by: INTERNAL MEDICINE

## 2021-03-09 PROCEDURE — 80053 COMPREHEN METABOLIC PANEL: CPT | Performed by: STUDENT IN AN ORGANIZED HEALTH CARE EDUCATION/TRAINING PROGRAM

## 2021-03-09 PROCEDURE — 82947 ASSAY GLUCOSE BLOOD QUANT: CPT

## 2021-03-09 PROCEDURE — 86140 C-REACTIVE PROTEIN: CPT | Performed by: STUDENT IN AN ORGANIZED HEALTH CARE EDUCATION/TRAINING PROGRAM

## 2021-03-09 PROCEDURE — 82947 ASSAY GLUCOSE BLOOD QUANT: CPT | Performed by: THORACIC SURGERY (CARDIOTHORACIC VASCULAR SURGERY)

## 2021-03-09 PROCEDURE — 272N000085 HC PACK CELL SAVER CSP: Performed by: THORACIC SURGERY (CARDIOTHORACIC VASCULAR SURGERY)

## 2021-03-09 PROCEDURE — 250N000009 HC RX 250: Performed by: INTERNAL MEDICINE

## 2021-03-09 PROCEDURE — 99291 CRITICAL CARE FIRST HOUR: CPT | Mod: 24 | Performed by: INTERNAL MEDICINE

## 2021-03-09 PROCEDURE — 87070 CULTURE OTHR SPECIMN AEROBIC: CPT | Performed by: SURGERY

## 2021-03-09 PROCEDURE — C1894 INTRO/SHEATH, NON-LASER: HCPCS | Performed by: THORACIC SURGERY (CARDIOTHORACIC VASCULAR SURGERY)

## 2021-03-09 PROCEDURE — C1713 ANCHOR/SCREW BN/BN,TIS/BN: HCPCS | Performed by: THORACIC SURGERY (CARDIOTHORACIC VASCULAR SURGERY)

## 2021-03-09 PROCEDURE — 999N000127 HC STATISTIC PERIPHERAL IV START W US GUIDANCE

## 2021-03-09 PROCEDURE — 85347 COAGULATION TIME ACTIVATED: CPT

## 2021-03-09 PROCEDURE — 5A1221Z PERFORMANCE OF CARDIAC OUTPUT, CONTINUOUS: ICD-10-PCS | Performed by: THORACIC SURGERY (CARDIOTHORACIC VASCULAR SURGERY)

## 2021-03-09 PROCEDURE — 82330 ASSAY OF CALCIUM: CPT

## 2021-03-09 PROCEDURE — 85300 ANTITHROMBIN III ACTIVITY: CPT | Performed by: STUDENT IN AN ORGANIZED HEALTH CARE EDUCATION/TRAINING PROGRAM

## 2021-03-09 PROCEDURE — 83605 ASSAY OF LACTIC ACID: CPT | Performed by: SURGERY

## 2021-03-09 PROCEDURE — 999N000185 HC STATISTIC TRANSPORT TIME EA 15 MIN

## 2021-03-09 PROCEDURE — 83051 HEMOGLOBIN PLASMA: CPT | Performed by: STUDENT IN AN ORGANIZED HEALTH CARE EDUCATION/TRAINING PROGRAM

## 2021-03-09 PROCEDURE — 33949 ECMO/ECLS DAILY MGMT ARTERY: CPT

## 2021-03-09 RX ORDER — NOREPINEPHRINE BITARTRATE 0.06 MG/ML
0.03-0.4 INJECTION, SOLUTION INTRAVENOUS CONTINUOUS
Status: DISCONTINUED | OUTPATIENT
Start: 2021-03-09 | End: 2021-03-10

## 2021-03-09 RX ORDER — AMOXICILLIN 250 MG
1-2 CAPSULE ORAL 2 TIMES DAILY
Status: DISCONTINUED | OUTPATIENT
Start: 2021-03-09 | End: 2021-03-12

## 2021-03-09 RX ORDER — PROTAMINE SULFATE 10 MG/ML
INJECTION, SOLUTION INTRAVENOUS PRN
Status: DISCONTINUED | OUTPATIENT
Start: 2021-03-09 | End: 2021-03-09

## 2021-03-09 RX ORDER — SODIUM CHLORIDE, SODIUM GLUCONATE, SODIUM ACETATE, POTASSIUM CHLORIDE AND MAGNESIUM CHLORIDE 526; 502; 368; 37; 30 MG/100ML; MG/100ML; MG/100ML; MG/100ML; MG/100ML
INJECTION, SOLUTION INTRAVENOUS CONTINUOUS PRN
Status: DISCONTINUED | OUTPATIENT
Start: 2021-03-09 | End: 2021-03-09

## 2021-03-09 RX ORDER — HEPARIN SODIUM 1000 [USP'U]/ML
INJECTION, SOLUTION INTRAVENOUS; SUBCUTANEOUS PRN
Status: DISCONTINUED | OUTPATIENT
Start: 2021-03-09 | End: 2021-03-09

## 2021-03-09 RX ORDER — NITROGLYCERIN 10 MG/100ML
INJECTION INTRAVENOUS PRN
Status: DISCONTINUED | OUTPATIENT
Start: 2021-03-09 | End: 2021-03-09

## 2021-03-09 RX ORDER — FENTANYL CITRATE 50 UG/ML
INJECTION, SOLUTION INTRAMUSCULAR; INTRAVENOUS PRN
Status: DISCONTINUED | OUTPATIENT
Start: 2021-03-09 | End: 2021-03-09

## 2021-03-09 RX ORDER — BISACODYL 10 MG
10 SUPPOSITORY, RECTAL RECTAL DAILY PRN
Status: DISCONTINUED | OUTPATIENT
Start: 2021-03-09 | End: 2021-04-05 | Stop reason: HOSPADM

## 2021-03-09 RX ADMIN — Medication 1 UNITS: at 18:17

## 2021-03-09 RX ADMIN — CEFEPIME 2 G: 2 INJECTION, POWDER, FOR SOLUTION INTRAVENOUS at 03:22

## 2021-03-09 RX ADMIN — FENTANYL CITRATE 150 MCG: 50 INJECTION, SOLUTION INTRAMUSCULAR; INTRAVENOUS at 15:02

## 2021-03-09 RX ADMIN — ROCURONIUM BROMIDE 40 MG: 10 INJECTION INTRAVENOUS at 15:02

## 2021-03-09 RX ADMIN — NITROGLYCERIN 50 MCG: 10 INJECTION INTRAVENOUS at 18:21

## 2021-03-09 RX ADMIN — CALCIUM CHLORIDE, MAGNESIUM CHLORIDE, SODIUM CHLORIDE, SODIUM BICARBONATE, POTASSIUM CHLORIDE AND SODIUM PHOSPHATE DIBASIC DIHYDRATE 12.5 ML/KG/HR: 3.68; 3.05; 6.34; 3.09; .314; .187 INJECTION INTRAVENOUS at 03:13

## 2021-03-09 RX ADMIN — VANCOMYCIN HYDROCHLORIDE 1500 MG: 10 INJECTION, POWDER, LYOPHILIZED, FOR SOLUTION INTRAVENOUS at 15:35

## 2021-03-09 RX ADMIN — CALCIUM CHLORIDE, MAGNESIUM CHLORIDE, SODIUM CHLORIDE, SODIUM BICARBONATE, POTASSIUM CHLORIDE AND SODIUM PHOSPHATE DIBASIC DIHYDRATE: 3.68; 3.05; 6.34; 3.09; .314; .187 INJECTION INTRAVENOUS at 20:28

## 2021-03-09 RX ADMIN — DOCUSATE SODIUM 50 MG AND SENNOSIDES 8.6 MG 1 TABLET: 8.6; 5 TABLET, FILM COATED ORAL at 08:20

## 2021-03-09 RX ADMIN — Medication 0.03 MCG/KG/MIN: at 15:55

## 2021-03-09 RX ADMIN — CHLORHEXIDINE GLUCONATE 0.12% ORAL RINSE 15 ML: 1.2 LIQUID ORAL at 08:21

## 2021-03-09 RX ADMIN — CALCIUM CHLORIDE, MAGNESIUM CHLORIDE, SODIUM CHLORIDE, SODIUM BICARBONATE, POTASSIUM CHLORIDE AND SODIUM PHOSPHATE DIBASIC DIHYDRATE 12.5 ML/KG/HR: 3.68; 3.05; 6.34; 3.09; .314; .187 INJECTION INTRAVENOUS at 20:28

## 2021-03-09 RX ADMIN — HEPARIN SODIUM 30000 UNITS: 1000 INJECTION INTRAVENOUS; SUBCUTANEOUS at 16:00

## 2021-03-09 RX ADMIN — CALCIUM CHLORIDE, MAGNESIUM CHLORIDE, SODIUM CHLORIDE, SODIUM BICARBONATE, POTASSIUM CHLORIDE AND SODIUM PHOSPHATE DIBASIC DIHYDRATE 12.5 ML/KG/HR: 3.68; 3.05; 6.34; 3.09; .314; .187 INJECTION INTRAVENOUS at 08:27

## 2021-03-09 RX ADMIN — AMIODARONE HYDROCHLORIDE 200 MG: 200 TABLET ORAL at 08:20

## 2021-03-09 RX ADMIN — CALCIUM CHLORIDE, MAGNESIUM CHLORIDE, DEXTROSE MONOHYDRATE, LACTIC ACID, SODIUM CHLORIDE, SODIUM BICARBONATE AND POTASSIUM CHLORIDE 12.5 ML/KG/HR: 5.15; 2.03; 22; 5.4; 6.46; 3.09; .157 INJECTION INTRAVENOUS at 10:00

## 2021-03-09 RX ADMIN — Medication 0.5 UNITS: at 18:50

## 2021-03-09 RX ADMIN — CALCIUM CHLORIDE, MAGNESIUM CHLORIDE, DEXTROSE MONOHYDRATE, LACTIC ACID, SODIUM CHLORIDE, SODIUM BICARBONATE AND POTASSIUM CHLORIDE 12.5 ML/KG/HR: 5.15; 2.03; 22; 5.4; 6.46; 3.09; .157 INJECTION INTRAVENOUS at 20:27

## 2021-03-09 RX ADMIN — ROCURONIUM BROMIDE 40 MG: 10 INJECTION INTRAVENOUS at 15:45

## 2021-03-09 RX ADMIN — PANTOPRAZOLE SODIUM 40 MG: 40 TABLET, DELAYED RELEASE ORAL at 08:20

## 2021-03-09 RX ADMIN — EPINEPHRINE 10 MCG: 1 INJECTION PARENTERAL at 18:16

## 2021-03-09 RX ADMIN — FENTANYL CITRATE 50 MCG: 50 INJECTION, SOLUTION INTRAMUSCULAR; INTRAVENOUS at 19:59

## 2021-03-09 RX ADMIN — SODIUM CHLORIDE, SODIUM GLUCONATE, SODIUM ACETATE, POTASSIUM CHLORIDE AND MAGNESIUM CHLORIDE: 526; 502; 368; 37; 30 INJECTION, SOLUTION INTRAVENOUS at 14:59

## 2021-03-09 RX ADMIN — MUPIROCIN 0.5 G: 20 OINTMENT TOPICAL at 08:21

## 2021-03-09 RX ADMIN — CALCIUM CHLORIDE, MAGNESIUM CHLORIDE, DEXTROSE MONOHYDRATE, LACTIC ACID, SODIUM CHLORIDE, SODIUM BICARBONATE AND POTASSIUM CHLORIDE 12.5 ML/KG/HR: 5.15; 2.03; 22; 5.4; 6.46; 3.09; .157 INJECTION INTRAVENOUS at 04:28

## 2021-03-09 RX ADMIN — EPINEPHRINE 10 MCG: 1 INJECTION PARENTERAL at 18:14

## 2021-03-09 RX ADMIN — CEFEPIME 2 G: 2 INJECTION, POWDER, FOR SOLUTION INTRAVENOUS at 15:21

## 2021-03-09 RX ADMIN — ROCURONIUM BROMIDE 10 MG: 10 INJECTION INTRAVENOUS at 17:04

## 2021-03-09 RX ADMIN — Medication 0.5 UNITS: at 15:06

## 2021-03-09 RX ADMIN — NITROGLYCERIN 50 MCG: 10 INJECTION INTRAVENOUS at 18:23

## 2021-03-09 RX ADMIN — Medication 0.5 UNITS: at 15:10

## 2021-03-09 RX ADMIN — HEPARIN SODIUM 1300 UNITS/HR: 10000 INJECTION, SOLUTION INTRAVENOUS at 05:55

## 2021-03-09 RX ADMIN — POLYETHYLENE GLYCOL 3350 17 G: 17 POWDER, FOR SOLUTION ORAL at 08:20

## 2021-03-09 RX ADMIN — ASPIRIN 81 MG CHEWABLE TABLET 81 MG: 81 TABLET CHEWABLE at 08:20

## 2021-03-09 RX ADMIN — AMIODARONE HYDROCHLORIDE 200 MG: 200 TABLET ORAL at 14:43

## 2021-03-09 RX ADMIN — ROCURONIUM BROMIDE 10 MG: 10 INJECTION INTRAVENOUS at 17:55

## 2021-03-09 RX ADMIN — ROCURONIUM BROMIDE 10 MG: 10 INJECTION INTRAVENOUS at 18:51

## 2021-03-09 RX ADMIN — ROCURONIUM BROMIDE 20 MG: 10 INJECTION INTRAVENOUS at 15:23

## 2021-03-09 RX ADMIN — PROTAMINE SULFATE 250 MG: 10 INJECTION, SOLUTION INTRAVENOUS at 18:25

## 2021-03-09 ASSESSMENT — ACTIVITIES OF DAILY LIVING (ADL)
ADLS_ACUITY_SCORE: 22

## 2021-03-09 ASSESSMENT — MIFFLIN-ST. JEOR: SCORE: 1618.13

## 2021-03-09 NOTE — PROGRESS NOTES
CRRT STATUS NOTE    DATA:  Time:  5:33 PM  Pressures WNL:  YES  Filter Status:  WDL    Problems Reported/Alarms Noted:  n/a    Supplies Present:  YES    ASSESSMENT:  Patient Net Fluid Balance:  -538  Vital Signs:  HR90 RR28 82/61 89%  Labs:  K5.5 ical4.1  Goals of Therapy:  I=O    INTERVENTIONS:   OR for decannulation    PLAN:  Continue fluid removal as tolerated per goals of therapy. Check circuit daily and change circuit q72h and prn. Please contact CRRT resource RN at [phone] with any questions/concerns.

## 2021-03-09 NOTE — ANESTHESIA PREPROCEDURE EVALUATION
Anesthesia Pre-Procedure Evaluation    Patient: Jin Garrett   MRN: [id number] : 1940        Preoperative Diagnosis: Status post cardiac surgery [Z98.890]   Procedure : Procedure(s):  REMOVAL, CANNULA, ADULT, FOR ECMO  INCISION AND DRAINAGE, WOUND, CHEST, WITH IRRIGATION AND ALL OTHER ASSOCIATED PROCEDURES     No past medical history on file.   No past surgical history on file.   No Known Allergies   Social History     Tobacco Use     Smoking status: Not on file   Substance Use Topics     Alcohol use: Not on file      Wt Readings from Last 1 Encounters:   21 88.6 kg (195 lb 5.2 oz)        Anesthesia Evaluation            ROS/MED HX  ENT/Pulmonary: Comment: Intubated and sedated      Neurologic:       Cardiovascular: Comment:   VA ECMO (central)    (+) hypertension--CAD ---CHF valvular problems/murmurs type: MR Previous cardiac testing   Echo: Date: Results:  Interpretation Summary  Global and regional left ventricular function is normal with an EF of 55-60%.  Right ventricular function, chamber size, wall motion, and thickness are  normal.  Ruptured Chordae with flail posterior mitral leaflet. MR difficult to assess.  Impella device in place.  Ascending aorta 4 cm.  Dilation of the inferior vena cava is present with abnormal respiratory  variation in diameter.  No pericardial effusion is present.  The atrial septum is intact as assessed by agitated saline bubble study .  There is no prior study for direct comparison.  Stress Test: Date: Results:    ECG Reviewed: Date: Results:    Cath: Date: Results:      METS/Exercise Tolerance:     Hematologic:       Musculoskeletal:       GI/Hepatic:       Renal/Genitourinary:     (+) renal disease, type: ARF,     Endo: Comment: Stress hyperglycemia        Psychiatric/Substance Use:       Infectious Disease: Comment: Leukocytosis - likely secondary to physiologic stress      Malignancy:       Other:               OUTSIDE LABS:  CBC:   Lab Results   Component Value  Date    WBC 18.9 (H) 03/09/2021    WBC 18.2 (H) 03/08/2021    HGB 9.0 (L) 03/09/2021    HGB 8.7 (L) 03/08/2021    HCT 27.5 (L) 03/09/2021    HCT 27.6 (L) 03/08/2021     (L) 03/09/2021     (L) 03/08/2021     BMP:   Lab Results   Component Value Date     03/09/2021     03/08/2021    POTASSIUM 4.6 03/09/2021    POTASSIUM 4.8 03/08/2021    CHLORIDE 106 03/09/2021    CHLORIDE 108 03/08/2021    CO2 25 03/09/2021    CO2 28 03/08/2021    BUN 33 (H) 03/09/2021    BUN 35 (H) 03/08/2021    CR 1.53 (H) 03/09/2021    CR 1.63 (H) 03/08/2021     (H) 03/09/2021     (H) 03/09/2021     COAGS:   Lab Results   Component Value Date    PTT 87 (H) 03/09/2021    INR 1.20 (H) 03/09/2021    FIBR 810 (H) 03/09/2021     POC:   Lab Results   Component Value Date     (H) 03/09/2021     HEPATIC:   Lab Results   Component Value Date    ALBUMIN 2.0 (L) 03/09/2021    PROTTOTAL 5.7 (L) 03/09/2021    ALT 25 03/09/2021    AST 44 03/09/2021    ALKPHOS 78 03/09/2021    BILITOTAL 0.6 03/09/2021     OTHER:   Lab Results   Component Value Date    PH 7.41 03/09/2021    LACT 1.2 03/09/2021    A1C 5.7 (H) 03/05/2021    PALLAVI 8.7 03/09/2021    PHOS 3.4 03/09/2021    MAG 2.4 (H) 03/09/2021    .0 (H) 03/09/2021    SED 97 (H) 03/09/2021       Anesthesia Plan    ASA Status:  4      Anesthesia Type: General.   Induction: N/a.   Maintenance: Balanced.        Consents            Postoperative Care            Comments:                Harry Grajeda MD

## 2021-03-09 NOTE — PROGRESS NOTES
"  Nephrology Progress Note  03/09/2021         Assessment & Recommendations:   Jin Garrett is a 80 year old with PMH HTN, AAA, HLD, RA who presented to OSH with SOB and atypical CP. TTE showed severe MR with ?posterior flail leaflet, EF preserved. On arrival at [facility] an Impella was emergently placed. S/p MVR and single vessel CABG on 3/5/21. Returned from the OR on central ECMO with an open chest. Nephrology consulted for ADAN.     ADAN in the setting of cardiogenic shock on ECMO  Hyperkalemia, resolved with CRRT  Unknown baseline, no h/o CKD per daughter. 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 Cr 2.5 at presentation, decreased to 2.1 today which was probably dilutional as pt was aggressively resuscitated yesterday and now back up to 2.6. Etiology likely ATN in the setting of shock, no obstruction per CT and urinalysis showed with granular casts. UOP declined and K rising  on 3/6, so initiated on CRRT.  - continue CRRT as there is no clear signs of renal recovery   - Goal UF 0-50 net neg with out going up on pressors  - Likely need HD catheter to continue RRT if his ECMO is taken down  - Labs per CRRT protocol  - Avoid nephrotoxins  - Renally dose medications  - Renal diet  - Daily weights  - Strict I/Os      Recommendations were communicated to primary team via this note     Seen and discussed with Dr. Leyda Chester MD   [phone]    Interval History :   Nursing and provider notes from last 24 hours reviewed.  In the last 24 hours Jin Garrett continued to be intubated and sedated, likely going to OR for wash out and possible ECMO take down.    Physical Exam:   I/O last 3 completed shifts:  In: 2502.8 [I.V.:1192.8; NG/GT:230]  Out: 3499 [Urine:192; Other:2067; Chest Tube:1240]   BP 99/61   Pulse 86   Temp 98.1  F (36.7  C)   Resp 26   Ht 1.803 m (5' 11\")   Wt 88.6 kg (195 lb 5.2 oz)   SpO2 98%   BMI 27.24 kg/m   "     General : Pt sedated and intubated, not in acute distress   Lungs : anterior lung fields are clear  Cardiac : S1, S2 present  Abdomen : Soft/ND/NT  LE : Edema noted  Dialysis Access : ECMO circuit     Labs:   All labs reviewed by me  Electrolytes/Renal -   Recent Labs   Lab Test 03/09/21 0358 03/08/21 2209 03/08/21  1601 03/08/21  1600 03/08/21  0351 03/08/21  0351    139  --  139   < > 138   POTASSIUM 4.6 4.8  --  4.6   < > 4.4   CHLORIDE 106 108  --  109   < > 108   CO2 25 28  --  24   < > 26   BUN 33* 35*  --  33*   < > 34*   CR 1.53* 1.63*  --  1.64*   < > 1.86*   *  137* 130*  127* 155* 149*   < > 128*  126*   PALLAVI 8.7 8.8  --  8.7   < > 8.6   MAG 2.4*  --   --  2.3  --  2.4*   PHOS 3.4  --   --  3.0  --  3.8    < > = values in this interval not displayed.       CBC -   Recent Labs   Lab Test 03/09/21 0358 03/08/21 2209 03/08/21  1600   WBC 18.9* 18.2* 18.2*   HGB 9.0* 8.7* 8.1*   * 103* 102*       LFTs -   Recent Labs   Lab Test 03/09/21 0358 03/08/21  0351 03/07/21  0353   ALKPHOS 78 66 56   BILITOTAL 0.6 0.5 0.8   ALT 25 24 24   AST 44 62* 90*   PROTTOTAL 5.7* 4.9* 4.9*   ALBUMIN 2.0* 2.0* 2.3*     Current Medications:    amiodarone  200 mg Oral or NG Tube TID     aspirin  81 mg Oral or NG Tube Daily     ceFEPIme (MAXIPIME) IV  2 g Intravenous Q12H     chlorhexidine  15 mL Swish & Spit BID     insulin aspart  1-4 Units Subcutaneous Q4H     mupirocin  0.5 g Both Nostrils BID     pantoprazole  40 mg Oral or NG Tube Daily    Or     pantoprazole  40 mg Oral Daily     polyethylene glycol  17 g Oral BID     senna-docusate  1 tablet Oral BID     vancomycin (VANCOCIN) IV  1,500 mg Intravenous Q24H       dextrose       CRRT replacement solution 12.5 mL/kg/hr (03/09/21 0932)     EPINEPHrine Stopped (03/08/21 2312)     fentaNYL 50 mcg/hr (03/09/21 0900)     HEParin 1,300 Units/hr (03/09/21 0900)     - MEDICATION INSTRUCTIONS -       - MEDICATION INSTRUCTIONS -       norepinephrine Stopped  (03/07/21 0845)     CRRT replacement solution 200 mL/hr at 03/08/21 1659     CRRT replacement solution 12.5 mL/kg/hr (03/09/21 0827)     propofol (DIPRIVAN) infusion Stopped (03/09/21 0200)     BETA BLOCKER NOT PRESCRIBED       Andie Chester MD

## 2021-03-09 NOTE — PHARMACY-VANCOMYCIN DOSING SERVICE
Pharmacy Vancomycin Note  Date of Service 2021  Patient's  1940   80 year old, male    Indication: open chest ppx  Goal Trough Level: 10-15 mg/L  Day of Therapy: 5  Current Vancomycin regimen:  1500 mg IV q24h    Current estimated CrCl = Estimated Creatinine Clearance: 50.6 mL/min (A) (based on SCr of 1.46 mg/dL (H)).    Creatinine for last 3 days  3/6/2021:  4:12 PM Creatinine 2.56 mg/dL; 10:02 PM Creatinine 2.34 mg/dL  3/7/2021:  3:53 AM Creatinine 2.25 mg/dL; 10:02 AM Creatinine 2.13 mg/dL;  4:20 PM Creatinine 2.04 mg/dL;  9:58 PM Creatinine 1.98 mg/dL  3/8/2021:  3:51 AM Creatinine 1.86 mg/dL; 10:35 AM Creatinine 1.75 mg/dL;  4:00 PM Creatinine 1.64 mg/dL; 10:09 PM Creatinine 1.63 mg/dL  3/9/2021:  3:58 AM Creatinine 1.53 mg/dL; 10:17 AM Creatinine 1.46 mg/dL    Recent Vancomycin Levels (past 3 days)  3/9/2021:  1:46 PM Vancomycin Level 13.7 mg/L    Vancomycin IV Administrations (past 72 hours)                   vancomycin 1500 mg in 0.9% NaCl 250 ml intermittent infusion 1,500 mg (mg) 1,500 mg Given 21 1535     1,500 mg New Bag 21 1359     1,500 mg New Bag 21 1353                Nephrotoxins and other renal medications (From now, onward)    Start     Dose/Rate Route Frequency Ordered Stop    21 1500  norepinephrine (LEVOPHED) 16 mg in  mL infusion CENTRAL LINE      0.03-0.4 mcg/kg/min × 79 kg (Dosing Weight)  2.2-29.6 mL/hr  Intravenous CONTINUOUS 21 1437      21 1400  [Auto Hold]  vancomycin 1500 mg in 0.9% NaCl 250 ml intermittent infusion 1,500 mg     (Auto Hold since Tue 3/9/2021 at 1500.Hold Reason: Transfer to a procedural area.)    1,500 mg  over 90 Minutes Intravenous EVERY 24 HOURS 21 1528      03/05/21 0630  norepinephrine (LEVOPHED) 16 mg in  mL infusion CENTRAL LINE      0.03-0.4 mcg/kg/min × 79 kg (Dosing Weight)  2.2-29.6 mL/hr  Intravenous CONTINUOUS 21 0603               Contrast Orders - past 72 hours (72h ago, onward)     None          Interpretation of levels and current regimen:  Trough level is  Therapeutic    Renal Function: CRRT    Plan:  1.  Continue Current Dose  2.  Pharmacy will check trough levels as appropriate in 1-3 Days.    3. Serum creatinine levels will be ordered daily for the first week of therapy and at least twice weekly for subsequent weeks.      Monique Kaiser RPH        .

## 2021-03-09 NOTE — PROGRESS NOTES
CV ICU PROGRESS NOTE  March 9, 2021      CO-MORBIDITIES:   Cardiogenic shock (H)  (primary encounter diagnosis)    ASSESSMENT: Jin Garrett is a 80 year old male with PMH of HTN, AAA, HLD and rheumatoid arthritis. He presented to an OSH with shortness of breath and atypical chest pain. TTE showed severe MR with concern for posterior flail leaflet, EF was preserved. On arrival at [facility] an Impella was emergently placed in cath lab. 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 Porras on 3/5/21. Returned from the OR on central ECMO with an open chest, intubated and sedated. Returned to the OR for a wash out on 3/9.    TODAY'S PROGRESS:   - ECMO turndown study with TASH in OR today  - OR for washout and possible IABP today  - Bronch at time of washout   - Goal net even     PLAN:  Neuro/ pain/ sedation:  Post-operative pain  - Monitor neurological status. Notify the MD for any acute changes in exam.  - Fentanyl gtt for pain, decrease as able   - Propofol gtt for sedation     Pulmonary care:  Acute post-operative respiratory failure  - MV  - Titrate FiO2 for SpO2 >92%  - Bronch at time of washout today      Cardiovascular:  HTN  AAA  HLD  A fib  Severe mitral regurgitation s/p MVR  CAD with 100% occlusion of the mid LCx s/p single vessel CABG   Cardiogenic shock  Vasoplegic shock   VA ECMO (central)   - Monitor hemodynamic status.   - MAP >65  - Vasopressors: epi  - Prophylactic amiodarone  - EMCO TASH turndown study today (in the OR)  - Return to OR for washout and possible ECMO decannulation     GI/Nutrition:   - Advance TF towards goal, holding today  - Consider NJ tomorrow      Fluids/ Electrolytes/Renal:  ADAN   Hyperkalemia  - TKO for IV fluid hydration.  - Will continue to monitor intake and output.  - Continue to monitor electrolytes  - Nephrology consulted appreciate recommendations  - Continue CRRT, goal net even      Endocrine:  Stress hyperglycemia    - Discontinue insulin gtt  - Start low dose  SSI  - Keep blood glucose <180     ID/ Antibiotics:  Leukocytosis - likely secondary to physiologic stress  - Complete perioperative/open chest antibiotics: cefepime and vancomycin  - Monitor CBC and fever curve, rising WBC   - Pan-culture - NGTD     Heme:  Hemorraghic shock      - Hgb goal >8  - CBC q6 hours  - Heparin gtt   - ACT goal 180-200     Prophylaxis:    - Mechanical prophylaxis for DVT.   - Bowel regimen  - Heparin gtt  - Protonix qd     Lines/ tubes/ drains:  - MAC  - Arterial line  - ETT  - OG  - Central ECMO cannulas  - 3 mediastinal CTs  - 2 pleural CTs  - Alfaro     Disposition:  - CV ICU.      Patient seen, findings and plan discussed with CV ICU staff, Dr. Hurtado.     BRYANT Tinajero ([facility])  Anesthesiology Resident  *[id number]    ====================================    SUBJECTIVE:   Intubated and sedated    OBJECTIVE:   1. VITAL SIGNS:   Temp:  [98.1  F (36.7  C)-98.8  F (37.1  C)] 98.2  F (36.8  C)  Pulse:  [65-89] 80  Resp:  [12-32] 28  MAP:  [65 mmHg-106 mmHg] 87 mmHg  Arterial Line BP: ()/(54-99) 100/84  FiO2 (%):  [50 %-60 %] 60 %  SpO2:  [75 %-100 %] 99 %  Ventilation Mode: PCV Plus assist  (Pressure Control Ventilation/ Assist Control)  FiO2 (%): 60 %  Rate Set (breaths/minute): 12 breaths/min  Tidal Volume Set (mL): (S) 600 mL  PEEP (cm H2O): 5 cmH2O  Oxygen Concentration (%): 60 %  Peak Inspiratory Pressure (cm H2O) (Drager Fabiana): 18  Resp: 28      2. INTAKE/ OUTPUT:   I/O last 3 completed shifts:  In: 2911.7 [I.V.:1161.7; NG/GT:340]  Out: 3261 [Urine:185; Other:2161; Chest Tube:915]    3. PHYSICAL EXAMINATION:   General: lying in bed  Neuro: Sedated, not following commands at this time  Resp: Mechanically ventilated CTAB  CV: RRR  Abdomen: Soft, Non-distended, Non-tender  Incisions: open chest, dressing in place  Extremities: warm and and well perfused upper extremities, lower extremities warn, difficult to palpate pulses in the feet, dusky finger and toes tips    4. INVESTIGATIONS:    Arterial Blood Gases   Recent Labs   Lab 03/09/21  0601 03/09/21  0358 03/09/21  0210 03/09/21  0015   PH 7.41 7.42 7.42 7.41   PCO2 43 41 43 43   PO2 161* 130* 179* 210*   HCO3 27 27 28 27     Complete Blood Count   Recent Labs   Lab 03/09/21 0358 03/08/21 2209 03/08/21 1600 03/08/21  1035   WBC 18.9* 18.2* 18.2* 17.8*   HGB 9.0* 8.7* 8.1* 7.9*   * 103* 102* 92*     Basic Metabolic Panel  Recent Labs   Lab 03/09/21 0358 03/08/21 2209 03/08/21  1601 03/08/21 1600 03/08/21  1035    139  --  139 140   POTASSIUM 4.6 4.8  --  4.6 4.6   CHLORIDE 106 108  --  109 109   CO2 25 28  --  24 26   BUN 33* 35*  --  33* 34*   CR 1.53* 1.63*  --  1.64* 1.75*   *  137* 130*  127* 155* 149* 149*  147*     Liver Function Tests  Recent Labs   Lab 03/09/21 0358 03/08/21 2209 03/08/21 1600 03/08/21  1035 03/08/21  0351 03/07/21  0353 03/07/21  0353 03/06/21  1154 03/06/21  1154   AST 44  --   --   --  62*  --  90*  --  94*   ALT 25  --   --   --  24  --  24  --  20   ALKPHOS 78  --   --   --  66  --  56  --  47   BILITOTAL 0.6  --   --   --  0.5  --  0.8  --  1.8*   ALBUMIN 2.0*  --   --   --  2.0*  --  2.3*  --  2.5*   INR 1.20* 1.20* 1.26* 1.27* 1.29*   < > 1.28*   < > 1.48*    < > = values in this interval not displayed.     Pancreatic Enzymes  No lab results found in last 7 days.  Coagulation Profile  Recent Labs   Lab 03/09/21 0358 03/08/21 2209 03/08/21  1600 03/08/21  1035   INR 1.20* 1.20* 1.26* 1.27*   PTT 87* 90* 113* 150*         5. RADIOLOGY:   No results found for this or any previous visit (from the past 24 hour(s)).    =========================================

## 2021-03-09 NOTE — PROGRESS NOTES
Pt transported to OR on ECMO for possible decann, clamped out at 1629 and recirculating circuit. Pt on Bypass for surgical interventions, IABP and Impella on S/B.  Will continue to follow.      Hilaria Stevenson RRT/ECMO specialist

## 2021-03-09 NOTE — CONSULTS
Care Management Initial Consult    General Information  Assessment completed with: MercedesPamela  Type of CM/SW Visit: Initial Assessment      Cognitive  Cognitive/Neuro/Behavioral: .WDL except  Level of Consciousness: somnolent  Arousal Level: arouses to pain  Orientation: (aline)  Mood/Behavior: behavior appropriate to situation     Speech: endotracheal tube    Living Environment:   People in home: alone     Able to return to prior arrangements:  TBD       Family/Social Support:  Care provided by: self, child(louisa)  Provides care for: no one  Marital Status:         Description of Support System: Supportive, Involved         Current Resources:   Patient receiving home care services: No  Community Resources: None      Additional Information:  Pt transferred from OSH for higher level of care.  Pt is s/p mitral valve replacement and single vessel CABG with Dr. Porras on 3/5/21.   Pt is in ICU vented, sedated and with ECMO.    RNCC visited pt dtr, Pamela, to introduce RNCC role and for support.  RNCC role discussed and contact info provided.  Pamela stated the team have been updating her well about the plan of care.  Per discussion with Pamela, pt lives alone.  He has 3 kids and they all live near pt.  Pt spouse  recently.  Pt has a girlfriend who is supportive and prepare meals for him sometimes, per Pamela.  Pt was ind. with most of his cares.  Pt and his Childrens lives in [place].  Pamela is staying locally at the hotel.  RNCC will cont to follow plan of care.      Elie Schrader RN, PHN, BSN  4A and 4E/ ICU  Care Coordinator  Phone: [phone]  Pager: [phone]

## 2021-03-09 NOTE — PROGRESS NOTES
ECMO Shift Summary:    ECMO Equipment:  Console serial number: [id number]   Circuit Lot number: [id number]  Oxygenator Lot number: [id number]    Patient remains on VA ECMO, all equipment is functioning and alarms are appropriately set. RPM's 3150 with flow range 2.5-3.25 L/min. Sweep gas is at 2 LPM and FiO2 70%. There are small collections of clot at each corner of the oxygenator and a small band of fibrin/clot extends down from the top center of the oxygenator. The cannulas are secure with no bleeding from the site. There is a large amount of serosanguinous drainage from the chest tubes. Extremities are cool to touch, most of the fingertips and toes are dark purple in appearance, and the feet are mottled. Suctioned ETT for a moderate amount of thick, augustus secretions.    Significant Shift Events: None.      Vent settings:  Ventilation Mode: PCV Plus assist  (Pressure Control Ventilation/ Assist Control)  FiO2 (%): 60 %  Rate Set (breaths/minute): 12 breaths/min  Tidal Volume Set (mL): (S) 600 mL  PEEP (cm H2O): 5 cmH2O  Oxygen Concentration (%): 60 %  Peak Inspiratory Pressure (cm H2O) (Drager Fabiana): 18  Resp: 28  .    Heparin is running at 1300 u/hr, ACT range 168-176.    The patient is on CRRT, there was no outward blood loss besides the serosanguinous drainage previously mentioned. No product given overnight.      Intake/Output Summary (Last 24 hours) at 3/9/2021 0613  Last data filed at 3/9/2021 0600  Gross per 24 hour   Intake 2502.8 ml   Output 3499 ml   Net -996.2 ml       ECHO:  No results found for this or any previous visit.No results found for this or any previous visit.    CXR:  No results found for this or any previous visit (from the past 24 hour(s)).    Labs:  Recent Labs   Lab 03/09/21  0601 03/09/21  0358 03/09/21  0210 03/09/21  0015   PH 7.41 7.42 7.42 7.41   PCO2 43 41 43 43   PO2 161* 130* 179* 210*   HCO3 27 27 28 27   O2PER 60.0 60.0 60.0 60.0       Lab Results   Component Value Date    HGB 9.0  (L) 03/09/2021    PHGB <30 03/09/2021     (L) 03/09/2021    FIBR 810 (H) 03/09/2021    INR 1.20 (H) 03/09/2021    PTT 87 (H) 03/09/2021    DD 1.8 (H) 03/05/2021    ANTCH 57 (L) 03/08/2021         Plan is to go to the OR today at 3:40 PM for a chest washout, turndown with potential ECMO decannulation, and possible insertion of an intra-aortic balloon pump. Will continue VA ECMO support until then.      Beau Edgar, RT  ECMO Specialist  3/9/2021 6:20 AM

## 2021-03-09 NOTE — PLAN OF CARE
Major Shift Events:  No acute events overnight. Patient sedation increased at beginning of shift d/t increased RR. Sedation stopped at 02:00, pt only grimacing rest of shift. Not responding to commands at all. CRRT goals met. 50cc/hr of CT output. No BM, bowel meds given. TF held at MN, Residual at 04:00 = 500mL. Pressors titrated off. Plan for OR today.         Plan: OR later today.   For vital signs and complete assessments, please see documentation flowsheets.

## 2021-03-09 NOTE — PLAN OF CARE
Pt remains on ECMO rpm 2875 flow~2.9, sweep 1.  Informal bedside turn down to ~ 2.5 L and MAP 50's.  Propofol off from 1000 - 1800.  Pt follows no command or with draws from pain.  Pt over breathes vent 20-30's. Pt with good productive cough.  Family at the bedside and updated on POC.  Plan for OR in am washout, possible IABP, possible decannulation. TF at goal 70.  Will continue to monitor.

## 2021-03-09 NOTE — PLAN OF CARE
ECMO turned down to 2L flow and maintained MAPs >60.  Pt to OR ~1500 for washout and possible decannulation and chest closure.  Per ECMO specialist pt was placed on bypass and MVR needs replacement.  Family here and updated on the situation.  Will continue to monitor.

## 2021-03-10 ENCOUNTER — APPOINTMENT (OUTPATIENT)
Dept: GENERAL RADIOLOGY | Facility: CLINIC | Age: 81
DRG: 003 | End: 2021-03-10
Attending: INTERNAL MEDICINE
Payer: MEDICARE

## 2021-03-10 LAB
ABO + RH BLD: NORMAL
ABO + RH BLD: NORMAL
ALBUMIN SERPL-MCNC: 2 G/DL (ref 3.4–5)
ALP SERPL-CCNC: 77 U/L (ref 40–150)
ALT SERPL W P-5'-P-CCNC: 58 U/L (ref 0–70)
ANION GAP SERPL CALCULATED.3IONS-SCNC: 10 MMOL/L (ref 3–14)
ANION GAP SERPL CALCULATED.3IONS-SCNC: 10 MMOL/L (ref 3–14)
ANION GAP SERPL CALCULATED.3IONS-SCNC: 12 MMOL/L (ref 3–14)
ANION GAP SERPL CALCULATED.3IONS-SCNC: 12 MMOL/L (ref 3–14)
ANION GAP SERPL CALCULATED.3IONS-SCNC: 8 MMOL/L (ref 3–14)
APTT PPP: 33 SEC (ref 22–37)
APTT PPP: 34 SEC (ref 22–37)
APTT PPP: 39 SEC (ref 22–37)
APTT PPP: 46 SEC (ref 22–37)
APTT PPP: 50 SEC (ref 22–37)
AST SERPL W P-5'-P-CCNC: 113 U/L (ref 0–45)
AT III ACT/NOR PPP CHRO: 67 % (ref 85–135)
BACTERIA SPEC CULT: NO GROWTH
BASE DEFICIT BLDA-SCNC: 0.7 MMOL/L
BASE DEFICIT BLDA-SCNC: 2.1 MMOL/L
BILIRUB SERPL-MCNC: 0.8 MG/DL (ref 0.2–1.3)
BLD GP AB SCN SERPL QL: NORMAL
BLD PROD TYP BPU: NORMAL
BLOOD BANK CMNT PATIENT-IMP: NORMAL
BUN SERPL-MCNC: 31 MG/DL (ref 7–30)
BUN SERPL-MCNC: 33 MG/DL (ref 7–30)
BUN SERPL-MCNC: 35 MG/DL (ref 7–30)
BUN SERPL-MCNC: 39 MG/DL (ref 7–30)
BUN SERPL-MCNC: 40 MG/DL (ref 7–30)
CA-I BLD-MCNC: 4.6 MG/DL (ref 4.4–5.2)
CALCIUM SERPL-MCNC: 7.4 MG/DL (ref 8.5–10.1)
CALCIUM SERPL-MCNC: 8.2 MG/DL (ref 8.5–10.1)
CALCIUM SERPL-MCNC: 8.3 MG/DL (ref 8.5–10.1)
CALCIUM SERPL-MCNC: 8.4 MG/DL (ref 8.5–10.1)
CALCIUM SERPL-MCNC: 8.5 MG/DL (ref 8.5–10.1)
CHLORIDE SERPL-SCNC: 107 MMOL/L (ref 94–109)
CHLORIDE SERPL-SCNC: 108 MMOL/L (ref 94–109)
CHLORIDE SERPL-SCNC: 109 MMOL/L (ref 94–109)
CHLORIDE SERPL-SCNC: 110 MMOL/L (ref 94–109)
CHLORIDE SERPL-SCNC: 111 MMOL/L (ref 94–109)
CO2 SERPL-SCNC: 20 MMOL/L (ref 20–32)
CO2 SERPL-SCNC: 20 MMOL/L (ref 20–32)
CO2 SERPL-SCNC: 22 MMOL/L (ref 20–32)
CO2 SERPL-SCNC: 23 MMOL/L (ref 20–32)
CO2 SERPL-SCNC: 24 MMOL/L (ref 20–32)
CREAT SERPL-MCNC: 1.4 MG/DL (ref 0.66–1.25)
CREAT SERPL-MCNC: 1.48 MG/DL (ref 0.66–1.25)
CREAT SERPL-MCNC: 1.52 MG/DL (ref 0.66–1.25)
CREAT SERPL-MCNC: 1.59 MG/DL (ref 0.66–1.25)
CREAT SERPL-MCNC: 1.67 MG/DL (ref 0.66–1.25)
CRP SERPL-MCNC: 210 MG/L (ref 0–8)
ERYTHROCYTE [DISTWIDTH] IN BLOOD BY AUTOMATED COUNT: 16.4 % (ref 10–15)
ERYTHROCYTE [DISTWIDTH] IN BLOOD BY AUTOMATED COUNT: 16.7 % (ref 10–15)
ERYTHROCYTE [SEDIMENTATION RATE] IN BLOOD BY WESTERGREN METHOD: 68 MM/H (ref 0–20)
FIBRINOGEN PPP-MCNC: 603 MG/DL (ref 200–420)
GFR SERPL CREATININE-BSD FRML MDRD: 38 ML/MIN/{1.73_M2}
GFR SERPL CREATININE-BSD FRML MDRD: 40 ML/MIN/{1.73_M2}
GFR SERPL CREATININE-BSD FRML MDRD: 43 ML/MIN/{1.73_M2}
GFR SERPL CREATININE-BSD FRML MDRD: 44 ML/MIN/{1.73_M2}
GFR SERPL CREATININE-BSD FRML MDRD: 47 ML/MIN/{1.73_M2}
GLUCOSE BLD-MCNC: 106 MG/DL (ref 70–99)
GLUCOSE BLDC GLUCOMTR-MCNC: 103 MG/DL (ref 70–99)
GLUCOSE BLDC GLUCOMTR-MCNC: 94 MG/DL (ref 70–99)
GLUCOSE SERPL-MCNC: 105 MG/DL (ref 70–99)
GLUCOSE SERPL-MCNC: 116 MG/DL (ref 70–99)
GLUCOSE SERPL-MCNC: 144 MG/DL (ref 70–99)
GLUCOSE SERPL-MCNC: 79 MG/DL (ref 70–99)
GLUCOSE SERPL-MCNC: 90 MG/DL (ref 70–99)
HCO3 BLD-SCNC: 22 MMOL/L (ref 21–28)
HCO3 BLD-SCNC: 23 MMOL/L (ref 21–28)
HCT VFR BLD AUTO: 26.8 % (ref 40–53)
HCT VFR BLD AUTO: 26.9 % (ref 40–53)
HGB BLD-MCNC: 7.9 G/DL (ref 13.3–17.7)
HGB BLD-MCNC: 8.5 G/DL (ref 13.3–17.7)
HGB BLD-MCNC: 8.6 G/DL (ref 13.3–17.7)
HGB FREE PLAS-MCNC: 30 MG/DL
INR PPP: 1.28 (ref 0.86–1.14)
INR PPP: 1.31 (ref 0.86–1.14)
LACTATE BLD-SCNC: 1.6 MMOL/L (ref 0.7–2)
LDH SERPL L TO P-CCNC: 482 U/L (ref 85–227)
MAGNESIUM SERPL-MCNC: 2.2 MG/DL (ref 1.6–2.3)
MAGNESIUM SERPL-MCNC: 2.8 MG/DL (ref 1.6–2.3)
MCH RBC QN AUTO: 28.4 PG (ref 26.5–33)
MCH RBC QN AUTO: 28.8 PG (ref 26.5–33)
MCHC RBC AUTO-ENTMCNC: 31.7 G/DL (ref 31.5–36.5)
MCHC RBC AUTO-ENTMCNC: 32 G/DL (ref 31.5–36.5)
MCV RBC AUTO: 90 FL (ref 78–100)
MCV RBC AUTO: 90 FL (ref 78–100)
NUM BPU REQUESTED: 1
O2/TOTAL GAS SETTING VFR VENT: 40 %
O2/TOTAL GAS SETTING VFR VENT: 60 %
OXYHGB MFR BLD: 97 % (ref 92–100)
OXYHGB MFR BLD: 98 % (ref 92–100)
PCO2 BLD: 31 MM HG (ref 35–45)
PCO2 BLD: 34 MM HG (ref 35–45)
PF4 HEPARIN CMPLX AB SER QL: POSITIVE
PH BLD: 7.44 PH (ref 7.35–7.45)
PH BLD: 7.44 PH (ref 7.35–7.45)
PHOSPHATE SERPL-MCNC: 3.8 MG/DL (ref 2.5–4.5)
PHOSPHATE SERPL-MCNC: 4.1 MG/DL (ref 2.5–4.5)
PLATELET # BLD AUTO: 126 10E9/L (ref 150–450)
PLATELET # BLD AUTO: 128 10E9/L (ref 150–450)
PO2 BLD: 108 MM HG (ref 80–105)
PO2 BLD: 118 MM HG (ref 80–105)
POTASSIUM SERPL-SCNC: 3.7 MMOL/L (ref 3.4–5.3)
POTASSIUM SERPL-SCNC: 3.9 MMOL/L (ref 3.4–5.3)
POTASSIUM SERPL-SCNC: 4.5 MMOL/L (ref 3.4–5.3)
POTASSIUM SERPL-SCNC: 4.8 MMOL/L (ref 3.4–5.3)
POTASSIUM SERPL-SCNC: 5 MMOL/L (ref 3.4–5.3)
PROT SERPL-MCNC: 4.8 G/DL (ref 6.8–8.8)
RBC # BLD AUTO: 2.99 10E12/L (ref 4.4–5.9)
RBC # BLD AUTO: 2.99 10E12/L (ref 4.4–5.9)
S100 CA BINDING PROTEIN B SER-MCNC: 675 NG/L (ref 0–96)
SODIUM SERPL-SCNC: 140 MMOL/L (ref 133–144)
SODIUM SERPL-SCNC: 140 MMOL/L (ref 133–144)
SODIUM SERPL-SCNC: 141 MMOL/L (ref 133–144)
SODIUM SERPL-SCNC: 141 MMOL/L (ref 133–144)
SODIUM SERPL-SCNC: 142 MMOL/L (ref 133–144)
SPECIMEN EXP DATE BLD: NORMAL
SPECIMEN SOURCE: NORMAL
WBC # BLD AUTO: 19.2 10E9/L (ref 4–11)
WBC # BLD AUTO: 21.7 10E9/L (ref 4–11)

## 2021-03-10 PROCEDURE — 71045 X-RAY EXAM CHEST 1 VIEW: CPT

## 2021-03-10 PROCEDURE — 85610 PROTHROMBIN TIME: CPT | Performed by: STUDENT IN AN ORGANIZED HEALTH CARE EDUCATION/TRAINING PROGRAM

## 2021-03-10 PROCEDURE — 84100 ASSAY OF PHOSPHORUS: CPT | Performed by: STUDENT IN AN ORGANIZED HEALTH CARE EDUCATION/TRAINING PROGRAM

## 2021-03-10 PROCEDURE — 85730 THROMBOPLASTIN TIME PARTIAL: CPT | Performed by: STUDENT IN AN ORGANIZED HEALTH CARE EDUCATION/TRAINING PROGRAM

## 2021-03-10 PROCEDURE — 999N000065 XR ABDOMEN PORT 1 VW

## 2021-03-10 PROCEDURE — 272N000083 HC NUTRITION PRODUCT SEMIELEM INTERMED LITER

## 2021-03-10 PROCEDURE — 82947 ASSAY GLUCOSE BLOOD QUANT: CPT | Performed by: THORACIC SURGERY (CARDIOTHORACIC VASCULAR SURGERY)

## 2021-03-10 PROCEDURE — 83051 HEMOGLOBIN PLASMA: CPT | Performed by: STUDENT IN AN ORGANIZED HEALTH CARE EDUCATION/TRAINING PROGRAM

## 2021-03-10 PROCEDURE — 82330 ASSAY OF CALCIUM: CPT | Performed by: THORACIC SURGERY (CARDIOTHORACIC VASCULAR SURGERY)

## 2021-03-10 PROCEDURE — 90945 DIALYSIS ONE EVALUATION: CPT | Mod: GC | Performed by: INTERNAL MEDICINE

## 2021-03-10 PROCEDURE — 80053 COMPREHEN METABOLIC PANEL: CPT | Performed by: STUDENT IN AN ORGANIZED HEALTH CARE EDUCATION/TRAINING PROGRAM

## 2021-03-10 PROCEDURE — 83605 ASSAY OF LACTIC ACID: CPT | Performed by: THORACIC SURGERY (CARDIOTHORACIC VASCULAR SURGERY)

## 2021-03-10 PROCEDURE — 85652 RBC SED RATE AUTOMATED: CPT | Performed by: STUDENT IN AN ORGANIZED HEALTH CARE EDUCATION/TRAINING PROGRAM

## 2021-03-10 PROCEDURE — 44500 INTRO GASTROINTESTINAL TUBE: CPT

## 2021-03-10 PROCEDURE — 250N000013 HC RX MED GY IP 250 OP 250 PS 637: Performed by: STUDENT IN AN ORGANIZED HEALTH CARE EDUCATION/TRAINING PROGRAM

## 2021-03-10 PROCEDURE — 250N000013 HC RX MED GY IP 250 OP 250 PS 637: Performed by: INTERNAL MEDICINE

## 2021-03-10 PROCEDURE — 86022 PLATELET ANTIBODIES: CPT | Performed by: INTERNAL MEDICINE

## 2021-03-10 PROCEDURE — 999N000157 HC STATISTIC RCP TIME EA 10 MIN

## 2021-03-10 PROCEDURE — 258N000003 HC RX IP 258 OP 636: Performed by: INTERNAL MEDICINE

## 2021-03-10 PROCEDURE — 258N000003 HC RX IP 258 OP 636: Performed by: STUDENT IN AN ORGANIZED HEALTH CARE EDUCATION/TRAINING PROGRAM

## 2021-03-10 PROCEDURE — 84100 ASSAY OF PHOSPHORUS: CPT | Performed by: INTERNAL MEDICINE

## 2021-03-10 PROCEDURE — 250N000013 HC RX MED GY IP 250 OP 250 PS 637: Performed by: NURSE PRACTITIONER

## 2021-03-10 PROCEDURE — 999N000015 HC STATISTIC ARTERIAL MONITORING DAILY

## 2021-03-10 PROCEDURE — 71045 X-RAY EXAM CHEST 1 VIEW: CPT | Mod: 26 | Performed by: RADIOLOGY

## 2021-03-10 PROCEDURE — 74018 RADEX ABDOMEN 1 VIEW: CPT | Mod: 26 | Performed by: RADIOLOGY

## 2021-03-10 PROCEDURE — 250N000011 HC RX IP 250 OP 636: Performed by: INTERNAL MEDICINE

## 2021-03-10 PROCEDURE — 83735 ASSAY OF MAGNESIUM: CPT | Performed by: STUDENT IN AN ORGANIZED HEALTH CARE EDUCATION/TRAINING PROGRAM

## 2021-03-10 PROCEDURE — 250N000011 HC RX IP 250 OP 636: Performed by: THORACIC SURGERY (CARDIOTHORACIC VASCULAR SURGERY)

## 2021-03-10 PROCEDURE — 83615 LACTATE (LD) (LDH) ENZYME: CPT | Performed by: STUDENT IN AN ORGANIZED HEALTH CARE EDUCATION/TRAINING PROGRAM

## 2021-03-10 PROCEDURE — 86140 C-REACTIVE PROTEIN: CPT | Performed by: STUDENT IN AN ORGANIZED HEALTH CARE EDUCATION/TRAINING PROGRAM

## 2021-03-10 PROCEDURE — 999N001017 HC STATISTIC GLUCOSE BY METER IP

## 2021-03-10 PROCEDURE — 90947 DIALYSIS REPEATED EVAL: CPT

## 2021-03-10 PROCEDURE — 250N000013 HC RX MED GY IP 250 OP 250 PS 637: Performed by: SURGERY

## 2021-03-10 PROCEDURE — 82805 BLOOD GASES W/O2 SATURATION: CPT | Performed by: SURGERY

## 2021-03-10 PROCEDURE — 85730 THROMBOPLASTIN TIME PARTIAL: CPT | Performed by: THORACIC SURGERY (CARDIOTHORACIC VASCULAR SURGERY)

## 2021-03-10 PROCEDURE — 94003 VENT MGMT INPAT SUBQ DAY: CPT

## 2021-03-10 PROCEDURE — 85027 COMPLETE CBC AUTOMATED: CPT | Performed by: STUDENT IN AN ORGANIZED HEALTH CARE EDUCATION/TRAINING PROGRAM

## 2021-03-10 PROCEDURE — 85300 ANTITHROMBIN III ACTIVITY: CPT | Performed by: STUDENT IN AN ORGANIZED HEALTH CARE EDUCATION/TRAINING PROGRAM

## 2021-03-10 PROCEDURE — 250N000009 HC RX 250: Performed by: INTERNAL MEDICINE

## 2021-03-10 PROCEDURE — 85384 FIBRINOGEN ACTIVITY: CPT | Performed by: STUDENT IN AN ORGANIZED HEALTH CARE EDUCATION/TRAINING PROGRAM

## 2021-03-10 PROCEDURE — 83735 ASSAY OF MAGNESIUM: CPT | Performed by: INTERNAL MEDICINE

## 2021-03-10 PROCEDURE — 80048 BASIC METABOLIC PNL TOTAL CA: CPT | Performed by: INTERNAL MEDICINE

## 2021-03-10 PROCEDURE — 200N000002 HC R&B ICU UMMC

## 2021-03-10 PROCEDURE — 85018 HEMOGLOBIN: CPT | Performed by: THORACIC SURGERY (CARDIOTHORACIC VASCULAR SURGERY)

## 2021-03-10 PROCEDURE — 250N000011 HC RX IP 250 OP 636: Performed by: STUDENT IN AN ORGANIZED HEALTH CARE EDUCATION/TRAINING PROGRAM

## 2021-03-10 PROCEDURE — 99291 CRITICAL CARE FIRST HOUR: CPT | Mod: 24 | Performed by: INTERNAL MEDICINE

## 2021-03-10 PROCEDURE — 82805 BLOOD GASES W/O2 SATURATION: CPT | Performed by: THORACIC SURGERY (CARDIOTHORACIC VASCULAR SURGERY)

## 2021-03-10 RX ORDER — LIDOCAINE HYDROCHLORIDE 20 MG/ML
5 SOLUTION OROPHARYNGEAL ONCE
Status: COMPLETED | OUTPATIENT
Start: 2021-03-10 | End: 2021-03-10

## 2021-03-10 RX ORDER — BISACODYL 10 MG
10 SUPPOSITORY, RECTAL RECTAL ONCE
Status: DISCONTINUED | OUTPATIENT
Start: 2021-03-10 | End: 2021-03-10

## 2021-03-10 RX ORDER — B COMPLEX C NO.10/FOLIC ACID 900MCG/5ML
5 LIQUID (ML) ORAL DAILY
Status: DISCONTINUED | OUTPATIENT
Start: 2021-03-10 | End: 2021-04-05 | Stop reason: HOSPADM

## 2021-03-10 RX ADMIN — CALCIUM CHLORIDE, MAGNESIUM CHLORIDE, DEXTROSE MONOHYDRATE, LACTIC ACID, SODIUM CHLORIDE, SODIUM BICARBONATE AND POTASSIUM CHLORIDE 12.5 ML/KG/HR: 5.15; 2.03; 22; 5.4; 6.46; 3.09; .157 INJECTION INTRAVENOUS at 22:05

## 2021-03-10 RX ADMIN — CEFEPIME 2 G: 2 INJECTION, POWDER, FOR SOLUTION INTRAVENOUS at 03:46

## 2021-03-10 RX ADMIN — CALCIUM CHLORIDE, MAGNESIUM CHLORIDE, SODIUM CHLORIDE, SODIUM BICARBONATE, POTASSIUM CHLORIDE AND SODIUM PHOSPHATE DIBASIC DIHYDRATE 12.5 ML/KG/HR: 3.68; 3.05; 6.34; 3.09; .314; .187 INJECTION INTRAVENOUS at 12:09

## 2021-03-10 RX ADMIN — CHLORHEXIDINE GLUCONATE 0.12% ORAL RINSE 15 ML: 1.2 LIQUID ORAL at 08:11

## 2021-03-10 RX ADMIN — CALCIUM CHLORIDE, MAGNESIUM CHLORIDE, DEXTROSE MONOHYDRATE, LACTIC ACID, SODIUM CHLORIDE, SODIUM BICARBONATE AND POTASSIUM CHLORIDE 12.5 ML/KG/HR: 5.15; 2.03; 22; 5.4; 6.46; 3.09; .157 INJECTION INTRAVENOUS at 01:46

## 2021-03-10 RX ADMIN — DOCUSATE SODIUM 50MG AND SENNOSIDES 8.6MG 2 TABLET: 8.6; 5 TABLET, FILM COATED ORAL at 20:13

## 2021-03-10 RX ADMIN — MUPIROCIN 0.5 G: 20 OINTMENT TOPICAL at 08:11

## 2021-03-10 RX ADMIN — PANTOPRAZOLE SODIUM 40 MG: 40 TABLET, DELAYED RELEASE ORAL at 08:11

## 2021-03-10 RX ADMIN — CALCIUM CHLORIDE, MAGNESIUM CHLORIDE, DEXTROSE MONOHYDRATE, LACTIC ACID, SODIUM CHLORIDE, SODIUM BICARBONATE AND POTASSIUM CHLORIDE 12.5 ML/KG/HR: 5.15; 2.03; 22; 5.4; 6.46; 3.09; .157 INJECTION INTRAVENOUS at 17:13

## 2021-03-10 RX ADMIN — VANCOMYCIN HYDROCHLORIDE 1500 MG: 10 INJECTION, POWDER, LYOPHILIZED, FOR SOLUTION INTRAVENOUS at 14:29

## 2021-03-10 RX ADMIN — BISACODYL SUPPOSITORY 10 MG: 10 SUPPOSITORY RECTAL at 08:09

## 2021-03-10 RX ADMIN — CALCIUM CHLORIDE, MAGNESIUM CHLORIDE, SODIUM CHLORIDE, SODIUM BICARBONATE, POTASSIUM CHLORIDE AND SODIUM PHOSPHATE DIBASIC DIHYDRATE: 3.68; 3.05; 6.34; 3.09; .314; .187 INJECTION INTRAVENOUS at 22:04

## 2021-03-10 RX ADMIN — BIVALIRUDIN 0.02 MG/KG/HR: 250 INJECTION, POWDER, LYOPHILIZED, FOR SOLUTION INTRAVENOUS at 10:56

## 2021-03-10 RX ADMIN — CALCIUM CHLORIDE, MAGNESIUM CHLORIDE, SODIUM CHLORIDE, SODIUM BICARBONATE, POTASSIUM CHLORIDE AND SODIUM PHOSPHATE DIBASIC DIHYDRATE 12.5 ML/KG/HR: 3.68; 3.05; 6.34; 3.09; .314; .187 INJECTION INTRAVENOUS at 22:05

## 2021-03-10 RX ADMIN — Medication 5 ML: at 11:01

## 2021-03-10 RX ADMIN — CEFEPIME 2 G: 2 INJECTION, POWDER, FOR SOLUTION INTRAVENOUS at 17:20

## 2021-03-10 RX ADMIN — ASPIRIN 81 MG CHEWABLE TABLET 81 MG: 81 TABLET CHEWABLE at 08:09

## 2021-03-10 RX ADMIN — CHLORHEXIDINE GLUCONATE 0.12% ORAL RINSE 15 ML: 1.2 LIQUID ORAL at 20:14

## 2021-03-10 RX ADMIN — LIDOCAINE HYDROCHLORIDE 15 ML: 20 SOLUTION ORAL; TOPICAL at 11:00

## 2021-03-10 RX ADMIN — DOCUSATE SODIUM 50MG AND SENNOSIDES 8.6MG 2 TABLET: 8.6; 5 TABLET, FILM COATED ORAL at 08:09

## 2021-03-10 RX ADMIN — AMIODARONE HYDROCHLORIDE 200 MG: 200 TABLET ORAL at 14:29

## 2021-03-10 RX ADMIN — CALCIUM CHLORIDE, MAGNESIUM CHLORIDE, SODIUM CHLORIDE, SODIUM BICARBONATE, POTASSIUM CHLORIDE AND SODIUM PHOSPHATE DIBASIC DIHYDRATE 12.5 ML/KG/HR: 3.68; 3.05; 6.34; 3.09; .314; .187 INJECTION INTRAVENOUS at 01:46

## 2021-03-10 RX ADMIN — EPINEPHRINE 0.03 MCG/KG/MIN: 1 INJECTION PARENTERAL at 01:43

## 2021-03-10 RX ADMIN — AMIODARONE HYDROCHLORIDE 200 MG: 200 TABLET ORAL at 08:09

## 2021-03-10 RX ADMIN — AMIODARONE HYDROCHLORIDE 200 MG: 200 TABLET ORAL at 20:14

## 2021-03-10 RX ADMIN — CALCIUM CHLORIDE, MAGNESIUM CHLORIDE, SODIUM CHLORIDE, SODIUM BICARBONATE, POTASSIUM CHLORIDE AND SODIUM PHOSPHATE DIBASIC DIHYDRATE 12.5 ML/KG/HR: 3.68; 3.05; 6.34; 3.09; .314; .187 INJECTION INTRAVENOUS at 17:15

## 2021-03-10 RX ADMIN — CALCIUM CHLORIDE, MAGNESIUM CHLORIDE, DEXTROSE MONOHYDRATE, LACTIC ACID, SODIUM CHLORIDE, SODIUM BICARBONATE AND POTASSIUM CHLORIDE 12.5 ML/KG/HR: 5.15; 2.03; 22; 5.4; 6.46; 3.09; .157 INJECTION INTRAVENOUS at 06:39

## 2021-03-10 RX ADMIN — CALCIUM CHLORIDE, MAGNESIUM CHLORIDE, SODIUM CHLORIDE, SODIUM BICARBONATE, POTASSIUM CHLORIDE AND SODIUM PHOSPHATE DIBASIC DIHYDRATE 12.5 ML/KG/HR: 3.68; 3.05; 6.34; 3.09; .314; .187 INJECTION INTRAVENOUS at 06:39

## 2021-03-10 RX ADMIN — POLYETHYLENE GLYCOL 3350 17 G: 17 POWDER, FOR SOLUTION ORAL at 20:14

## 2021-03-10 RX ADMIN — POLYETHYLENE GLYCOL 3350 17 G: 17 POWDER, FOR SOLUTION ORAL at 08:09

## 2021-03-10 RX ADMIN — CALCIUM CHLORIDE, MAGNESIUM CHLORIDE, DEXTROSE MONOHYDRATE, LACTIC ACID, SODIUM CHLORIDE, SODIUM BICARBONATE AND POTASSIUM CHLORIDE 12.5 ML/KG/HR: 5.15; 2.03; 22; 5.4; 6.46; 3.09; .157 INJECTION INTRAVENOUS at 12:08

## 2021-03-10 ASSESSMENT — MIFFLIN-ST. JEOR: SCORE: 1619.13

## 2021-03-10 ASSESSMENT — ACTIVITIES OF DAILY LIVING (ADL)
ADLS_ACUITY_SCORE: 22

## 2021-03-10 NOTE — ANESTHESIA POSTPROCEDURE EVALUATION
Patient: Jin Garrett    Procedure(s):  REMOVAL, CANNULA, ADULT, FOR ECMO, Chest Closure  INCISION AND DRAINAGE, WOUND, CHEST, WITH IRRIGATION  Mitral Valve Replace with 31mm St. Tee Epic Valve  Flexible Bronchoscopy    Diagnosis:Status post cardiac surgery [Z98.890]  Diagnosis Additional Information: No value filed.    Anesthesia Type:  General    Note:  Disposition: ICU            ICU Sign Out: Anesthesiologist/ICU physician sign out WAS performed   Postop Pain Control: Uneventful            Sign Out: Well controlled pain   PONV: No   Neuro/Psych: Uneventful            Sign Out: PLANNED postop sedation   Airway/Respiratory: Uneventful            Sign Out: AIRWAY IN SITU/Resp. Support               Airway in situ/Resp. Support: ETT                 Reason: Planned Pre-op   CV/Hemodynamics: Uneventful            Sign Out: Acceptable CV status   Other NRE: NONE   DID A NON-ROUTINE EVENT OCCUR? No         Last vitals:  Vitals:    03/09/21 1330 03/09/21 1400 03/09/21 1430   BP:      Pulse: 90 91 90   Resp:  28    Temp: 36.9  C (98.4  F) 37  C (98.6  F) 37.1  C (98.8  F)   SpO2: 91% (!) 88% (!) 89%       Last vitals prior to Anesthesia Care Transfer:  CRNA VITALS  3/9/2021 1929 - 3/9/2021 2029      3/9/2021             Resp Rate (observed):  12    Resp Rate (set):  12          Electronically Signed By: Khoi Blackwell MD  March 9, 2021  8:38 PM

## 2021-03-10 NOTE — PROGRESS NOTES
CRRT STATUS NOTE    DATA:  Time:  4:28 PM  Pressures WNL:  YES  Filter Status:  CRRT Filter Status:WDL    Problems Reported/Alarms Noted:  none    Supplies Present:  YES    ASSESSMENT:  Patient Net Fluid Balance:  Net even  Vital Signs:  T98.2 HR80 RR26 /64 MAP90  Labs: K3.9 Hgb 7.9  Goals of Therapy:  0-100 net negative as tolerated, with out going up on pressors    INTERVENTIONS:   discussed goals of care with bedside RN    PLAN:  Continue plan of care

## 2021-03-10 NOTE — PROGRESS NOTES
"CRRT STATUS NOTE    DATA:  Time:  06:51 am  Pressures WNL:  YES  Filter Status:WDL  Problems Reported/Alarms Noted:  none    Supplies Present: Yes    ASSESSMENT:  Patient Net Fluid Balance:  Pt was + 1087 ml at midnight 3/9/21 and is  -268 ml so far today 3/10/21  Vital Signs:BP 99/61   Pulse 80   Temp 97.7  F (36.5  C)   Resp 20   Ht 1.803 m (5' 11\")   Wt 88.7 kg (195 lb 8.8 oz)   SpO2 96%   BMI 27.27 kg/m    Labs: WBC 19.2  K 4.8  Goals of Therapy:0-100 net negative as tolerated    INTERVENTIONS:   none    PLAN:  Continue to monitor and update CRRT RN at [id number] with questions and concerns, change set q 72 hrs and PRN     "

## 2021-03-10 NOTE — ANESTHESIA PROCEDURE NOTES
TASH Probe Insertion Note:    Staff -   Anesthesiologist:  Khoi Blackwell MD  Performed By: anesthesiologist  Probe Number: x7  Probe Status PRE Insertion: NO obvious damage  Probe type:  Adult 3D    Bite block used:   Yes  Insertion Technique: Jaw Lift, Recurrent attempts  Insertion complications: None obvious    Billing Report:TASH report by Anesthesiologist (See Separate Report note)    Probe Status POST Removal: NO obvious damage

## 2021-03-10 NOTE — PLAN OF CARE
Major Shift Events:  *Arriving back from OR close to 21:00, Redo MVR + chest closure. Patient stable, CRRT restarted, goals being met.  FIO2 titrated down to 40%. Pressors weaned as able.  Minimal CT output. Sedation stopped at 04:00, patient with minimal response, grimacing, making slight head movements during inline suctioning. Otherwise uneventful night.     Plan: Continue to monitor reporting changes to treatment team.  For vital signs and complete assessments, please see documentation flowsheets.      Admitted/transferred from: From OR  Reason for admission/transfer: Stable post surgery  Patient status upon admission/transfer: Stable.  Interventions: Standard precautions.  Plan:   2 RN skin assessment: completed by Lalo  Result of skin assessment and interventions/actions: Standard precautions  Height, weight, drug calc weight: done

## 2021-03-10 NOTE — ANESTHESIA PROCEDURE NOTES
Perioperative TASH Report    Preanesthesia Checklist:  Patient identified, IV assessed, risks and benefits discussed, monitors and equipment assessed, procedure being performed at surgeon's request and anesthesia consent obtained.  General Procedure Information  Modalities:  3D, 2D, CW Doppler and PW Doppler    Initially placed for hemodynamic monitoring for ECMO decannulation. Patient discovered to have significant mitral stenosis on exam.   Echocardiographic and Doppler Measurements  Right Ventricle:  Cavity size normal.   Global function normal.     Left Ventricle:  Cavity size normal.   Global Function mildly impaired.     Other Ventricular Findings:  EF approximately 30% on 2L VA ECMO flow in setting of severe MS    Valves  Aortic Valve: Stenosis not present.  Regurgitation absent.  Leaflet motions normal.    Mitral Valve: Annulus bioprosthetic.  Stenosis severe.  Mean Gradient: 22 mmHg.  Leaflet motions restricted.    Tricuspid Valve: Annulus normal.  Stenosis not present.  Regurgitation +1.  Leaflets normal.  Leaflet motions normal.    Pulmonic Valve: Annulus normal.  Stenosis not present.  Regurgitation +1.    Other Valve Findings:  Large thrombus in the LA adherent to the mitral valve with severely restricted motion of the bioprosthetic mitral valve. Thrombus appears to slightly cross the valve.   Aorta: Ascending Aorta: Dissection not present.    Aortic Arch: Dissection not present.     Descending Aorta: Dissection not present.         Right Atrium:  Spontaneous echo contrast not present.    Left Atrium: Spontaneous echo contrast present.  Thrombus present.  Tumor not present.  Device not present.    Left atrial appendage thrombus.         Other Findings:   .  Pleural Effusion:  left. .  .  .  .  Post Intervention Findings  Procedure(s) performed:  Mitral Valve Repair/Replace. Global function:  Improved.   Regional wall motion:  Unchanged   Surgeon(s) notified of all postintervention findings:  Yes  .   Mitral Valve:  Valve replaced with bioprosthetic valve  No paravalvular leak.  .   .  .  .  .  .  .  .    Post-op gradient 7mm Hg through the bioprosthetic mitral valve, no paravalvular leak. No pericardial effusion, no aortic dissection. EF approximately 45%, RWMA unchanged.     Echocardiogram Comments

## 2021-03-10 NOTE — PROGRESS NOTES
Nephrology Progress Note  03/10/2021         Assessment & Recommendations:   Jin Garrett is a 80 year old with PMH HTN, AAA, HLD, RA who presented to OSH with SOB and atypical CP. TTE showed severe MR with ?posterior flail leaflet, EF preserved. On arrival at [facility] an Impella was emergently placed. S/p MVR and single vessel CABG on 3/5/21. Returned from the OR on central ECMO with an open chest, now s/p decannulation on 3/9. Nephrology consulted for ADAN.     ADAN in the setting of cardiogenic shock was on ECMO, s/p decannulation on 3/9  Unknown baseline, no h/o CKD per daughter. 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 Etiology likely ATN in the setting of shock, no obstruction per CT and urinalysis showed granular casts which is c/w ATN. UOP declined and K rising  on 3/6, so initiated on CRRT. He is off of pressors today with BP in 110-120's. UOP was 250 ml in past 24 hours. Seems like he needs RRT, but continue on CRRT while he is being intubated and while receiving high obligatory intake (3.4L in past 24 hours. Will plan transitioning to HD if he gets extubated and or he is HDS.  - continue CRRT as there is no clear signs of renal recovery   - Goal UF 0-100 net neg with out going up on pressors  - Off of ECMO, now undergoing HD via subclavian HD catheter placed in OR  - Labs per CRRT protocol  - Avoid nephrotoxins  - Renally dose medications  - Renal diet  - Daily weights  - Strict I/Os      Recommendations were communicated to primary team via this note     Seen and discussed with Dr. Leyda Chester MD   [phone]    Interval History :   Nursing and provider notes from last 24 hours reviewed.  In the last 24 hours Jin Garrett underwent ECMO circuit decannulation, off of pressors since OR. Continued to be intubated and sedated with plan of weaning of sedation.    Physical Exam:   I/O last 3 completed shifts:  In:  "3408.72 [I.V.:608.72; Other:630; NG/GT:120]  Out: 1938 [Urine:243; Emesis/NG output:350; Other:425; Chest Tube:920]   BP 99/61   Pulse 81   Temp 98.2  F (36.8  C)   Resp 19   Ht 1.803 m (5' 11\")   Wt 88.7 kg (195 lb 8.8 oz)   SpO2 98%   BMI 27.27 kg/m       General : Pt sedated and intubated, not in acute distress   Lungs : anterior lung fields are clear  Cardiac : S1, S2 present  Abdomen : Soft/ND  LE : Edema noted  Dialysis Access : right subclavian non tunneled line    Labs:   All labs reviewed by me  Electrolytes/Renal -   Recent Labs   Lab Test 03/10/21  0356 03/09/21  2347 03/09/21  2022 03/09/21  0358 03/09/21  0358    141 141   < > 138   POTASSIUM 4.8 5.0 5.2   < > 4.6   CHLORIDE 107 108 108   < > 106   CO2 22 23 25   < > 25   BUN 35* 40* 42*   < > 33*   CR 1.67* 1.59* 1.72*   < > 1.53*   *  106* 116*  116* 136*  137*   < > 132*  137*   PALLAVI 8.3* 8.4* 7.8*   < > 8.7   MAG 2.8*  --  3.0*  --  2.4*   PHOS 4.1  --  4.2  --  3.4    < > = values in this interval not displayed.       CBC -   Recent Labs   Lab Test 03/10/21  0356 03/09/21  2347 03/09/21  2022   WBC 19.2* 21.7* 23.6*   HGB 8.5* 8.6* 7.9*   * 128* 116*       LFTs -   Recent Labs   Lab Test 03/10/21  0356 03/09/21  2022 03/09/21  0358   ALKPHOS 77 78 78   BILITOTAL 0.8 0.8 0.6   ALT 58 50 25   * 110* 44   PROTTOTAL 4.8* 4.2* 5.7*   ALBUMIN 2.0* 1.7* 2.0*     Current Medications:    amiodarone  200 mg Oral or NG Tube TID     aspirin  81 mg Oral or NG Tube Daily     B and C vitamin Complex with folic acid  5 mL Oral Daily     bisacodyl  10 mg Rectal Once     ceFEPIme (MAXIPIME) IV  2 g Intravenous Q12H     chlorhexidine  15 mL Swish & Spit BID     insulin aspart  1-4 Units Subcutaneous Q4H     hot shot cardioplegia solution   PERFUSION Once     mupirocin  0.5 g Both Nostrils BID     pantoprazole  40 mg Oral or NG Tube Daily    Or     pantoprazole  40 mg Oral Daily     polyethylene glycol  17 g Oral BID     low " cardioplegia solution   PERFUSION Once     high cardioplegia solution   PERFUSION Once     senna-docusate  1-2 tablet Oral BID     vancomycin (VANCOCIN) IV  1,500 mg Intravenous Q24H       bivalirudin ANTICOAGULANT (ANGIOMAX) 250mg/250mL in 0.9% Sodium Chloride 0.02 mg/kg/hr (03/10/21 1056)     dextrose       CRRT replacement solution 12.5 mL/kg/hr (03/10/21 0639)     EPINEPHrine Stopped (03/10/21 0800)     fentaNYL Stopped (03/10/21 0800)     - MEDICATION INSTRUCTIONS -       - MEDICATION INSTRUCTIONS -       CRRT replacement solution 200 mL/hr at 03/09/21 2028     CRRT replacement solution 12.5 mL/kg/hr (03/10/21 0639)     BETA BLOCKER NOT PRESCRIBED       Andie Chester MD

## 2021-03-10 NOTE — PROCEDURES
Small Bowel Feeding Tube Placement Assessment  Reason for Feeding Tube Placement: Team request for postpyloric FT   Cortrak Start Time: 1315   Cortrak End Time: 1345  Medicine Delivered During Procedure: Lidocaine gel   Placement Successful: Presume post-pyloric (pending AXR confirmation).     Procedure Complications: None  Final Placement Harsh at exit of nare: 93 cm  Face to Face time with patient: 30 min       Bridle Placement:   Reason for bridle placement: Securement of FT    Medicine delivered during procedure: lidocaine gel   Procedure: Successful  Location of top of clip on FT: @ 94 cm marker   Condition of nose/skin at time of bridle placement: Unremarkable   Face to Face time with patient: 5 minutes.    Ashley Ortiz RD, LD  [id number]  Pgr: [phone]

## 2021-03-10 NOTE — PROGRESS NOTES
CV ICU PROGRESS NOTE  March , 2021      CO-MORBIDITIES:   Cardiogenic shock (H)  (primary encounter diagnosis)    ASSESSMENT: Jin Garrett is a 80 year old male with PMH of HTN, AAA, HLD and rheumatoid arthritis. He presented to an OSH with shortness of breath and atypical chest pain. TTE showed severe MR with concern for posterior flail leaflet, EF was preserved. On arrival at [facility] an Impella was emergently placed in cath lab. 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 Porras on 3/5/21. Returned from the OR on central ECMO with an open chest, intubated and sedated. Returned to the OR for a wash out on 3/9, found to have a thrombus on his mitral valve so he had the valve replaced, decannulated central ECMO, bronchoscopy and placement of left subclavian dialysis line.     TODAY'S PROGRESS:   - Wean FiO2 as able  - Wean pressors as able - off this am   - Hold IV sedation - off this am, neurologic exam today  - Head CT if not arousing by the afternoon   - Hold diuresis today and monitor I/O''s  - NJ placement today with starting TF per Nutrition   - Suppository today   - HIT +, follow up JENNIFER, no heparin, start bivalirudin today     PLAN:  Neuro/ pain/ sedation:  Post-operative pain  - Monitor neurological status. Notify the MD for any acute changes in exam.  - Fentanyl gtt for pain, decrease as able - currently off    - Propofol gtt for sedation - off at this time   - Assess for pain and plan to add PRN pain meds as needed      Pulmonary care:  Acute post-operative respiratory failure  - MV  - Titrate FiO2 for SpO2 >92%  - Bronchoscopy 3/9     Cardiovascular:  HTN  AAA  HLD  A fib  Severe mitral regurgitation s/p MVR3/5  CAD with 100% occlusion of the mid LCx s/p single vessel CABG 3/5  Cardiogenic shock  Vasoplegic shock   VA ECMO (central)   S/p redo MVR due to thrombosis, removal of ECMO 3/9, and wound closure 3/9  - Monitor hemodynamic status.   - MAP >65  - Vasopressors: epi weaned off  -  Prophylactic amiodarone  - TPW set a back up rate of 60    GI/Nutrition:   - NJ placement today with initiation of TF per Nutrition   - Add suppository today   - Bowel regimen       Fluids/ Electrolytes/Renal:  ADAN   Hyperkalemia  - TKO for IV fluid hydration.  - Will continue to monitor intake and output.  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   - Bowel regimen  - Bivalirudin gtt  - Protonix qd     Lines/ tubes/ drains:  - MAC  - Radial arterial line  -   - ETT  - OG  - 3 mediastinal CTs  - 2 pleural CTs  - Alfaro     Disposition:  - CV ICU.      Patient seen, findings and plan discussed with CV ICU staff, Dr. Hurtado.     BRYANT Tinajero ([facility])  Anesthesiology Resident  *[id number]    ====================================    SUBJECTIVE:   Intubated and sedated    OBJECTIVE:   1. VITAL SIGNS:   Temp:  [97  F (36.1  C)-98.8  F (37.1  C)] 97.7  F (36.5  C)  Pulse:  [78-91] 80  Resp:  [15-30] 20  MAP:  [64 mmHg-110 mmHg] 68 mmHg  Arterial Line BP: ()/() 102/55  FiO2 (%):  [40 %-85 %] 40 %  SpO2:  [88 %-100 %] 96 %  Ventilation Mode: PCV Plus assist  (Pressure Control Ventilation/ Assist Control)  FiO2 (%): 40 %  Rate Set (breaths/minute): 12 breaths/min  PEEP (cm H2O): 8 cmH2O  Oxygen Concentration (%): 70 %  Peak Inspiratory Pressure  (cm H2O) (Drager Fabiana): 21  Resp: 20      2. INTAKE/ OUTPUT:   I/O last 3 completed shifts:  In: 3515.49 [I.V.:675.49; Other:630; NG/GT:90]  Out: 2508 [Urine:252; Emesis/NG output:300; Other:821; Chest Tube:1135]    3. PHYSICAL EXAMINATION:   General: Lying in bed  Neuro: Sedated, not following commands at this time  Resp: Mechanically ventilated CTAB  CV: RRR  Abdomen: Soft, Non-distended, Non-tender  Incisions: C/D/I  Extremities: Warm and and well perfused upper extremities, lower extremities warm, difficult to palpate pulses in the feet, dusky finger and toes tips    4. INVESTIGATIONS:   Arterial Blood Gases   Recent Labs   Lab 03/10/21  0356 03/09/21  2347 03/09/21  2155 03/09/21  2022   PH 7.44 7.43 7.42 7.38   PCO2 34* 37 39 43   PO2 118* 103 69* 73*   HCO3 23 24 25 25     Complete Blood Count   Recent Labs   Lab 03/10/21  0356 03/09/21  2347 03/09/21  2022 03/09/21  1943 03/09/21  1831 03/09/21  1831 03/09/21  1017 03/09/21  1017   WBC 19.2* 21.7* 23.6*  --   --   --   --  18.9*   HGB 8.5* 8.6* 7.9* 8.8*   < >  --    < > 8.9*   * 128* 116*  --   --  54*  --  103*    < > = values in this interval not displayed.     Basic Metabolic Panel  Recent Labs   Lab 03/10/21  0356 03/09/21  2347 03/09/21  2022 03/09/21  1943 03/09/21  1017 03/09/21  1017    141 141 139   < > 137   POTASSIUM 4.8 5.0 5.2 5.0   < > 4.5   CHLORIDE 107 108 108  --   --  106   CO2 22 23 25  --   --  24   BUN 35* 40* 42*  --   --  35*   CR 1.67* 1.59* 1.72*  --   --  1.46*   *  106* 116*  116* 136*  137* 139*   < > 119*  121*    < > = values in this interval not displayed.     Liver Function Tests  Recent Labs   Lab 03/10/21  0356 03/09/21 2347 03/09/21 2022 03/09/21 1831 03/09/21 0358 03/09/21 0358 03/08/21 0351 03/08/21 0351   *  --  110*  --   --  44  --  62*   ALT 58  --  50  --   --  25  --  24   ALKPHOS 77  --  78  --   --  78  --  66   BILITOTAL 0.8  --  0.8  --   --  0.6  --  0.5   ALBUMIN 2.0*   --  1.7*  --   --  2.0*  --  2.0*   INR 1.28* 1.31* 1.46* 1.82*   < > 1.20*   < > 1.29*    < > = values in this interval not displayed.     Pancreatic Enzymes  No lab results found in last 7 days.  Coagulation Profile  Recent Labs   Lab 03/10/21  0356 03/09/21  2347 03/09/21 2022 03/09/21  1831   INR 1.28* 1.31* 1.46* 1.82*   PTT 33 46* 44* 36         5. RADIOLOGY:   Recent Results (from the past 24 hour(s))   XR Chest Port 1 View    Narrative    Exam: XR CHEST PORT 1 VW, 3/9/2021 7:41 PM    Indication: Chest Closure    Comparison: 3/9/2021 at 0112    Findings:   AP view of the chest. Endotracheal tube with the tip in the mid  thoracic trachea. Right IJ approach venous sheath with the tip near  the innominate confluence. Left IJ approach venous line with the tip  in the lower thoracic SVC. Mediastinal and bilateral chest drains.  Postsurgical changes of mitral valve replacement. Intact median  sternotomy wires.     Cardiac silhouette is unchanged. Small calcific densities again noted  along the left heart border. Vascular calcifications of the aortic  arch. Mixed interstitial and patchy airspace opacities throughout both  lungs similar to prior. Small bilateral pleural effusions. No large  pneumothorax. No acute findings in the upper abdomen. Degenerative  changes of the spine and shoulders.      Impression    Impression:   1. Postsurgical changes and support devices as detailed above.  2. Mixed interstitial and patchy airspace opacities in the left and  right lung similar to prior exam.  3. Small bilateral pleural effusions.  Findings discussed with Mickie Driscoll RN in OR 26 at 7:47 PM on 3/9/2021.    I have personally reviewed the examination and initial interpretation  and I agree with the findings.    REJI LYNN MD       =========================================

## 2021-03-10 NOTE — OP NOTE
OPERATIVE DATE: 3/9/2021    PRE-OPERATIVE DIAGNOSIS:  1) Thrombosed mitral valve  2) Cardiogenic shock requiring VA ECMO  Patient Active Problem List   Diagnosis     Cardiogenic shock (H)     Status post cardiac surgery     POST-OPERATIVE DIAGNOSIS:  1) Thrombosed mitral valve  2) Cardiogenic shock requiring VA ECMO  Patient Active Problem List   Diagnosis     Cardiogenic shock (H)     Status post cardiac surgery     PROCEDURE:  1) Redo mitral valve replacement - 31mm St Tee Epic mitral valve  2) VA ECMO decannulation  3) Trans-esophageal echocardiogram  4) Flexible bronchoscopy  5) Placement of left subclavian Mahurkar dialysis catheter    SURGEON: Daquan Porras MD    ASSISTANT: Scott Camacho PA-C; Jus Short PA-C    ANESTHESIA: GETA    ESTIMATED BLOOD LOSS: 1000cc    OPERATIVE FINDINGS:  1) Thrombosis of prosthetic mitral valve    INDICATIONS:  Mr. Jin Garrett is a 80 year old male admitted with acute mitral regurgitation.  He had post-cardiotomy shock requiring VA ECMO after mitral valve replacement on Friday.  He was scheduled ECMO turndown and possible decannulation.  TASH showed thrombosed mitral valve.  I decided to proceed with revision of his mitral valve.  Risks and benefits of the operation were explained to the patient and their family including, but not limited to, bleeding, infection, stroke and even death.  They understood these risks and agreed to proceed electively.    OPERATIVE REPORT:  The patient was transferred to the operating room and positioned supine on the OR table.  General anesthesia was initiated by the anesthesia team.  Endotracheal intubation and central venous access was performed by anesthesia.  The patients neck, chest, abdomen and bilateral lower extremities were clipped, prepped and draped in sterile fashion.  A pre-procedure time-out was performed confirming the correct patient, correct site and correct procedure.    Flexible bronchoscopy was performed.  There was not any  significant secretions.   Prior dressing was removed. The patient was given 400 mg/kg IV heparin.   Bicaval venous canulation was performed with a 24F right angle venous cannula in the SVC and 28F straight venous cannula in the IVC.  The aortic EOPA was transferred from the ECMO circuit to the bypass circuit.  Cardiopulmonary bypass was initiated with good flows.  Flow on the circuit was decreased.  A large aortic cross clamp was applied.  Flow on the circuit was resumed.  1000cc of antegrade cold blood cardioplegia was delivered with good diastolic arrest.  An additional 300cc of retrograde cold blood cardioplegia was used to test the retrograde.  The trans-septal atriotomy was reopened.  There was a large amount of thrombus in the left atrium.  We worked carefully to clear all thrombus.  The old valve was removed.  Pledgeted 2-0 Tevdek stitches were placed in the annulus circumferentially.  The annulus was sized to 31mm.  Next a 31mm St. Tee Epic Bioprosthetic Mitral Valve was opened.  The valve stitches were placed through the sewing cuff.  The valve was seated and secured with CoreKnot device.  The left atriotomy was closed in 2 layers using running 4-0 prolene.  The left side of the heart was de-aired with a vent in the right atrium, and de-airing maneuvers.    A retrograde hot shot of warm blood cardiplegia was delivered.  After completion of the hot shot, flow on the bypass circuit was decreased and the aortic cross clamp was removed.  Flow on the bypass circuit was resumed.  The retrograde cardioplegia catheter was removed.  Ventricular pacing wires were placed and delivered through the anterior abdominal wall and secured to the skin with 0-ethibond stitches.  The patient had normal sinus rhythm and was paced at 80 bpm.      The left pleural space was suctioned dry.  The lungs were ventilated bilaterally.  TASH showed no intra-cardiac air.  The DLP root vent / antegrade cardioplegia catheter was removed.   Intravenous calcium was administered.  Flow on the bypass circuit was weaned to off.  The patient was stable on low dose epinephrine and nitroglycerin.  The venous cannula was removed.  Pursestring at this site was tied.  This was over-sewed with 0-ethibond.  The remaining pump blood volume was returned via the arterial cannula.  A test dose of protamine was administered.  The arterial cannula was removed.  The pursestrings at this site were tied.  This was oversewn with pledgeted 4-0 prolene.     The sternal retractor was removed.  Two 32F straight argyle mediastinal chest tubes were placed.  Bilateral 28F pleural chest tubes were placed.  These were delivered through the anterior abdominal wall and secured to the skin with 0-ethibond stitches.      The wound was made hemostatic with cautery.  The subcutaneous tissue, sternal edges, thymic fat, pericardial edges and all anastomotic and cannulation sites were inspected and hemostatic.    The sternum was reapproximated with sternal wires.  The wound was irrigated with warm antibiotic saline.  The sternum was reapproximated.    The wound was made hemostatic then closed in layers using vicryl stitches.  The subcutaneous tissue was closed with 2-0 vicryl stitches.  The skin was closed with 3-0 vicryl.  Dermabond skin glue was applied.  A sterile dressing was applied.      The patient was then transferred from the operating bed to an ICU bed and transferred to the ICU in critical, but stable, condition.    All needle, sponge and instrument counts were correct at the end of the case.    Daquan Porras  Cardiothoracic Surgery  Pager: [phone]

## 2021-03-11 ENCOUNTER — APPOINTMENT (OUTPATIENT)
Dept: OCCUPATIONAL THERAPY | Facility: CLINIC | Age: 81
DRG: 003 | End: 2021-03-11
Attending: INTERNAL MEDICINE
Payer: MEDICARE

## 2021-03-11 ENCOUNTER — APPOINTMENT (OUTPATIENT)
Dept: GENERAL RADIOLOGY | Facility: CLINIC | Age: 81
DRG: 003 | End: 2021-03-11
Attending: INTERNAL MEDICINE
Payer: MEDICARE

## 2021-03-11 LAB
ALBUMIN SERPL-MCNC: 2 G/DL (ref 3.4–5)
ALP SERPL-CCNC: 114 U/L (ref 40–150)
ALT SERPL W P-5'-P-CCNC: 57 U/L (ref 0–70)
ANION GAP SERPL CALCULATED.3IONS-SCNC: 5 MMOL/L (ref 3–14)
ANION GAP SERPL CALCULATED.3IONS-SCNC: 8 MMOL/L (ref 3–14)
APTT PPP: 50 SEC (ref 22–37)
AST SERPL W P-5'-P-CCNC: 70 U/L (ref 0–45)
BACTERIA SPEC CULT: NO GROWTH
BILIRUB SERPL-MCNC: 0.6 MG/DL (ref 0.2–1.3)
BUN SERPL-MCNC: 45 MG/DL (ref 7–30)
BUN SERPL-MCNC: 47 MG/DL (ref 7–30)
BUN SERPL-MCNC: 49 MG/DL (ref 7–30)
BUN SERPL-MCNC: 49 MG/DL (ref 7–30)
CA-I BLD-MCNC: 4.5 MG/DL (ref 4.4–5.2)
CA-I SERPL ISE-MCNC: 4.3 MG/DL (ref 4.4–5.2)
CALCIUM SERPL-MCNC: 7.9 MG/DL (ref 8.5–10.1)
CALCIUM SERPL-MCNC: 8.1 MG/DL (ref 8.5–10.1)
CALCIUM SERPL-MCNC: 8.3 MG/DL (ref 8.5–10.1)
CALCIUM SERPL-MCNC: 8.4 MG/DL (ref 8.5–10.1)
CHLORIDE SERPL-SCNC: 107 MMOL/L (ref 94–109)
CHLORIDE SERPL-SCNC: 108 MMOL/L (ref 94–109)
CHLORIDE SERPL-SCNC: 110 MMOL/L (ref 94–109)
CHLORIDE SERPL-SCNC: 112 MMOL/L (ref 94–109)
CO2 SERPL-SCNC: 24 MMOL/L (ref 20–32)
CO2 SERPL-SCNC: 25 MMOL/L (ref 20–32)
CO2 SERPL-SCNC: 25 MMOL/L (ref 20–32)
CO2 SERPL-SCNC: 26 MMOL/L (ref 20–32)
COPATH REPORT: NORMAL
CREAT SERPL-MCNC: 1.57 MG/DL (ref 0.66–1.25)
CREAT SERPL-MCNC: 1.57 MG/DL (ref 0.66–1.25)
CREAT SERPL-MCNC: 1.62 MG/DL (ref 0.66–1.25)
CREAT SERPL-MCNC: 1.66 MG/DL (ref 0.66–1.25)
ERYTHROCYTE [DISTWIDTH] IN BLOOD BY AUTOMATED COUNT: 16.6 % (ref 10–15)
GFR SERPL CREATININE-BSD FRML MDRD: 38 ML/MIN/{1.73_M2}
GFR SERPL CREATININE-BSD FRML MDRD: 39 ML/MIN/{1.73_M2}
GFR SERPL CREATININE-BSD FRML MDRD: 41 ML/MIN/{1.73_M2}
GFR SERPL CREATININE-BSD FRML MDRD: 41 ML/MIN/{1.73_M2}
GLUCOSE BLDC GLUCOMTR-MCNC: 126 MG/DL (ref 70–99)
GLUCOSE BLDC GLUCOMTR-MCNC: 133 MG/DL (ref 70–99)
GLUCOSE BLDC GLUCOMTR-MCNC: 135 MG/DL (ref 70–99)
GLUCOSE BLDC GLUCOMTR-MCNC: 136 MG/DL (ref 70–99)
GLUCOSE SERPL-MCNC: 118 MG/DL (ref 70–99)
GLUCOSE SERPL-MCNC: 132 MG/DL (ref 70–99)
GLUCOSE SERPL-MCNC: 138 MG/DL (ref 70–99)
GLUCOSE SERPL-MCNC: 148 MG/DL (ref 70–99)
HCT VFR BLD AUTO: 25.3 % (ref 40–53)
HGB BLD-MCNC: 8 G/DL (ref 13.3–17.7)
LDH SERPL L TO P-CCNC: 439 U/L (ref 85–227)
Lab: NORMAL
MAGNESIUM SERPL-MCNC: 2.3 MG/DL (ref 1.6–2.3)
MAGNESIUM SERPL-MCNC: 2.3 MG/DL (ref 1.6–2.3)
MAGNESIUM SERPL-MCNC: 2.7 MG/DL (ref 1.6–2.3)
MCH RBC QN AUTO: 28.8 PG (ref 26.5–33)
MCHC RBC AUTO-ENTMCNC: 31.6 G/DL (ref 31.5–36.5)
MCV RBC AUTO: 91 FL (ref 78–100)
PHOSPHATE SERPL-MCNC: 1.9 MG/DL (ref 2.5–4.5)
PHOSPHATE SERPL-MCNC: 2.6 MG/DL (ref 2.5–4.5)
PHOSPHATE SERPL-MCNC: 4.8 MG/DL (ref 2.5–4.5)
PLATELET # BLD AUTO: 122 10E9/L (ref 150–450)
POTASSIUM SERPL-SCNC: 3.4 MMOL/L (ref 3.4–5.3)
POTASSIUM SERPL-SCNC: 3.5 MMOL/L (ref 3.4–5.3)
POTASSIUM SERPL-SCNC: 3.6 MMOL/L (ref 3.4–5.3)
POTASSIUM SERPL-SCNC: 3.7 MMOL/L (ref 3.4–5.3)
POTASSIUM SERPL-SCNC: 5.8 MMOL/L (ref 3.4–5.3)
PROT SERPL-MCNC: 5.2 G/DL (ref 6.8–8.8)
RBC # BLD AUTO: 2.78 10E12/L (ref 4.4–5.9)
SODIUM SERPL-SCNC: 140 MMOL/L (ref 133–144)
SODIUM SERPL-SCNC: 142 MMOL/L (ref 133–144)
SPECIMEN SOURCE: NORMAL
WBC # BLD AUTO: 17.1 10E9/L (ref 4–11)

## 2021-03-11 PROCEDURE — 250N000013 HC RX MED GY IP 250 OP 250 PS 637: Performed by: INTERNAL MEDICINE

## 2021-03-11 PROCEDURE — 272N000083 HC NUTRITION PRODUCT SEMIELEM INTERMED LITER

## 2021-03-11 PROCEDURE — 84100 ASSAY OF PHOSPHORUS: CPT | Performed by: THORACIC SURGERY (CARDIOTHORACIC VASCULAR SURGERY)

## 2021-03-11 PROCEDURE — 82330 ASSAY OF CALCIUM: CPT | Performed by: THORACIC SURGERY (CARDIOTHORACIC VASCULAR SURGERY)

## 2021-03-11 PROCEDURE — 85730 THROMBOPLASTIN TIME PARTIAL: CPT | Performed by: STUDENT IN AN ORGANIZED HEALTH CARE EDUCATION/TRAINING PROGRAM

## 2021-03-11 PROCEDURE — 84100 ASSAY OF PHOSPHORUS: CPT | Performed by: STUDENT IN AN ORGANIZED HEALTH CARE EDUCATION/TRAINING PROGRAM

## 2021-03-11 PROCEDURE — 83735 ASSAY OF MAGNESIUM: CPT | Performed by: THORACIC SURGERY (CARDIOTHORACIC VASCULAR SURGERY)

## 2021-03-11 PROCEDURE — 200N000002 HC R&B ICU UMMC

## 2021-03-11 PROCEDURE — 71045 X-RAY EXAM CHEST 1 VIEW: CPT | Mod: 26 | Performed by: RADIOLOGY

## 2021-03-11 PROCEDURE — 999N000157 HC STATISTIC RCP TIME EA 10 MIN

## 2021-03-11 PROCEDURE — 999N001017 HC STATISTIC GLUCOSE BY METER IP

## 2021-03-11 PROCEDURE — 97165 OT EVAL LOW COMPLEX 30 MIN: CPT | Mod: GO

## 2021-03-11 PROCEDURE — 250N000013 HC RX MED GY IP 250 OP 250 PS 637: Performed by: SURGERY

## 2021-03-11 PROCEDURE — 83735 ASSAY OF MAGNESIUM: CPT | Performed by: STUDENT IN AN ORGANIZED HEALTH CARE EDUCATION/TRAINING PROGRAM

## 2021-03-11 PROCEDURE — 80048 BASIC METABOLIC PNL TOTAL CA: CPT | Performed by: THORACIC SURGERY (CARDIOTHORACIC VASCULAR SURGERY)

## 2021-03-11 PROCEDURE — 97530 THERAPEUTIC ACTIVITIES: CPT | Mod: GO

## 2021-03-11 PROCEDURE — 250N000013 HC RX MED GY IP 250 OP 250 PS 637: Performed by: STUDENT IN AN ORGANIZED HEALTH CARE EDUCATION/TRAINING PROGRAM

## 2021-03-11 PROCEDURE — 90947 DIALYSIS REPEATED EVAL: CPT

## 2021-03-11 PROCEDURE — 258N000003 HC RX IP 258 OP 636: Performed by: INTERNAL MEDICINE

## 2021-03-11 PROCEDURE — 84132 ASSAY OF SERUM POTASSIUM: CPT | Performed by: THORACIC SURGERY (CARDIOTHORACIC VASCULAR SURGERY)

## 2021-03-11 PROCEDURE — 71045 X-RAY EXAM CHEST 1 VIEW: CPT

## 2021-03-11 PROCEDURE — 80048 BASIC METABOLIC PNL TOTAL CA: CPT | Performed by: INTERNAL MEDICINE

## 2021-03-11 PROCEDURE — 85027 COMPLETE CBC AUTOMATED: CPT | Performed by: STUDENT IN AN ORGANIZED HEALTH CARE EDUCATION/TRAINING PROGRAM

## 2021-03-11 PROCEDURE — 94003 VENT MGMT INPAT SUBQ DAY: CPT

## 2021-03-11 PROCEDURE — 80053 COMPREHEN METABOLIC PANEL: CPT | Performed by: STUDENT IN AN ORGANIZED HEALTH CARE EDUCATION/TRAINING PROGRAM

## 2021-03-11 PROCEDURE — 83735 ASSAY OF MAGNESIUM: CPT | Performed by: INTERNAL MEDICINE

## 2021-03-11 PROCEDURE — 83615 LACTATE (LD) (LDH) ENZYME: CPT | Performed by: STUDENT IN AN ORGANIZED HEALTH CARE EDUCATION/TRAINING PROGRAM

## 2021-03-11 PROCEDURE — 250N000009 HC RX 250: Performed by: INTERNAL MEDICINE

## 2021-03-11 PROCEDURE — 250N000012 HC RX MED GY IP 250 OP 636 PS 637: Performed by: NURSE PRACTITIONER

## 2021-03-11 PROCEDURE — 250N000011 HC RX IP 250 OP 636: Performed by: INTERNAL MEDICINE

## 2021-03-11 PROCEDURE — 999N000015 HC STATISTIC ARTERIAL MONITORING DAILY

## 2021-03-11 PROCEDURE — 99291 CRITICAL CARE FIRST HOUR: CPT | Mod: 24 | Performed by: INTERNAL MEDICINE

## 2021-03-11 PROCEDURE — 90945 DIALYSIS ONE EVALUATION: CPT | Mod: GC | Performed by: INTERNAL MEDICINE

## 2021-03-11 PROCEDURE — 82330 ASSAY OF CALCIUM: CPT | Performed by: INTERNAL MEDICINE

## 2021-03-11 PROCEDURE — 84100 ASSAY OF PHOSPHORUS: CPT | Performed by: INTERNAL MEDICINE

## 2021-03-11 RX ORDER — MAGNESIUM SULFATE HEPTAHYDRATE 40 MG/ML
2 INJECTION, SOLUTION INTRAVENOUS EVERY 6 HOURS PRN
Status: DISCONTINUED | OUTPATIENT
Start: 2021-03-11 | End: 2021-03-12

## 2021-03-11 RX ORDER — POTASSIUM CHLORIDE 29.8 MG/ML
20 INJECTION INTRAVENOUS EVERY 6 HOURS PRN
Status: DISCONTINUED | OUTPATIENT
Start: 2021-03-11 | End: 2021-03-12

## 2021-03-11 RX ADMIN — SODIUM PHOSPHATE, MONOBASIC, MONOHYDRATE AND SODIUM PHOSPHATE, DIBASIC, ANHYDROUS 20 MMOL: 276; 142 INJECTION, SOLUTION INTRAVENOUS at 18:11

## 2021-03-11 RX ADMIN — CALCIUM CHLORIDE, MAGNESIUM CHLORIDE, SODIUM CHLORIDE, SODIUM BICARBONATE, POTASSIUM CHLORIDE AND SODIUM PHOSPHATE DIBASIC DIHYDRATE 12.5 ML/KG/HR: 3.68; 3.05; 6.34; 3.09; .314; .187 INJECTION INTRAVENOUS at 17:22

## 2021-03-11 RX ADMIN — AMIODARONE HYDROCHLORIDE 200 MG: 200 TABLET ORAL at 13:54

## 2021-03-11 RX ADMIN — CEFEPIME 2 G: 2 INJECTION, POWDER, FOR SOLUTION INTRAVENOUS at 03:51

## 2021-03-11 RX ADMIN — PANTOPRAZOLE SODIUM 40 MG: 40 TABLET, DELAYED RELEASE ORAL at 08:14

## 2021-03-11 RX ADMIN — CALCIUM CHLORIDE, MAGNESIUM CHLORIDE, SODIUM CHLORIDE, SODIUM BICARBONATE, POTASSIUM CHLORIDE AND SODIUM PHOSPHATE DIBASIC DIHYDRATE 12.5 ML/KG/HR: 3.68; 3.05; 6.34; 3.09; .314; .187 INJECTION INTRAVENOUS at 15:07

## 2021-03-11 RX ADMIN — CALCIUM CHLORIDE, MAGNESIUM CHLORIDE, DEXTROSE MONOHYDRATE, LACTIC ACID, SODIUM CHLORIDE, SODIUM BICARBONATE AND POTASSIUM CHLORIDE 12.5 ML/KG/HR: 5.15; 2.03; 22; 5.4; 6.46; 3.09; .157 INJECTION INTRAVENOUS at 09:39

## 2021-03-11 RX ADMIN — CALCIUM CHLORIDE, MAGNESIUM CHLORIDE, SODIUM CHLORIDE, SODIUM BICARBONATE, POTASSIUM CHLORIDE AND SODIUM PHOSPHATE DIBASIC DIHYDRATE 12.5 ML/KG/HR: 3.68; 3.05; 6.34; 3.09; .314; .187 INJECTION INTRAVENOUS at 20:38

## 2021-03-11 RX ADMIN — Medication 5 ML: at 08:14

## 2021-03-11 RX ADMIN — INSULIN ASPART 1 UNITS: 100 INJECTION, SOLUTION INTRAVENOUS; SUBCUTANEOUS at 04:34

## 2021-03-11 RX ADMIN — CHLORHEXIDINE GLUCONATE 0.12% ORAL RINSE 15 ML: 1.2 LIQUID ORAL at 08:14

## 2021-03-11 RX ADMIN — AMIODARONE HYDROCHLORIDE 200 MG: 200 TABLET ORAL at 08:14

## 2021-03-11 RX ADMIN — CALCIUM CHLORIDE, MAGNESIUM CHLORIDE, SODIUM CHLORIDE, SODIUM BICARBONATE, POTASSIUM CHLORIDE AND SODIUM PHOSPHATE DIBASIC DIHYDRATE: 3.68; 3.05; 6.34; 3.09; .314; .187 INJECTION INTRAVENOUS at 04:02

## 2021-03-11 RX ADMIN — CALCIUM CHLORIDE, MAGNESIUM CHLORIDE, DEXTROSE MONOHYDRATE, LACTIC ACID, SODIUM CHLORIDE, SODIUM BICARBONATE AND POTASSIUM CHLORIDE 12.5 ML/KG/HR: 5.15; 2.03; 22; 5.4; 6.46; 3.09; .157 INJECTION INTRAVENOUS at 03:33

## 2021-03-11 RX ADMIN — CALCIUM CHLORIDE, MAGNESIUM CHLORIDE, SODIUM CHLORIDE, SODIUM BICARBONATE, POTASSIUM CHLORIDE AND SODIUM PHOSPHATE DIBASIC DIHYDRATE 12.5 ML/KG/HR: 3.68; 3.05; 6.34; 3.09; .314; .187 INJECTION INTRAVENOUS at 22:19

## 2021-03-11 RX ADMIN — CHLORHEXIDINE GLUCONATE 0.12% ORAL RINSE 15 ML: 1.2 LIQUID ORAL at 19:36

## 2021-03-11 RX ADMIN — CALCIUM CHLORIDE, MAGNESIUM CHLORIDE, SODIUM CHLORIDE, SODIUM BICARBONATE, POTASSIUM CHLORIDE AND SODIUM PHOSPHATE DIBASIC DIHYDRATE 12.5 ML/KG/HR: 3.68; 3.05; 6.34; 3.09; .314; .187 INJECTION INTRAVENOUS at 09:40

## 2021-03-11 RX ADMIN — CALCIUM CHLORIDE, MAGNESIUM CHLORIDE, SODIUM CHLORIDE, SODIUM BICARBONATE, POTASSIUM CHLORIDE AND SODIUM PHOSPHATE DIBASIC DIHYDRATE 12.5 ML/KG/HR: 3.68; 3.05; 6.34; 3.09; .314; .187 INJECTION INTRAVENOUS at 03:33

## 2021-03-11 RX ADMIN — CALCIUM CHLORIDE, MAGNESIUM CHLORIDE, SODIUM CHLORIDE, SODIUM BICARBONATE, POTASSIUM CHLORIDE AND SODIUM PHOSPHATE DIBASIC DIHYDRATE: 3.68; 3.05; 6.34; 3.09; .314; .187 INJECTION INTRAVENOUS at 15:07

## 2021-03-11 RX ADMIN — CALCIUM CHLORIDE, MAGNESIUM CHLORIDE, DEXTROSE MONOHYDRATE, LACTIC ACID, SODIUM CHLORIDE, SODIUM BICARBONATE AND POTASSIUM CHLORIDE 12.5 ML/KG/HR: 5.15; 2.03; 22; 5.4; 6.46; 3.09; .157 INJECTION INTRAVENOUS at 04:01

## 2021-03-11 RX ADMIN — CALCIUM CHLORIDE, MAGNESIUM CHLORIDE, SODIUM CHLORIDE, SODIUM BICARBONATE, POTASSIUM CHLORIDE AND SODIUM PHOSPHATE DIBASIC DIHYDRATE 12.5 ML/KG/HR: 3.68; 3.05; 6.34; 3.09; .314; .187 INJECTION INTRAVENOUS at 04:02

## 2021-03-11 RX ADMIN — BIVALIRUDIN 0.03 MG/KG/HR: 250 INJECTION, POWDER, LYOPHILIZED, FOR SOLUTION INTRAVENOUS at 09:33

## 2021-03-11 RX ADMIN — ACETAMINOPHEN 650 MG: 325 TABLET, FILM COATED ORAL at 21:36

## 2021-03-11 RX ADMIN — POTASSIUM CHLORIDE 20 MEQ: 29.8 INJECTION, SOLUTION INTRAVENOUS at 12:43

## 2021-03-11 RX ADMIN — INSULIN ASPART 1 UNITS: 100 INJECTION, SOLUTION INTRAVENOUS; SUBCUTANEOUS at 01:19

## 2021-03-11 RX ADMIN — ASPIRIN 81 MG CHEWABLE TABLET 81 MG: 81 TABLET CHEWABLE at 08:14

## 2021-03-11 RX ADMIN — CALCIUM CHLORIDE, MAGNESIUM CHLORIDE, DEXTROSE MONOHYDRATE, LACTIC ACID, SODIUM CHLORIDE, SODIUM BICARBONATE AND POTASSIUM CHLORIDE 12.5 ML/KG/HR: 5.15; 2.03; 22; 5.4; 6.46; 3.09; .157 INJECTION INTRAVENOUS at 15:06

## 2021-03-11 RX ADMIN — AMIODARONE HYDROCHLORIDE 200 MG: 200 TABLET ORAL at 19:36

## 2021-03-11 ASSESSMENT — ACTIVITIES OF DAILY LIVING (ADL)
ADLS_ACUITY_SCORE: 20
PREVIOUS_RESPONSIBILITIES: MEAL PREP;HOUSEKEEPING;LAUNDRY;SHOPPING;MEDICATION MANAGEMENT;YARDWORK;FINANCES;DRIVING
ADLS_ACUITY_SCORE: 26
ADLS_ACUITY_SCORE: 24
ADLS_ACUITY_SCORE: 20
ADLS_ACUITY_SCORE: 24
ADLS_ACUITY_SCORE: 24

## 2021-03-11 ASSESSMENT — MIFFLIN-ST. JEOR: SCORE: 1601.13

## 2021-03-11 NOTE — PROGRESS NOTES
CRRT STATUS NOTE    DATA:  Time:  6:00 AM  Pressures WNL:  YES  Filter Status:  WDL    Problems Reported/Alarms Noted:  None.    Supplies Present:  YES    ASSESSMENT:  Patient Net Fluid Balance:  Net -660 ml @ 0600. Net even 3/10 and net + 7600 since admission  Vital Signs:  HR 70-80, BP 90/50, MAP >65  Labs:  K 3.7, Mg 2.7, Phos 2.6,, Hgb 8, Plt 122  Goals of Therapy: 0-100 net negative as tolerated with pressor increase    INTERVENTIONS:   None.    PLAN:  Fluid removal per plan of care. Continue to monitor circuit daily and change set q72 hours or PRN for clotting/clogging. Please call CRRT resource RN at [phone]  with any quesitons/problems.

## 2021-03-11 NOTE — PROGRESS NOTES
Nephrology Progress Note  03/11/2021         Assessment & Recommendations:   Jin Garrett is a 80 year old with PMH HTN, AAA, HLD, RA who presented to OSH with SOB and atypical CP. TTE showed severe MR with ?posterior flail leaflet, EF preserved. On arrival at [facility] an Impella was emergently placed. S/p MVR and single vessel CABG on 3/5/21. Returned from the OR on central ECMO with an open chest, now s/p decannulation on 3/9. Nephrology consulted for ADAN.     ADAN in the setting of cardiogenic shock was on ECMO, s/p decannulation on 3/9  Unknown baseline, no h/o CKD per daughter. 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 Etiology likely ATN in the setting of shock, no obstruction per CT and urinalysis showed granular casts which is c/w ATN. UOP declined and K rising  on 3/6, so initiated on CRRT. He is off of pressors today with BP in 110-120's. UOP was 250 ml in past 24 hours. Seems like he needs RRT, but continue on CRRT while he is being intubated and while receiving high obligatory intake (3.4L in past 24 hours. Will plan transitioning to HD if he gets extubated and or he is HDS.  - continue CRRT as there is no clear signs of renal recovery   - Goal UF 0-100 net neg with out going up on pressors  - Off of ECMO, now undergoing RRT via subclavian HD catheter placed in OR  - Labs per CRRT protocol  - Avoid nephrotoxins  - Renally dose medications  - Renal diet  - Daily weights  - Strict I/Os      Recommendations were communicated to primary team via this note     Seen and discussed with Dr. Leyda Chester MD   [phone]    Interval History :   Nursing and provider notes from last 24 hours reviewed.  In the last 24 hours Jin Garrett intubated and sedated    Physical Exam:   I/O last 3 completed shifts:  In: 2470.95 [I.V.:430.95; NG/GT:430]  Out: 3886 [Urine:96; Emesis/NG output:400; Other:3150; Chest Tube:240]   /65   " Pulse 85   Temp 98.6  F (37  C) (Axillary)   Resp 26   Ht 1.803 m (5' 11\")   Wt 86.9 kg (191 lb 9.3 oz)   SpO2 97%   BMI 26.72 kg/m       General : Pt sedated and intubated, not in acute distress   Lungs : anterior lung fields are clear  Cardiac : S1, S2 present  Abdomen : Soft/ND  LE : Edema noted  Dialysis Access : right subclavian non tunneled line    Labs:   All labs reviewed by me  Electrolytes/Renal -   Recent Labs   Lab Test 03/11/21  1015 03/11/21  0339 03/10/21  2200 03/10/21  1439 03/10/21  0356 03/10/21  0356    140 141 142   < > 140   POTASSIUM 3.4 3.7 3.7 3.9   < > 4.8   CHLORIDE 108 107 109 111*   < > 107   CO2 26 25 24 20   < > 22   BUN 47* 45* 39* 31*   < > 35*   CR 1.66* 1.62* 1.52* 1.40*   < > 1.67*   * 148* 144* 79   < > 105*  106*   PALLAVI 8.3* 8.4* 8.5 7.4*   < > 8.3*   MAG  --  2.7*  --  2.2  --  2.8*   PHOS  --  2.6  --  3.8  --  4.1    < > = values in this interval not displayed.       CBC -   Recent Labs   Lab Test 03/11/21  0339 03/10/21  1527 03/10/21  0356 03/09/21  2347   WBC 17.1*  --  19.2* 21.7*   HGB 8.0* 7.9* 8.5* 8.6*   *  --  126* 128*       LFTs -   Recent Labs   Lab Test 03/11/21  0339 03/10/21  0356 03/09/21 2022   ALKPHOS 114 77 78   BILITOTAL 0.6 0.8 0.8   ALT 57 58 50   AST 70* 113* 110*   PROTTOTAL 5.2* 4.8* 4.2*   ALBUMIN 2.0* 2.0* 1.7*       Current Medications:    amiodarone  200 mg Oral or NG Tube TID     aspirin  81 mg Oral or NG Tube Daily     B and C vitamin Complex with folic acid  5 mL Oral Daily     chlorhexidine  15 mL Swish & Spit BID     insulin aspart  1-4 Units Subcutaneous Q4H     pantoprazole  40 mg Oral or NG Tube Daily    Or     pantoprazole  40 mg Oral Daily     polyethylene glycol  17 g Oral BID     senna-docusate  1-2 tablet Oral BID       bivalirudin ANTICOAGULANT (ANGIOMAX) 250mg/250mL in 0.9% Sodium Chloride 0.029 mg/kg/hr (03/11/21 1500)     dextrose       CRRT replacement solution 12.5 mL/kg/hr (03/11/21 1506)     - " MEDICATION INSTRUCTIONS -       - MEDICATION INSTRUCTIONS -       CRRT replacement solution 200 mL/hr at 03/11/21 1507     CRRT replacement solution 12.5 mL/kg/hr (03/11/21 1507)     BETA BLOCKER NOT PRESCRIBED       Andie Chester MD

## 2021-03-11 NOTE — PROGRESS NOTES
"   03/11/21 1000   Quick Adds   Type of Visit Initial Occupational Therapy Evaluation   Living Environment   People in home alone   Current Living Arrangements house   Home Accessibility stairs within home;stairs to enter home   Number of Stairs, Within Home, Primary 10   Stair Railings, Within Home, Primary railing on left side (ascending)   Transportation Anticipated family or friend will provide;car, drives self   Living Environment Comments Pt [place]. 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 Pt with tub shower combo with shower chair.    Self-Care   Usual Activity Tolerance good   Current Activity Tolerance fair   Regular Exercise Yes   Equipment Currently Used at Home tub bench;walker, standard   Activity/Exercise/Self-Care Comment Prior to L hip surgery pt was independent with ADLs/IADLs. Pt very active gardening and doing yardwork on 30 acres of land. Pt with recent L hip surgery ~2 weeks ago and was NWB status. Pt admitted to the hospital shortly after so unclear of his level of assist needed for ADLs/IADLs between that time. Pt with hx of spinal stenosis and chronic back pain. Pt intubated/sedated and information obtained from his daughter \"Pamela\".    Disability/Function   Concentrating, Remembering or Making Decisions Difficulty no   Difficulty Communicating no   Difficulty Eating/Swallowing no   Walking or Climbing Stairs Difficulty no   Dressing/Bathing Difficulty no   Toileting issues no   Doing Errands Independently Difficulty (such as shopping) no   Fall history within last six months no   General Information   Onset of Illness/Injury or Date of Surgery 03/04/21   Referring Physician Elliott Leslie MD   Patient/Family Therapy Goal Statement (OT) none stated    Additional Occupational Profile Info/Pertinent History of Current Problem Jin Garrett is a 80 year old male with PMH of HTN, AAA, HLD and rheumatoid arthritis. He presented to an OSH with shortness " of breath and atypical chest pain. TTE showed severe MR with concern for posterior flail leaflet, EF was preserved. On arrival at [facility] an Impella was emergently placed in cath lab. 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 Porras on 3/5/21. Returned from the OR on central ECMO with an open chest, intubated and sedated. Returned to the OR for a wash out on 3/9, found to have a thrombus on his mitral valve so he had the valve replaced, decannulated central ECMO, bronchoscopy and placement of left subclavian dialysis line.    Existing Precautions/Restrictions sternal;fall   Cognitive Status Examination   Cognitive Status Comments Pt intubated and off sedation, pt not following commands and does not arousal to movement or environmental stimuli.    Visual Perception   Visual Impairment/Limitations unable/difficult to assess   Sensory   Sensory Comments unable to assess.    Integumentary/Edema   Integumentary/Edema no deficits were identifed   Posture   Posture scoliosis   Posture Comments Per daughter pt with hx of spinal stenosis and chronic back pain.    Range of Motion Comprehensive   General Range of Motion bilateral upper extremity ROM WNL   Strength Comprehensive (MMT)   Comment, General Manual Muscle Testing (MMT) Assessment unable to assess.    Muscle Tone Assessment   Muscle Tone Quick Adds No deficits were identified   Transfers   Transfer Comments OH lift for transfers    Balance   Balance Comments unable to assess    Instrumental Activities of Daily Living (IADL)   Previous Responsibilities meal prep;housekeeping;laundry;shopping;medication management;yardwork;finances;driving   Clinical Impression   Criteria for Skilled Therapeutic Interventions Met (OT) yes   OT Diagnosis decreased ADL I    OT Problem List-Impairments impacting ADL problems related to;balance;activity tolerance impaired;cognition;mobility;range of motion (ROM);strength;pain;post-surgical precautions;postural control    Assessment of Occupational Performance 5 or more Performance Deficits   Identified Performance Deficits dressing, bathing, g/h tasks, mobility, home management   Planned Therapy Interventions (OT) ADL retraining;IADL retraining;balance training;bed mobility training;cognition;ROM;strengthening;stretching;transfer training;home program guidelines;progressive activity/exercise;risk factor education   Clinical Decision Making Complexity (OT) low complexity   Therapy Frequency (OT) 3x/week   Predicted Duration of Therapy 3 weeks    Risk & Benefits of therapy have been explained evaluation/treatment results reviewed;care plan/treatment goals reviewed;risks/benefits reviewed;current/potential barriers reviewed;daughter   Comment-Clinical Impression Pt presents with the above stated deficits. Recommend continued OT Intervention to facilitate return to PLOF.    OT Discharge Planning    OT Discharge Recommendation (DC Rec) Transitional Care Facility   OT Rationale for DC Rec Recommend continued rehab to facilitate return to PLOF.    OT Brief overview of current status  OH lift    Total Evaluation Time (Minutes)   Total Evaluation Time (Minutes) 5

## 2021-03-11 NOTE — PLAN OF CARE
Major Shift Events:  No major events during shift.   Pt up to chair x 2. [place] and izquierdo catheter removed. Pt had multiple loose stools. Continues to be unresponsive but moves spontaneously. Therapy at bs for ROM exercises and adjusted sling to account for recent L hip surgery accommodations. Pt received 20 mEq KCl - renal notified of need for replacement.  Plan: Continue POC, allow time for pt to continue to clear and wake up. Possible CT in future if no neurological improvement.  For vital signs and complete assessments, please see documentation flowsheets.

## 2021-03-11 NOTE — PROGRESS NOTES
CV ICU PROGRESS NOTE  March 11, 2021      CO-MORBIDITIES:   Cardiogenic shock (H)  (primary encounter diagnosis)    ASSESSMENT: Jin Garrett is a 80 year old male with PMH of HTN, AAA, HLD and rheumatoid arthritis. He presented to an OSH with shortness of breath and atypical chest pain. TTE showed severe MR with concern for posterior flail leaflet, EF was preserved. On arrival at [facility] an Impella was emergently placed in cath lab. 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 Porras on 3/5/21. Returned from the OR on central ECMO with an open chest, intubated and sedated. Returned to the OR for a wash out on 3/9, found to have a thrombus on his mitral valve so he had the valve replaced, decannulated central ECMO, bronchoscopy and placement of left subclavian dialysis line.     TODAY'S PROGRESS:   - Cefepime and vancomycin completed today   - Remove hands free sheath today    - CRRT goal -500 cc today   - Follow up JENNIFER    PLAN:  Neuro/ pain/ sedation:  Post-operative pain  - Monitor neurological status. Notify the MD for any acute changes in exam.  - Fentanyl gtt for pain, decrease as able - currently off    - Propofol gtt for sedation - off at this time   - Assess for pain and plan to add PRN pain meds as needed      Pulmonary care:  Acute post-operative respiratory failure  - MV  - Titrate FiO2 for SpO2 >92%  - Bronchoscopy 3/9     Cardiovascular:  HTN  AAA  HLD  A fib  Severe mitral regurgitation s/p MVR3/5  CAD with 100% occlusion of the mid LCx s/p single vessel CABG 3/5  Cardiogenic shock  Vasoplegic shock   VA ECMO (central)   S/p redo MVR due to thrombosis, removal of ECMO 3/9, and wound closure 3/9  - Monitor hemodynamic status.   - MAP >65  - ASA 81  - Prophylactic amiodarone  - TPW set a back up rate of 60    GI/Nutrition:   - TF through [place] advance towards goal  - Bowel regimen       Fluids/ Electrolytes/Renal:  ADAN   Hyperkalemia  - TKO for IV fluid hydration.  - Will continue to monitor  intake and output.  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   - Bowel regimen  - Bivalirudin gtt  - Protonix qd     Lines/ tubes/ drains:  - MAC    - ETT  - OG  - 3 mediastinal CTs  - 2 pleural CTs       Disposition:  - CV ICU.      Patient seen, findings and plan discussed with CV ICU staff, Dr. Hurtado.     BRYANT Tinajero ([facility])  Anesthesiology Resident  *[id number]    ====================================    SUBJECTIVE:   Intubated and sedated    OBJECTIVE:   1. VITAL SIGNS:   Temp:  [98.2  F (36.8  C)-99.7  F (37.6  C)] 99.7  F (37.6  C)  Pulse:  [75-87] 80  Resp:  [17-26] 22  BP: (106-127)/(62-73) 106/69  MAP:  [64 mmHg-104 mmHg] 90 mmHg  Arterial Line BP: ()/(54-93) 94/90  FiO2 (%):  [40 %] 40 %  SpO2:  [94 %-100 %] 97 %  Ventilation Mode: PCV Plus assist  (Pressure Control Ventilation/ Assist Control)  FiO2 (%): 40 %  Rate Set (breaths/minute): 12 breaths/min  PEEP (cm H2O): 8 cmH2O  Oxygen Concentration (%): 40 %  Peak Inspiratory Pressure (cm H2O) (Drager Fabiana): 21  Resp: 22      2. INTAKE/ OUTPUT:   I/O last 3 completed shifts:  In: 1617.53 [I.V.:827.53; NG/GT:300]  Out: 1558 [Urine:123; Emesis/NG output:100; Other:845; Chest Tube:490]    3. PHYSICAL EXAMINATION:   General: Lying in bed  Neuro: Sedated, not  following commands at this time  Resp: Mechanically ventilated, coarse lung sounds bilaterally  CV: RRR  Abdomen: Soft, Non-distended, Non-tender  Incisions: C/D/I  Extremities: Warm and and well perfused upper extremities, lower extremities warm, difficult to palpate pulses in the feet, dusky finger and toes tips    4. INVESTIGATIONS:   Arterial Blood Gases   Recent Labs   Lab 03/10/21  0813 03/10/21  0356 03/09/21  2347 03/09/21  2155   PH 7.44 7.44 7.43 7.42   PCO2 31* 34* 37 39   PO2 108* 118* 103 69*   HCO3 22 23 24 25     Complete Blood Count   Recent Labs   Lab 03/11/21  0339 03/10/21  1527 03/10/21  0356 03/09/21  2347 03/09/21  2022   WBC 17.1*  --  19.2* 21.7* 23.6*   HGB 8.0* 7.9* 8.5* 8.6* 7.9*   *  --  126* 128* 116*     Basic Metabolic Panel  Recent Labs   Lab 03/11/21  0339 03/10/21  2200 03/10/21  1439 03/10/21  1108    141 142 140   POTASSIUM 3.7 3.7 3.9 4.5   CHLORIDE 107 109 111* 110*   CO2 25 24 20 20   BUN 45* 39* 31* 33*   CR 1.62* 1.52* 1.40* 1.48*   * 144* 79 90     Liver Function Tests  Recent Labs   Lab 03/11/21  0339 03/10/21  0356 03/09/21  2347 03/09/21  2022 03/09/21  1831 03/09/21 0358 03/09/21 0358   AST 70* 113*  --  110*  --   --  44   ALT 57 58  --  50  --   --  25   ALKPHOS 114 77  --  78  --   --  78   BILITOTAL 0.6 0.8  --  0.8  --   --  0.6   ALBUMIN 2.0* 2.0*  --  1.7*  --   --  2.0*   INR  --  1.28* 1.31* 1.46* 1.82*   < > 1.20*    < > = values in this interval not displayed.     Pancreatic Enzymes  No lab results found in last 7 days.  Coagulation Profile  Recent Labs   Lab 03/11/21  0339 03/10/21  2008 03/10/21  1527 03/10/21  1229 03/10/21  0356 03/09/21  2347 03/09/21 2022 03/09/21  1831   INR  --   --   --   --  1.28* 1.31* 1.46* 1.82*   PTT 50* 50* 39* 34 33 46* 44* 36         5. RADIOLOGY:   Recent Results (from the past 24 hour(s))   XR Abdomen Port 1 View    Narrative    Examination:  XR ABDOMEN PORT 1 VW 3/10/2021 2:33 PM     Comparison:  Abdominal radiograph dated 3/5/2021    History: Feeding tube placement    Findings: Portable supine view of the abdomen. Feeding tube with  distal tip positioned in the third portion of the duodenum. Enteric  tube distal tip and sidehole projected over the stomach. Interval  removal of right groin catheter. Nonobstructive bowel gas pattern.    Multiple partially visualized mediastinal drains and chest tubes.  Partially visualized postoperative changes of median sternotomy with  mitral valve replacement. Advanced degenerative changes of the lumbar  spine.       Impression    Impression:   1. Postpyloric feeding tube with distal tip overlying the third  portion of the duodenum.  2. Postsurgical changes of the chest with multiple tubes and lines as  detailed above. Better visualized on same day chest x-ray.    I have personally reviewed the examination and initial interpretation  and I agree with the findings.    LYNNE RASMUSSEN MD       =========================================

## 2021-03-11 NOTE — PROGRESS NOTES
Major Shift Events:   -Patient remains intubated, off sedation. Will open eyes slightly with turns or cares but not following commands or tracking.  -CRRT pulling at goal; UOP ~10ml/hr  -Large BM x3 overnight  -Arterial line dampened; not drawing back. Rewire or remove in morning?    Plan: Continue to monitor; head CT today?  For vital signs and complete assessments, please see documentation flowsheets.

## 2021-03-11 NOTE — PLAN OF CARE
Pt moving head, eyes and hands more today vs past 2 days.  Pt over breathing the vent with frequent coughing.  Off all sedation and continuous pain meds with prn's available.  Removed femoral a-line and sheath.  Pt had lg BM this shift.  CRRT with new orders 0-100 net negative without adding pressors.  Family at the bedside and updated on POC.  Will continue to monitor.

## 2021-03-12 ENCOUNTER — APPOINTMENT (OUTPATIENT)
Dept: GENERAL RADIOLOGY | Facility: CLINIC | Age: 81
DRG: 003 | End: 2021-03-12
Attending: NURSE PRACTITIONER
Payer: MEDICARE

## 2021-03-12 ENCOUNTER — APPOINTMENT (OUTPATIENT)
Dept: GENERAL RADIOLOGY | Facility: CLINIC | Age: 81
DRG: 003 | End: 2021-03-12
Attending: INTERNAL MEDICINE
Payer: MEDICARE

## 2021-03-12 LAB
ALBUMIN SERPL-MCNC: 1.9 G/DL (ref 3.4–5)
ALP SERPL-CCNC: 116 U/L (ref 40–150)
ALT SERPL W P-5'-P-CCNC: 60 U/L (ref 0–70)
ANION GAP SERPL CALCULATED.3IONS-SCNC: 4 MMOL/L (ref 3–14)
ANION GAP SERPL CALCULATED.3IONS-SCNC: 7 MMOL/L (ref 3–14)
ANION GAP SERPL CALCULATED.3IONS-SCNC: 8 MMOL/L (ref 3–14)
APTT PPP: 45 SEC (ref 22–37)
APTT PPP: 51 SEC (ref 22–37)
AST SERPL W P-5'-P-CCNC: 72 U/L (ref 0–45)
BASE EXCESS BLDA CALC-SCNC: 1.8 MMOL/L
BASE EXCESS BLDV CALC-SCNC: 2.6 MMOL/L
BILIRUB SERPL-MCNC: 0.6 MG/DL (ref 0.2–1.3)
BLD PROD TYP BPU: NORMAL
BLD UNIT ID BPU: 0
BLOOD PRODUCT CODE: NORMAL
BPU ID: NORMAL
BUN SERPL-MCNC: 49 MG/DL (ref 7–30)
BUN SERPL-MCNC: 50 MG/DL (ref 7–30)
BUN SERPL-MCNC: 64 MG/DL (ref 7–30)
CA-I BLD-MCNC: 4.3 MG/DL (ref 4.4–5.2)
CA-I SERPL ISE-MCNC: 4.1 MG/DL (ref 4.4–5.2)
CALCIUM SERPL-MCNC: 7.4 MG/DL (ref 8.5–10.1)
CALCIUM SERPL-MCNC: 7.6 MG/DL (ref 8.5–10.1)
CALCIUM SERPL-MCNC: 7.7 MG/DL (ref 8.5–10.1)
CHLORIDE SERPL-SCNC: 107 MMOL/L (ref 94–109)
CHLORIDE SERPL-SCNC: 109 MMOL/L (ref 94–109)
CHLORIDE SERPL-SCNC: 110 MMOL/L (ref 94–109)
CO2 SERPL-SCNC: 24 MMOL/L (ref 20–32)
CO2 SERPL-SCNC: 25 MMOL/L (ref 20–32)
CO2 SERPL-SCNC: 25 MMOL/L (ref 20–32)
CREAT SERPL-MCNC: 1.47 MG/DL (ref 0.66–1.25)
CREAT SERPL-MCNC: 1.57 MG/DL (ref 0.66–1.25)
CREAT SERPL-MCNC: 1.91 MG/DL (ref 0.66–1.25)
ERYTHROCYTE [DISTWIDTH] IN BLOOD BY AUTOMATED COUNT: 18 % (ref 10–15)
ERYTHROCYTE [DISTWIDTH] IN BLOOD BY AUTOMATED COUNT: 18.3 % (ref 10–15)
GFR SERPL CREATININE-BSD FRML MDRD: 32 ML/MIN/{1.73_M2}
GFR SERPL CREATININE-BSD FRML MDRD: 41 ML/MIN/{1.73_M2}
GFR SERPL CREATININE-BSD FRML MDRD: 44 ML/MIN/{1.73_M2}
GLUCOSE BLDC GLUCOMTR-MCNC: 101 MG/DL (ref 70–99)
GLUCOSE BLDC GLUCOMTR-MCNC: 116 MG/DL (ref 70–99)
GLUCOSE BLDC GLUCOMTR-MCNC: 119 MG/DL (ref 70–99)
GLUCOSE BLDC GLUCOMTR-MCNC: 123 MG/DL (ref 70–99)
GLUCOSE BLDC GLUCOMTR-MCNC: 129 MG/DL (ref 70–99)
GLUCOSE BLDC GLUCOMTR-MCNC: 134 MG/DL (ref 70–99)
GLUCOSE SERPL-MCNC: 108 MG/DL (ref 70–99)
GLUCOSE SERPL-MCNC: 117 MG/DL (ref 70–99)
GLUCOSE SERPL-MCNC: 123 MG/DL (ref 70–99)
HCO3 BLD-SCNC: 26 MMOL/L (ref 21–28)
HCO3 BLDV-SCNC: 27 MMOL/L (ref 21–28)
HCT VFR BLD AUTO: 21.8 % (ref 40–53)
HCT VFR BLD AUTO: 23.3 % (ref 40–53)
HGB BLD-MCNC: 6.8 G/DL (ref 13.3–17.7)
HGB BLD-MCNC: 7.3 G/DL (ref 13.3–17.7)
HGB BLD-MCNC: 7.4 G/DL (ref 13.3–17.7)
LAB SCANNED RESULT: NORMAL
LDH SERPL L TO P-CCNC: 418 U/L (ref 85–227)
MAGNESIUM SERPL-MCNC: 2.5 MG/DL (ref 1.6–2.3)
MCH RBC QN AUTO: 29.8 PG (ref 26.5–33)
MCH RBC QN AUTO: 30 PG (ref 26.5–33)
MCHC RBC AUTO-ENTMCNC: 31.2 G/DL (ref 31.5–36.5)
MCHC RBC AUTO-ENTMCNC: 31.8 G/DL (ref 31.5–36.5)
MCV RBC AUTO: 94 FL (ref 78–100)
MCV RBC AUTO: 96 FL (ref 78–100)
O2/TOTAL GAS SETTING VFR VENT: 30 %
O2/TOTAL GAS SETTING VFR VENT: 30 %
OXYHGB MFR BLD: 97 % (ref 92–100)
OXYHGB MFR BLDV: 48 %
PCO2 BLD: 38 MM HG (ref 35–45)
PCO2 BLDV: 41 MM HG (ref 40–50)
PH BLD: 7.45 PH (ref 7.35–7.45)
PH BLDV: 7.43 PH (ref 7.32–7.43)
PHOSPHATE SERPL-MCNC: 3.4 MG/DL (ref 2.5–4.5)
PLATELET # BLD AUTO: 105 10E9/L (ref 150–450)
PLATELET # BLD AUTO: 127 10E9/L (ref 150–450)
PO2 BLD: 100 MM HG (ref 80–105)
PO2 BLDV: 29 MM HG (ref 25–47)
POTASSIUM SERPL-SCNC: 3.6 MMOL/L (ref 3.4–5.3)
POTASSIUM SERPL-SCNC: 3.6 MMOL/L (ref 3.4–5.3)
POTASSIUM SERPL-SCNC: 3.7 MMOL/L (ref 3.4–5.3)
PROT SERPL-MCNC: 5.2 G/DL (ref 6.8–8.8)
RBC # BLD AUTO: 2.28 10E12/L (ref 4.4–5.9)
RBC # BLD AUTO: 2.47 10E12/L (ref 4.4–5.9)
SODIUM SERPL-SCNC: 139 MMOL/L (ref 133–144)
SODIUM SERPL-SCNC: 140 MMOL/L (ref 133–144)
SODIUM SERPL-SCNC: 140 MMOL/L (ref 133–144)
TRANSFUSION STATUS PATIENT QL: NORMAL
TRANSFUSION STATUS PATIENT QL: NORMAL
UFH PPP CHRO-ACNC: 0.47 IU/ML
UFH PPP CHRO-ACNC: <0.1 IU/ML
WBC # BLD AUTO: 15.3 10E9/L (ref 4–11)
WBC # BLD AUTO: 16.8 10E9/L (ref 4–11)

## 2021-03-12 PROCEDURE — 250N000013 HC RX MED GY IP 250 OP 250 PS 637: Performed by: INTERNAL MEDICINE

## 2021-03-12 PROCEDURE — 85027 COMPLETE CBC AUTOMATED: CPT | Performed by: STUDENT IN AN ORGANIZED HEALTH CARE EDUCATION/TRAINING PROGRAM

## 2021-03-12 PROCEDURE — 71045 X-RAY EXAM CHEST 1 VIEW: CPT | Mod: 26 | Performed by: RADIOLOGY

## 2021-03-12 PROCEDURE — 86923 COMPATIBILITY TEST ELECTRIC: CPT | Performed by: SURGERY

## 2021-03-12 PROCEDURE — 82330 ASSAY OF CALCIUM: CPT | Performed by: INTERNAL MEDICINE

## 2021-03-12 PROCEDURE — 86901 BLOOD TYPING SEROLOGIC RH(D): CPT | Performed by: SURGERY

## 2021-03-12 PROCEDURE — 85520 HEPARIN ASSAY: CPT | Performed by: THORACIC SURGERY (CARDIOTHORACIC VASCULAR SURGERY)

## 2021-03-12 PROCEDURE — 84100 ASSAY OF PHOSPHORUS: CPT | Performed by: STUDENT IN AN ORGANIZED HEALTH CARE EDUCATION/TRAINING PROGRAM

## 2021-03-12 PROCEDURE — 272N000083 HC NUTRITION PRODUCT SEMIELEM INTERMED LITER

## 2021-03-12 PROCEDURE — 250N000013 HC RX MED GY IP 250 OP 250 PS 637: Performed by: STUDENT IN AN ORGANIZED HEALTH CARE EDUCATION/TRAINING PROGRAM

## 2021-03-12 PROCEDURE — 90947 DIALYSIS REPEATED EVAL: CPT

## 2021-03-12 PROCEDURE — 71045 X-RAY EXAM CHEST 1 VIEW: CPT

## 2021-03-12 PROCEDURE — 94003 VENT MGMT INPAT SUBQ DAY: CPT

## 2021-03-12 PROCEDURE — 85018 HEMOGLOBIN: CPT | Performed by: SURGERY

## 2021-03-12 PROCEDURE — 74018 RADEX ABDOMEN 1 VIEW: CPT | Mod: 26 | Performed by: RADIOLOGY

## 2021-03-12 PROCEDURE — 71045 X-RAY EXAM CHEST 1 VIEW: CPT | Mod: 77

## 2021-03-12 PROCEDURE — 250N000011 HC RX IP 250 OP 636: Performed by: INTERNAL MEDICINE

## 2021-03-12 PROCEDURE — P9016 RBC LEUKOCYTES REDUCED: HCPCS | Performed by: SURGERY

## 2021-03-12 PROCEDURE — 82805 BLOOD GASES W/O2 SATURATION: CPT | Performed by: SURGERY

## 2021-03-12 PROCEDURE — 83735 ASSAY OF MAGNESIUM: CPT | Performed by: STUDENT IN AN ORGANIZED HEALTH CARE EDUCATION/TRAINING PROGRAM

## 2021-03-12 PROCEDURE — 250N000013 HC RX MED GY IP 250 OP 250 PS 637: Performed by: SURGERY

## 2021-03-12 PROCEDURE — 85520 HEPARIN ASSAY: CPT | Performed by: STUDENT IN AN ORGANIZED HEALTH CARE EDUCATION/TRAINING PROGRAM

## 2021-03-12 PROCEDURE — 999N000155 HC STATISTIC RAPCV CVP MONITORING

## 2021-03-12 PROCEDURE — 86900 BLOOD TYPING SEROLOGIC ABO: CPT | Performed by: SURGERY

## 2021-03-12 PROCEDURE — 80048 BASIC METABOLIC PNL TOTAL CA: CPT | Performed by: STUDENT IN AN ORGANIZED HEALTH CARE EDUCATION/TRAINING PROGRAM

## 2021-03-12 PROCEDURE — 258N000003 HC RX IP 258 OP 636: Performed by: INTERNAL MEDICINE

## 2021-03-12 PROCEDURE — 82330 ASSAY OF CALCIUM: CPT | Performed by: STUDENT IN AN ORGANIZED HEALTH CARE EDUCATION/TRAINING PROGRAM

## 2021-03-12 PROCEDURE — 200N000002 HC R&B ICU UMMC

## 2021-03-12 PROCEDURE — 999N000127 HC STATISTIC PERIPHERAL IV START W US GUIDANCE

## 2021-03-12 PROCEDURE — 83615 LACTATE (LD) (LDH) ENZYME: CPT | Performed by: STUDENT IN AN ORGANIZED HEALTH CARE EDUCATION/TRAINING PROGRAM

## 2021-03-12 PROCEDURE — 999N000157 HC STATISTIC RCP TIME EA 10 MIN

## 2021-03-12 PROCEDURE — 74018 RADEX ABDOMEN 1 VIEW: CPT

## 2021-03-12 PROCEDURE — 999N001017 HC STATISTIC GLUCOSE BY METER IP

## 2021-03-12 PROCEDURE — 85730 THROMBOPLASTIN TIME PARTIAL: CPT | Performed by: STUDENT IN AN ORGANIZED HEALTH CARE EDUCATION/TRAINING PROGRAM

## 2021-03-12 PROCEDURE — 90945 DIALYSIS ONE EVALUATION: CPT | Mod: GC | Performed by: INTERNAL MEDICINE

## 2021-03-12 PROCEDURE — 82805 BLOOD GASES W/O2 SATURATION: CPT | Performed by: STUDENT IN AN ORGANIZED HEALTH CARE EDUCATION/TRAINING PROGRAM

## 2021-03-12 PROCEDURE — 250N000011 HC RX IP 250 OP 636: Performed by: STUDENT IN AN ORGANIZED HEALTH CARE EDUCATION/TRAINING PROGRAM

## 2021-03-12 PROCEDURE — 80048 BASIC METABOLIC PNL TOTAL CA: CPT | Performed by: INTERNAL MEDICINE

## 2021-03-12 PROCEDURE — 99291 CRITICAL CARE FIRST HOUR: CPT | Mod: 24 | Performed by: INTERNAL MEDICINE

## 2021-03-12 PROCEDURE — 999N000111 HC STATISTIC OT IP EVAL DEFER

## 2021-03-12 PROCEDURE — 80053 COMPREHEN METABOLIC PANEL: CPT | Performed by: STUDENT IN AN ORGANIZED HEALTH CARE EDUCATION/TRAINING PROGRAM

## 2021-03-12 PROCEDURE — 250N000009 HC RX 250: Performed by: INTERNAL MEDICINE

## 2021-03-12 PROCEDURE — 86850 RBC ANTIBODY SCREEN: CPT | Performed by: SURGERY

## 2021-03-12 RX ORDER — AMOXICILLIN 250 MG
1-2 CAPSULE ORAL 2 TIMES DAILY PRN
Status: DISCONTINUED | OUTPATIENT
Start: 2021-03-12 | End: 2021-04-05 | Stop reason: HOSPADM

## 2021-03-12 RX ORDER — HEPARIN SODIUM 10000 [USP'U]/100ML
0-5000 INJECTION, SOLUTION INTRAVENOUS CONTINUOUS
Status: DISCONTINUED | OUTPATIENT
Start: 2021-03-12 | End: 2021-03-26 | Stop reason: CLARIF

## 2021-03-12 RX ORDER — POLYETHYLENE GLYCOL 3350 17 G/17G
17 POWDER, FOR SOLUTION ORAL 2 TIMES DAILY PRN
Status: DISCONTINUED | OUTPATIENT
Start: 2021-03-12 | End: 2021-03-17

## 2021-03-12 RX ORDER — METOPROLOL TARTRATE 25 MG/1
25 TABLET, FILM COATED ORAL 2 TIMES DAILY
Status: DISCONTINUED | OUTPATIENT
Start: 2021-03-12 | End: 2021-03-17

## 2021-03-12 RX ADMIN — Medication 5 ML: at 07:32

## 2021-03-12 RX ADMIN — OXYCODONE HYDROCHLORIDE 5 MG: 5 TABLET ORAL at 23:52

## 2021-03-12 RX ADMIN — METOPROLOL TARTRATE 25 MG: 25 TABLET, FILM COATED ORAL at 19:49

## 2021-03-12 RX ADMIN — CALCIUM CHLORIDE, MAGNESIUM CHLORIDE, SODIUM CHLORIDE, SODIUM BICARBONATE, POTASSIUM CHLORIDE AND SODIUM PHOSPHATE DIBASIC DIHYDRATE 12.5 ML/KG/HR: 3.68; 3.05; 6.34; 3.09; .314; .187 INJECTION INTRAVENOUS at 06:11

## 2021-03-12 RX ADMIN — CALCIUM CHLORIDE, MAGNESIUM CHLORIDE, SODIUM CHLORIDE, SODIUM BICARBONATE, POTASSIUM CHLORIDE AND SODIUM PHOSPHATE DIBASIC DIHYDRATE 12.5 ML/KG/HR: 3.68; 3.05; 6.34; 3.09; .314; .187 INJECTION INTRAVENOUS at 03:23

## 2021-03-12 RX ADMIN — CALCIUM CHLORIDE, MAGNESIUM CHLORIDE, SODIUM CHLORIDE, SODIUM BICARBONATE, POTASSIUM CHLORIDE AND SODIUM PHOSPHATE DIBASIC DIHYDRATE 12.5 ML/KG/HR: 3.68; 3.05; 6.34; 3.09; .314; .187 INJECTION INTRAVENOUS at 01:42

## 2021-03-12 RX ADMIN — ACETAMINOPHEN 650 MG: 325 TABLET, FILM COATED ORAL at 12:31

## 2021-03-12 RX ADMIN — OXYCODONE HYDROCHLORIDE 5 MG: 5 TABLET ORAL at 00:28

## 2021-03-12 RX ADMIN — CHLORHEXIDINE GLUCONATE 0.12% ORAL RINSE 15 ML: 1.2 LIQUID ORAL at 07:33

## 2021-03-12 RX ADMIN — OXYCODONE HYDROCHLORIDE 5 MG: 5 TABLET ORAL at 19:48

## 2021-03-12 RX ADMIN — ASPIRIN 81 MG CHEWABLE TABLET 81 MG: 81 TABLET CHEWABLE at 07:31

## 2021-03-12 RX ADMIN — PANTOPRAZOLE SODIUM 40 MG: 40 TABLET, DELAYED RELEASE ORAL at 07:32

## 2021-03-12 RX ADMIN — CALCIUM CHLORIDE, MAGNESIUM CHLORIDE, SODIUM CHLORIDE, SODIUM BICARBONATE, POTASSIUM CHLORIDE AND SODIUM PHOSPHATE DIBASIC DIHYDRATE 12.5 ML/KG/HR: 3.68; 3.05; 6.34; 3.09; .314; .187 INJECTION INTRAVENOUS at 08:32

## 2021-03-12 RX ADMIN — AMIODARONE HYDROCHLORIDE 200 MG: 200 TABLET ORAL at 14:14

## 2021-03-12 RX ADMIN — OXYCODONE HYDROCHLORIDE 5 MG: 5 TABLET ORAL at 07:31

## 2021-03-12 RX ADMIN — HEPARIN SODIUM 1500 UNITS/HR: 10000 INJECTION, SOLUTION INTRAVENOUS at 15:30

## 2021-03-12 RX ADMIN — AMIODARONE HYDROCHLORIDE 200 MG: 200 TABLET ORAL at 07:31

## 2021-03-12 RX ADMIN — BIVALIRUDIN 0.03 MG/KG/HR: 250 INJECTION, POWDER, LYOPHILIZED, FOR SOLUTION INTRAVENOUS at 01:52

## 2021-03-12 RX ADMIN — CHLORHEXIDINE GLUCONATE 0.12% ORAL RINSE 15 ML: 1.2 LIQUID ORAL at 20:07

## 2021-03-12 ASSESSMENT — ACTIVITIES OF DAILY LIVING (ADL)
ADLS_ACUITY_SCORE: 21
ADLS_ACUITY_SCORE: 26
ADLS_ACUITY_SCORE: 23
ADLS_ACUITY_SCORE: 23

## 2021-03-12 ASSESSMENT — MIFFLIN-ST. JEOR: SCORE: 1573.13

## 2021-03-12 NOTE — PLAN OF CARE
Major Shift Events:  CRRT goals maintained to keep fluid net negatvive 0-100. Pt's blood pressures tolerated fluid removal well. Recheck potassium at beginning of shift was 3.5. Pt not following commands at beginning of shift but tossing head back and forth, withdrawing from pain and stimulation. At 0000, pt following commands, opening eyes to voice, shaking head yes/no appropriately to questions, still tossing head back and forth, moving all extremities. Pt had pain in L hip @ surgical site, PRN tylenol and oxy given with some relief. Multiple loose BMs throughout shift. Minimal chest tube output. No urine output. Bival therapeutic at 0.029. CVP 15. Pacer wires capped.   Plan: monitor neuro status, maintain CRRT goals, possibly transition to HD. Possible head CT today.   For vital signs and complete assessments, please see documentation flowsheets.

## 2021-03-12 NOTE — PROGRESS NOTES
[facility], Procedure Note          Extubation:       Jin Garrett  MRN# [id number]   March 12, 2021, 9:47 AM         Patient extubated at: March 12, 2021, 9:47 AM   Supplemental Oxygen: Via nasal cannula at 4 liters per minute   Cough: The cough is strong   Secretion Mode: Able to clear   Secretion Amount: Small amount, thin and clear in color   Respiratory Exam:: Breath sounds: equal and clear     Location: all lobes   Skin Exam:: Patient color: natural   Patient Status: Currently appears comfortable   Arterial Blood Gasses: pH Arterial (pH)   Date Value   03/12/2021 7.45     pO2 Arterial (mm Hg)   Date Value   03/12/2021 100     pCO2 Arterial (mm Hg)   Date Value   03/12/2021 38     Bicarbonate Arterial (mmol/L)   Date Value   03/12/2021 26            Recorded by Deanne Alexander

## 2021-03-12 NOTE — PROGRESS NOTES
CV ICU PROGRESS NOTE  March 12, 2021      CO-MORBIDITIES:   Cardiogenic shock (H)  (primary encounter diagnosis)    ASSESSMENT: Jin Garrett is a 80 year old male with PMH of HTN, AAA, HLD and rheumatoid arthritis. He presented to an OSH with shortness of breath and atypical chest pain. TTE showed severe MR with concern for posterior flail leaflet, EF was preserved. On arrival at [facility] an Impella was emergently placed in cath lab. 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 Porras on 3/5/21. Returned from the OR on central ECMO with an open chest, intubated and sedated. Returned to the OR for a wash out on 3/9, found to have a thrombus on his mitral valve so he had the valve replaced, decannulated central ECMO, bronchoscopy and placement of left subclavian dialysis line.     TODAY'S PROGRESS:   - PST and extubate  - CRRT goal -500 cc today   - Follow up JENNIFER    PLAN:  Neuro/ pain/ sedation:  Post-operative pain  - Monitor neurological status. Notify the MD for any acute changes in exam.  - Assess for pain and plan to add PRN pain meds as needed      Pulmonary care:  Acute post-operative respiratory failure  - MV  - PST and extubate  - Titrate FiO2 for SpO2 >92%  - Bronchoscopy 3/9     Cardiovascular:  HTN  AAA  HLD  A fib  Severe mitral regurgitation s/p MVR3/5  CAD with 100% occlusion of the mid LCx s/p single vessel CABG 3/5  Cardiogenic shock  Vasoplegic shock   VA ECMO (central)   S/p redo MVR due to thrombosis, removal of ECMO 3/9, and wound closure 3/9  - Monitor hemodynamic status.   - MAP >65  - ASA 81  - Prophylactic amiodarone  - TPW set a back up rate of 60    GI/Nutrition:   - TF through [place] advance towards goal  - Bowel regimen       Fluids/ Electrolytes/Renal:  ADAN   Hyperkalemia  - TKO for IV fluid hydration.  - Will continue to monitor intake and output.  - Continue to monitor electrolytes  - Nephrology consulted appreciate recommendations, consider switching to HD  - Continue CRRT,  goal net negative 500     Endocrine:  Stress hyperglycemia    - Continue low dose SSI  - Keep blood glucose <180     ID/ Antibiotics:  Leukocytosis - likely secondary to physiologic stress  - Complete perioperative/open chest antibiotics: cefepime and vancomycin for 7 days or 24 hours after chest closure, whichever is shorter last day 3/11  - Monitor CBC and fever curve, WBC downtrending 17.1 --> 16.8  - Sputum-culture 3/9 - NGTD  - Blood cultures - NGTD     Heme:  Hemorraghic shock      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   - Bowel regimen  - Bivalirudin gtt  - Protonix qd     Lines/ tubes/ drains:  - MAC      - TPW - cut     Disposition:  - CV ICU.      Patient seen, findings and plan discussed with CV ICU staff, Dr. Hurtado.     BRYANT Tinajero ([facility])  Anesthesiology Resident  *[id number]    ====================================    SUBJECTIVE:   Intubated and sedated    OBJECTIVE:   1. VITAL SIGNS:   Temp:  [98.1  F (36.7  C)-99.8  F (37.7  C)] 98.9  F (37.2  C)  Pulse:  [73-88] 75  Resp:  [17-26] 20  BP: ()/(53-97) 105/64  MAP:  [61 mmHg-91 mmHg] 91 mmHg  Arterial Line BP: (64-93)/(62-90) 93/90  FiO2 (%):  [30 %-40 %] 30 %  SpO2:  [93 %-100 %] 99 %  Ventilation Mode: PCV Plus assist  (Pressure Control Ventilation/ Assist Control)  FiO2 (%): 30 %  Rate Set (breaths/minute): 12 breaths/min  PEEP (cm H2O): 8 cmH2O  Pressure Support (cm H2O): 21 cmH2O  Oxygen Concentration (%): 40 %  Peak Inspiratory Pressure (cm H2O) (Drager Fabiana): 21  Resp: 20      2. INTAKE/ OUTPUT:   I/O last 3 completed shifts:  In: 2424.2 [I.V.:384.2; NG/GT:360]  Out: 4241 [Urine:81; Emesis/NG output:350; Other:3610; Chest Tube:200]    3. PHYSICAL EXAMINATION:   General: Lying in bed  Neuro: Sedated, follows commands, answers yes/no questions  Resp: Mechanically ventilated, coarse lung sounds bilaterally  CV: RRR  Abdomen:  Soft, Non-distended, Non-tender  Incisions: C/D/I  Extremities: Warm and and well perfused upper extremities, lower extremities warm, difficult to palpate pulses in the feet, dusky finger and toes tips    4. INVESTIGATIONS:   Arterial Blood Gases   Recent Labs   Lab 03/10/21  0813 03/10/21  0356 03/09/21  2347 03/09/21  2155   PH 7.44 7.44 7.43 7.42   PCO2 31* 34* 37 39   PO2 108* 118* 103 69*   HCO3 22 23 24 25     Complete Blood Count   Recent Labs   Lab 03/12/21  0355 03/11/21  0339 03/10/21  1527 03/10/21  0356 03/09/21  2347   WBC 16.8* 17.1*  --  19.2* 21.7*   HGB 7.4* 8.0* 7.9* 8.5* 8.6*   * 122*  --  126* 128*     Basic Metabolic Panel  Recent Labs   Lab 03/12/21 0355 03/11/21 2210 03/11/21 2010 03/11/21  1655 03/11/21  1015    142  --  142 142   POTASSIUM 3.6 3.6 3.5 5.8* 3.4   CHLORIDE 107 110*  --  112* 108   CO2 25 24  --  25 26   BUN 49* 49*  --  49* 47*   CR 1.57* 1.57*  --  1.57* 1.66*   * 118*  --  132* 138*     Liver Function Tests  Recent Labs   Lab 03/12/21  0355 03/11/21  0339 03/10/21  0356 03/09/21  2347 03/09/21  2022 03/09/21  1831   AST 72* 70* 113*  --  110*  --    ALT 60 57 58  --  50  --    ALKPHOS 116 114 77  --  78  --    BILITOTAL 0.6 0.6 0.8  --  0.8  --    ALBUMIN 1.9* 2.0* 2.0*  --  1.7*  --    INR  --   --  1.28* 1.31* 1.46* 1.82*     Pancreatic Enzymes  No lab results found in last 7 days.  Coagulation Profile  Recent Labs   Lab 03/12/21  0355 03/11/21  0339 03/10/21  2008 03/10/21  1527 03/10/21  0356 03/10/21  0356 03/09/21  2347 03/09/21 2022 03/09/21  1831   INR  --   --   --   --   --  1.28* 1.31* 1.46* 1.82*   PTT 45* 50* 50* 39*   < > 33 46* 44* 36    < > = values in this interval not displayed.         5. RADIOLOGY:   No results found for this or any previous visit (from the past 24 hour(s)).    =========================================

## 2021-03-12 NOTE — PROGRESS NOTES
[facility] CVTS Progress Note       Removed right pleural and two medial chest tubes without complication.   Removed one pacer wire without any resistance. Second pacer wire was cut and left in place.   No MRI's in the future due to retained pacer wire.   Left pleural chest tube left in place to suction. Possible remove tomorrow pending output and CXR.     Follow up CXR ordered.   Bivalirudin held for one and a half hours prior to chest tube pull and restarted one hour after.       Mel Castillo, JOIE, CNP  [facility] Cardiovascular Thoracic Surgery   O: [phone]  [facility] [phone]

## 2021-03-12 NOTE — PLAN OF CARE
OT 4E: Cancel/Hold - Per discussion with daughter, pt with recent left hip surgery (~2 weeks ago) with unspecified weight bearing and ROM restrictions. Pt was NWB on LLE and per daughter was supposed to have updated restrictions once attending follow-up appointment. Will hold therapy services and out of bed activity until further clarification on activity restrictions. Pt may benefit from IP Ortho Consult to further address left hip precautions as pt was unable to attend his follow-up appointment due to hospital admission. Assisted CVTS team with obtaining information regarding orthopaedic surgeon, clinic, and phone number to clarify activity restrictions.

## 2021-03-12 NOTE — PROGRESS NOTES
"  Nephrology Progress Note  03/12/2021         Assessment & Recommendations:   Jin Garrett is a 80 year old with PMH HTN, AAA, HLD, RA who presented to OSH with SOB and atypical CP. TTE showed severe MR with ?posterior flail leaflet, EF preserved. On arrival at [facility] an Impella was emergently placed. S/p MVR and single vessel CABG on 3/5/21. Returned from the OR on central ECMO with an open chest, now s/p decannulation on 3/9. Nephrology consulted for ADAN.     ADAN in the setting of cardiogenic shock was on ECMO, s/p decannulation on 3/9  Unknown baseline, no h/o CKD per daughter. 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 Etiology likely ATN in the setting of shock, no obstruction per CT and urinalysis showed granular casts which is c/w ATN. UOP declined and K rising  on 3/6, so initiated on CRRT. He is off of pressors today with BP in 110-120's. Today extubated   - discontinue CRRT for today   - Trial of HD AM provided hemodynamically stable   - Labs per CRRT protocol  - Avoid nephrotoxins  - Renally dose medications  - Renal diet  - Daily weights  - Strict I/Os      Recommendations were communicated to primary team via this note     Seen and discussed with Dr. Leyda Chester MD   [phone]    Interval History :   Nursing and provider notes from last 24 hours reviewed.  In the last 24 hours Jin Garrett extubated this morning, on supplemental O2    Physical Exam:   I/O last 3 completed shifts:  In: 2446.2 [I.V.:336.2; NG/GT:430]  Out: 4065 [Urine:6; Emesis/NG output:425; Other:3504; Chest Tube:130]   BP 95/51   Pulse 72   Temp 98.7  F (37.1  C) (Axillary)   Resp 14   Ht 1.803 m (5' 11\")   Wt 84.1 kg (185 lb 6.5 oz)   SpO2 96%   BMI 25.86 kg/m       General : Pt awake, not in acute distress   Lungs : anterior lung fields are clear  Cardiac : S1, S2 present  Abdomen : Soft/ND/NT  LE : Edema noted  Dialysis Access : " right subclavian non tunneled line    Labs:   All labs reviewed by me  Electrolytes/Renal -   Recent Labs   Lab Test 03/12/21  0355 03/11/21 2210 03/11/21 2010 03/11/21  1655    142  --  142   POTASSIUM 3.6 3.6 3.5 5.8*   CHLORIDE 107 110*  --  112*   CO2 25 24  --  25   BUN 49* 49*  --  49*   CR 1.57* 1.57*  --  1.57*   * 118*  --  132*   PALLAVI 7.7* 7.9*  --  8.1*   MAG 2.5* 2.3  --  2.3   PHOS 3.4 4.8*  --  1.9*       CBC -   Recent Labs   Lab Test 03/12/21  0355 03/11/21  0339 03/10/21  1527 03/10/21  0356   WBC 16.8* 17.1*  --  19.2*   HGB 7.4* 8.0* 7.9* 8.5*   * 122*  --  126*       LFTs -   Recent Labs   Lab Test 03/12/21  0355 03/11/21  0339 03/10/21  0356   ALKPHOS 116 114 77   BILITOTAL 0.6 0.6 0.8   ALT 60 57 58   AST 72* 70* 113*   PROTTOTAL 5.2* 5.2* 4.8*   ALBUMIN 1.9* 2.0* 2.0*     Current Medications:    amiodarone  200 mg Oral or NG Tube TID     aspirin  81 mg Oral or NG Tube Daily     B and C vitamin Complex with folic acid  5 mL Oral Daily     chlorhexidine  15 mL Swish & Spit BID     insulin aspart  1-4 Units Subcutaneous Q4H     pantoprazole  40 mg Oral or NG Tube Daily    Or     pantoprazole  40 mg Oral Daily     polyethylene glycol  17 g Oral BID     senna-docusate  1-2 tablet Oral BID       bivalirudin ANTICOAGULANT (ANGIOMAX) 250mg/250mL in 0.9% Sodium Chloride Stopped (03/12/21 0830)     dextrose       CRRT replacement solution 12.5 mL/kg/hr (03/12/21 0832)     - MEDICATION INSTRUCTIONS -       - MEDICATION INSTRUCTIONS -       CRRT replacement solution       CRRT replacement solution 12.5 mL/kg/hr (03/12/21 0611)     BETA BLOCKER NOT PRESCRIBED       Andie Chester MD

## 2021-03-12 NOTE — PROGRESS NOTES
CLINICAL NUTRITION SERVICES - REASSESSMENT NOTE     Nutrition Prescription    Recommendations:  Diet adv as appropriate    Malnutrition Status:    Non-severe malnutrition in the context of acute illness    Interventions by Registered Dietitian (RD):  Pending formulary stock, may change to nutritionally equivalent formula: Pivot 1.5 Richard @ goal of  70ml/hr (1680ml/day)  will provide: 2520 kcals (28 kcal/kg), 157 g PRO (1.7 g/kg), 1260 ml free H20, 289 g CHO, and 12 g fiber daily.    Future Recommendations:  - Maintain FT until pt consuming 1700 kcal and 100 g protein daily (ie 75% minimum needs)   - If renal formula indicated: Novasource Renal @ 55 ml/hr (1320 ml) + Prosource (1 pkt TID) provides 2760 kcal (30 kcal/kg), 153 g pro (1.7 g/kg), 242 g CHO, 946 ml free water, and 0 g fiber daily. RD to adjust protein modulars pending renal status.       EVALUATION OF THE PROGRESS TOWARD GOALS   Diet: NPO (intubated)   Nutrition Support: Impact Peptide @ 70 ml/hr via NDT to provide 2520 kcal (28 kcal/kg), 158 g protein (1.7 g/kg) 235 g CHO, no fiber daily.  Micronutrients: Renal multivitamin w/ minerals      Intake: TF currently @ goal rate. Overall, pt received 16 kcal/kg (65% needs) and 1 g protein/kg (66% needs) from avg TF infusion over the past 5 days. Intermittent interruptions to feeds r/t procedures -vs- advancement schedule.     NEW FINDINGS   Resp: Plan to extubate today.   Renal: CRRT. K/Phos within normal limits.  GI: Previously constipated, now passing several stools. Bowel regimen appropriately held today.     MALNUTRITION  % Intake: < 75% for > 7 days (non-severe)  % Weight Loss: None noted since admit  Subcutaneous Fat Loss: None observed  Muscle Loss: Does not meet criteria. Noted loss of scapular bone: Mild, Upper arm (bicep, tricep): Mild and Lower arm  (forearm): Mild -- (suspect baseline sarcopenia).   Fluid Accumulation/Edema: Mild  Malnutrition Diagnosis: Non-severe malnutrition in the context of  acute illness    Previous Goals   Once at goal TF rate, total avg nutritional intake to meet a minimum of 25 kcal/kg and 1.5 g PRO/kg daily (per dosing wt 91 kg).  Evaluation: Not met    Previous Nutrition Diagnosis  Increased nutrient needs (protein)   Evaluation: No change, updated below.    CURRENT NUTRITION DIAGNOSIS  Inadequate protein-energy intake related to NPO dependent on enteral nutrition as evidenced by  received 16 kcal/kg (65% needs) and 1 g protein/kg (66% needs) from avg TF infusion over the past 5 days.       INTERVENTIONS  Implementation  See interventions/changes at top of progress note    Goals  Total avg nutritional intake to meet a minimum of 25 kcal/kg and 1.5 g PRO/kg daily (per dosing wt 91 kg).    Monitoring/Evaluation  Progress toward goals will be monitored and evaluated per protocol.    Ashley Ortiz RD, LD  [id number]  Pgr: [phone]

## 2021-03-13 ENCOUNTER — APPOINTMENT (OUTPATIENT)
Dept: SPEECH THERAPY | Facility: CLINIC | Age: 81
DRG: 003 | End: 2021-03-13
Attending: INTERNAL MEDICINE
Payer: MEDICARE

## 2021-03-13 ENCOUNTER — APPOINTMENT (OUTPATIENT)
Dept: GENERAL RADIOLOGY | Facility: CLINIC | Age: 81
DRG: 003 | End: 2021-03-13
Attending: NURSE PRACTITIONER
Payer: MEDICARE

## 2021-03-13 LAB
ALBUMIN SERPL-MCNC: 1.8 G/DL (ref 3.4–5)
ALP SERPL-CCNC: 120 U/L (ref 40–150)
ALT SERPL W P-5'-P-CCNC: 59 U/L (ref 0–70)
ANION GAP SERPL CALCULATED.3IONS-SCNC: 8 MMOL/L (ref 3–14)
APTT PPP: 76 SEC (ref 22–37)
AST SERPL W P-5'-P-CCNC: 62 U/L (ref 0–45)
BACTERIA SPEC CULT: NO GROWTH
BACTERIA SPEC CULT: NO GROWTH
BILIRUB SERPL-MCNC: 0.5 MG/DL (ref 0.2–1.3)
BUN SERPL-MCNC: 95 MG/DL (ref 7–30)
CALCIUM SERPL-MCNC: 7.6 MG/DL (ref 8.5–10.1)
CHLORIDE SERPL-SCNC: 108 MMOL/L (ref 94–109)
CO2 SERPL-SCNC: 26 MMOL/L (ref 20–32)
CREAT SERPL-MCNC: 2.58 MG/DL (ref 0.66–1.25)
ERYTHROCYTE [DISTWIDTH] IN BLOOD BY AUTOMATED COUNT: 19.3 % (ref 10–15)
GFR SERPL CREATININE-BSD FRML MDRD: 22 ML/MIN/{1.73_M2}
GLUCOSE BLDC GLUCOMTR-MCNC: 118 MG/DL (ref 70–99)
GLUCOSE BLDC GLUCOMTR-MCNC: 119 MG/DL (ref 70–99)
GLUCOSE BLDC GLUCOMTR-MCNC: 122 MG/DL (ref 70–99)
GLUCOSE BLDC GLUCOMTR-MCNC: 124 MG/DL (ref 70–99)
GLUCOSE BLDC GLUCOMTR-MCNC: 148 MG/DL (ref 70–99)
GLUCOSE SERPL-MCNC: 117 MG/DL (ref 70–99)
HCT VFR BLD AUTO: 23.9 % (ref 40–53)
HGB BLD-MCNC: 7.4 G/DL (ref 13.3–17.7)
HGB BLD-MCNC: 7.8 G/DL (ref 13.3–17.7)
LDH SERPL L TO P-CCNC: 389 U/L (ref 85–227)
MCH RBC QN AUTO: 28.9 PG (ref 26.5–33)
MCHC RBC AUTO-ENTMCNC: 31 G/DL (ref 31.5–36.5)
MCV RBC AUTO: 93 FL (ref 78–100)
PLATELET # BLD AUTO: 113 10E9/L (ref 150–450)
POTASSIUM SERPL-SCNC: 3.7 MMOL/L (ref 3.4–5.3)
PROT SERPL-MCNC: 4.9 G/DL (ref 6.8–8.8)
RBC # BLD AUTO: 2.56 10E12/L (ref 4.4–5.9)
SODIUM SERPL-SCNC: 142 MMOL/L (ref 133–144)
SPECIMEN SOURCE: NORMAL
SPECIMEN SOURCE: NORMAL
UFH PPP CHRO-ACNC: 0.31 IU/ML
WBC # BLD AUTO: 16.3 10E9/L (ref 4–11)

## 2021-03-13 PROCEDURE — 92526 ORAL FUNCTION THERAPY: CPT | Mod: GN

## 2021-03-13 PROCEDURE — 5A1D70Z PERFORMANCE OF URINARY FILTRATION, INTERMITTENT, LESS THAN 6 HOURS PER DAY: ICD-10-PCS | Performed by: INTERNAL MEDICINE

## 2021-03-13 PROCEDURE — 71045 X-RAY EXAM CHEST 1 VIEW: CPT

## 2021-03-13 PROCEDURE — 250N000013 HC RX MED GY IP 250 OP 250 PS 637: Performed by: THORACIC SURGERY (CARDIOTHORACIC VASCULAR SURGERY)

## 2021-03-13 PROCEDURE — 999N001017 HC STATISTIC GLUCOSE BY METER IP

## 2021-03-13 PROCEDURE — 250N000013 HC RX MED GY IP 250 OP 250 PS 637: Performed by: INTERNAL MEDICINE

## 2021-03-13 PROCEDURE — 80053 COMPREHEN METABOLIC PANEL: CPT | Performed by: STUDENT IN AN ORGANIZED HEALTH CARE EDUCATION/TRAINING PROGRAM

## 2021-03-13 PROCEDURE — 250N000013 HC RX MED GY IP 250 OP 250 PS 637: Performed by: STUDENT IN AN ORGANIZED HEALTH CARE EDUCATION/TRAINING PROGRAM

## 2021-03-13 PROCEDURE — 272N000083 HC NUTRITION PRODUCT SEMIELEM INTERMED LITER

## 2021-03-13 PROCEDURE — 85730 THROMBOPLASTIN TIME PARTIAL: CPT | Performed by: STUDENT IN AN ORGANIZED HEALTH CARE EDUCATION/TRAINING PROGRAM

## 2021-03-13 PROCEDURE — 90937 HEMODIALYSIS REPEATED EVAL: CPT

## 2021-03-13 PROCEDURE — 87340 HEPATITIS B SURFACE AG IA: CPT | Performed by: INTERNAL MEDICINE

## 2021-03-13 PROCEDURE — 200N000002 HC R&B ICU UMMC

## 2021-03-13 PROCEDURE — 86706 HEP B SURFACE ANTIBODY: CPT | Performed by: INTERNAL MEDICINE

## 2021-03-13 PROCEDURE — 90935 HEMODIALYSIS ONE EVALUATION: CPT | Mod: GC | Performed by: INTERNAL MEDICINE

## 2021-03-13 PROCEDURE — 999N000197 HC STATISTIC WOC PT EDUCATION, 0-15 MIN

## 2021-03-13 PROCEDURE — 85027 COMPLETE CBC AUTOMATED: CPT | Performed by: STUDENT IN AN ORGANIZED HEALTH CARE EDUCATION/TRAINING PROGRAM

## 2021-03-13 PROCEDURE — 250N000011 HC RX IP 250 OP 636: Performed by: STUDENT IN AN ORGANIZED HEALTH CARE EDUCATION/TRAINING PROGRAM

## 2021-03-13 PROCEDURE — 99233 SBSQ HOSP IP/OBS HIGH 50: CPT | Mod: 24 | Performed by: INTERNAL MEDICINE

## 2021-03-13 PROCEDURE — 85520 HEPARIN ASSAY: CPT | Performed by: STUDENT IN AN ORGANIZED HEALTH CARE EDUCATION/TRAINING PROGRAM

## 2021-03-13 PROCEDURE — 71045 X-RAY EXAM CHEST 1 VIEW: CPT | Mod: 26

## 2021-03-13 PROCEDURE — 85018 HEMOGLOBIN: CPT | Performed by: SURGERY

## 2021-03-13 PROCEDURE — 92610 EVALUATE SWALLOWING FUNCTION: CPT | Mod: GN

## 2021-03-13 PROCEDURE — 999N000155 HC STATISTIC RAPCV CVP MONITORING

## 2021-03-13 PROCEDURE — 83615 LACTATE (LD) (LDH) ENZYME: CPT | Performed by: STUDENT IN AN ORGANIZED HEALTH CARE EDUCATION/TRAINING PROGRAM

## 2021-03-13 PROCEDURE — 258N000003 HC RX IP 258 OP 636: Performed by: INTERNAL MEDICINE

## 2021-03-13 PROCEDURE — 250N000013 HC RX MED GY IP 250 OP 250 PS 637: Performed by: SURGERY

## 2021-03-13 PROCEDURE — 999N000157 HC STATISTIC RCP TIME EA 10 MIN

## 2021-03-13 RX ORDER — LANOLIN ALCOHOL/MO/W.PET/CERES
3 CREAM (GRAM) TOPICAL AT BEDTIME
Status: DISCONTINUED | OUTPATIENT
Start: 2021-03-13 | End: 2021-03-17

## 2021-03-13 RX ORDER — ATORVASTATIN CALCIUM 40 MG/1
40 TABLET, FILM COATED ORAL EVERY EVENING
Status: DISCONTINUED | OUTPATIENT
Start: 2021-03-13 | End: 2021-03-17

## 2021-03-13 RX ORDER — QUETIAPINE FUMARATE 25 MG/1
25 TABLET, FILM COATED ORAL AT BEDTIME
Status: DISCONTINUED | OUTPATIENT
Start: 2021-03-13 | End: 2021-03-16

## 2021-03-13 RX ORDER — POTASSIUM CHLORIDE 20MEQ/15ML
10 LIQUID (ML) ORAL ONCE
Status: COMPLETED | OUTPATIENT
Start: 2021-03-13 | End: 2021-03-13

## 2021-03-13 RX ADMIN — ASPIRIN 81 MG CHEWABLE TABLET 81 MG: 81 TABLET CHEWABLE at 07:39

## 2021-03-13 RX ADMIN — OXYCODONE HYDROCHLORIDE 5 MG: 5 TABLET ORAL at 13:01

## 2021-03-13 RX ADMIN — INSULIN ASPART 1 UNITS: 100 INJECTION, SOLUTION INTRAVENOUS; SUBCUTANEOUS at 13:02

## 2021-03-13 RX ADMIN — MELATONIN TAB 3 MG 3 MG: 3 TAB at 21:57

## 2021-03-13 RX ADMIN — Medication 5 ML: at 07:39

## 2021-03-13 RX ADMIN — OXYCODONE HYDROCHLORIDE 5 MG: 5 TABLET ORAL at 20:27

## 2021-03-13 RX ADMIN — SODIUM CHLORIDE 250 ML: 9 INJECTION, SOLUTION INTRAVENOUS at 12:21

## 2021-03-13 RX ADMIN — POTASSIUM CHLORIDE 10 MEQ: 20 SOLUTION ORAL at 05:29

## 2021-03-13 RX ADMIN — METOPROLOL TARTRATE 25 MG: 25 TABLET, FILM COATED ORAL at 20:27

## 2021-03-13 RX ADMIN — METOPROLOL TARTRATE 25 MG: 25 TABLET, FILM COATED ORAL at 07:39

## 2021-03-13 RX ADMIN — ATORVASTATIN CALCIUM 40 MG: 40 TABLET, FILM COATED ORAL at 20:27

## 2021-03-13 RX ADMIN — Medication: at 12:21

## 2021-03-13 RX ADMIN — SODIUM CHLORIDE 300 ML: 9 INJECTION, SOLUTION INTRAVENOUS at 12:20

## 2021-03-13 RX ADMIN — CHLORHEXIDINE GLUCONATE 0.12% ORAL RINSE 15 ML: 1.2 LIQUID ORAL at 07:39

## 2021-03-13 RX ADMIN — PANTOPRAZOLE SODIUM 40 MG: 40 TABLET, DELAYED RELEASE ORAL at 07:39

## 2021-03-13 RX ADMIN — CHLORHEXIDINE GLUCONATE 0.12% ORAL RINSE 15 ML: 1.2 LIQUID ORAL at 20:28

## 2021-03-13 RX ADMIN — HEPARIN SODIUM 1500 UNITS/HR: 10000 INJECTION, SOLUTION INTRAVENOUS at 07:40

## 2021-03-13 RX ADMIN — QUETIAPINE FUMARATE 25 MG: 25 TABLET ORAL at 21:56

## 2021-03-13 ASSESSMENT — ACTIVITIES OF DAILY LIVING (ADL)
ADLS_ACUITY_SCORE: 25
ADLS_ACUITY_SCORE: 21

## 2021-03-13 ASSESSMENT — MIFFLIN-ST. JEOR: SCORE: 1551.13

## 2021-03-13 NOTE — PROGRESS NOTES
CV ICU PROGRESS NOTE  March 13, 2021      CO-MORBIDITIES:   Cardiogenic shock (H)  (primary encounter diagnosis)  Status post cardiac surgery  Status post cardiac surgery    ASSESSMENT: Jin Garrett is a 80 year old male with PMH of HTN, AAA, HLD and rheumatoid arthritis. He presented to an OSH with shortness of breath and atypical chest pain. TTE showed severe MR with concern for posterior flail leaflet, EF was preserved. On arrival at [facility] an Impella was emergently placed in cath lab. 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 Porras on 3/5/21. Returned from the OR on central ECMO with an open chest, intubated and sedated. Returned to the OR for a wash out on 3/9, found to have a thrombus on his mitral valve so he had the valve replaced, decannulated central ECMO, bronchoscopy and placement of left subclavian dialysis line.     TODAY'S PROGRESS:   HD trial today per nephrology  Start seroquel and melatonin  Start atorvastatin 40  1pm Hgb    PLAN:  Neuro/ pain/ sedation:  Post-operative pain  Delerium  - Monitor neurological status. Notify the MD for any acute changes in exam.  - APAP + oxy  - Start seroquel 25mg at bedtime, melatonin     Pulmonary care:  Acute post-operative respiratory failure, resolved  - Wean O2 as tolerated  - IS    Cardiovascular:  HTN  AAA  HLD  A fib  Severe mitral regurgitation s/p MVR3/5  CAD with 100% occlusion of the mid LCx s/p single vessel CABG 3/5  Cardiogenic shock  Vasoplegic shock   VA ECMO (central)   S/p redo MVR due to thrombosis, removal of ECMO 3/9, and wound closure 3/9  - Monitor hemodynamic status.   - MAP >65  - ASA 81  - Metoprolol 25 BID  - Continue heparin     GI/Nutrition:   - TF through [place] advance towards goal  - PRN Bowel regimen    - Start atorvastatin 40mg     Fluids/ Electrolytes/Renal:  ADAN   Hyperkalemia  - TKO for IV fluid hydration.  - Will continue to monitor intake and output.  - Continue to monitor electrolytes  - Nephrology consulted  appreciate recommendations  - Trial of HD today per nephrology     Endocrine:  Stress hyperglycemia    - Continue low dose SSI  - Keep blood glucose <180     ID/ Antibiotics:  Leukocytosis - likely secondary to physiologic stress  - Complete perioperative/open chest antibiotics: cefepime and vancomycin for 7 days or 24 hours after chest closure, whichever is shorter last day 3/11  - Sputum-culture 3/9 - NGTD  - Blood cultures - NGTD     Heme:  Acute blood loss anemia      - Hgb goal >8  - Daily CBC  - Continue heparin gtt for prior mitral valve thrombosis  - Repeat hgb in PM    Prophylaxis:    - Mechanical prophylaxis for DVT.   - Bowel regimen  - Heparin gtt  - Protonix every day     Lines/ tubes/ drains:  - HD line  - Pleural tube     Disposition:  - CV ICU, possible floor transfer if tolerating HD     Patient seen, findings and plan discussed with CV ICU staff, Dr. Hurtado.     Elliott Leslie MD (PGY-3)  General Surgery Resident  *[id number]      ====================================    SUBJECTIVE:   Received  PRBCs overnight.  Pain well controlled    OBJECTIVE:   1. VITAL SIGNS:   Temp:  [98.2  F (36.8  C)-99.4  F (37.4  C)] 98.5  F (36.9  C)  Pulse:  [61-86] 78  Resp:  [12-16] 16  BP: ()/(46-95) 91/82  SpO2:  [90 %-100 %] 95 %  Ventilation Mode: (S) CPAP/PS  (Continuous positive airway pressure with Pressure Support)  FiO2 (%): 30 %  Rate Set (breaths/minute): 12 breaths/min  PEEP (cm H2O): 8 cmH2O  Pressure Support (cm H2O): 7 cmH2O  Oxygen Concentration (%): 40 %  Peak Inspiratory Pressure (cm H2O) (Drager Fabiana): 21  Resp: 16      2. INTAKE/ OUTPUT:   I/O last 3 completed shifts:  In: 2655.95 [I.V.:345.95; NG/GT:400]  Out: 1204 [Urine:50; Emesis/NG output:125; Other:749; Stool:200; Chest Tube:80]    3. PHYSICAL EXAMINATION:   General: NAD  Neuro: Alert, follows commands  Resp: Breathing non-labored  CV: RRR  Abdomen: Soft, Non-distended, Non-tender  Incisions: C/D/I  Extremities: warm and well perfused    4.  INVESTIGATIONS:   Arterial Blood Gases   Recent Labs   Lab 03/12/21  0815 03/10/21  0813 03/10/21  0356 03/09/21  2347   PH 7.45 7.44 7.44 7.43   PCO2 38 31* 34* 37   PO2 100 108* 118* 103   HCO3 26 22 23 24     Complete Blood Count   Recent Labs   Lab 03/13/21  0334 03/12/21  1903 03/12/21  1504 03/12/21  0355 03/11/21  0339   WBC 16.3*  --  15.3* 16.8* 17.1*   HGB 7.4* 7.3* 6.8* 7.4* 8.0*   *  --  105* 127* 122*     Basic Metabolic Panel  Recent Labs   Lab 03/13/21  0334 03/12/21  1504 03/12/21  1026 03/12/21  0355    140 140 139   POTASSIUM 3.7 3.7 3.6 3.6   CHLORIDE 108 109 110* 107   CO2 26 24 25 25   BUN 95* 64* 50* 49*   CR 2.58* 1.91* 1.47* 1.57*   * 117* 108* 123*     Liver Function Tests  Recent Labs   Lab 03/13/21  0334 03/12/21  0355 03/11/21  0339 03/10/21  0356 03/09/21 2347 03/09/21 2022 03/09/21  1831   AST 62* 72* 70* 113*  --  110*  --    ALT 59 60 57 58  --  50  --    ALKPHOS 120 116 114 77  --  78  --    BILITOTAL 0.5 0.6 0.6 0.8  --  0.8  --    ALBUMIN 1.8* 1.9* 2.0* 2.0*  --  1.7*  --    INR  --   --   --  1.28* 1.31* 1.46* 1.82*     Pancreatic Enzymes  No lab results found in last 7 days.  Coagulation Profile  Recent Labs   Lab 03/13/21  0334 03/12/21  1504 03/12/21  0355 03/11/21  0339 03/10/21  0356 03/10/21  0356 03/09/21  2347 03/09/21 2022 03/09/21  1831   INR  --   --   --   --   --  1.28* 1.31* 1.46* 1.82*   PTT 76* 51* 45* 50*   < > 33 46* 44* 36    < > = values in this interval not displayed.         5. RADIOLOGY:   Recent Results (from the past 24 hour(s))   XR Abdomen Port 1 View    Narrative    Exam: XR ABDOMEN PORT 1 VW, 3/12/2021 11:51 AM    Indication: Feeding tube placement    Comparison: Abdominal radiograph 3/10/2021    Findings:   Supine portable abdominal radiograph. Feeding tube tip remains present  in the distal duodenum towards the DJ flexure. The nasogastric tube,  chest tubes and mediastinal drains present previously on prior  abdominal  radiograph have been removed. A left-sided chest tube is  demonstrated with tip laterally. Epicardial pacing lead and prosthetic  cardiac valve noted. Sternotomy wires. Please see same day chest  radiograph.    Nonobstructive bowel gas pattern. Atherosclerosis of the aorta.  Scoliosis with degenerative changes. Metallic fixation devices along  the left SI joint noted.      Impression    Impression: Feeding tube tip stable in position with tip in the distal  duodenum towards the DJ flexure.    KHANH CARDENAS MD   XR Chest Port 1 View    Narrative    Exam: XR CHEST PORT 1 VW, 3/12/2021 11:51 AM    Indication: chest tube removal mitral valve replacement. CABG.    Comparison: Chest x-ray 3/12/2020    Findings:   Single AP portable semiupright view of the chest is obtained.  Postsurgical changes of mitral valve repair and coronary artery bypass  grafting. Median sternotomy wires are intact. Interval extubation and  removal of right-sided chest tube and 2 mediastinal drains. Gastric  tube courses off the inferior edge of the film before leaving midline.  Right internal jugular catheter tip projects over the superior vena  cava. Left subclavian line projects at the confluence of the superior  vena cava and brachiocephalic vein. Left-sided chest tube and  mediastinal drains in place.    Trachea is midline. Mediastinum is within normal limits. Calcified  aortic knob. Cardiopulmonary silhouette is not enlarged. Continued  improvement in bilateral mixed pulmonary opacities. There is no  appreciated pneumothorax or pleural effusion. Upper abdomen is  unremarkable.      Impression    Impression:   1. No appreciable pneumothorax.  2. Continued improvement in pulmonary edema.  3. Interval removal of endotracheal tube and right-sided chest tube  otherwise stable support devices.    I have personally reviewed the examination and initial interpretation  and I agree with the findings.    HERMELINDA KANG MD   XR Chest Port 1 View     Narrative    Exam: XR CHEST PORT 1 VW, 3/13/2021 2:36 AM    Indication: chest tube in place    Comparison: 3/12/2021    Findings:   Single portable supine radiograph of the chest at 45 degrees. The  gastric tube is then removed. Single enteric tube courses below the  diaphragm, with tip collimated on the field of view. Right IJ and left  subclavian central venous catheter tips project over the mid SVC.  Stable left basilar chest tube. The trachea is midline. Intact median  sternotomy wires.    Stable cardiomediastinal silhouette. There is a radiopaque line  overlying the mid left hemithorax, favored to represent a skinfold. No  pneumothorax. No substantial pleural effusion. Continued pulmonary  vascular congestion and interstitial opacities. The upper abdomen is  unremarkable.      Impression    Impression:   1. The gastric tube has been removed. Additional support devices,  stable.  2. Stable findings of pulmonary edema and/or infection.    I have personally reviewed the examination and initial interpretation  and I agree with the findings.    RICHARD GARCIA MD       =========================================

## 2021-03-13 NOTE — PROGRESS NOTES
03/13/21 1000   General Information   Onset of Illness/Injury or Date of Surgery 03/09/21   Referring Physician Elliott Leslie MD   Patient/Family Therapy Goal Statement (SLP) patient is thirsty and would like to drink H20   Pertinent History of Current Problem Jin Garrett is a 80 year old male with PMH of HTN, AAA, HLD and rheumatoid arthritis. He presented to an OSH with shortness of breath and atypical chest pain. TTE showed severe MR with concern for posterior flail leaflet, EF was preserved. On arrival at [facility] an Impella was emergently placed in cath lab. 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 Porras on 3/5/21. Returned from the OR on central ECMO with an open chest, intubated and sedated. Returned to the OR for a wash out on 3/9, found to have a thrombus on his mitral valve so he had the valve replaced, decannulated central ECMO, bronchoscopy and placement of left subclavian dialysis line.  SLP consulted to assess swallowing function and make PO diet recs s/p prolonged intubation.    General Observations sat up in the chair. pleasant and cooperative but confused.   Past History of Dysphagia No hx of dypshagia listed in medical chart.   Pain Assessment   Patient Currently in Pain No   Type of Evaluation   Type of Evaluation Swallow Evaluation   Oral Motor   Oral Musculature   (deconditioned)   Structural Abnormalities none present   Mucosal Quality adequate   Dentition (Oral Motor)   Dentition (Oral Motor) natural dentition;adequate dentition   Facial Symmetry (Oral Motor)   Facial Symmetry (Oral Motor) WNL   Lip Function (Oral Motor)   Comment, Lip Function (Oral Motor) reduced strength  and coordination   Tongue Function (Oral Motor)   Comment, Tongue Function (Oral Motor) reduced strength and coordination   Cough/Swallow/Gag Reflex (Oral Motor)   Soft Palate/Velum (Oral Motor) WNL   Gag Reflex (Oral Motor) not tested   Volitional Throat Clear/Cough (Oral Motor) reduced strength    Volitional Swallow (Oral Motor) weak   Vocal Quality/Secretion Management (Oral Motor)   Vocal Quality (Oral Motor) hoarse   Secretion Management (Oral Motor) WNL   General Swallowing Observations   Current Diet/Method of Nutritional Intake (General Swallowing Observations, NIS) NPO;nasogastric tube (NG)   Respiratory Support (General Swallowing Observations) nasal cannula  (intubated 3/4/21-3/12/21)   Clinical Swallow Evaluation   Feeding Assistance dependent   Clinical Swallow Eval: Thin Liquid Texture Trial   Mode of Presentation, Thin Liquids spoon;fed by clinician   Volume of Liquid or Food Presented ice chips and H20 by spoon   Oral Phase of Swallow WFL   Pharyngeal Phase of Swallow impaired;coughing/choking;no awareness of problems;reduction in laryngeal movement;repeated swallows;throat clearing   Diagnostic Statement Poorly coordinated pharyngeal swallow. At elevated risk for aspiration.   Clinical Swallow Evaluation: Nectar-Thick Liquid Texture Trial   Mode of Presentation, Nectar spoon;fed by clinician   Volume of Nectar Presented 3 teaspoons of nectar thick H20   Oral Phase, Nectar WFL   Pharyngeal Phase, Nectar impaired;no awareness of problems;reduction in laryngeal movement;repeated swallows;throat clearing   Diagnostic Statement Poorly coordinated pharyngeal swallow. At elevated risk for aspiration.   Esophageal Phase of Swallow   Patient reports or presents with symptoms of esophageal dysphagia No   Swallowing Recommendations   Diet Consistency Recommendations NPO;ice chips only   Supervision Level for Intake 1:1 supervision needed   Medication Administration Recommendations, Swallowing (SLP) Not safe for PO medication; should be administered via TF at this time   Instrumental Assessment Recommendations instrumental evaluation not recommended at this time  (may be indicated in the future pending progress)   General Therapy Interventions   Planned Therapy Interventions Dysphagia Treatment    Dysphagia treatment Oropharyngeal exercise training;Modified diet education;Instruction of safe swallow strategies   SLP Therapy Assessment/Plan   Criteria for Skilled Therapeutic Interventions Met (SLP Eval) yes;treatment indicated   SLP Diagnosis Oropharyngeal dysphagia   Rehab Potential (SLP Eval) good, to achieve stated therapy goals   Therapy Frequency (SLP Eval) daily   Predicted Duration of Therapy Intervention (SLP Eval) 4 weeks   Comment, Therapy Assessment/Plan (SLP) Clinical dysphagia examination completed per MD order. The patient currently presents with moderate oropharyngeal dysphagia and elevated risk for aspiration s/p prolonged intubation. Pt presents with generalized deconditioning, weak/incomplete pharyngeal swallow and positive signs/symptoms of aspiration with small PO trials. Recommend NPO with continued nutrition/hydration/medication via TF. Ok for ice chips for comfort and to initiate pharyngeal strength ex program with 1:1 supervision. SLP will follow closely for dysphagia tx with the goal of PO readiness.   Therapy Plan Review/Discharge Plan (SLP)   Therapy Plan Review (SLP) care plan/treatment goals reviewed;participants included;patient   Demonstrates Need for Referral to Another Service (SLP) clinical nutrition services/dietitian;occupational therapist;physical therapist   SLP Discharge Planning    SLP Discharge Recommendation (DC Rec) Transitional Care Facility   SLP Rationale for DC Rec benefits from SLP intervention for well below baseline oropharyngeal swallowing skills, requires TF at this time due to aspiration risk   SLP Brief overview of current status  Recommend NPO with nutrition/hydration/medication via TF due to high aspiration risk. Pt is OK for ice chips for comfort with 1:1 supervision when up in the chair. Please encourage pt to complete repeated effortful swallows with goal of pharyngeal strengthening. SLP is following closely for PO readiness.    Total Evaluation Time    Total Evaluation Time (Minutes) 15

## 2021-03-13 NOTE — PROGRESS NOTES
"  Nephrology Progress Note  03/13/2021         Assessment & Recommendations:   Jin Garrett is a 80 year old with PMH HTN, AAA, HLD, RA who presented to OSH with SOB and atypical CP. TTE showed severe MR with ?posterior flail leaflet, EF preserved. On arrival at [facility] an Impella was emergently placed. S/p MVR and single vessel CABG on 3/5/21. Returned from the OR on central ECMO with an open chest, now s/p decannulation on 3/9. Nephrology consulted for ADAN.     ADAN in the setting of cardiogenic shock was on ECMO, s/p decannulation on 3/9  Unknown baseline, no h/o CKD per daughter. 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 Etiology likely ATN in the setting of shock, no obstruction per CT and urinalysis showed granular casts which is c/w ATN. UOP declined and K rising  on 3/6, so initiated on CRRT.   - discontinued CRRT on 3/12  - Trial of HD on 3/13 with minimal UF as toelrated   - Q day bladder scans  - Avoid nephrotoxins  - Renally dose medications  - Renal diet  - Daily weights  - Strict I/Os      Recommendations were communicated to primary team via this note     Seen and discussed with Dr. Leyda Chester MD   [phone]    Interval History :   Nursing and provider notes from last 24 hours reviewed.  In the last 24 hours Jin Garrett remained stable, but confused intermittently.    Physical Exam:   I/O last 3 completed shifts:  In: 2655.95 [I.V.:345.95; NG/GT:400]  Out: 1204 [Urine:50; Emesis/NG output:125; Other:749; Stool:200; Chest Tube:80]   BP 99/52 (BP Location: Right arm)   Pulse 68   Temp 98.4  F (36.9  C) (Oral)   Resp 17   Ht 1.803 m (5' 11\")   Wt 81.9 kg (180 lb 8.9 oz)   SpO2 98%   BMI 25.18 kg/m       General : Pt awake, not in acute distress   Lungs : anterior lung fields are clear  Cardiac : S1, S2 present  Abdomen : Soft/ND  LE : no Edema noted  Dialysis Access : right subclavian non tunneled " line    Labs:   All labs reviewed by me  Electrolytes/Renal -   Recent Labs   Lab Test 03/13/21  0334 03/12/21  1504 03/12/21  1026 03/12/21  0355 03/11/21  2210 03/11/21  1655 03/11/21  1655    140 140 139 142  --  142   POTASSIUM 3.7 3.7 3.6 3.6 3.6   < > 5.8*   CHLORIDE 108 109 110* 107 110*  --  112*   CO2 26 24 25 25 24  --  25   BUN 95* 64* 50* 49* 49*  --  49*   CR 2.58* 1.91* 1.47* 1.57* 1.57*  --  1.57*   * 117* 108* 123* 118*  --  132*   PALLAVI 7.6* 7.6* 7.4* 7.7* 7.9*  --  8.1*   MAG  --   --   --  2.5* 2.3  --  2.3   PHOS  --   --   --  3.4 4.8*  --  1.9*    < > = values in this interval not displayed.       CBC -   Recent Labs   Lab Test 03/13/21  0334 03/12/21  1903 03/12/21  1504 03/12/21  0355   WBC 16.3*  --  15.3* 16.8*   HGB 7.4* 7.3* 6.8* 7.4*   *  --  105* 127*       LFTs -   Recent Labs   Lab Test 03/13/21  0334 03/12/21  0355 03/11/21  0339   ALKPHOS 120 116 114   BILITOTAL 0.5 0.6 0.6   ALT 59 60 57   AST 62* 72* 70*   PROTTOTAL 4.9* 5.2* 5.2*   ALBUMIN 1.8* 1.9* 2.0*       Current Medications:    sodium chloride 0.9%  250 mL Intravenous Once in dialysis     sodium chloride 0.9%  300 mL Hemodialysis Machine Once     aspirin  81 mg Oral or NG Tube Daily     atorvastatin  40 mg Oral or Feeding Tube QPM     B and C vitamin Complex with folic acid  5 mL Oral Daily     chlorhexidine  15 mL Swish & Spit BID     gelatin absorbable  1 each Topical During Hemodialysis (from stock)     insulin aspart  1-4 Units Subcutaneous Q4H     melatonin  3 mg Oral or Feeding Tube At Bedtime     metoprolol tartrate  25 mg Oral or Feeding Tube BID     - MEDICATION INSTRUCTIONS -   Does not apply Once     pantoprazole  40 mg Oral or NG Tube Daily    Or     pantoprazole  40 mg Oral Daily     QUEtiapine  25 mg Oral or Feeding Tube At Bedtime     sodium chloride (PF)  9 mL Intracatheter During Hemodialysis (from stock)     sodium chloride (PF)  9 mL Intracatheter During Hemodialysis (from stock)        dextrose       heparin 1,500 Units/hr (03/13/21 0718)     - MEDICATION INSTRUCTIONS -       Andie Chester MD

## 2021-03-13 NOTE — PROGRESS NOTES
HEMODIALYSIS TREATMENT NOTE    Date: 3/13/2021  Time: 2:14 PM    Data:  Pre Wt:  81.9 kg    Desired Wt: 81.4 kg   Post Wt:  81.4 kg (estimated)  Weight change:   0.5 kg  Ultrafiltration - Post Run Net Total Removed (mL): (P) 500 mL  Vascular Access Status: CVC  patent  Dialyzer Rinse:    Total Blood Volume Processed: (P) 45.8 L   Total Dialysis (Treatment) Time: (P) 2.5   Dialysate Bath: K 3, Ca 3  Heparin: None    Lab:   Yes  Hep B  Interventions:Assessments   Pt dialyzed for 2.5hrs on K 3, Ca 3. Pt tolerated treatment very well and able to achieve UF goal of 500ml. VSS. Blood Pressure was soft but stable. 300 BFR was maintained with appropriate venous and arterial pressure. CVC site is dry,intact, and clean. Blood return was noted from both lumens. Post treatment the lumens were locked with saline. Hand off report was given to Mary Ann Spicer.  Plan:    Per renal team

## 2021-03-13 NOTE — CONSULTS
[facility]  WOC Nurse Inpatient Adult Pressure Injury Prevention Assessment: ECMO  Follow up-3/13- ECMO de cannulated, No skin issues or PI concerns. Mid chest is nicely approximated. No further visit is planned, WOC will sign off    Positioning Tolerance: Fair  Date of ECMO cannulation: 3/5/21  Presence of Ischemia: No  Location of ischemia: N/A    Pressure Injury Prevention Interventions In Place:  Optifoam Dressing under ECMO Cannula, Z flow Positioner under head, Pillows for repositioning, TAPs Wedge Positioners in use, Heel off-loading boots, Pillows under calves for heel suspension, Mepilex Sacral Dressing and Dressing to sacrum for prevention   Current support surface: Standard  Low air loss mattress       Pressure Injury Prevention Interventions Added:  None and all interventions in place currently        Plan of Care for Positioning and Pressure Injury Prevention  Reposition patient every 1-2 hours using TAP Wedges  Position head on Z flow positioner, mold indentation at areas of pressure points.  Pad ECMO IJ cannula with Optifoam (#[id number]) along face and scalp between skin and cannula and under Coban head wraps    Pad ECMO groin and chest cannula under rigid connectors with Optifoam or Soft cloth  Heel off-loading Boots at all times  Sacral Mepilex for Prevention, change every 5 days and prn  Low Air loss mattress    Patient History:   According to medical record: Jin Garrett is a 80 year old male with PMH of HTN, AAA, HLD and rheumatoid arthritis. He presented to an OSH with shortness of breath and atypical chest pain. Work-up revealed an elevated troponin, hypotension and hypoxia. TTE showed severe MR with concern for posterior flail leaflet due to to either papillary muscle rupture or torn chordae, EF was preserved. On arrival at [facility] an Impella was emergently placed in cath lab. 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 Porras on 3/5/21.  Returned from the OR on central ECMO with an open chest, intubated and sedated.     Current Diet / Nutrition:     None      Output:    I/O last 3 completed shifts:  In: 2655.95 [I.V.:345.95; NG/GT:400]  Out: 1204 [Urine:50; Emesis/NG output:125; Other:749; Stool:200; Chest Tube:80]  Containment: of urine/stool: Incontinence Protocol and Urinary Catheter    Risk Assessment:   Sensory Perception: 3-->slightly limited  Moisture: 3-->occasionally moist  Activity: 2-->chairfast  Mobility: 2-->very limited  Nutrition: 2-->probably inadequate  Friction and Shear: 2-->potential problem  Raman Score: 14    Labs:    Recent Labs   Lab 03/13/21  0334 03/10/21  0356 03/10/21  0356   ALBUMIN 1.8*   < > 2.0*   HGB 7.4*   < > 8.5*   INR  --   --  1.28*   WBC 16.3*   < > 19.2*   CRP  --   --  210.0*    < > = values in this interval not displayed.       Focused Assessment:  Mid chest and groin    Pressure Injury Present::No    Education provided to: nurse  Discussed importance of:their role in pressure injury prevention  Discussed plan of care with Nurse  WOC Nurse follow-up plan:signing off    Bouchra Ponce RN RN, CWOCN

## 2021-03-13 NOTE — PLAN OF CARE
Major Shift Events:  Pt alert to place and self, disoriented to time and situation. SR with frequent PACs + PVCs. CVP 11-12. 2L NC. NPO, tube feeds at goal. Pt previously anuric due to CRRT, now having occasional episodes of urinary incontinence. Pt also having frequent diarrhea + stool incontinence. Rectal pouch placed but failed, rectal tube placed. Minimal chest tube output. Heparin Xa therapeutic this am, gtt at 1500 units/hr.    Plan: Pt to trial HD run today. Continue to monitor neurological status and delirium prevention. Work with therapies.    For vital signs and complete assessments, please see documentation flowsheets.

## 2021-03-13 NOTE — PROGRESS NOTES
Pt extubated at 0925. CRRT stopped d/t filter pressures increasing- not restarted per Dr Chester (nephrology fellow). Bival switched to Heparin, 10A ordered for 2145. CT x3 removed, pacing wires dc'd. 1 U PRBC given for Hgb of 6.8, recheck sent. Pt's daughter at bedside most of day, updated by myself and CVTS. Report given to oncoming RNJose.

## 2021-03-14 ENCOUNTER — APPOINTMENT (OUTPATIENT)
Dept: GENERAL RADIOLOGY | Facility: CLINIC | Age: 81
DRG: 003 | End: 2021-03-14
Attending: PHYSICIAN ASSISTANT
Payer: MEDICARE

## 2021-03-14 ENCOUNTER — APPOINTMENT (OUTPATIENT)
Dept: OCCUPATIONAL THERAPY | Facility: CLINIC | Age: 81
DRG: 003 | End: 2021-03-14
Attending: INTERNAL MEDICINE
Payer: MEDICARE

## 2021-03-14 ENCOUNTER — APPOINTMENT (OUTPATIENT)
Dept: SPEECH THERAPY | Facility: CLINIC | Age: 81
DRG: 003 | End: 2021-03-14
Attending: INTERNAL MEDICINE
Payer: MEDICARE

## 2021-03-14 ENCOUNTER — APPOINTMENT (OUTPATIENT)
Dept: ULTRASOUND IMAGING | Facility: CLINIC | Age: 81
DRG: 003 | End: 2021-03-14
Attending: INTERNAL MEDICINE
Payer: MEDICARE

## 2021-03-14 LAB
ABO + RH BLD: NORMAL
ABO + RH BLD: NORMAL
ALBUMIN SERPL-MCNC: 1.6 G/DL (ref 3.4–5)
ALP SERPL-CCNC: 142 U/L (ref 40–150)
ALT SERPL W P-5'-P-CCNC: 123 U/L (ref 0–70)
ANION GAP SERPL CALCULATED.3IONS-SCNC: 8 MMOL/L (ref 3–14)
AST SERPL W P-5'-P-CCNC: 159 U/L (ref 0–45)
BILIRUB SERPL-MCNC: 0.5 MG/DL (ref 0.2–1.3)
BLD GP AB SCN SERPL QL: NORMAL
BLD PROD TYP BPU: NORMAL
BLD UNIT ID BPU: 0
BLD UNIT ID BPU: 0
BLOOD BANK CMNT PATIENT-IMP: NORMAL
BLOOD PRODUCT CODE: NORMAL
BLOOD PRODUCT CODE: NORMAL
BPU ID: NORMAL
BPU ID: NORMAL
BUN SERPL-MCNC: 90 MG/DL (ref 7–30)
CALCIUM SERPL-MCNC: 7.4 MG/DL (ref 8.5–10.1)
CHLORIDE SERPL-SCNC: 107 MMOL/L (ref 94–109)
CO2 SERPL-SCNC: 25 MMOL/L (ref 20–32)
CREAT SERPL-MCNC: 2.67 MG/DL (ref 0.66–1.25)
ERYTHROCYTE [DISTWIDTH] IN BLOOD BY AUTOMATED COUNT: 20.2 % (ref 10–15)
GFR SERPL CREATININE-BSD FRML MDRD: 22 ML/MIN/{1.73_M2}
GLUCOSE BLDC GLUCOMTR-MCNC: 117 MG/DL (ref 70–99)
GLUCOSE BLDC GLUCOMTR-MCNC: 123 MG/DL (ref 70–99)
GLUCOSE BLDC GLUCOMTR-MCNC: 126 MG/DL (ref 70–99)
GLUCOSE BLDC GLUCOMTR-MCNC: 127 MG/DL (ref 70–99)
GLUCOSE BLDC GLUCOMTR-MCNC: 128 MG/DL (ref 70–99)
GLUCOSE BLDC GLUCOMTR-MCNC: 136 MG/DL (ref 70–99)
GLUCOSE BLDC GLUCOMTR-MCNC: 140 MG/DL (ref 70–99)
GLUCOSE SERPL-MCNC: 139 MG/DL (ref 70–99)
HBV SURFACE AB SERPL IA-ACNC: 0 M[IU]/ML
HBV SURFACE AG SERPL QL IA: NONREACTIVE
HCT VFR BLD AUTO: 21.6 % (ref 40–53)
HGB BLD-MCNC: 6.7 G/DL (ref 13.3–17.7)
HGB BLD-MCNC: 8.2 G/DL (ref 13.3–17.7)
LDH SERPL L TO P-CCNC: 439 U/L (ref 85–227)
MCH RBC QN AUTO: 29 PG (ref 26.5–33)
MCHC RBC AUTO-ENTMCNC: 31 G/DL (ref 31.5–36.5)
MCV RBC AUTO: 94 FL (ref 78–100)
NUM BPU REQUESTED: 3
PLATELET # BLD AUTO: 134 10E9/L (ref 150–450)
POTASSIUM SERPL-SCNC: 3.8 MMOL/L (ref 3.4–5.3)
PROT SERPL-MCNC: 4.8 G/DL (ref 6.8–8.8)
RBC # BLD AUTO: 2.31 10E12/L (ref 4.4–5.9)
SODIUM SERPL-SCNC: 140 MMOL/L (ref 133–144)
SPECIMEN EXP DATE BLD: NORMAL
TRANSFUSION STATUS PATIENT QL: NORMAL
UFH PPP CHRO-ACNC: 0.2 IU/ML
UFH PPP CHRO-ACNC: 0.66 IU/ML
UFH PPP CHRO-ACNC: 0.76 IU/ML
WBC # BLD AUTO: 14.3 10E9/L (ref 4–11)

## 2021-03-14 PROCEDURE — 36568 INSJ PICC <5 YR W/O IMAGING: CPT

## 2021-03-14 PROCEDURE — 250N000013 HC RX MED GY IP 250 OP 250 PS 637: Performed by: INTERNAL MEDICINE

## 2021-03-14 PROCEDURE — 97535 SELF CARE MNGMENT TRAINING: CPT | Mod: GO | Performed by: OCCUPATIONAL THERAPIST

## 2021-03-14 PROCEDURE — 200N000002 HC R&B ICU UMMC

## 2021-03-14 PROCEDURE — 999N001017 HC STATISTIC GLUCOSE BY METER IP

## 2021-03-14 PROCEDURE — 80053 COMPREHEN METABOLIC PANEL: CPT | Performed by: STUDENT IN AN ORGANIZED HEALTH CARE EDUCATION/TRAINING PROGRAM

## 2021-03-14 PROCEDURE — 93970 EXTREMITY STUDY: CPT | Mod: 26 | Performed by: RADIOLOGY

## 2021-03-14 PROCEDURE — 85027 COMPLETE CBC AUTOMATED: CPT | Performed by: STUDENT IN AN ORGANIZED HEALTH CARE EDUCATION/TRAINING PROGRAM

## 2021-03-14 PROCEDURE — 92526 ORAL FUNCTION THERAPY: CPT | Mod: GN

## 2021-03-14 PROCEDURE — 250N000011 HC RX IP 250 OP 636: Performed by: STUDENT IN AN ORGANIZED HEALTH CARE EDUCATION/TRAINING PROGRAM

## 2021-03-14 PROCEDURE — 999N000155 HC STATISTIC RAPCV CVP MONITORING

## 2021-03-14 PROCEDURE — 85520 HEPARIN ASSAY: CPT | Performed by: THORACIC SURGERY (CARDIOTHORACIC VASCULAR SURGERY)

## 2021-03-14 PROCEDURE — 999N000157 HC STATISTIC RCP TIME EA 10 MIN

## 2021-03-14 PROCEDURE — 85018 HEMOGLOBIN: CPT | Performed by: SURGERY

## 2021-03-14 PROCEDURE — 71045 X-RAY EXAM CHEST 1 VIEW: CPT | Mod: 26 | Performed by: RADIOLOGY

## 2021-03-14 PROCEDURE — 250N000013 HC RX MED GY IP 250 OP 250 PS 637: Performed by: SURGERY

## 2021-03-14 PROCEDURE — 250N000013 HC RX MED GY IP 250 OP 250 PS 637: Performed by: STUDENT IN AN ORGANIZED HEALTH CARE EDUCATION/TRAINING PROGRAM

## 2021-03-14 PROCEDURE — P9016 RBC LEUKOCYTES REDUCED: HCPCS | Performed by: SURGERY

## 2021-03-14 PROCEDURE — 93970 EXTREMITY STUDY: CPT

## 2021-03-14 PROCEDURE — 97530 THERAPEUTIC ACTIVITIES: CPT | Mod: GO | Performed by: OCCUPATIONAL THERAPIST

## 2021-03-14 PROCEDURE — 85520 HEPARIN ASSAY: CPT | Performed by: STUDENT IN AN ORGANIZED HEALTH CARE EDUCATION/TRAINING PROGRAM

## 2021-03-14 PROCEDURE — 83615 LACTATE (LD) (LDH) ENZYME: CPT | Performed by: STUDENT IN AN ORGANIZED HEALTH CARE EDUCATION/TRAINING PROGRAM

## 2021-03-14 PROCEDURE — 99233 SBSQ HOSP IP/OBS HIGH 50: CPT | Mod: 24 | Performed by: INTERNAL MEDICINE

## 2021-03-14 PROCEDURE — 99233 SBSQ HOSP IP/OBS HIGH 50: CPT | Mod: GC | Performed by: INTERNAL MEDICINE

## 2021-03-14 PROCEDURE — 999N000065 XR CHEST PORT 1 VW

## 2021-03-14 RX ORDER — TRAMADOL HYDROCHLORIDE 50 MG/1
50 TABLET ORAL EVERY 6 HOURS PRN
Status: DISCONTINUED | OUTPATIENT
Start: 2021-03-14 | End: 2021-03-22

## 2021-03-14 RX ORDER — HEPARIN SODIUM,PORCINE 10 UNIT/ML
2-5 VIAL (ML) INTRAVENOUS
Status: ACTIVE | OUTPATIENT
Start: 2021-03-14 | End: 2021-03-17

## 2021-03-14 RX ORDER — ACETAMINOPHEN 325 MG/1
975 TABLET ORAL EVERY 8 HOURS SCHEDULED
Status: DISCONTINUED | OUTPATIENT
Start: 2021-03-14 | End: 2021-03-16 | Stop reason: DRUGHIGH

## 2021-03-14 RX ORDER — LIDOCAINE 40 MG/G
CREAM TOPICAL
Status: DISCONTINUED | OUTPATIENT
Start: 2021-03-14 | End: 2021-03-14

## 2021-03-14 RX ADMIN — CHLORHEXIDINE GLUCONATE 0.12% ORAL RINSE 15 ML: 1.2 LIQUID ORAL at 07:32

## 2021-03-14 RX ADMIN — Medication 5 ML: at 07:32

## 2021-03-14 RX ADMIN — HEPARIN SODIUM 1500 UNITS/HR: 10000 INJECTION, SOLUTION INTRAVENOUS at 00:10

## 2021-03-14 RX ADMIN — ASPIRIN 81 MG CHEWABLE TABLET 81 MG: 81 TABLET CHEWABLE at 07:32

## 2021-03-14 RX ADMIN — INSULIN ASPART 1 UNITS: 100 INJECTION, SOLUTION INTRAVENOUS; SUBCUTANEOUS at 04:13

## 2021-03-14 RX ADMIN — MELATONIN TAB 3 MG 3 MG: 3 TAB at 21:39

## 2021-03-14 RX ADMIN — ACETAMINOPHEN 975 MG: 325 TABLET, FILM COATED ORAL at 15:44

## 2021-03-14 RX ADMIN — CHLORHEXIDINE GLUCONATE 0.12% ORAL RINSE 15 ML: 1.2 LIQUID ORAL at 19:42

## 2021-03-14 RX ADMIN — HEPARIN SODIUM 1650 UNITS/HR: 10000 INJECTION, SOLUTION INTRAVENOUS at 19:41

## 2021-03-14 RX ADMIN — METOPROLOL TARTRATE 25 MG: 25 TABLET, FILM COATED ORAL at 07:32

## 2021-03-14 RX ADMIN — HEPARIN SODIUM 1650 UNITS/HR: 10000 INJECTION, SOLUTION INTRAVENOUS at 05:07

## 2021-03-14 RX ADMIN — ACETAMINOPHEN 975 MG: 325 TABLET, FILM COATED ORAL at 21:39

## 2021-03-14 RX ADMIN — PANTOPRAZOLE SODIUM 40 MG: 40 TABLET, DELAYED RELEASE ORAL at 07:32

## 2021-03-14 RX ADMIN — QUETIAPINE FUMARATE 25 MG: 25 TABLET ORAL at 21:39

## 2021-03-14 RX ADMIN — ATORVASTATIN CALCIUM 40 MG: 40 TABLET, FILM COATED ORAL at 19:42

## 2021-03-14 RX ADMIN — METOPROLOL TARTRATE 25 MG: 25 TABLET, FILM COATED ORAL at 19:42

## 2021-03-14 RX ADMIN — ACETAMINOPHEN 650 MG: 325 TABLET, FILM COATED ORAL at 07:32

## 2021-03-14 ASSESSMENT — ACTIVITIES OF DAILY LIVING (ADL)
ADLS_ACUITY_SCORE: 21
ADLS_ACUITY_SCORE: 25
ADLS_ACUITY_SCORE: 21
ADLS_ACUITY_SCORE: 21

## 2021-03-14 ASSESSMENT — MIFFLIN-ST. JEOR: SCORE: 1541.8

## 2021-03-14 NOTE — PROCEDURES
[facility]    Double Lumen PICC Placement    Date/Time: 3/14/2021 5:12 PM  Performed by: Daquan Porras MD  Authorized by: Daquan Porras MD   Indications: vascular access    UNIVERSAL PROTOCOL   Site Marked: Yes  Prior Images Obtained and Reviewed:  Yes  Required items: Required blood products, implants, devices and special equipment available    Patient identity confirmed:  Verbally with patient and arm band  NA - No sedation, light sedation, or local anesthesia  Confirmation Checklist:  Patient's identity using two indicators, relevant allergies, procedure was appropriate and matched the consent or emergent situation and correct equipment/implants were available  Time out: Immediately prior to the procedure a time out was called    Universal Protocol: the Joint Commission Universal Protocol was followed    Preparation: Patient was prepped and draped in usual sterile fashion           ANESTHESIA    Local Anesthetic: Lidocaine 1% without epinephrine  Anesthetic Total (mL):  5      SEDATION    Patient Sedated: No        Skin prep agent: skin prep agent completely dried prior to procedure  Sterile barriers: maximum sterile barriers were used: cap, mask, sterile gown, sterile gloves, and large sterile sheet  Hand hygiene: hand hygiene performed prior to central venous catheter insertion  Type of line used: PICC  Catheter type: double lumen  Lumen type: non-valved  Catheter size: 5 Fr  Brand: Bard  Lot number: [id number]  Placement method: venipuncture, MST and ultrasound  Number of attempts: 1  Successful placement: yes  Orientation: right  Location: brachial vein (lateral)  Visible catheter length: 0  Internal length: 39 cm  Total catheter length: 39  Dressing and securement: blood removed, blood cleaned with CHG, dressing applied, securement device and statlock  Post procedure assessment: blood return through all ports  PROCEDURE   Patient Tolerance:   Patient tolerated the procedure well with no immediate complications  Describe Procedure: RIGHT UPPER EXTREMITY PICC PLACED ON FIRST ATTEMPT WITH ULTRASOUND GUIDANCE VIA THE BRACHIAL VENOUS SYTEM. LINE THREADED WITH EASE AND POSITIVE DARK RED BLOOD RETURN OBSERVED X 2LUMENS. NAVIGATION SYSTEM INDICATED LINE TOWARDS SVC BUT WRITER UNABLE TO SEE AN ELEVATED P-WAVE; CXR ORDERED. FLUSHED AND DRESSED CATHETER PER PROTOCOL. NO OBSERVABLE SIGNS OR SX OF COMPLICATIONS POST INSERTION.  DR. WESTBROOK CONTACTED REGARDING LOW GFR AND ELEVATED CREAT. DR. WESTBROOK ORDERS TO PLACE PICC.   MARK Lincoln, [facility]-BC

## 2021-03-14 NOTE — PLAN OF CARE
Pt very pleasant, confused, only alert to self. Pt follows commands. Pt with swelling to left lower and upper arm and hand, red and warm and painful to touch. Arm elevated and warm packs placed prn switched with ice packs. Dwayne UE US ordered. Left arm has no clot or DVT. Right arm shows a small occlusive right basilac. CVTS says ok to place a PICC line in either arm. Right internal jugular line d/c'd, site CDI. Pt may tx to floor.

## 2021-03-14 NOTE — PLAN OF CARE
Pt OOB to chair twice today. Pt tolerated dialysis today vs WDL, Pt very pleasant   and confused. Daughter at bedside and updated. Speech swallow eval performed today, did not pass see notes.

## 2021-03-14 NOTE — PROGRESS NOTES
"  Nephrology Progress Note  03/14/2021         Assessment & Recommendations:   Jin Garrett is a 80 year old with PMH HTN, AAA, HLD, RA who presented to OSH with SOB and atypical CP. TTE showed severe MR with ?posterior flail leaflet, EF preserved. On arrival at [facility] an Impella was emergently placed. S/p MVR and single vessel CABG on 3/5/21. Returned from the OR on central ECMO with an open chest, now s/p decannulation on 3/9. Nephrology consulted for ADAN.     ADAN in the setting of cardiogenic shock was on ECMO, s/p decannulation on 3/9  Unknown baseline, no h/o CKD per daughter. 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 Etiology likely ATN in the setting of shock, no obstruction per CT and urinalysis showed granular casts which is c/w ATN. UOP declined and K rising  on 3/6, so initiated on CRRT.   - discontinued CRRT on 3/12  - Trial of HD on 3/13 with minimal UF which was tolerated well  - will daily evaluate for renal recovery and HD needs  - No acute need for HD today based on labs or exam.  - Q day bladder scans  - Avoid nephrotoxins  - Renally dose medications  - Renal diet  - Daily weights  - Strict I/Os      Recommendations were communicated to primary team via this note     Seen and discussed with Dr. Leyda Chester MD   [phone]    Interval History :   Nursing and provider notes from last 24 hours reviewed.  In the last 24 hours Jin Garrett remained stable. No new complaints this morning, denied being in pain.    Physical Exam:   I/O last 3 completed shifts:  In: 2325.33 [I.V.:415.33; NG/GT:300]  Out: 900 [Other:500; Stool:300; Chest Tube:100]   /54 (BP Location: Right arm)   Pulse 95   Temp 100.1  F (37.8  C) (Axillary)   Resp 16   Ht 1.803 m (5' 11\")   Wt 81 kg (178 lb 8 oz)   SpO2 96%   BMI 24.90 kg/m       General : Pt awake, not in acute distress   Lungs : anterior lung fields are " clear  Cardiac : S1, S2 present  Abdomen : Soft/ND  LE : no Edema noted  Dialysis Access : right subclavian non tunneled line    Labs:   All labs reviewed by me  Electrolytes/Renal -   Recent Labs   Lab Test 03/14/21  0350 03/13/21  0334 03/12/21  1504 03/12/21  0355 03/12/21  0355 03/11/21  2210 03/11/21  1655 03/11/21  1655    142 140   < > 139 142  --  142   POTASSIUM 3.8 3.7 3.7   < > 3.6 3.6   < > 5.8*   CHLORIDE 107 108 109   < > 107 110*  --  112*   CO2 25 26 24   < > 25 24  --  25   BUN 90* 95* 64*   < > 49* 49*  --  49*   CR 2.67* 2.58* 1.91*   < > 1.57* 1.57*  --  1.57*   * 117* 117*   < > 123* 118*  --  132*   PALLAVI 7.4* 7.6* 7.6*   < > 7.7* 7.9*  --  8.1*   MAG  --   --   --   --  2.5* 2.3  --  2.3   PHOS  --   --   --   --  3.4 4.8*  --  1.9*    < > = values in this interval not displayed.       CBC -   Recent Labs   Lab Test 03/14/21  0845 03/14/21  0350 03/13/21  1300 03/13/21  0334 03/12/21  1504 03/12/21  1504   WBC  --  14.3*  --  16.3*  --  15.3*   HGB 8.2* 6.7* 7.8* 7.4*   < > 6.8*   PLT  --  134*  --  113*  --  105*    < > = values in this interval not displayed.       LFTs -   Recent Labs   Lab Test 03/14/21  0350 03/13/21  0334 03/12/21  0355   ALKPHOS 142 120 116   BILITOTAL 0.5 0.5 0.6   * 59 60   * 62* 72*   PROTTOTAL 4.8* 4.9* 5.2*   ALBUMIN 1.6* 1.8* 1.9*     Current Medications:    acetaminophen  975 mg Oral or Feeding Tube Q8H KERRI     aspirin  81 mg Oral or NG Tube Daily     atorvastatin  40 mg Oral or Feeding Tube QPM     B and C vitamin Complex with folic acid  5 mL Oral Daily     chlorhexidine  15 mL Swish & Spit BID     insulin aspart  1-4 Units Subcutaneous Q4H     melatonin  3 mg Oral or Feeding Tube At Bedtime     metoprolol tartrate  25 mg Oral or Feeding Tube BID     pantoprazole  40 mg Oral or NG Tube Daily    Or     pantoprazole  40 mg Oral Daily     QUEtiapine  25 mg Oral or Feeding Tube At Bedtime       dextrose       heparin 1,650 Units/hr  (03/14/21 0800)     - MEDICATION INSTRUCTIONS -       Andie Chester MD

## 2021-03-14 NOTE — PROGRESS NOTES
CV ICU PROGRESS NOTE  March 14, 2021      CO-MORBIDITIES:   Cardiogenic shock (H)  (primary encounter diagnosis)  Status post cardiac surgery  Status post cardiac surgery    ASSESSMENT: Jin Garrett is a 80 year old male with PMH of HTN, AAA, HLD and rheumatoid arthritis. He presented to an OSH with shortness of breath and atypical chest pain. TTE showed severe MR with concern for posterior flail leaflet, EF was preserved. On arrival at [facility] an Impella was emergently placed in cath lab. 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 Porras on 3/5/21. Returned from the OR on central ECMO with an open chest, intubated and sedated. Returned to the OR for a wash out on 3/9, found to have a thrombus on his mitral valve so he had the valve replaced, decannulated central ECMO, bronchoscopy and placement of left subclavian dialysis line.      TODAY'S PROGRESS:   - stop oxycodone   - start scheduled tylenol   - WBAT per family  - Remove MAC line today   - repeat SLP evaluation today   - change insulin to every 6 hours given minimal use   - US of left upper extremity, given swelling and tenderness   - transfer to 6D/c under CVTS, hand off provider to team     PLAN:    Neuro/ pain/ sedation:  Post-operative pain  Delerium  - Monitor neurological status. Continue with delirium management   - APAP + oxy (10 mg/24 hours)-- will stop oxycodone given worsening delirium   - seroquel 25mg at bedtime, melatonin 3 mg, continue to hold PTA amitriptyline      Pulmonary care:  Acute post-operative respiratory failure, resolved   - weaned to RA   - Incentive spirometer      Cardiovascular:  HTN  AAA  HLD  A fib  Severe mitral regurgitation s/p MVR3/5  CAD with 100% occlusion of the mid LCx s/p single vessel CABG 3/5  Cardiogenic shock  Vasoplegic shock   VA ECMO (central)   S/p redo MVR due to thrombosis, removal of ECMO 3/9, and wound closure 3/9  - Monitor hemodynamic status. MAP >65  - ASA 81  - Metoprolol 25 BID  -  Continue heparin infusion for now.   - chest tube management per CVTS, total output 110/24hrs.      GI/Nutrition:   Concern for protein calorie malnutrition   dysphagia   Loose stools  - TF through NJ. Tf's at goal. Standard free water flushes   - PRN Bowel regimen    - atorvastatin 40mg  - rectal stool in place, remove when stool C 200ml/24 hrs   - watch LFTs with initiation of Lipitor     Fluids/ Electrolytes/Renal:  ADAN   Hyperkalemia, improved with iHD 3.8  - Continue to monitor electrolytes  - Nephrology consulted appreciate recommendations  -  - iHD 3/13 with UF of 500mL. Additional dialysis per direction of neprhololgy   - bladder scan PRN total of 50cc/24 hrs   - will likely need tunneled line for iHD      Endocrine:  Stress hyperglycemia    - Continue low dose sliding scale insulin--total of I unit for past 24 hours. Goal to keep blood glucose <180. Glucose range 128-148.   - change checks to every 6 hours to allow sleep during nighttime and minimal unit(s) use      ID/ Antibiotics:  Leukocytosis - likely secondary to physiologic stress, WBC down to 14.3, monitor for now   - Complete perioperative/open chest antibiotics: cefepime and vancomycin for 7 days or 24 hours after chest closure, whichever is shorter,  last day 3/11.   - hold off abx given afebrile. Monitor for now   - Sputum-culture 3/9 - NGTD  - Blood cultures 3/7- NGTD     Heme:  Acute blood loss anemia      - Hgb goal >8.   - Daily CBC  - 2 units PRBC today for hgb 6.2-->Recheck 8.2  - Continue heparin gtt for prior mitral valve thrombosis, hold off oral agent until plan for HD line.     MSK  Weakness and deconditioning of critical illness  Recent left hip surgery    Physical and therapy consults  WBAT per family      Prophylaxis:    - Mechanical prophylaxis for DVT and  Heparin gtt  - Bowel regimen--not needed   - Protonix every day     Lines/ tubes/ drains:  - HD line  - Pleural tube  - MAC--will remove      Disposition:  - Anticipate transfer  to floor later today.      Patient seen, findings and plan discussed with CV ICU staff, Dr. Hurtado.    Iraj Figueroa PA-C    ====================================    SUBJECTIVE:   Course reviewed. Confusion reported overnight. Did not pass SLP eval. Tolerated iHd yesterday with  UF 500cc.  Oriented to self only this am, unable to provide date or location when asked.     OBJECTIVE:   1. VITAL SIGNS:   Temp:  [98.1  F (36.7  C)-100.1  F (37.8  C)] 100.1  F (37.8  C)  Pulse:  [68-98] 95  Resp:  [13-18] 18  BP: ()/(43-64) 108/54  SpO2:  [90 %-99 %] 96 %      2. INTAKE/ OUTPUT:   I/O last 3 completed shifts:  In: 2325.33 [I.V.:415.33; NG/GT:300]  Out: 900 [Other:500; Stool:300; Chest Tube:100]    3. PHYSICAL EXAMINATION:   General: sitting up in bed,   Neuro: awakes, orientated to self only.   Resp: Breathing non-labored, on RA.   CV: S1/2. Midline chest incision dressed. Chest tube with serous ligh pink drainage.   Abdomen: Soft, Non-distended, Non-tender.  Extremities:  Left lower forearm swelling present, SAGASTUME. Pulses 2+. Calves soft and compressible.     4. INVESTIGATIONS:   Arterial Blood Gases   Recent Labs   Lab 03/12/21  0815 03/10/21  0813 03/10/21  0356 03/09/21  2347   PH 7.45 7.44 7.44 7.43   PCO2 38 31* 34* 37   PO2 100 108* 118* 103   HCO3 26 22 23 24     Complete Blood Count   Recent Labs   Lab 03/14/21  0350 03/13/21  1300 03/13/21  0334 03/12/21  1903 03/12/21  1504 03/12/21  0355   WBC 14.3*  --  16.3*  --  15.3* 16.8*   HGB 6.7* 7.8* 7.4* 7.3* 6.8* 7.4*   *  --  113*  --  105* 127*     Basic Metabolic Panel  Recent Labs   Lab 03/14/21  0350 03/13/21  0334 03/12/21  1504 03/12/21  1026    142 140 140   POTASSIUM 3.8 3.7 3.7 3.6   CHLORIDE 107 108 109 110*   CO2 25 26 24 25   BUN 90* 95* 64* 50*   CR 2.67* 2.58* 1.91* 1.47*   * 117* 117* 108*     Liver Function Tests  Recent Labs   Lab 03/14/21  0350 03/13/21  0334 03/12/21  0355 03/11/21  0339 03/10/21  0356 03/09/21  2348  03/09/21 2022 03/09/21 1831   * 62* 72* 70* 113*  --  110*  --    * 59 60 57 58  --  50  --    ALKPHOS 142 120 116 114 77  --  78  --    BILITOTAL 0.5 0.5 0.6 0.6 0.8  --  0.8  --    ALBUMIN 1.6* 1.8* 1.9* 2.0* 2.0*  --  1.7*  --    INR  --   --   --   --  1.28* 1.31* 1.46* 1.82*     Pancreatic Enzymes  No lab results found in last 7 days.  Coagulation Profile  Recent Labs   Lab 03/13/21  0334 03/12/21  1504 03/12/21  0355 03/11/21  0339 03/10/21  0356 03/10/21  0356 03/09/21  2347 03/09/21 2022 03/09/21 1831   INR  --   --   --   --   --  1.28* 1.31* 1.46* 1.82*   PTT 76* 51* 45* 50*   < > 33 46* 44* 36    < > = values in this interval not displayed.         5. RADIOLOGY:   No results found for this or any previous visit (from the past 24 hour(s)).    =========================================

## 2021-03-14 NOTE — PLAN OF CARE
Major Shift Events:  Pt becoming more confused and disoriented x4 after midnight. SR with frequent PACs + PVCs. CVP 11-12. NPO, tube feeds at goal. Intermittent episodes of urinary incontinence, primofit initiated. Good output via rectal tube. Minimal chest tube output. Heparin Xa sub-therapeutic this am, bolused and gtt increased according to protocol. Hemoglobin critical at 6.7, 2 units of PRBCs given.      Plan: Continue to monitor neurological status and delirium prevention. Work with therapies. Anticipate transfer up to floor soon.     For vital signs and complete assessments, please see documentation flowsheets.

## 2021-03-15 ENCOUNTER — APPOINTMENT (OUTPATIENT)
Dept: SPEECH THERAPY | Facility: CLINIC | Age: 81
DRG: 003 | End: 2021-03-15
Attending: INTERNAL MEDICINE
Payer: MEDICARE

## 2021-03-15 LAB
ALBUMIN SERPL-MCNC: 1.6 G/DL (ref 3.4–5)
ALP SERPL-CCNC: 194 U/L (ref 40–150)
ALT SERPL W P-5'-P-CCNC: 308 U/L (ref 0–70)
ANION GAP SERPL CALCULATED.3IONS-SCNC: 12 MMOL/L (ref 3–14)
AST SERPL W P-5'-P-CCNC: 330 U/L (ref 0–45)
BILIRUB SERPL-MCNC: 0.4 MG/DL (ref 0.2–1.3)
BUN SERPL-MCNC: 137 MG/DL (ref 7–30)
CALCIUM SERPL-MCNC: 7.4 MG/DL (ref 8.5–10.1)
CHLORIDE SERPL-SCNC: 108 MMOL/L (ref 94–109)
CO2 SERPL-SCNC: 22 MMOL/L (ref 20–32)
CREAT SERPL-MCNC: 4.14 MG/DL (ref 0.66–1.25)
ERYTHROCYTE [DISTWIDTH] IN BLOOD BY AUTOMATED COUNT: 18.7 % (ref 10–15)
GFR SERPL CREATININE-BSD FRML MDRD: 13 ML/MIN/{1.73_M2}
GLUCOSE BLDC GLUCOMTR-MCNC: 110 MG/DL (ref 70–99)
GLUCOSE BLDC GLUCOMTR-MCNC: 125 MG/DL (ref 70–99)
GLUCOSE BLDC GLUCOMTR-MCNC: 129 MG/DL (ref 70–99)
GLUCOSE BLDC GLUCOMTR-MCNC: 129 MG/DL (ref 70–99)
GLUCOSE BLDC GLUCOMTR-MCNC: 131 MG/DL (ref 70–99)
GLUCOSE BLDC GLUCOMTR-MCNC: 147 MG/DL (ref 70–99)
GLUCOSE SERPL-MCNC: 134 MG/DL (ref 70–99)
HCT VFR BLD AUTO: 25.6 % (ref 40–53)
HGB BLD-MCNC: 8.2 G/DL (ref 13.3–17.7)
LABORATORY COMMENT REPORT: NORMAL
LDH SERPL L TO P-CCNC: 531 U/L (ref 85–227)
MCH RBC QN AUTO: 29.4 PG (ref 26.5–33)
MCHC RBC AUTO-ENTMCNC: 32 G/DL (ref 31.5–36.5)
MCV RBC AUTO: 92 FL (ref 78–100)
PLATELET # BLD AUTO: 179 10E9/L (ref 150–450)
POTASSIUM SERPL-SCNC: 4.2 MMOL/L (ref 3.4–5.3)
PROT SERPL-MCNC: 4.7 G/DL (ref 6.8–8.8)
RBC # BLD AUTO: 2.79 10E12/L (ref 4.4–5.9)
SARS-COV-2 RNA RESP QL NAA+PROBE: NEGATIVE
SODIUM SERPL-SCNC: 143 MMOL/L (ref 133–144)
SPECIMEN SOURCE: NORMAL
UFH PPP CHRO-ACNC: 0.68 IU/ML
UFH PPP CHRO-ACNC: 0.76 IU/ML
UFH PPP CHRO-ACNC: 0.77 IU/ML
WBC # BLD AUTO: 16.2 10E9/L (ref 4–11)

## 2021-03-15 PROCEDURE — 85027 COMPLETE CBC AUTOMATED: CPT | Performed by: STUDENT IN AN ORGANIZED HEALTH CARE EDUCATION/TRAINING PROGRAM

## 2021-03-15 PROCEDURE — 85520 HEPARIN ASSAY: CPT | Performed by: INTERNAL MEDICINE

## 2021-03-15 PROCEDURE — 80053 COMPREHEN METABOLIC PANEL: CPT | Performed by: STUDENT IN AN ORGANIZED HEALTH CARE EDUCATION/TRAINING PROGRAM

## 2021-03-15 PROCEDURE — U0003 INFECTIOUS AGENT DETECTION BY NUCLEIC ACID (DNA OR RNA); SEVERE ACUTE RESPIRATORY SYNDROME CORONAVIRUS 2 (SARS-COV-2) (CORONAVIRUS DISEASE [COVID-19]), AMPLIFIED PROBE TECHNIQUE, MAKING USE OF HIGH THROUGHPUT TECHNOLOGIES AS DESCRIBED BY CMS-2020-01-R: HCPCS | Performed by: STUDENT IN AN ORGANIZED HEALTH CARE EDUCATION/TRAINING PROGRAM

## 2021-03-15 PROCEDURE — 250N000011 HC RX IP 250 OP 636: Performed by: STUDENT IN AN ORGANIZED HEALTH CARE EDUCATION/TRAINING PROGRAM

## 2021-03-15 PROCEDURE — U0005 INFEC AGEN DETEC AMPLI PROBE: HCPCS | Performed by: STUDENT IN AN ORGANIZED HEALTH CARE EDUCATION/TRAINING PROGRAM

## 2021-03-15 PROCEDURE — 999N000127 HC STATISTIC PERIPHERAL IV START W US GUIDANCE

## 2021-03-15 PROCEDURE — 250N000013 HC RX MED GY IP 250 OP 250 PS 637: Performed by: INTERNAL MEDICINE

## 2021-03-15 PROCEDURE — 92526 ORAL FUNCTION THERAPY: CPT | Mod: GN

## 2021-03-15 PROCEDURE — 250N000013 HC RX MED GY IP 250 OP 250 PS 637: Performed by: SURGERY

## 2021-03-15 PROCEDURE — 83615 LACTATE (LD) (LDH) ENZYME: CPT | Performed by: STUDENT IN AN ORGANIZED HEALTH CARE EDUCATION/TRAINING PROGRAM

## 2021-03-15 PROCEDURE — 99231 SBSQ HOSP IP/OBS SF/LOW 25: CPT | Mod: GC | Performed by: ANESTHESIOLOGY

## 2021-03-15 PROCEDURE — 120N000002 HC R&B MED SURG/OB UMMC

## 2021-03-15 PROCEDURE — 250N000013 HC RX MED GY IP 250 OP 250 PS 637: Performed by: STUDENT IN AN ORGANIZED HEALTH CARE EDUCATION/TRAINING PROGRAM

## 2021-03-15 PROCEDURE — 85018 HEMOGLOBIN: CPT | Performed by: STUDENT IN AN ORGANIZED HEALTH CARE EDUCATION/TRAINING PROGRAM

## 2021-03-15 PROCEDURE — 250N000013 HC RX MED GY IP 250 OP 250 PS 637: Performed by: PHYSICIAN ASSISTANT

## 2021-03-15 PROCEDURE — 999N001017 HC STATISTIC GLUCOSE BY METER IP

## 2021-03-15 PROCEDURE — 272N000083 HC NUTRITION PRODUCT SEMIELEM INTERMED LITER

## 2021-03-15 PROCEDURE — 85520 HEPARIN ASSAY: CPT | Performed by: THORACIC SURGERY (CARDIOTHORACIC VASCULAR SURGERY)

## 2021-03-15 PROCEDURE — 99232 SBSQ HOSP IP/OBS MODERATE 35: CPT | Mod: 24 | Performed by: INTERNAL MEDICINE

## 2021-03-15 RX ORDER — QUETIAPINE FUMARATE 25 MG/1
25 TABLET, FILM COATED ORAL DAILY PRN
Status: DISCONTINUED | OUTPATIENT
Start: 2021-03-15 | End: 2021-04-05 | Stop reason: HOSPADM

## 2021-03-15 RX ADMIN — Medication 50 MG: at 11:52

## 2021-03-15 RX ADMIN — Medication 5 ML: at 07:32

## 2021-03-15 RX ADMIN — ACETAMINOPHEN 975 MG: 325 TABLET, FILM COATED ORAL at 13:59

## 2021-03-15 RX ADMIN — QUETIAPINE 25 MG: 25 TABLET ORAL at 11:52

## 2021-03-15 RX ADMIN — METOPROLOL TARTRATE 25 MG: 25 TABLET, FILM COATED ORAL at 07:31

## 2021-03-15 RX ADMIN — Medication 50 MG: at 17:58

## 2021-03-15 RX ADMIN — ACETAMINOPHEN 975 MG: 325 TABLET, FILM COATED ORAL at 22:01

## 2021-03-15 RX ADMIN — METOPROLOL TARTRATE 25 MG: 25 TABLET, FILM COATED ORAL at 20:00

## 2021-03-15 RX ADMIN — QUETIAPINE FUMARATE 25 MG: 25 TABLET ORAL at 19:59

## 2021-03-15 RX ADMIN — ATORVASTATIN CALCIUM 40 MG: 40 TABLET, FILM COATED ORAL at 19:59

## 2021-03-15 RX ADMIN — ASPIRIN 81 MG CHEWABLE TABLET 81 MG: 81 TABLET CHEWABLE at 07:42

## 2021-03-15 RX ADMIN — PANTOPRAZOLE SODIUM 40 MG: 40 TABLET, DELAYED RELEASE ORAL at 07:32

## 2021-03-15 RX ADMIN — CHLORHEXIDINE GLUCONATE 0.12% ORAL RINSE 15 ML: 1.2 LIQUID ORAL at 07:32

## 2021-03-15 RX ADMIN — ACETAMINOPHEN 975 MG: 325 TABLET, FILM COATED ORAL at 06:50

## 2021-03-15 RX ADMIN — MELATONIN TAB 3 MG 3 MG: 3 TAB at 20:00

## 2021-03-15 RX ADMIN — HEPARIN SODIUM 1450 UNITS/HR: 10000 INJECTION, SOLUTION INTRAVENOUS at 14:48

## 2021-03-15 ASSESSMENT — ACTIVITIES OF DAILY LIVING (ADL)
ADLS_ACUITY_SCORE: 21

## 2021-03-15 ASSESSMENT — MIFFLIN-ST. JEOR: SCORE: 1551.78

## 2021-03-15 NOTE — PROGRESS NOTES
"  Nephrology Progress Note  03/15/2021         Assessment & Recommendations:   Jin Garrett is a 80 year old with PMH HTN, AAA, HLD, RA who presented to OSH with SOB and atypical CP. TTE showed severe MR with ?posterior flail leaflet, EF preserved. On arrival at [facility] an Impella was emergently placed. S/p MVR and single vessel CABG on 3/5/21. Returned from the OR on central ECMO with an open chest, now s/p decannulation on 3/9. Nephrology consulted for ADAN.     ADAN in the setting of cardiogenic shock was on ECMO, s/p decannulation on 3/9  Unknown baseline, no h/o CKD per daughter. 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 Etiology likely ATN in the setting of shock, no obstruction per CT and urinalysis showed granular casts which is c/w ATN. UOP declined and K rising  on 3/6, so initiated on CRRT.   - discontinued CRRT on 3/12  - Trial of HD on 3/13 with minimal UF which was tolerated well  - will daily evaluate for renal recovery and HD needs  - No acute need for HD today based on labs or exam.  - Likely need HD AM given raise in creatinine and ongoing oliguria.  - Q day bladder scans  - Avoid nephrotoxins  - Renally dose medications  - Renal diet  - Daily weights  - Strict I/Os      Recommendations were communicated to primary team via this note     Seen and discussed with Dr. Kriss Chester MD   [phone]    Interval History :   Nursing and provider notes from last 24 hours reviewed.  In the last 24 hours Jin Garrett contained to be hemodynamically stable. Continued to be confused this morning.    Physical Exam:   I/O last 3 completed shifts:  In: 2086 [I.V.:346; NG/GT:270]  Out: 750 [Urine:100; Stool:500; Chest Tube:150]   /59 (BP Location: Left arm)   Pulse 86   Temp 98.8  F (37.1  C) (Axillary)   Resp 23   Ht 1.803 m (5' 11\")   Wt 82 kg (180 lb 11.2 oz)   SpO2 92%   BMI 25.20 kg/m       General : Pt " awake, not in acute distress   Lungs : anterior lung fields are clear  Cardiac : S1, S2 present  Abdomen : Soft/ND/NT  LE : no Edema noted  Dialysis Access : right subclavian non tunneled line    Labs:   All labs reviewed by me  Electrolytes/Renal -   Recent Labs   Lab Test 03/15/21  0426 03/14/21  0350 03/13/21  0334 03/12/21  0355 03/12/21  0355 03/11/21  2210 03/11/21  1655 03/11/21  1655    140 142   < > 139 142  --  142   POTASSIUM 4.2 3.8 3.7   < > 3.6 3.6   < > 5.8*   CHLORIDE 108 107 108   < > 107 110*  --  112*   CO2 22 25 26   < > 25 24  --  25   * 90* 95*   < > 49* 49*  --  49*   CR 4.14* 2.67* 2.58*   < > 1.57* 1.57*  --  1.57*   * 139* 117*   < > 123* 118*  --  132*   PALLAVI 7.4* 7.4* 7.6*   < > 7.7* 7.9*  --  8.1*   MAG  --   --   --   --  2.5* 2.3  --  2.3   PHOS  --   --   --   --  3.4 4.8*  --  1.9*    < > = values in this interval not displayed.       CBC -   Recent Labs   Lab Test 03/15/21  0426 03/14/21  0845 03/14/21 0350 03/13/21 0334 03/13/21 0334   WBC 16.2*  --  14.3*  --  16.3*   HGB 8.2* 8.2* 6.7*   < > 7.4*     --  134*  --  113*    < > = values in this interval not displayed.       LFTs -   Recent Labs   Lab Test 03/15/21  0426 03/14/21  0350 03/13/21  0334   ALKPHOS 194* 142 120   BILITOTAL 0.4 0.5 0.5   * 123* 59   * 159* 62*   PROTTOTAL 4.7* 4.8* 4.9*   ALBUMIN 1.6* 1.6* 1.8*     Current Medications:    acetaminophen  975 mg Oral or Feeding Tube Q8H KERRI     aspirin  81 mg Oral or NG Tube Daily     atorvastatin  40 mg Oral or Feeding Tube QPM     B and C vitamin Complex with folic acid  5 mL Oral Daily     chlorhexidine  15 mL Swish & Spit BID     insulin aspart  1-4 Units Subcutaneous Q6H KERRI     melatonin  3 mg Oral or Feeding Tube At Bedtime     metoprolol tartrate  25 mg Oral or Feeding Tube BID     pantoprazole  40 mg Oral or NG Tube Daily    Or     pantoprazole  40 mg Oral Daily     QUEtiapine  25 mg Oral or Feeding Tube At Bedtime        dextrose       heparin 1,450 Units/hr (03/15/21 1200)     - MEDICATION INSTRUCTIONS -       Andie Chester MD

## 2021-03-15 NOTE — PROGRESS NOTES
PICC tip looks like in innominate to high SVC, no official result yet. Talked with RN to ask MD to look at it if they're okay with the PICC position, if not,  have an order to rewire it tomorrow.

## 2021-03-15 NOTE — PROGRESS NOTES
Pt is alert and oriented to self and place, confused, restless, and anxious, constantly trying to get out of bed, bed alarm in place, SR with frequent PAC's, chest x1 to suction, external cath in place, voided 150 ml so far, bladder scan showed 110 ml. Excoriation to buttocks, thigh, and perineum area, swelling and purple bruising to L arm; MD aware. Rectal tube in place for loose stools, TF @ goal of 70 ml/hr via NJ.    Transferred to:   Belongings: sent with the patient  Alfaro removed? Yes  Central line removed? L HD line in place, PICC to R.  Chart and medications sent with patient Yes  Family notified: Yes

## 2021-03-15 NOTE — PROGRESS NOTES
CV ICU PROGRESS NOTE  March 15, 2021      CO-MORBIDITIES:   Cardiogenic shock (H)  (primary encounter diagnosis)  Status post cardiac surgery  Status post cardiac surgery    ASSESSMENT: Jin Garrett is a 80 year old male with PMH of HTN, AAA, HLD and rheumatoid arthritis. He presented to an OSH with shortness of breath and atypical chest pain. TTE showed severe MR with concern for posterior flail leaflet, EF was preserved. On arrival at [facility] an Impella was emergently placed in cath lab. 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 Porras on 3/5/21. Returned from the OR on central ECMO with an open chest, intubated and sedated. Returned to the OR for a wash out on 3/9, found to have a thrombus on his mitral valve so he had the valve replaced, decannulated central ECMO, bronchoscopy and placement of left subclavian dialysis line.        TODAY'S PROGRESS:   Transfer to floor when bed available  HD per nephrology   Discuss need for tunneled HD line with nephrology  Resume atorvastatin     PLAN:  Neuro/ pain/ sedation:  Post-operative pain  Delerium  - Monitor neurological status. Notify the MD for any acute changes in exam.  - APAP  - Seroquel 25mg at bedtime, melatonin 3 mg, continue to hold PTA amitriptyline         Pulmonary care:  Acute post-operative respiratory failure, resolved  - Wean O2 as tolerated  - IS     Cardiovascular:  HTN  AAA  HLD  A fib  Severe mitral regurgitation s/p MVR3/5  CAD with 100% occlusion of the mid LCx s/p single vessel CABG 3/5  Cardiogenic shock  Vasoplegic shock   VA ECMO (central)   S/p redo MVR due to thrombosis, removal of ECMO 3/9, and wound closure 3/9  - Monitor hemodynamic status.   - MAP >65  - ASA 81  - Metoprolol 25 BID  - Continue heparin     GI/Nutrition:   - TF through NJ at goal  - PRN Bowel regimen    - Continue atorvastatin     Fluids/ Electrolytes/Renal:  ADAN   Hyperkalemia  - TKO for IV fluid hydration.  - Will continue to monitor intake and  output.  - Continue to monitor electrolytes  - Nephrology consulted appreciate recommendations  - HD per nephrology  - Discuss need for long term HD access with nephrology     Endocrine:  Stress hyperglycemia    - Continue low dose SSI  - Keep blood glucose <180     ID/ Antibiotics:  Leukocytosis - likely secondary to physiologic stress  - Complete perioperative/open chest antibiotics: cefepime and vancomycin for 7 days or 24 hours after chest closure, whichever is shorter last day 3/11  - Sputum-culture 3/9 - NGTD  - Blood cultures - NGTD     Heme:  Acute blood loss anemia      - Hgb goal >8  - Daily CBC  - Continue heparin gtt for prior mitral valve thrombosis     Prophylaxis:    - Mechanical prophylaxis for DVT.   - PRN Bowel regimen  - Heparin gtt  - PPI     Lines/ tubes/ drains:  - HD line  - Pleural tube  - PICC    Patient seen, findings and plan discussed with CV ICU staff, Dr. Omer.    Beau Shrestha DNP APRN CNP CCRN      ====================================    SUBJECTIVE:   Remains delirious. PICC placed yesterday. Pain well controlled.    OBJECTIVE:   1. VITAL SIGNS:   Temp:  [97.5  F (36.4  C)-100.1  F (37.8  C)] 98.2  F (36.8  C)  Pulse:  [] 96  Resp:  [16-29] 28  BP: ()/(38-65) 113/60  SpO2:  [92 %-97 %] 96 %  Resp: 28      2. INTAKE/ OUTPUT:   I/O last 3 completed shifts:  In: 2047.83 [I.V.:377.83; NG/GT:270]  Out: 770 [Urine:100; Stool:600; Chest Tube:70]    3. PHYSICAL EXAMINATION:   General: NAD  Neuro: Alert  Resp: Breathing non-labored  CV: RRR  Abdomen: Soft, Non-distended, Non-tender  Incisions: C/D/I  Extremities: warm and well perfused    4. INVESTIGATIONS:   Arterial Blood Gases   Recent Labs   Lab 03/12/21  0815 03/10/21  0813 03/10/21  0356 03/09/21  2347   PH 7.45 7.44 7.44 7.43   PCO2 38 31* 34* 37   PO2 100 108* 118* 103   HCO3 26 22 23 24     Complete Blood Count   Recent Labs   Lab 03/15/21  0426 03/14/21  0845 03/14/21  0350 03/13/21  1300 03/13/21  0334 03/12/21  1504  03/12/21  1504   WBC 16.2*  --  14.3*  --  16.3*  --  15.3*   HGB 8.2* 8.2* 6.7* 7.8* 7.4*   < > 6.8*     --  134*  --  113*  --  105*    < > = values in this interval not displayed.     Basic Metabolic Panel  Recent Labs   Lab 03/15/21  0426 03/14/21  0350 03/13/21  0334 03/12/21  1504    140 142 140   POTASSIUM 4.2 3.8 3.7 3.7   CHLORIDE 108 107 108 109   CO2 22 25 26 24   * 90* 95* 64*   CR 4.14* 2.67* 2.58* 1.91*   * 139* 117* 117*     Liver Function Tests  Recent Labs   Lab 03/15/21  0426 03/14/21  0350 03/13/21  0334 03/12/21  0355 03/10/21  0356 03/10/21  0356 03/09/21  2347 03/09/21  2022 03/09/21  1831   * 159* 62* 72*   < > 113*  --  110*  --    * 123* 59 60   < > 58  --  50  --    ALKPHOS 194* 142 120 116   < > 77  --  78  --    BILITOTAL 0.4 0.5 0.5 0.6   < > 0.8  --  0.8  --    ALBUMIN 1.6* 1.6* 1.8* 1.9*   < > 2.0*  --  1.7*  --    INR  --   --   --   --   --  1.28* 1.31* 1.46* 1.82*    < > = values in this interval not displayed.     Pancreatic Enzymes  No lab results found in last 7 days.  Coagulation Profile  Recent Labs   Lab 03/13/21 0334 03/12/21  1504 03/12/21  0355 03/11/21  0339 03/10/21  0356 03/10/21  0356 03/09/21  2347 03/09/21  2022 03/09/21  1831   INR  --   --   --   --   --  1.28* 1.31* 1.46* 1.82*   PTT 76* 51* 45* 50*   < > 33 46* 44* 36    < > = values in this interval not displayed.         5. RADIOLOGY:   Recent Results (from the past 24 hour(s))   US Upper Extremity Venous Duplex Bilat    Narrative    EXAMINATION: DOPPLER VENOUS ULTRASOUND OF BILATERAL UPPER EXTREMITIES,  3/14/2021 11:24 AM     COMPARISON: None.    HISTORY: Evaluation for DVT    TECHNIQUE:  Gray-scale evaluation with compression, spectral flow, and  color Doppler assessment of the deep venous system of both upper  extremities.    FINDINGS:  Right internal jugular, innominate and proximal subclavian veins and  left subclavian and axillary veins were obscured by catheters  and  dressings. Normal blood flow and waveforms are demonstrated in the  left internal jugular and innominate veins and right subclavian and  axillary veins. The right cephalic vein in the upper arm is partially  compressible. There is normal compressibility of the brachial and  basilic veins bilaterally and left cephalic vein.      Impression    IMPRESSION:  1.  No evidence of deep venous thrombosis in either upper extremity.  Nonocclusive thrombus in the right cephalic vein.    I have personally reviewed the examination and initial interpretation  and I agree with the findings.    ADRIANA BERGERON MD   XR Chest Port 1 View    Narrative    EXAM: XR Chest 1 view 3/14/2021 5:22 PM      HISTORY: assess PICC line placement.    COMPARISON: Previous day.     TECHNIQUE: Frontal view of the chest.    FINDINGS: Interval placement of right-sided PICC line with tip  terminating in the right innominate vein. Stable left-sided chest tube  and left-sided CVC with tip terminating in the low SVC. Enteric tube  is subdiaphragmatic with tip, negative study. Postsurgical changes of  CABG and mitral valve replacement with median sternotomy wires intact.  Trachea is midline. Cardiomediastinal silhouette is stable. Extensive  aortic arch atherosclerosis. Stable bilateral pulmonary vascular  congestion and interstitial opacities. No pneumothoraces. No pleural  effusions. Visualized upper abdomen is within normal limits. No acute  osseous abnormality.      Impression    IMPRESSION:   1. Interval placement of right-sided PICC line with tip terminating in  the right innominate vein.  2. Stable pulmonary vascular congestion and bilateral interstitial  opacities.  3. Stable support devices.    I have personally reviewed the examination and initial interpretation  and I agree with the findings.    DINESH BRITT MD       =========================================

## 2021-03-15 NOTE — PROVIDER NOTIFICATION
I spoke with Mr Garrett's daughter Pamela and she was able to clarify that Mr. Garrett has no activity restrictions at this time from his prior hip surgery. He can weight bear as tolerated.     Britney HURTADO  Anesthesia Resident  [id number]

## 2021-03-15 NOTE — PLAN OF CARE
Admitted/transferred from: 4E  Reason for admission/transfer: lateral transfer  Patient status upon admission/transfer: AVSS, confused, restless  Interventions:  PRN tramadol and seroquel given at the request of the pt's daughter due to history of chronic back pain.  Plan:   2 RN skin assessment: completed by Bridgette Danielson and Jake Betts  Result of skin assessment and interventions/actions: chest incision approximated.  CT and old chest tube sites covered.  CABG graft site on LE.  LUE edematous, ecchymotic, with skin tear - possible old infiltration site?  Periarea with moisture excoriation.  Height, weight, drug calc weight: n/a  Patient belongings (see Flowsheet - Adult Profile for details): transferred with pt  MDRO education (if applicable): n/a

## 2021-03-15 NOTE — PLAN OF CARE
OT 4E: Cancel - pt attempted multiple times throughout the day however unavailable for OT session. Will reschedule.

## 2021-03-15 NOTE — PROGRESS NOTES
CLINICAL NUTRITION SERVICES - BRIEF NOTE   (see RD note on 3/12 for full assessment)    NPO per SLP. Receiving Impact Peptide @ 70 ml/hr (goal). Previously constipated, now passing loose stool. Will change to nutritionally comparable formula which contains fiber, given loose stools.    Nutrition changes today:  - Start Pivot 1.5 Richard @ 70ml/hr (1680ml/day) to provide 2520 kcals (28 kcal/kg), 157 g PRO (1.7 g/kg), 1260 ml free H20, 289 g CHO, and 12 g fiber daily. This meets 100% minimum protein-energy needs.    Ashley Ortiz, RD, LD  [id number]  Pgr: [phone]

## 2021-03-16 ENCOUNTER — APPOINTMENT (OUTPATIENT)
Dept: SPEECH THERAPY | Facility: CLINIC | Age: 81
DRG: 003 | End: 2021-03-16
Attending: INTERNAL MEDICINE
Payer: MEDICARE

## 2021-03-16 LAB
ALBUMIN SERPL-MCNC: 1.5 G/DL (ref 3.4–5)
ALP SERPL-CCNC: 195 U/L (ref 40–150)
ALT SERPL W P-5'-P-CCNC: 255 U/L (ref 0–70)
ANION GAP SERPL CALCULATED.3IONS-SCNC: 11 MMOL/L (ref 3–14)
ANION GAP SERPL CALCULATED.3IONS-SCNC: 14 MMOL/L (ref 3–14)
AST SERPL W P-5'-P-CCNC: 189 U/L (ref 0–45)
BILIRUB SERPL-MCNC: 0.4 MG/DL (ref 0.2–1.3)
BUN SERPL-MCNC: 174 MG/DL (ref 7–30)
BUN SERPL-MCNC: 179 MG/DL (ref 7–30)
CALCIUM SERPL-MCNC: 7.1 MG/DL (ref 8.5–10.1)
CALCIUM SERPL-MCNC: 7.2 MG/DL (ref 8.5–10.1)
CHLORIDE SERPL-SCNC: 108 MMOL/L (ref 94–109)
CHLORIDE SERPL-SCNC: 109 MMOL/L (ref 94–109)
CO2 SERPL-SCNC: 21 MMOL/L (ref 20–32)
CO2 SERPL-SCNC: 23 MMOL/L (ref 20–32)
CREAT SERPL-MCNC: 4.9 MG/DL (ref 0.66–1.25)
CREAT SERPL-MCNC: 4.97 MG/DL (ref 0.66–1.25)
ERYTHROCYTE [DISTWIDTH] IN BLOOD BY AUTOMATED COUNT: 18.8 % (ref 10–15)
GFR SERPL CREATININE-BSD FRML MDRD: 10 ML/MIN/{1.73_M2}
GFR SERPL CREATININE-BSD FRML MDRD: 10 ML/MIN/{1.73_M2}
GLUCOSE BLDC GLUCOMTR-MCNC: 107 MG/DL (ref 70–99)
GLUCOSE BLDC GLUCOMTR-MCNC: 112 MG/DL (ref 70–99)
GLUCOSE BLDC GLUCOMTR-MCNC: 159 MG/DL (ref 70–99)
GLUCOSE SERPL-MCNC: 136 MG/DL (ref 70–99)
GLUCOSE SERPL-MCNC: 138 MG/DL (ref 70–99)
HCT VFR BLD AUTO: 22.8 % (ref 40–53)
HGB BLD-MCNC: 7.1 G/DL (ref 13.3–17.7)
HGB BLD-MCNC: 7.5 G/DL (ref 13.3–17.7)
HGB BLD-MCNC: 7.8 G/DL (ref 13.3–17.7)
INTERPRETATION ECG - MUSE: NORMAL
LDH SERPL L TO P-CCNC: 422 U/L (ref 85–227)
MAGNESIUM SERPL-MCNC: 2.4 MG/DL (ref 1.6–2.3)
MCH RBC QN AUTO: 28 PG (ref 26.5–33)
MCHC RBC AUTO-ENTMCNC: 31.1 G/DL (ref 31.5–36.5)
MCV RBC AUTO: 90 FL (ref 78–100)
PLATELET # BLD AUTO: 237 10E9/L (ref 150–450)
POTASSIUM SERPL-SCNC: 4.6 MMOL/L (ref 3.4–5.3)
POTASSIUM SERPL-SCNC: 4.6 MMOL/L (ref 3.4–5.3)
PROT SERPL-MCNC: 4.5 G/DL (ref 6.8–8.8)
RBC # BLD AUTO: 2.54 10E12/L (ref 4.4–5.9)
SODIUM SERPL-SCNC: 143 MMOL/L (ref 133–144)
SODIUM SERPL-SCNC: 143 MMOL/L (ref 133–144)
TSH SERPL DL<=0.005 MIU/L-ACNC: 1.56 MU/L (ref 0.4–4)
UFH PPP CHRO-ACNC: 0.52 IU/ML
UFH PPP CHRO-ACNC: 0.69 IU/ML
UFH PPP CHRO-ACNC: 0.73 IU/ML
WBC # BLD AUTO: 18.2 10E9/L (ref 4–11)

## 2021-03-16 PROCEDURE — 250N000013 HC RX MED GY IP 250 OP 250 PS 637: Performed by: NURSE PRACTITIONER

## 2021-03-16 PROCEDURE — 120N000002 HC R&B MED SURG/OB UMMC

## 2021-03-16 PROCEDURE — 250N000011 HC RX IP 250 OP 636: Performed by: STUDENT IN AN ORGANIZED HEALTH CARE EDUCATION/TRAINING PROGRAM

## 2021-03-16 PROCEDURE — 272N000083 HC NUTRITION PRODUCT SEMIELEM INTERMED LITER

## 2021-03-16 PROCEDURE — P9041 ALBUMIN (HUMAN),5%, 50ML: HCPCS | Performed by: STUDENT IN AN ORGANIZED HEALTH CARE EDUCATION/TRAINING PROGRAM

## 2021-03-16 PROCEDURE — 250N000011 HC RX IP 250 OP 636: Performed by: INTERNAL MEDICINE

## 2021-03-16 PROCEDURE — 99232 SBSQ HOSP IP/OBS MODERATE 35: CPT | Mod: 24 | Performed by: INTERNAL MEDICINE

## 2021-03-16 PROCEDURE — 83735 ASSAY OF MAGNESIUM: CPT | Performed by: STUDENT IN AN ORGANIZED HEALTH CARE EDUCATION/TRAINING PROGRAM

## 2021-03-16 PROCEDURE — 85520 HEPARIN ASSAY: CPT | Performed by: STUDENT IN AN ORGANIZED HEALTH CARE EDUCATION/TRAINING PROGRAM

## 2021-03-16 PROCEDURE — 93005 ELECTROCARDIOGRAM TRACING: CPT

## 2021-03-16 PROCEDURE — 85027 COMPLETE CBC AUTOMATED: CPT | Performed by: STUDENT IN AN ORGANIZED HEALTH CARE EDUCATION/TRAINING PROGRAM

## 2021-03-16 PROCEDURE — 83615 LACTATE (LD) (LDH) ENZYME: CPT | Performed by: STUDENT IN AN ORGANIZED HEALTH CARE EDUCATION/TRAINING PROGRAM

## 2021-03-16 PROCEDURE — 250N000011 HC RX IP 250 OP 636: Performed by: NURSE ANESTHETIST, CERTIFIED REGISTERED

## 2021-03-16 PROCEDURE — 84443 ASSAY THYROID STIM HORMONE: CPT | Performed by: STUDENT IN AN ORGANIZED HEALTH CARE EDUCATION/TRAINING PROGRAM

## 2021-03-16 PROCEDURE — 250N000013 HC RX MED GY IP 250 OP 250 PS 637: Performed by: PHYSICIAN ASSISTANT

## 2021-03-16 PROCEDURE — 85520 HEPARIN ASSAY: CPT | Performed by: THORACIC SURGERY (CARDIOTHORACIC VASCULAR SURGERY)

## 2021-03-16 PROCEDURE — 258N000003 HC RX IP 258 OP 636: Performed by: STUDENT IN AN ORGANIZED HEALTH CARE EDUCATION/TRAINING PROGRAM

## 2021-03-16 PROCEDURE — 80053 COMPREHEN METABOLIC PANEL: CPT | Performed by: STUDENT IN AN ORGANIZED HEALTH CARE EDUCATION/TRAINING PROGRAM

## 2021-03-16 PROCEDURE — 93010 ELECTROCARDIOGRAM REPORT: CPT | Performed by: INTERNAL MEDICINE

## 2021-03-16 PROCEDURE — 92526 ORAL FUNCTION THERAPY: CPT | Mod: GN

## 2021-03-16 PROCEDURE — 999N001017 HC STATISTIC GLUCOSE BY METER IP

## 2021-03-16 PROCEDURE — 250N000013 HC RX MED GY IP 250 OP 250 PS 637: Performed by: STUDENT IN AN ORGANIZED HEALTH CARE EDUCATION/TRAINING PROGRAM

## 2021-03-16 PROCEDURE — 250N000013 HC RX MED GY IP 250 OP 250 PS 637: Performed by: INTERNAL MEDICINE

## 2021-03-16 PROCEDURE — 258N000003 HC RX IP 258 OP 636: Performed by: INTERNAL MEDICINE

## 2021-03-16 PROCEDURE — P9047 ALBUMIN (HUMAN), 25%, 50ML: HCPCS | Performed by: INTERNAL MEDICINE

## 2021-03-16 PROCEDURE — 90937 HEMODIALYSIS REPEATED EVAL: CPT

## 2021-03-16 PROCEDURE — 80048 BASIC METABOLIC PNL TOTAL CA: CPT | Performed by: STUDENT IN AN ORGANIZED HEALTH CARE EDUCATION/TRAINING PROGRAM

## 2021-03-16 PROCEDURE — 250N000013 HC RX MED GY IP 250 OP 250 PS 637: Performed by: SURGERY

## 2021-03-16 PROCEDURE — 85018 HEMOGLOBIN: CPT | Performed by: NURSE PRACTITIONER

## 2021-03-16 PROCEDURE — 250N000013 HC RX MED GY IP 250 OP 250 PS 637: Performed by: THORACIC SURGERY (CARDIOTHORACIC VASCULAR SURGERY)

## 2021-03-16 RX ORDER — ACETAMINOPHEN 325 MG/10.15ML
975 LIQUID ORAL EVERY 8 HOURS SCHEDULED
Status: DISCONTINUED | OUTPATIENT
Start: 2021-03-17 | End: 2021-03-17

## 2021-03-16 RX ORDER — ALBUMIN (HUMAN) 12.5 G/50ML
25 SOLUTION INTRAVENOUS ONCE
Status: COMPLETED | OUTPATIENT
Start: 2021-03-16 | End: 2021-03-16

## 2021-03-16 RX ORDER — QUETIAPINE FUMARATE 25 MG/1
25 TABLET, FILM COATED ORAL ONCE
Status: COMPLETED | OUTPATIENT
Start: 2021-03-16 | End: 2021-03-16

## 2021-03-16 RX ORDER — QUETIAPINE FUMARATE 50 MG/1
50 TABLET, FILM COATED ORAL AT BEDTIME
Status: DISCONTINUED | OUTPATIENT
Start: 2021-03-16 | End: 2021-03-17

## 2021-03-16 RX ORDER — ONDANSETRON 2 MG/ML
4 INJECTION INTRAMUSCULAR; INTRAVENOUS ONCE
Status: COMPLETED | OUTPATIENT
Start: 2021-03-16 | End: 2021-03-16

## 2021-03-16 RX ORDER — ONDANSETRON 4 MG/1
4 TABLET, FILM COATED ORAL EVERY 6 HOURS PRN
Status: DISCONTINUED | OUTPATIENT
Start: 2021-03-16 | End: 2021-03-19

## 2021-03-16 RX ORDER — AMIODARONE HYDROCHLORIDE 200 MG/1
400 TABLET ORAL DAILY
Status: DISCONTINUED | OUTPATIENT
Start: 2021-03-16 | End: 2021-03-17

## 2021-03-16 RX ADMIN — AMIODARONE HYDROCHLORIDE 400 MG: 200 TABLET ORAL at 20:09

## 2021-03-16 RX ADMIN — Medication 50 MG: at 14:36

## 2021-03-16 RX ADMIN — ACETAMINOPHEN ORAL SOLUTION 975 MG: 325 SOLUTION ORAL at 23:35

## 2021-03-16 RX ADMIN — QUETIAPINE 25 MG: 25 TABLET ORAL at 03:04

## 2021-03-16 RX ADMIN — ALBUMIN HUMAN 12.5 G: 0.05 INJECTION, SOLUTION INTRAVENOUS at 11:46

## 2021-03-16 RX ADMIN — MELATONIN TAB 3 MG 3 MG: 3 TAB at 20:10

## 2021-03-16 RX ADMIN — Medication 50 MG: at 01:28

## 2021-03-16 RX ADMIN — ASPIRIN 81 MG CHEWABLE TABLET 81 MG: 81 TABLET CHEWABLE at 08:18

## 2021-03-16 RX ADMIN — HEPARIN SODIUM 1250 UNITS/HR: 10000 INJECTION, SOLUTION INTRAVENOUS at 10:16

## 2021-03-16 RX ADMIN — QUETIAPINE FUMARATE 50 MG: 50 TABLET ORAL at 20:10

## 2021-03-16 RX ADMIN — SODIUM CHLORIDE 300 ML: 9 INJECTION, SOLUTION INTRAVENOUS at 10:45

## 2021-03-16 RX ADMIN — Medication: at 10:46

## 2021-03-16 RX ADMIN — Medication 50 MG: at 20:10

## 2021-03-16 RX ADMIN — PANTOPRAZOLE SODIUM 40 MG: 40 TABLET, DELAYED RELEASE ORAL at 08:18

## 2021-03-16 RX ADMIN — QUETIAPINE FUMARATE 25 MG: 25 TABLET ORAL at 04:30

## 2021-03-16 RX ADMIN — METOPROLOL TARTRATE 25 MG: 25 TABLET, FILM COATED ORAL at 20:10

## 2021-03-16 RX ADMIN — Medication 50 MG: at 08:22

## 2021-03-16 RX ADMIN — ALBUMIN HUMAN 12.5 G: 0.25 SOLUTION INTRAVENOUS at 12:36

## 2021-03-16 RX ADMIN — AMIODARONE HYDROCHLORIDE 1 MG/MIN: 50 INJECTION, SOLUTION INTRAVENOUS at 04:57

## 2021-03-16 RX ADMIN — ACETAMINOPHEN 975 MG: 325 TABLET, FILM COATED ORAL at 05:44

## 2021-03-16 RX ADMIN — AMIODARONE HYDROCHLORIDE 150 MG: 1.5 INJECTION, SOLUTION INTRAVENOUS at 04:54

## 2021-03-16 RX ADMIN — SODIUM CHLORIDE 250 ML: 9 INJECTION, SOLUTION INTRAVENOUS at 10:45

## 2021-03-16 RX ADMIN — ACETAMINOPHEN 975 MG: 325 TABLET, FILM COATED ORAL at 13:04

## 2021-03-16 RX ADMIN — ONDANSETRON 4 MG: 2 INJECTION INTRAMUSCULAR; INTRAVENOUS at 15:18

## 2021-03-16 ASSESSMENT — ACTIVITIES OF DAILY LIVING (ADL)
ADLS_ACUITY_SCORE: 21
ADLS_ACUITY_SCORE: 23
ADLS_ACUITY_SCORE: 21
ADLS_ACUITY_SCORE: 23
ADLS_ACUITY_SCORE: 21
ADLS_ACUITY_SCORE: 23

## 2021-03-16 ASSESSMENT — MIFFLIN-ST. JEOR: SCORE: 1583.99

## 2021-03-16 NOTE — PROGRESS NOTES
CV ICU PROGRESS NOTE  March 16, 2021      CO-MORBIDITIES:   Cardiogenic shock (H)  (primary encounter diagnosis)  Status post cardiac surgery  Status post cardiac surgery    ASSESSMENT: Jin Garrett is a 80 year old male with PMH of HTN, AAA, HLD and rheumatoid arthritis. He presented to an OSH with shortness of breath and atypical chest pain. TTE showed severe MR with concern for posterior flail leaflet, EF was preserved. On arrival at [facility] an Impella was emergently placed in cath lab. 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 Porras on 3/5/21. Returned from the OR on central ECMO with an open chest, intubated and sedated. Returned to the OR for a wash out on 3/9, found to have a thrombus on his mitral valve so he had the valve replaced, decannulated central ECMO, bronchoscopy and placement of left subclavian dialysis line.        TODAY'S PROGRESS:   Transfer to floor when bed available  Increase PM Seroquel to 50 mg   Continue IV amiodarone load and transition to oral amiodarone this pm    Continue heparin drip, start coumadin with INR goal 2-3  Recheck Hgb now and if still low plan to transfuse 1 unit of RBC's while on dialysis   Monitor LFTs daily and check TSH   Hold Lipitor while LFT's elevated     PLAN:  Neuro/ pain/ sedation:  Post-operative pain  Delerium  - Monitor neurological status. Notify the MD for any acute changes in exam.  - APAP  - Not sleeping, confused.   - Increase Seroquel 50mg at bedtime, melatonin 3 mg, continue to hold PTA amitriptyline         Pulmonary care:  Acute post-operative respiratory failure, resolved  - Extubated 3/12 to 4L/nc   - Wean O2 as tolerated, on room air   - IS  - CXR in am     Left pleural CT in place with 280 ml out in 24 hours, serosang.     Cardiovascular:  HTN  AAA  HLD  Hx of A fib ~ 10 years ago and on admission with MI   Now with post-op atrial fib with RVR 3/16  Severe mitral regurgitation s/p MVR 3/5  CAD with 100% occlusion of the mid LCx  "s/p single vessel CABG 3/5  Cardiogenic shock  Vasoplegic shock   VA ECMO (central)   S/p redo MVR due to thrombosis, removal of ECMO 3/9, and wound closure 3/9  - Monitor hemodynamic status.   - MAP >65  - ASA 81  - Metoprolol 25 BID  - Continue heparin, start coumadin with INR goal 2-3  - Started on IV amiodarone load overnight, complete IV load, transition to po this evening  - Check TSH and monitor LFT\"s closely      GI/Nutrition:   - TF through NJ at goal  - Holding Bowel regimen with loose stools and rectal tube   - Speech consulted, NPO with ice chips per recs 3/13    Fluids/ Electrolytes/Renal:  ADAN c/w ATN post-cardiogenic shock   Hyperkalemia  - TKO for IV fluid hydration.  - Will continue to monitor intake and output.  - Continue to monitor electrolytes  - Nephrology consulted appreciate recommendations  - Started CRRT 3/6, stopped 3/12  - Trial HD 3/13, nephrology following   - Creat rising, 4.97 today, total urine out in 24 hours 300 ml, 300 ml today   - Will receive dialysis today   - Discussed with nephrology today, hold off on tunneled dialysis line for now, as he is starting to make urine and hoping kidney function will improve. Could consider lasix trial on non-dialysis day.      Endocrine:  Stress hyperglycemia    - Continue low dose SSI  - Keep blood glucose <180  - Had 1 unit of insulin in two days, -159  - stop sliding scale insulin      ID/ Antibiotics:  Leukocytosis - likely secondary to physiologic stress  - Complete perioperative/open chest antibiotics: cefepime and vancomycin for 7 days or 24 hours after chest closure, whichever is shorter last day 3/11  - Sputum-culture 3/9 - NGTD  - Blood cultures - NGTD  - WBC rising, 18.2 today, afebrile. Monitor for now.     Heme:  Acute blood loss anemia      LUE US 3/14 with nonocclusive thrombus in right cephalic vein, no UE DVT   - Hgb goal >8, Hgb 7.1 today, recheck is 7.8.   - Daily CBC  - Continue heparin gtt for prior mitral valve " thrombosis  - Start warfarin this evening     Musculoskeletal:   Recent hip surgery  - No weight bearing or activity restrictions related to recent hip surgery per family (records not available)   - PT/OT      Prophylaxis:    - Mechanical prophylaxis for DVT.   - PRN Bowel regimen  - Heparin gtt  - PPI     Lines/ tubes/ drains:  - HD line  - Pleural tube  - PICC     Disposition:  -Floor when bed available     Patient seen, findings and plan discussed with CV ICU staff, Dr. Omer.    Mel Castillo, DNP, CNP  P Cardiovascular Thoracic Surgery   O: [phone]  Pgr [phone]      ====================================    SUBJECTIVE:   Afib overnight, started on amiodarone.  Poor sleep overnight.  Delerious.     OBJECTIVE:   1. VITAL SIGNS:   Temp:  [98  F (36.7  C)-98.8  F (37.1  C)] 98  F (36.7  C)  Pulse:  [] 100  Resp:  [9-34] 21  BP: ()/(53-63) 87/53  SpO2:  [87 %-97 %] 93 %  Resp: 21      2. INTAKE/ OUTPUT:   I/O last 3 completed shifts:  In: 2425.18 [I.V.:310.18; NG/GT:365]  Out: 930 [Urine:300; Stool:350; Chest Tube:280]    3. PHYSICAL EXAMINATION:   General: NAD, daughter is at the bedside.   Neuro: Alert, confused  Resp: CTA bety, breathing non-labored at rest   CV: irregularly irregular, no murmur or rub   Abdomen: Soft, Non-distended, Non-tender  Incisions: C/D/I  Chest tube dressing. Left pleural with 280 ml out in 24 hours, serosang.   Extremities: warm and well perfused    4. INVESTIGATIONS:   Arterial Blood Gases   Recent Labs   Lab 03/12/21  0815 03/10/21  0813 03/10/21  0356 03/09/21  2347   PH 7.45 7.44 7.44 7.43   PCO2 38 31* 34* 37   PO2 100 108* 118* 103   HCO3 26 22 23 24     Complete Blood Count   Recent Labs   Lab 03/16/21  0435 03/15/21  2325 03/15/21  0426 03/14/21  0845 03/14/21  0350 03/13/21  0334 03/13/21  0334   WBC 18.2*  --  16.2*  --  14.3*  --  16.3*   HGB 7.1* 7.5* 8.2* 8.2* 6.7*   < > 7.4*     --  179  --  134*  --  113*    < > = values in this interval not  displayed.     Basic Metabolic Panel  Recent Labs   Lab 03/16/21  0435 03/16/21  0005 03/15/21  0426 03/14/21  0350    143 143 140   POTASSIUM 4.6 4.6 4.2 3.8   CHLORIDE 108 109 108 107   CO2 21 23 22 25   * 174* 137* 90*   CR 4.97* 4.90* 4.14* 2.67*   * 136* 134* 139*     Liver Function Tests  Recent Labs   Lab 03/16/21  0435 03/15/21  0426 03/14/21  0350 03/13/21  0334 03/10/21  0356 03/10/21  0356 03/09/21  2347 03/09/21 2022 03/09/21 1831 03/09/21  1831   * 330* 159* 62*   < > 113*  --  110*   < >  --    * 308* 123* 59   < > 58  --  50   < >  --    ALKPHOS 195* 194* 142 120   < > 77  --  78   < >  --    BILITOTAL 0.4 0.4 0.5 0.5   < > 0.8  --  0.8   < >  --    ALBUMIN 1.5* 1.6* 1.6* 1.8*   < > 2.0*  --  1.7*   < >  --    INR  --   --   --   --   --  1.28* 1.31* 1.46*  --  1.82*    < > = values in this interval not displayed.     Pancreatic Enzymes  No lab results found in last 7 days.  Coagulation Profile  Recent Labs   Lab 03/13/21  0334 03/12/21  1504 03/12/21  0355 03/11/21  0339 03/10/21  0356 03/10/21  0356 03/09/21  2347 03/09/21 2022 03/09/21  1831   INR  --   --   --   --   --  1.28* 1.31* 1.46* 1.82*   PTT 76* 51* 45* 50*   < > 33 46* 44* 36    < > = values in this interval not displayed.         5. RADIOLOGY:   No results found for this or any previous visit (from the past 24 hour(s)).    =========================================

## 2021-03-16 NOTE — PLAN OF CARE
OT/4C: Cancel. Patient receiving HD upon x2 check-ins. Upon final check-back at end of day, patient sleeping. RN reports hard day with nausea and likely best to allow to rest. Will reschedule OT session for tomorrow.

## 2021-03-16 NOTE — PLAN OF CARE
4C PT cx: per OT patient getting HD and then too fatigued for therapy following. Will reschedule.

## 2021-03-16 NOTE — PLAN OF CARE
ICU End of Shift Summary. See flowsheets for vital signs and detailed assessment.    Changes this shift: Pt alert, disoriented to place/time/situation. Garbling and minimal words throughout the day. Inconsistently following commands. Remains in Afib rates in the 80s. Rates up to 130s intermittently this afternoon with HD run. Required 2L oximask when dozing off this afternoon, but now back on room air. Intermittently soft BPs, AM metoprolol held per MD verbal order. Loose stool out of rectal tube, incontinent of urine as well. Pt burping and gagging, received PRN zofran. TF remain at goal.     Plan: Continue current POC.

## 2021-03-16 NOTE — PROGRESS NOTES
"Care Management Follow Up    Length of Stay (days): 12    Expected Discharge Date: 03/19/21     Concerns to be Addressed: all concerns addressed in this encounter     Patient plan of care discussed at interdisciplinary rounds: Yes    Anticipated Discharge Disposition: Other (Comments)(Pending patient needs at discharge)     Anticipated Discharge Services:  TBD  Anticipated Discharge DME:  TBD    Patient/family educated on Medicare website which has current facility and service quality ratings: no  Education Provided on the Discharge Plan:  no  Patient/Family in Agreement with the Plan: unable to assess    Referrals Placed by CM/SW:  N/A  Private pay costs discussed: Not applicable    Additional Information:  Patient consulted to meet with pt's daughter Pamela per Pamela's request. Met with pt and Pamela in the room, introduced self and reason for visit.     Pamela asked if it was possible to change the designated visitor. CHERRY explained that this goes through nursing and the nurse manager will have to discuss with ANS, but when exceptions have been granted recently it has been for a permanent (through the duration of hospitalization) change. Pamela voiced understanding. She said she isn't sure about it yet but wanted to know if it was an option.    Pamela also reported \"my sister thinks I should sign a DNR for him. How do I do that?\" CHERRY explained that this is a physician order. SW provided education on code status and how families can feel when making such a change, although to all appearances a change to DNR would be appropriate for pt. Pamela voiced understanding and said she would follow up with her brother to make sure he is also in agreement with this. SW encouraged her to reach out to the treatment team with questions. CHERRY will continue to remain available for patient and family support, discharge planning, other resources and support PRN.    Jacque Lopez, LEONEL, [facility]  ICU    [facility] "   P: [phone]  Pager: [phone]

## 2021-03-16 NOTE — PROGRESS NOTES
HEMODIALYSIS TREATMENT NOTE    Date: 3/16/2021  Time: 11:55 AM    Data:  Pre Wt:  85.2    Desired Wt: 84.2 kg   Post Wt:  84.2  Weight change:0  kg  Ultrafiltration - Post Run Net Total Removed (mL):0  mL  Vascular Access Status: CVC  patent  Dialyzer Rinse:  Streaked light  Total Blood Volume Processed: 51.1    Total Dialysis (Treatment) Time: 3  Dialysate Bath: K 2, Ca 3  Heparin: None    Lab:   Yes, INR and HGB sebt    Interventions:  Albumin given for soft BP  Fluid goal titrated to support BP    Assessment:  Pt started with soft BP's, fluid goal titrated down until UF off. Pt in and out of a-fib rhythm through out tx with HR up into 120's-130's. Pt does not report feeling symptomatic. Pt mostly sleeping through out tx. Renal updated map goal 60, albumin okay writer will try to make pt net even.     Plan:    Per renal

## 2021-03-16 NOTE — PROGRESS NOTES
"  Nephrology Progress Note  03/16/2021         Assessment & Recommendations:   Jin Garrett is a 80 year old with PMH HTN, AAA, HLD, RA who presented to OSH with SOB and atypical CP. TTE showed severe MR with ?posterior flail leaflet, EF preserved. On arrival at [facility] an Impella was emergently placed. S/p MVR and single vessel CABG on 3/5/21. Returned from the OR on central ECMO with an open chest, now s/p decannulation on 3/9. Nephrology consulted for ADAN.     ADAN in the setting of cardiogenic shock was on ECMO, s/p decannulation on 3/9  Unknown baseline, no h/o CKD per daughter. 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 Etiology likely ATN in the setting of shock, no obstruction per CT and urinalysis showed granular casts which is c/w ATN. UOP declined and K rising  on 3/6, so initiated on CRRT.   - discontinued CRRT on 3/12  - initiated HD on 3/13   - will daily evaluate for renal recovery and HD needs  - HD today, required albumin boluses to finish the run  - Q day bladder scans  - Avoid nephrotoxins  - Renally dose medications  - Renal diet  - Daily weights  - Strict I/Os      Recommendations were communicated to primary team via this note     Seen and discussed with Dr. Kriss Chester MD   [phone]    Interval History :   Nursing and provider notes from last 24 hours reviewed.  In the last 24 hours Jin Garrett seems stable. Continued to be confused this morning.    Physical Exam:   I/O last 3 completed shifts:  In: 2404.68 [I.V.:349.68; NG/GT:445]  Out: 850 [Urine:300; Stool:250; Chest Tube:300]   BP 91/54   Pulse 92   Temp 96.8  F (36  C) (Axillary)   Resp 18   Ht 1.803 m (5' 11\")   Wt 85.2 kg (187 lb 12.8 oz)   SpO2 96%   BMI 26.19 kg/m       General : Pt awake, not in acute distress   Lungs : anterior lung fields are clear  Cardiac : S1, S2 present  Abdomen : Soft/ND/NT  LE : no Edema noted  Dialysis Access " : right subclavian non tunneled line    Labs:   All labs reviewed by me  Electrolytes/Renal -   Recent Labs   Lab Test 03/16/21  0435 03/16/21  0005 03/15/21  0426 03/12/21  0355 03/12/21  0355 03/11/21  2210 03/11/21  1655 03/11/21  1655    143 143   < > 139 142  --  142   POTASSIUM 4.6 4.6 4.2   < > 3.6 3.6   < > 5.8*   CHLORIDE 108 109 108   < > 107 110*  --  112*   CO2 21 23 22   < > 25 24  --  25   * 174* 137*   < > 49* 49*  --  49*   CR 4.97* 4.90* 4.14*   < > 1.57* 1.57*  --  1.57*   * 136* 134*   < > 123* 118*  --  132*   PALLAVI 7.1* 7.2* 7.4*   < > 7.7* 7.9*  --  8.1*   MAG  --  2.4*  --   --  2.5* 2.3  --  2.3   PHOS  --   --   --   --  3.4 4.8*  --  1.9*    < > = values in this interval not displayed.       CBC -   Recent Labs   Lab Test 03/16/21  1144 03/16/21  0435 03/15/21  2325 03/15/21  0426 03/14/21  0350 03/14/21  0350   WBC  --  18.2*  --  16.2*  --  14.3*   HGB 7.8* 7.1* 7.5* 8.2*   < > 6.7*   PLT  --  237  --  179  --  134*    < > = values in this interval not displayed.       LFTs -   Recent Labs   Lab Test 03/16/21  0435 03/15/21  0426 03/14/21  0350   ALKPHOS 195* 194* 142   BILITOTAL 0.4 0.4 0.5   * 308* 123*   * 330* 159*   PROTTOTAL 4.5* 4.7* 4.8*   ALBUMIN 1.5* 1.6* 1.6*     Current Medications:    acetaminophen  975 mg Oral or Feeding Tube Q8H KERRI     aspirin  81 mg Oral or NG Tube Daily     atorvastatin  40 mg Oral or Feeding Tube QPM     B and C vitamin Complex with folic acid  5 mL Oral Daily     gelatin absorbable  1 each Topical During Hemodialysis (from stock)     melatonin  3 mg Oral or Feeding Tube At Bedtime     metoprolol tartrate  25 mg Oral or Feeding Tube BID     pantoprazole  40 mg Oral or NG Tube Daily    Or     pantoprazole  40 mg Oral Daily     QUEtiapine  50 mg Oral or Feeding Tube At Bedtime     sodium chloride (PF)  9 mL Intracatheter During Hemodialysis (from stock)     sodium chloride (PF)  9 mL Intracatheter During Hemodialysis (from  stock)       amiodarone 0.5 mg/min (03/16/21 1200)     dextrose       heparin 1,250 Units/hr (03/16/21 1200)     - MEDICATION INSTRUCTIONS -       Andie Chester MD

## 2021-03-16 NOTE — PLAN OF CARE
ICU End of Shift Summary. See flowsheets for vital signs and detailed assessment.    Changes this shift: Pt awake all night, confused, oriented to self only, trying to get out of bed, bed alarm in use. Pt fidgeting with NJ tube, pt pulled out rectal tube, tube replaced for loose stools.  Afebrile, BP stable. Pt in A-fib this shift, MD notified, Amio started.  Chest tube x1 to suction, dressing very saturated x3, dressing replaced. TF @ goal of 70ml/hr via NJ. Heparin gtt held for 60 mins and decrease by 100 units/hr, gtt restarted at 1250 units/hr.    Plan:  Continue with plan of care.

## 2021-03-17 ENCOUNTER — APPOINTMENT (OUTPATIENT)
Dept: GENERAL RADIOLOGY | Facility: CLINIC | Age: 81
DRG: 003 | End: 2021-03-17
Attending: NURSE PRACTITIONER
Payer: MEDICARE

## 2021-03-17 ENCOUNTER — APPOINTMENT (OUTPATIENT)
Dept: OCCUPATIONAL THERAPY | Facility: CLINIC | Age: 81
DRG: 003 | End: 2021-03-17
Attending: INTERNAL MEDICINE
Payer: MEDICARE

## 2021-03-17 ENCOUNTER — APPOINTMENT (OUTPATIENT)
Dept: PHYSICAL THERAPY | Facility: CLINIC | Age: 81
DRG: 003 | End: 2021-03-17
Attending: INTERNAL MEDICINE
Payer: MEDICARE

## 2021-03-17 LAB
ALBUMIN SERPL-MCNC: 1.6 G/DL (ref 3.4–5)
ALP SERPL-CCNC: 248 U/L (ref 40–150)
ALT SERPL W P-5'-P-CCNC: 253 U/L (ref 0–70)
ANION GAP SERPL CALCULATED.3IONS-SCNC: 10 MMOL/L (ref 3–14)
AST SERPL W P-5'-P-CCNC: 215 U/L (ref 0–45)
BILIRUB DIRECT SERPL-MCNC: 0.2 MG/DL (ref 0–0.2)
BILIRUB SERPL-MCNC: 0.4 MG/DL (ref 0.2–1.3)
BUN SERPL-MCNC: 118 MG/DL (ref 7–30)
CALCIUM SERPL-MCNC: 7.8 MG/DL (ref 8.5–10.1)
CHLORIDE SERPL-SCNC: 101 MMOL/L (ref 94–109)
CO2 SERPL-SCNC: 25 MMOL/L (ref 20–32)
CREAT SERPL-MCNC: 3.85 MG/DL (ref 0.66–1.25)
ERYTHROCYTE [DISTWIDTH] IN BLOOD BY AUTOMATED COUNT: 18.5 % (ref 10–15)
GFR SERPL CREATININE-BSD FRML MDRD: 14 ML/MIN/{1.73_M2}
GLUCOSE BLDC GLUCOMTR-MCNC: 124 MG/DL (ref 70–99)
GLUCOSE BLDC GLUCOMTR-MCNC: 95 MG/DL (ref 70–99)
GLUCOSE SERPL-MCNC: 114 MG/DL (ref 70–99)
HCT VFR BLD AUTO: 20.7 % (ref 40–53)
HGB BLD-MCNC: 6.6 G/DL (ref 13.3–17.7)
HGB BLD-MCNC: 7.3 G/DL (ref 13.3–17.7)
LDH SERPL L TO P-CCNC: 424 U/L (ref 85–227)
MCH RBC QN AUTO: 28.7 PG (ref 26.5–33)
MCHC RBC AUTO-ENTMCNC: 31.9 G/DL (ref 31.5–36.5)
MCV RBC AUTO: 90 FL (ref 78–100)
PLATELET # BLD AUTO: 248 10E9/L (ref 150–450)
POTASSIUM SERPL-SCNC: 4.8 MMOL/L (ref 3.4–5.3)
PROT SERPL-MCNC: 4.7 G/DL (ref 6.8–8.8)
RBC # BLD AUTO: 2.3 10E12/L (ref 4.4–5.9)
SODIUM SERPL-SCNC: 136 MMOL/L (ref 133–144)
UFH PPP CHRO-ACNC: 0.46 IU/ML
WBC # BLD AUTO: 15.3 10E9/L (ref 4–11)

## 2021-03-17 PROCEDURE — 250N000011 HC RX IP 250 OP 636: Performed by: SURGERY

## 2021-03-17 PROCEDURE — 250N000013 HC RX MED GY IP 250 OP 250 PS 637: Performed by: NURSE PRACTITIONER

## 2021-03-17 PROCEDURE — 93010 ELECTROCARDIOGRAM REPORT: CPT | Performed by: INTERNAL MEDICINE

## 2021-03-17 PROCEDURE — 120N000002 HC R&B MED SURG/OB UMMC

## 2021-03-17 PROCEDURE — 250N000013 HC RX MED GY IP 250 OP 250 PS 637: Performed by: INTERNAL MEDICINE

## 2021-03-17 PROCEDURE — 86901 BLOOD TYPING SEROLOGIC RH(D): CPT | Performed by: INTERNAL MEDICINE

## 2021-03-17 PROCEDURE — 86850 RBC ANTIBODY SCREEN: CPT | Performed by: INTERNAL MEDICINE

## 2021-03-17 PROCEDURE — P9016 RBC LEUKOCYTES REDUCED: HCPCS | Performed by: INTERNAL MEDICINE

## 2021-03-17 PROCEDURE — 71045 X-RAY EXAM CHEST 1 VIEW: CPT

## 2021-03-17 PROCEDURE — 250N000013 HC RX MED GY IP 250 OP 250 PS 637: Performed by: SURGERY

## 2021-03-17 PROCEDURE — 97162 PT EVAL MOD COMPLEX 30 MIN: CPT | Mod: GP

## 2021-03-17 PROCEDURE — 97530 THERAPEUTIC ACTIVITIES: CPT | Mod: GP

## 2021-03-17 PROCEDURE — 250N000013 HC RX MED GY IP 250 OP 250 PS 637: Performed by: THORACIC SURGERY (CARDIOTHORACIC VASCULAR SURGERY)

## 2021-03-17 PROCEDURE — 86923 COMPATIBILITY TEST ELECTRIC: CPT | Performed by: INTERNAL MEDICINE

## 2021-03-17 PROCEDURE — 250N000011 HC RX IP 250 OP 636: Performed by: STUDENT IN AN ORGANIZED HEALTH CARE EDUCATION/TRAINING PROGRAM

## 2021-03-17 PROCEDURE — 85520 HEPARIN ASSAY: CPT | Performed by: THORACIC SURGERY (CARDIOTHORACIC VASCULAR SURGERY)

## 2021-03-17 PROCEDURE — 71045 X-RAY EXAM CHEST 1 VIEW: CPT | Mod: 26 | Performed by: RADIOLOGY

## 2021-03-17 PROCEDURE — 86900 BLOOD TYPING SEROLOGIC ABO: CPT | Performed by: INTERNAL MEDICINE

## 2021-03-17 PROCEDURE — 82248 BILIRUBIN DIRECT: CPT | Performed by: THORACIC SURGERY (CARDIOTHORACIC VASCULAR SURGERY)

## 2021-03-17 PROCEDURE — 97110 THERAPEUTIC EXERCISES: CPT | Mod: GO

## 2021-03-17 PROCEDURE — 250N000013 HC RX MED GY IP 250 OP 250 PS 637: Performed by: PHYSICIAN ASSISTANT

## 2021-03-17 PROCEDURE — 93005 ELECTROCARDIOGRAM TRACING: CPT

## 2021-03-17 PROCEDURE — 99232 SBSQ HOSP IP/OBS MODERATE 35: CPT | Mod: 24 | Performed by: INTERNAL MEDICINE

## 2021-03-17 PROCEDURE — 83615 LACTATE (LD) (LDH) ENZYME: CPT | Performed by: THORACIC SURGERY (CARDIOTHORACIC VASCULAR SURGERY)

## 2021-03-17 PROCEDURE — 85027 COMPLETE CBC AUTOMATED: CPT | Performed by: THORACIC SURGERY (CARDIOTHORACIC VASCULAR SURGERY)

## 2021-03-17 PROCEDURE — 80053 COMPREHEN METABOLIC PANEL: CPT | Performed by: THORACIC SURGERY (CARDIOTHORACIC VASCULAR SURGERY)

## 2021-03-17 PROCEDURE — 999N001017 HC STATISTIC GLUCOSE BY METER IP

## 2021-03-17 PROCEDURE — 85018 HEMOGLOBIN: CPT | Performed by: NURSE PRACTITIONER

## 2021-03-17 PROCEDURE — 97535 SELF CARE MNGMENT TRAINING: CPT | Mod: GO

## 2021-03-17 PROCEDURE — 97110 THERAPEUTIC EXERCISES: CPT | Mod: GP

## 2021-03-17 RX ORDER — FUROSEMIDE 10 MG/ML
100 INJECTION INTRAMUSCULAR; INTRAVENOUS ONCE
Status: COMPLETED | OUTPATIENT
Start: 2021-03-17 | End: 2021-03-17

## 2021-03-17 RX ORDER — GUAR GUM
1 PACKET (EA) ORAL 3 TIMES DAILY
Status: DISCONTINUED | OUTPATIENT
Start: 2021-03-17 | End: 2021-04-02

## 2021-03-17 RX ORDER — ACETAMINOPHEN 325 MG/1
650 TABLET ORAL EVERY 6 HOURS PRN
Status: DISCONTINUED | OUTPATIENT
Start: 2021-03-17 | End: 2021-03-21

## 2021-03-17 RX ADMIN — Medication 5 ML: at 08:03

## 2021-03-17 RX ADMIN — ACETAMINOPHEN ORAL SOLUTION 975 MG: 325 SOLUTION ORAL at 15:10

## 2021-03-17 RX ADMIN — ACETAMINOPHEN ORAL SOLUTION 975 MG: 325 SOLUTION ORAL at 08:03

## 2021-03-17 RX ADMIN — Medication 1 PACKET: at 19:51

## 2021-03-17 RX ADMIN — ASPIRIN 81 MG CHEWABLE TABLET 81 MG: 81 TABLET CHEWABLE at 08:03

## 2021-03-17 RX ADMIN — PANTOPRAZOLE SODIUM 40 MG: 40 TABLET, DELAYED RELEASE ORAL at 08:04

## 2021-03-17 RX ADMIN — Medication 1 PACKET: at 15:10

## 2021-03-17 RX ADMIN — Medication 10 MG: at 19:50

## 2021-03-17 RX ADMIN — Medication 50 MG: at 19:56

## 2021-03-17 RX ADMIN — HEPARIN SODIUM 1250 UNITS/HR: 10000 INJECTION, SOLUTION INTRAVENOUS at 10:19

## 2021-03-17 RX ADMIN — AMIODARONE HYDROCHLORIDE 400 MG: 200 TABLET ORAL at 08:03

## 2021-03-17 RX ADMIN — Medication 50 MG: at 02:30

## 2021-03-17 RX ADMIN — QUETIAPINE FUMARATE 75 MG: 50 TABLET ORAL at 19:50

## 2021-03-17 RX ADMIN — FUROSEMIDE 100 MG: 10 INJECTION, SOLUTION INTRAMUSCULAR; INTRAVENOUS at 12:36

## 2021-03-17 ASSESSMENT — ACTIVITIES OF DAILY LIVING (ADL)
ADLS_ACUITY_SCORE: 21
ADLS_ACUITY_SCORE: 23
ADLS_ACUITY_SCORE: 21

## 2021-03-17 NOTE — PROGRESS NOTES
"  Nephrology Progress Note  03/17/2021         Assessment & Recommendations:   Jin Garrett is a 80 year old with PMH HTN, AAA, HLD, RA who presented to OSH with SOB and atypical CP. TTE showed severe MR with ?posterior flail leaflet, EF preserved. On arrival at [facility] an Impella was emergently placed. S/p MVR and single vessel CABG on 3/5/21. Returned from the OR on central ECMO with an open chest, now s/p decannulation on 3/9. Nephrology consulted for ADAN.     ADAN in the setting of cardiogenic shock was on ECMO, s/p decannulation on 3/9  Unknown baseline, no h/o CKD per daughter. 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 Etiology likely ATN in the setting of shock, no obstruction per CT and urinalysis showed granular casts which is c/w ATN. UOP declined and K rising  on 3/6, so initiated on CRRT.   - discontinued CRRT on 3/12  - initiated HD on 3/13   - will daily evaluate for renal recovery and HD needs  - No acute indications for HD today based on labs or exam.  - Q day bladder scans  - Avoid nephrotoxins  - Renally dose medications  - Renal diet  - Daily weights  - Strict I/Os      Recommendations were communicated to primary team via this note     Seen and discussed with Dr. Kriss Chester MD   [phone]    Interval History :   Nursing and provider notes from last 24 hours reviewed.  In the last 24 hours Jin Garrett been stable, underwent HD on 3/16. Made about 450 ml of urine in past 24hours    Physical Exam:   I/O last 3 completed shifts:  In: 2485.66 [I.V.:385.66; NG/GT:420]  Out: 1100 [Urine:450; Stool:450; Chest Tube:200]   BP 98/57   Pulse 61   Temp 98.4  F (36.9  C) (Axillary)   Resp 19   Ht 1.803 m (5' 11\")   Wt 85.2 kg (187 lb 12.8 oz)   SpO2 93%   BMI 26.19 kg/m       General : Pt awake, not in acute distress   Lungs : anterior lung fields are clear  Cardiac : S1, S2 present  Abdomen : Soft/ND/NT  LE : " no Edema noted  Dialysis Access : right subclavian non tunneled line    Labs:   All labs reviewed by me  Electrolytes/Renal -   Recent Labs   Lab Test 03/17/21  0336 03/16/21  0435 03/16/21  0005 03/12/21  0355 03/12/21  0355 03/11/21  2210 03/11/21  1655 03/11/21  1655    143 143   < > 139 142  --  142   POTASSIUM 4.8 4.6 4.6   < > 3.6 3.6   < > 5.8*   CHLORIDE 101 108 109   < > 107 110*  --  112*   CO2 25 21 23   < > 25 24  --  25   * 179* 174*   < > 49* 49*  --  49*   CR 3.85* 4.97* 4.90*   < > 1.57* 1.57*  --  1.57*   * 138* 136*   < > 123* 118*  --  132*   PALLAVI 7.8* 7.1* 7.2*   < > 7.7* 7.9*  --  8.1*   MAG  --   --  2.4*  --  2.5* 2.3  --  2.3   PHOS  --   --   --   --  3.4 4.8*  --  1.9*    < > = values in this interval not displayed.       CBC -   Recent Labs   Lab Test 03/17/21  1053 03/17/21  0336 03/16/21  1144 03/16/21  0435 03/15/21  0426 03/15/21  0426   WBC  --  15.3*  --  18.2*  --  16.2*   HGB 7.3* 6.6* 7.8* 7.1*   < > 8.2*   PLT  --  248  --  237  --  179    < > = values in this interval not displayed.       LFTs -   Recent Labs   Lab Test 03/17/21  0336 03/16/21  0435 03/15/21  0426   ALKPHOS 248* 195* 194*   BILITOTAL 0.4 0.4 0.4   * 255* 308*   * 189* 330*   PROTTOTAL 4.7* 4.5* 4.7*   ALBUMIN 1.6* 1.5* 1.6*     Current Medications:    acetaminophen  975 mg Oral or Feeding Tube Q8H KERRI     aspirin  81 mg Oral or NG Tube Daily     B and C vitamin Complex with folic acid  5 mL Oral Daily     fiber modular (NUTRISOURCE FIBER)  1 packet Per Feeding Tube TID     melatonin  10 mg Oral or Feeding Tube At Bedtime     metoprolol tartrate  12.5 mg Oral or Feeding Tube BID     pantoprazole  40 mg Oral or NG Tube Daily    Or     pantoprazole  40 mg Oral Daily     QUEtiapine  75 mg Oral or Feeding Tube At Bedtime       heparin 1,250 Units/hr (03/17/21 1100)     - MEDICATION INSTRUCTIONS -       Andie Chester MD

## 2021-03-17 NOTE — PROGRESS NOTES
03/17/21 1010   Quick Adds   Type of Visit Initial PT Evaluation       Present no   Living Environment   People in home alone   Current Living Arrangements house   Home Accessibility stairs within home   Number of Stairs, Within Home, Primary 10   Stair Railings, Within Home, Primary railing on left side (ascending)   Living Environment Comments All subjective info retrieved from chart and OT evaluation as pt disoriented with minimal command following. Per OT eval, pt [place], has work shop in basement that he frequently uses.   Self-Care   Usual Activity Tolerance good   Current Activity Tolerance fair   Regular Exercise Yes   Equipment Currently Used at Home tub bench;walker, standard   Activity/Exercise/Self-Care Comment Per OT, pt was independent with mobility and ADLs prior to L hip surgery 3 weeks ago. History of spinal stenosis and chronic back pain. Currently pt is an unreliable historian and unable to provide accurate information.   Disability/Function   Hearing Difficulty or Deaf no   Wear Glasses or Blind no   Concentrating, Remembering or Making Decisions Difficulty no   Difficulty Communicating no   Difficulty Eating/Swallowing no   Walking or Climbing Stairs Difficulty no   Dressing/Bathing Difficulty no   Toileting issues no   Doing Errands Independently Difficulty (such as shopping) no   Fall history within last six months no   Change in Functional Status Since Onset of Current Illness/Injury yes   General Information   Onset of Illness/Injury or Date of Surgery 03/04/21  (admitted to MICU 3/15)   Referring Physician Britney Arriola MD   Patient/Family Therapy Goals Statement (PT) pt unable to state   Pertinent History of Current Problem (include personal factors and/or comorbidities that impact the POC) Per chart, Jin Garrett is a 80 year old male with PMH of HTN, AAA, HLD and rheumatoid arthritis. He presented to an OSH with shortness of breath  "and atypical chest pain. TTE showed severe MR with concern for posterior flail leaflet, EF was preserved. On arrival at [facility] an Impella was emergently placed in cath lab. 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 Porras on 3/5/21. Returned from the OR on central ECMO with an open chest, intubated and sedated. Returned to the OR for a wash out on 3/9, found to have a thrombus on his mitral valve so he had the valve replaced, decannulated central ECMO, bronchoscopy and placement of left subclavian dialysis line.    Existing Precautions/Restrictions fall;sternal   General Observations Activity orders: ambulate, WBAT L LE   Cognition   Orientation Status (Cognition) disoriented to;person;place;situation;time   Affect/Mental Status (Cognition) confused   Follows Commands (Cognition) follows one-step commands;0-24% accuracy   Safety Deficit (Cognition) moderate deficit;insight into deficits/self-awareness;judgment;problem-solving;safety precautions awareness;safety precautions follow-through/compliance   Cognitive Status Comments Pt often stating \"no\" when cued to complete task.   Posture    Posture Forward head position;Protracted shoulders   Range of Motion (ROM)   ROM Comment ROM difficult to assess 2/2 poor command follow   Strength   Manual Muscle Testing Quick Adds Deficits observed during functional mobility   Bed Mobility   Comment (Bed Mobility) MaxA of 2 supine rolling   Transfers   Transfer Safety Comments Dependent transfer bed>chair via OH lift, modA of 2 sit<>stand   Balance   Balance Comments Assist of 2 for standing balance   Clinical Impression   Criteria for Skilled Therapeutic Intervention yes, treatment indicated   PT Diagnosis (PT) Impaired functional mobility   Influenced by the following impairments decreased strength and endurance, impaired cognition, sternal precautions, delirium precautions   Functional limitations due to impairments Pt requires assist for safe functional " mobility within post-op precautions.   Clinical Presentation Evolving/Changing   Clinical Presentation Rationale PMH and clinical judgment   Clinical Decision Making (Complexity) moderate complexity   Therapy Frequency (PT) 5x/week   Predicted Duration of Therapy Intervention (days/wks) 2 weeks   Planned Therapy Interventions (PT) balance training;bed mobility training;gait training;home exercise program;neuromuscular re-education;ROM (range of motion);stair training;strengthening;transfer training   Anticipated Equipment Needs at Discharge (PT)   (TBD)   Risk & Benefits of therapy have been explained evaluation/treatment results reviewed;care plan/treatment goals reviewed;risks/benefits reviewed;participants included;patient   PT Discharge Planning    PT Discharge Recommendation (DC Rec) Transitional Care Facility   PT Rationale for DC Rec Pt currently below functional baseline, will benefit from continued skilled rehab to maximize functional independence.   PT Brief overview of current status  OH lift for transfers   Total Evaluation Time   Total Evaluation Time (Minutes) 5

## 2021-03-17 NOTE — PROGRESS NOTES
CV ICU PROGRESS NOTE  March 16, 2021      CO-MORBIDITIES:   Cardiogenic shock (H)  (primary encounter diagnosis)  Status post cardiac surgery  Status post cardiac surgery    ASSESSMENT: Jin Garrett is a 80 year old male with PMH of HTN, AAA, HLD and rheumatoid arthritis. He presented to an OSH with shortness of breath and atypical chest pain. TTE showed severe MR with concern for posterior flail leaflet, EF was preserved. On arrival at [facility] an Impella was emergently placed in cath lab. 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 Porras on 3/5/21. Returned from the OR on central ECMO with an open chest, intubated and sedated. Returned to the OR for a wash out on 3/9, found to have a thrombus on his mitral valve so he had the valve replaced, decannulated central ECMO, bronchoscopy and placement of left subclavian dialysis line.        TODAY'S PROGRESS:   Transfer to floor when bed available  Increase PM Seroquel to 75 mg at HS  Increase Melatonin to 10 mg at 8 pm   Received a unit of RBC's this am for Hgb 6.6, recheck hgb when blood infused   Lasix 100 mg IV X1  Add fiber to tube feeds   discontinue amiodarone with rising LFT's and bradycardia   Decrease metoprolol to 12.5 mg BID with hold parameters     PLAN:  Neuro/ pain/ sedation:  Post-operative pain  Delerium  - Monitor neurological status. Notify the MD for any acute changes in exam.  - APAP  - Not sleeping, confused. Sleeping during the day   - Increase Seroquel 75 mg at bedtime,  - Increase melatonin 10 mg in pm   - Holding PTA amitriptyline        Pulmonary care:  Acute post-operative respiratory failure, resolved  - Extubated 3/12 to 4L/nc   - Wean O2 as tolerated, on room air   - IS  - CXR pending     Left pleural CT in place with 190 ml out in 24 hours, 130 ml overnight, serosang. Will try diuresing today, and possibly pull the tube later today or tomorrow.      Cardiovascular:  HTN  AAA  HLD  Hx of A fib ~ 10 years ago and on admission  "with MI   Now with post-op atrial fib with RVR 3/16  Severe mitral regurgitation s/p MVR 3/5  CAD with 100% occlusion of the mid LCx s/p single vessel CABG 3/5  Cardiogenic shock  Vasoplegic shock   VA ECMO (central)   S/p redo MVR due to thrombosis, removal of ECMO 3/9, and wound closure 3/9  - Monitor hemodynamic status.   - MAP >65  - ASA 81  - Decrease Metoprolol to 12.5 BID, hold for b/p < 100 systolic or HR <55  - Continue heparin, hold coumadin with drop in hgb today  - Received IV amiodarone load 3/15-16, transitioned to 400 mg every day po 3/16 pm   - TSH 1.56, LFT\"s rising: continue to hold Lipitor and discontinue amiodarone      GI/Nutrition:   - TF through [place] at goal  - Holding Bowel regimen with loose stools and rectal tube   - Speech consulted, NPO with ice chips per recs 3/13  - Add fiber to tube feeds     Fluids/ Electrolytes/Renal:  ADAN c/w ATN post-cardiogenic shock   Hyperkalemia  - Will continue to monitor intake and output.  - Continue to monitor electrolytes  - Started CRRT 3/6, stopped 3/12  - Started iHD 3/13, nephrology following   - Creat better today at 3.85 (4.97)    -  last dialysis 3/16, unable to pull fluid. 24 I/O net +1.5 L, wt up 6 kg from admission.   - Discussed with nephrology 3/16, holding off on tunneled dialysis line for now, as he is starting to make urine and hoping kidney function will improve.   - Will do diuretic trial with Lasix 100 mg IV today      Endocrine:  Stress hyperglycemia    TSH wnl   - Continue low dose SSI  - Keep blood glucose <180  - stopped sliding scale insulin 3/16     ID/ Antibiotics:  Leukocytosis - likely secondary to physiologic stress  - Complete perioperative/open chest antibiotics: cefepime and vancomycin for 7 days or 24 hours after chest closure, whichever is shorter last day 3/11  - Sputum-culture 3/9 - NGTD  - Blood cultures - NGTD  - WBC down to 15.3 from 18.2 3/16, afebrile. Monitor for now.     Heme:  Acute blood loss anemia      LUE US " 3/14 with nonocclusive thrombus in right cephalic vein, no UE DVT   - Hgb goal >8, Hgb 6.6 -> RBC's this am, recheck 7.3  - Daily CBC  - Continue heparin gtt for prior mitral valve thrombosis  - Started warfarin 3/16, will hold today and reevaluate restarting tomorrow pending stable hgb and no of bleeding     Musculoskeletal:   Recent hip surgery  - No weight bearing or activity restrictions related to recent hip surgery per family (records not available)   - PT/OT      Prophylaxis:    - Mechanical prophylaxis for DVT.   - PRN Bowel regimen  - Heparin gtt  - PPI     Lines/ tubes/ drains:  - HD line  - Pleural tube  - PICC     Disposition:  -Floor when bed available     Patient seen, findings and plan discussed with CV ICU staff, Dr. Omer.    Mel Castillo, DNP, CNP  [facility] Cardiovascular Thoracic Surgery   O: [phone]  Pgr [phone]      ====================================    SUBJECTIVE:   Was awake and restless again last night despite Seroquel and Melatonin. Now is very sleepy.   Is back in SR/SB.   Ongoing confusion with intermittent following commands and appropriateness.   Pain well controlled.   Tolerating Tube feeds      OBJECTIVE:   1. VITAL SIGNS:   Temp:  [96.6  F (35.9  C)-99.8  F (37.7  C)] 99.2  F (37.3  C)  Pulse:  [] 62  Resp:  [12-30] 20  BP: ()/(36-76) 88/49  SpO2:  [86 %-100 %] 100 %  Resp: 20      2. INTAKE/ OUTPUT:   I/O last 3 completed shifts:  In: 2485.66 [I.V.:385.66; NG/GT:420]  Out: 1100 [Urine:450; Stool:450; Chest Tube:200]    3. PHYSICAL EXAMINATION:   General: NAD, sleeping. Daughter is at the bedside.   Neuro: Alert, confused  Resp: CTA bety, breathing non-labored at rest   CV: irregularly irregular, no murmur or rub   Abdomen: Soft, Non-distended, Non-tender  Incisions: C/D/I  Chest tube dressing WDI. Left pleural with 190 ml out in 24 hours, 130 ml overnight.  serosang.   Extremities: warm and well perfused    4. INVESTIGATIONS:   Arterial Blood Gases   Recent Labs    Lab 03/12/21  0815 03/10/21  0813   PH 7.45 7.44   PCO2 38 31*   PO2 100 108*   HCO3 26 22     Complete Blood Count   Recent Labs   Lab 03/17/21  0336 03/16/21  1144 03/16/21  0435 03/15/21  2325 03/15/21  0426 03/14/21  0350 03/14/21  0350   WBC 15.3*  --  18.2*  --  16.2*  --  14.3*   HGB 6.6* 7.8* 7.1* 7.5* 8.2*   < > 6.7*     --  237  --  179  --  134*    < > = values in this interval not displayed.     Basic Metabolic Panel  Recent Labs   Lab 03/17/21  0336 03/16/21  0435 03/16/21  0005 03/15/21  0426    143 143 143   POTASSIUM 4.8 4.6 4.6 4.2   CHLORIDE 101 108 109 108   CO2 25 21 23 22   * 179* 174* 137*   CR 3.85* 4.97* 4.90* 4.14*   * 138* 136* 134*     Liver Function Tests  Recent Labs   Lab 03/17/21  0336 03/16/21  0435 03/15/21  0426 03/14/21  0350   * 189* 330* 159*   * 255* 308* 123*   ALKPHOS 248* 195* 194* 142   BILITOTAL 0.4 0.4 0.4 0.5   ALBUMIN 1.6* 1.5* 1.6* 1.6*     Pancreatic Enzymes  No lab results found in last 7 days.  Coagulation Profile  Recent Labs   Lab 03/13/21  0334 03/12/21  1504 03/12/21  0355 03/11/21  0339   PTT 76* 51* 45* 50*         5. RADIOLOGY:   Recent Results (from the past 24 hour(s))   XR Chest Port 1 View    Narrative    Exam: XR CHEST PORT 1 VW, 3/17/2021 6:01 AM    Indication: Chest tube in place    Comparison: 3/14/2021    Findings:   Single portable view of the chest. Right approach PICC, left approach  central venous catheter, left chest tube, and enteric tube in similar  position to prior. Cardiomediastinum and upper abdomen are  unremarkable. No significant pneumothorax. No pleural effusion.  Similar appearing streaky bibasilar opacities in the lungs.      Impression    Impression:   1. Stable bilateral interstitial opacities suggestive of pulmonary  edema.  2. Support devices in similar position to prior.    I have personally reviewed the examination and initial interpretation  and I agree with the findings.    LYNNE  THAMPY, MD       =========================================

## 2021-03-17 NOTE — PROGRESS NOTES
CLINICAL NUTRITION SERVICES - BRIEF NOTE   (see RD note on 3/12 for full assessment)    Receiving TF (Pivot 1.5) @ 70 ml/hr. Loose stools (250-600 ml/day), bowel regimen held the past week.     Nutrition changes today:  - Add nutrisource fiber (1 pkt TID, 9 g fiber). With TF, pt will receive 21 g soluble fiber/day.     Ashley Ortiz, [place], LD  [id number]  Pgr: [phone]

## 2021-03-17 NOTE — PLAN OF CARE
ICU End of Shift Summary. See flowsheets for vital signs and detailed assessment.    Changes this shift: Patient remains confused, oriented to self and place at best when most awake this evening. Patient slept most of day despite working with therapy, sitting up in the chair, lights/tv on and daughter visiting. Speech is still garbled, inconsistently follows commands. Weak, congested cough, remains on RA. SB with PACs most of shift, BP stable, AM metoprolol held. 100 mg lasix challenge with no urine output- bladder scan for 80. TF remain at goal, copious stool output. Skin unchanged, significant bruising to L arm, swelling in L hand improved with mitt off.     Plan: Continue plan of care. Transfer to  when bed available.

## 2021-03-17 NOTE — PLAN OF CARE
Patient slept only for approx 3hrs tonight after bedtime medications. Patient was restless but easily redirected with sitter in room. 1unit RBC administered for Hgb 6.6. Per MD, heparin drip to stay running.   Patient incontinent of urine at start of shift x1. Bladder scanned at end of shift for 60ml.  Viky James RN

## 2021-03-18 ENCOUNTER — APPOINTMENT (OUTPATIENT)
Dept: PHYSICAL THERAPY | Facility: CLINIC | Age: 81
DRG: 003 | End: 2021-03-18
Attending: INTERNAL MEDICINE
Payer: MEDICARE

## 2021-03-18 LAB
ALBUMIN SERPL-MCNC: 1.8 G/DL (ref 3.4–5)
ALP SERPL-CCNC: 294 U/L (ref 40–150)
ALT SERPL W P-5'-P-CCNC: 231 U/L (ref 0–70)
ANION GAP SERPL CALCULATED.3IONS-SCNC: 15 MMOL/L (ref 3–14)
AST SERPL W P-5'-P-CCNC: 133 U/L (ref 0–45)
BILIRUB DIRECT SERPL-MCNC: 0.2 MG/DL (ref 0–0.2)
BILIRUB SERPL-MCNC: 0.4 MG/DL (ref 0.2–1.3)
BLD PROD TYP BPU: NORMAL
BLD PROD TYP BPU: NORMAL
BLD UNIT ID BPU: 0
BLD UNIT ID BPU: 0
BLOOD PRODUCT CODE: NORMAL
BLOOD PRODUCT CODE: NORMAL
BPU ID: NORMAL
BPU ID: NORMAL
BUN SERPL-MCNC: 160 MG/DL (ref 7–30)
C DIFF TOX B STL QL: NEGATIVE
CALCIUM SERPL-MCNC: 8.2 MG/DL (ref 8.5–10.1)
CHLORIDE SERPL-SCNC: 100 MMOL/L (ref 94–109)
CO2 SERPL-SCNC: 22 MMOL/L (ref 20–32)
CREAT SERPL-MCNC: 5.05 MG/DL (ref 0.66–1.25)
ERYTHROCYTE [DISTWIDTH] IN BLOOD BY AUTOMATED COUNT: 17.8 % (ref 10–15)
GFR SERPL CREATININE-BSD FRML MDRD: 10 ML/MIN/{1.73_M2}
GLUCOSE SERPL-MCNC: 150 MG/DL (ref 70–99)
HCT VFR BLD AUTO: 21.6 % (ref 40–53)
HGB BLD-MCNC: 6.9 G/DL (ref 13.3–17.7)
HGB BLD-MCNC: 7.9 G/DL (ref 13.3–17.7)
INR PPP: 1.31 (ref 0.86–1.14)
INTERPRETATION ECG - MUSE: NORMAL
LDH SERPL L TO P-CCNC: 372 U/L (ref 85–227)
MAGNESIUM SERPL-MCNC: 2.4 MG/DL (ref 1.6–2.3)
MCH RBC QN AUTO: 28.5 PG (ref 26.5–33)
MCHC RBC AUTO-ENTMCNC: 31.9 G/DL (ref 31.5–36.5)
MCV RBC AUTO: 89 FL (ref 78–100)
PHOSPHATE SERPL-MCNC: 6.6 MG/DL (ref 2.5–4.5)
PLATELET # BLD AUTO: 287 10E9/L (ref 150–450)
POTASSIUM SERPL-SCNC: 5.7 MMOL/L (ref 3.4–5.3)
POTASSIUM SERPL-SCNC: 5.7 MMOL/L (ref 3.4–5.3)
PROT SERPL-MCNC: 5.1 G/DL (ref 6.8–8.8)
RBC # BLD AUTO: 2.42 10E12/L (ref 4.4–5.9)
SODIUM SERPL-SCNC: 136 MMOL/L (ref 133–144)
SPECIMEN SOURCE: NORMAL
TRANSFUSION STATUS PATIENT QL: NORMAL
UFH PPP CHRO-ACNC: 0.48 IU/ML
WBC # BLD AUTO: 13.4 10E9/L (ref 4–11)

## 2021-03-18 PROCEDURE — 99232 SBSQ HOSP IP/OBS MODERATE 35: CPT | Mod: 24 | Performed by: INTERNAL MEDICINE

## 2021-03-18 PROCEDURE — 85520 HEPARIN ASSAY: CPT | Performed by: THORACIC SURGERY (CARDIOTHORACIC VASCULAR SURGERY)

## 2021-03-18 PROCEDURE — 250N000013 HC RX MED GY IP 250 OP 250 PS 637: Performed by: SURGERY

## 2021-03-18 PROCEDURE — P9016 RBC LEUKOCYTES REDUCED: HCPCS | Performed by: INTERNAL MEDICINE

## 2021-03-18 PROCEDURE — P9047 ALBUMIN (HUMAN), 25%, 50ML: HCPCS | Performed by: INTERNAL MEDICINE

## 2021-03-18 PROCEDURE — 85018 HEMOGLOBIN: CPT | Performed by: STUDENT IN AN ORGANIZED HEALTH CARE EDUCATION/TRAINING PROGRAM

## 2021-03-18 PROCEDURE — 82248 BILIRUBIN DIRECT: CPT | Performed by: THORACIC SURGERY (CARDIOTHORACIC VASCULAR SURGERY)

## 2021-03-18 PROCEDURE — 90937 HEMODIALYSIS REPEATED EVAL: CPT

## 2021-03-18 PROCEDURE — 250N000011 HC RX IP 250 OP 636: Performed by: INTERNAL MEDICINE

## 2021-03-18 PROCEDURE — 99232 SBSQ HOSP IP/OBS MODERATE 35: CPT | Mod: GC | Performed by: ANESTHESIOLOGY

## 2021-03-18 PROCEDURE — 85610 PROTHROMBIN TIME: CPT | Performed by: THORACIC SURGERY (CARDIOTHORACIC VASCULAR SURGERY)

## 2021-03-18 PROCEDURE — 258N000003 HC RX IP 258 OP 636: Performed by: INTERNAL MEDICINE

## 2021-03-18 PROCEDURE — 250N000013 HC RX MED GY IP 250 OP 250 PS 637: Performed by: PHYSICIAN ASSISTANT

## 2021-03-18 PROCEDURE — 120N000002 HC R&B MED SURG/OB UMMC

## 2021-03-18 PROCEDURE — 84132 ASSAY OF SERUM POTASSIUM: CPT | Performed by: STUDENT IN AN ORGANIZED HEALTH CARE EDUCATION/TRAINING PROGRAM

## 2021-03-18 PROCEDURE — 87493 C DIFF AMPLIFIED PROBE: CPT | Performed by: INTERNAL MEDICINE

## 2021-03-18 PROCEDURE — 83735 ASSAY OF MAGNESIUM: CPT | Performed by: STUDENT IN AN ORGANIZED HEALTH CARE EDUCATION/TRAINING PROGRAM

## 2021-03-18 PROCEDURE — 97530 THERAPEUTIC ACTIVITIES: CPT | Mod: GP

## 2021-03-18 PROCEDURE — 85027 COMPLETE CBC AUTOMATED: CPT | Performed by: THORACIC SURGERY (CARDIOTHORACIC VASCULAR SURGERY)

## 2021-03-18 PROCEDURE — 250N000013 HC RX MED GY IP 250 OP 250 PS 637: Performed by: STUDENT IN AN ORGANIZED HEALTH CARE EDUCATION/TRAINING PROGRAM

## 2021-03-18 PROCEDURE — 250N000011 HC RX IP 250 OP 636: Performed by: STUDENT IN AN ORGANIZED HEALTH CARE EDUCATION/TRAINING PROGRAM

## 2021-03-18 PROCEDURE — 250N000013 HC RX MED GY IP 250 OP 250 PS 637: Performed by: INTERNAL MEDICINE

## 2021-03-18 PROCEDURE — 250N000013 HC RX MED GY IP 250 OP 250 PS 637: Performed by: THORACIC SURGERY (CARDIOTHORACIC VASCULAR SURGERY)

## 2021-03-18 PROCEDURE — 84100 ASSAY OF PHOSPHORUS: CPT | Performed by: STUDENT IN AN ORGANIZED HEALTH CARE EDUCATION/TRAINING PROGRAM

## 2021-03-18 PROCEDURE — 83615 LACTATE (LD) (LDH) ENZYME: CPT | Performed by: THORACIC SURGERY (CARDIOTHORACIC VASCULAR SURGERY)

## 2021-03-18 PROCEDURE — 80053 COMPREHEN METABOLIC PANEL: CPT | Performed by: THORACIC SURGERY (CARDIOTHORACIC VASCULAR SURGERY)

## 2021-03-18 RX ORDER — MIDODRINE HYDROCHLORIDE 5 MG/1
10 TABLET ORAL ONCE
Status: COMPLETED | OUTPATIENT
Start: 2021-03-18 | End: 2021-03-18

## 2021-03-18 RX ORDER — MIDODRINE HYDROCHLORIDE 5 MG/1
10 TABLET ORAL DAILY PRN
Status: DISCONTINUED | OUTPATIENT
Start: 2021-03-18 | End: 2021-04-05 | Stop reason: HOSPADM

## 2021-03-18 RX ORDER — LOPERAMIDE HCL 1 MG/7.5ML
2 SUSPENSION ORAL 4 TIMES DAILY PRN
Status: DISCONTINUED | OUTPATIENT
Start: 2021-03-18 | End: 2021-04-05 | Stop reason: HOSPADM

## 2021-03-18 RX ORDER — ALBUMIN (HUMAN) 12.5 G/50ML
25 SOLUTION INTRAVENOUS DAILY PRN
Status: DISCONTINUED | OUTPATIENT
Start: 2021-03-18 | End: 2021-04-05 | Stop reason: HOSPADM

## 2021-03-18 RX ORDER — ALBUMIN (HUMAN) 12.5 G/50ML
25 SOLUTION INTRAVENOUS ONCE
Status: COMPLETED | OUTPATIENT
Start: 2021-03-18 | End: 2021-03-18

## 2021-03-18 RX ORDER — WARFARIN SODIUM 2.5 MG/1
2.5 TABLET ORAL
Status: COMPLETED | OUTPATIENT
Start: 2021-03-18 | End: 2021-03-18

## 2021-03-18 RX ORDER — PROPOFOL 10 MG/ML
INJECTION, EMULSION INTRAVENOUS
Status: DISCONTINUED
Start: 2021-03-18 | End: 2021-03-18 | Stop reason: HOSPADM

## 2021-03-18 RX ORDER — MIDODRINE HYDROCHLORIDE 5 MG/1
10 TABLET ORAL
Status: DISCONTINUED | OUTPATIENT
Start: 2021-03-18 | End: 2021-03-18

## 2021-03-18 RX ADMIN — Medication: at 11:21

## 2021-03-18 RX ADMIN — SODIUM CHLORIDE 250 ML: 9 INJECTION, SOLUTION INTRAVENOUS at 11:21

## 2021-03-18 RX ADMIN — QUETIAPINE 25 MG: 25 TABLET ORAL at 18:02

## 2021-03-18 RX ADMIN — ASPIRIN 81 MG CHEWABLE TABLET 81 MG: 81 TABLET CHEWABLE at 07:51

## 2021-03-18 RX ADMIN — Medication 5 ML: at 07:51

## 2021-03-18 RX ADMIN — ALBUMIN HUMAN 25 G: 0.25 SOLUTION INTRAVENOUS at 11:21

## 2021-03-18 RX ADMIN — SODIUM CHLORIDE 300 ML: 9 INJECTION, SOLUTION INTRAVENOUS at 11:21

## 2021-03-18 RX ADMIN — WARFARIN SODIUM 2.5 MG: 2.5 TABLET ORAL at 17:36

## 2021-03-18 RX ADMIN — Medication 1 PACKET: at 13:20

## 2021-03-18 RX ADMIN — LOPERAMIDE HCL 2 MG: 1 SOLUTION ORAL at 13:20

## 2021-03-18 RX ADMIN — Medication 50 MG: at 13:20

## 2021-03-18 RX ADMIN — Medication 50 MG: at 03:58

## 2021-03-18 RX ADMIN — Medication 10 MG: at 19:41

## 2021-03-18 RX ADMIN — ACETAMINOPHEN 650 MG: 325 TABLET, FILM COATED ORAL at 15:50

## 2021-03-18 RX ADMIN — QUETIAPINE FUMARATE 75 MG: 50 TABLET ORAL at 19:41

## 2021-03-18 RX ADMIN — PANTOPRAZOLE SODIUM 40 MG: 40 TABLET, DELAYED RELEASE ORAL at 07:51

## 2021-03-18 RX ADMIN — MIDODRINE HYDROCHLORIDE 10 MG: 5 TABLET ORAL at 09:48

## 2021-03-18 RX ADMIN — ALBUMIN HUMAN 25 G: 0.25 SOLUTION INTRAVENOUS at 13:30

## 2021-03-18 RX ADMIN — HEPARIN SODIUM 1250 UNITS/HR: 10000 INJECTION, SOLUTION INTRAVENOUS at 09:46

## 2021-03-18 RX ADMIN — ACETAMINOPHEN 650 MG: 325 TABLET, FILM COATED ORAL at 09:48

## 2021-03-18 RX ADMIN — AMITRIPTYLINE HYDROCHLORIDE 25 MG: 25 TABLET, FILM COATED ORAL at 21:42

## 2021-03-18 RX ADMIN — Medication 1 PACKET: at 07:51

## 2021-03-18 RX ADMIN — Medication 1 PACKET: at 19:41

## 2021-03-18 ASSESSMENT — MIFFLIN-ST. JEOR
SCORE: 1590.13
SCORE: 1588.13

## 2021-03-18 ASSESSMENT — ACTIVITIES OF DAILY LIVING (ADL)
ADLS_ACUITY_SCORE: 21

## 2021-03-18 NOTE — PROGRESS NOTES
CLINICAL NUTRITION SERVICES - BRIEF NOTE      Reason for RD note: Following up on EN support tolerance. Pt now hyperkalemic (K+ 5.7), hypophosphatemic (phos 6.6). Plan for HD today, but given extent of elevated K+/Phos, just going to transition now.     Interventions:  Transition to renal TF formula:  Novasource Renal @ 55 ml/hr (1320 ml) + Prosource (1 pkt TID) provides 2760 kcal (30 kcal/kg), 153 g pro (1.7 g/kg), 242 g CHO, 946 ml free water, and 0 g fiber daily. RD to adjust protein modulars pending renal status.    Future/Additional Recommendations:  Monitor lytes and tolerance to new EN formula.    Nutrition will continue to follow per protocol.    Ami Eastman RD, LD  Pager: [phone]

## 2021-03-18 NOTE — PROGRESS NOTES
HEMODIALYSIS TREATMENT NOTE    Date: 3/18/2021  Time: 1:57 PM    Data:  Pre Wt: 85.6 kg (Estimated)   Desired Wt: 85.6 kg   Post Wt: 85.8 kg (Estimated)  Weight change: -0.2 kg  Ultrafiltration - Post Run Net Total Removed (mL): 0 mL  Vascular Access Status: CVC  patent  Dialyzer Rinse: Clear  Total Blood Volume Processed: 68.9 L   Total Dialysis (Treatment) Time: 3   Dialysate Bath: K 2, Ca 3  Heparin: None    Lab:   No    Interventions:  Pt completed his 3 hours of HD successfully. However, writer was unable to pull fluid d/t soft BP. Albumin administered as ordered at the start of HD, and during HD to help keep MAP within ordered limit. Ending BP was 102/68. Pt rinsed back post tx, lumen were saline locked, and hand off report given to OLGA Angeles.    Assessment:  -Pt remain confused but redirectable  -Cont to have frequent wet productive cough       Plan:    Per renal

## 2021-03-18 NOTE — PHARMACY-ANTICOAGULATION SERVICE
Clinical Pharmacy - Warfarin Dosing Consult     Pharmacy has been consulted to manage this patient s warfarin therapy.  Indication: Bioprosthetic Heart Valve  Therapy Goal: INR 2-3  Provider/Team: CVTS  Warfarin Prior to Admission: No  Significant drug interactions: amitriptyline, aspirin, IV heparin (increased risk of bleeding)  Recent documented change in oral intake/nutrition: Yes(Patient receiving tube feeds.)  Dose Comments: Hgb not stable, requiring replacement will dose conservatively.    INR   Date Value Ref Range Status   03/10/2021 1.28 (H) 0.86 - 1.14 Final   03/09/2021 1.31 (H) 0.86 - 1.14 Final       Recommend warfarin 2.5 mg today.  Pharmacy will monitor Jin Garrett daily and order warfarin doses to achieve specified goal.      Please contact pharmacy as soon as possible if the warfarin needs to be held for a procedure or if the warfarin goals change.      Monique Kaiser, Lesvia  CVICU and Advanced Heart Failure Pharmacist  Pager [phone]

## 2021-03-18 NOTE — PLAN OF CARE
SLP: cancel - Pt with PT upon first attempt and receiving dialysis upon second attempt. RN requesting SLP reschedule.

## 2021-03-18 NOTE — PLAN OF CARE
"Patient remains disoriented to place, time, and situation. Patient had bedside HD today, Proamatine given prior to run as ordered. Has put out 150 mL of urine. Currently using sure fit urine collection system. Left arm has large bruise and 3 + edema, elevated. Per patient's daughter, Pamela, reports patient has allergies, patient has post nasal drip and clears his throat often. Uses yankers occasionally as well. Chest tube dressing CDI, chest tube draining. TF at 55 mL/hr. Coccyx and alesha area red. On Heparin drip, first dose of Coumadin given this evening for transition. Cleansed frequently and applied barrier cream. Rectal tube draining liquid stool and moderate amounts of gas. Patient became more agitated and confused in the evening saying \"I'm getting out of this bed\" and pulling at PICC line. Mitts applied. Seroquel given. Bedside attendant at bedside. Patient was up in chair and stood once with PT today. Continue to monitor.   "

## 2021-03-18 NOTE — PROGRESS NOTES
CV ICU PROGRESS NOTE  March 16, 2021      CO-MORBIDITIES:   Cardiogenic shock (H)  (primary encounter diagnosis)  Status post cardiac surgery  Status post cardiac surgery    ASSESSMENT: Jin Garrett is a 80 year old male with PMH of HTN, AAA, HLD and rheumatoid arthritis. He presented to an OSH with shortness of breath and atypical chest pain. TTE showed severe MR with concern for posterior flail leaflet, EF was preserved. On arrival at [facility] an Impella was emergently placed in cath lab. 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 Porras on 3/5/21. Returned from the OR on central ECMO with an open chest, intubated and sedated. Returned to the OR for a wash out on 3/9, found to have a thrombus on his mitral valve so he had the valve replaced, decannulated central ECMO, bronchoscopy and placement of left subclavian dialysis line.        TODAY'S PROGRESS:   Transfer to floor when bed available  Received a unit of RBC's this am for Hgb 6.9, recheck hgb 7.9  Discuss starting epogen with renal   Likely HD today, per renal  Midodrine 10mg now and 10mg with HD  Pharmacy to dose warfarin  C diff    PLAN:  Neuro/ pain/ sedation:  Post-operative pain  Delerium  - Monitor neurological status. Notify the MD for any acute changes in exam.  - APAP  - Not sleeping, confused. Sleeping during the day   - Increase Seroquel 75 mg at bedtime,  - Increase melatonin 10 mg in pm   - Restart PTA amitriptyline        Pulmonary care:  Acute post-operative respiratory failure, resolved  - Extubated 3/12 to 4L/nc   - Wean O2 as tolerated, on room air   - IS      Cardiovascular:  HTN  AAA  HLD  Hx of A fib ~ 10 years ago and on admission with MI   Now with post-op atrial fib with RVR 3/16  Severe mitral regurgitation s/p MVR 3/5  CAD with 100% occlusion of the mid LCx s/p single vessel CABG 3/5  Cardiogenic shock - resolved  Vasoplegic shock - resolved  VA ECMO (central)   S/p redo MVR due to thrombosis, removal of ECMO 3/9,  "and wound closure 3/9  - Monitor hemodynamic status.   - MAP >65  - ASA 81  - Decrease Metoprolol to 12.5 BID, hold for b/p < 100 systolic or HR <55  - Continue heparin  - Start warfarin today  - Start midodrine with HD  - Received IV amiodarone load 3/15-16, transitioned to 400 mg every day po 3/16 pm   - TSH 1.56, LFT\"s rising: continue to hold Lipitor and discontinue amiodarone      GI/Nutrition:   - TF through [place] at goal  - Holding Bowel regimen with loose stools and rectal tube   - Speech consulted, NPO with ice chips per recs 3/13  - Add fiber to tube feeds   - C diff negative 3/18    Fluids/ Electrolytes/Renal:  ADAN c/w ATN post-cardiogenic shock   Hyperkalemia  - Will continue to monitor intake and output.  - Continue to monitor electrolytes  - Started CRRT 3/6, stopped 3/12  - Started iHD 3/13, nephrology following   - Creat better today at 3.85 (4.97)    - Last dialysis 3/16, unable to pull fluid. 24 I/O net +1.5 L, wt up 6 kg from admission.   - Dialysis today  - Discuss starting epogen with renal today  - Discussed with nephrology 3/16, holding off on tunneled dialysis line for now, as he is starting to make urine and hoping kidney function will improve.      Endocrine:  Stress hyperglycemia    TSH wnl   - Continue low dose SSI  - Keep blood glucose <180  - stopped sliding scale insulin 3/16     ID/ Antibiotics:  Leukocytosis - likely secondary to physiologic stress  - Complete perioperative/open chest antibiotics: cefepime and vancomycin for 7 days or 24 hours after chest closure, whichever is shorter last day 3/11  - Sputum-culture 3/9 - NGTD  - Blood cultures - NGTD     Heme:  Acute blood loss anemia  Hgb continues to drift down likely due to HD      LUE US 3/14 with nonocclusive thrombus in right cephalic vein, no UE DVT   - Hgb goal >8, Hgb 6.6 -> RBC's this am, recheck 7.3  - Daily CBC  - Continue heparin gtt for prior mitral valve thrombosis  - Started warfarin 3/16, held 3/18  - Restarting " warfain    Musculoskeletal:   Recent hip surgery  - No weight bearing or activity restrictions related to recent hip surgery per family (records not available)   - PT/OT      Prophylaxis:    - Mechanical prophylaxis for DVT.   - PRN Bowel regimen  - Heparin gtt  - PPI     Lines/ tubes/ drains:  - HD line  - Pleural tube  - PICC     Disposition:  -Floor when bed available     Patient seen, findings and plan discussed with CV ICU staff, Dr. Omer.    Britney Arriola MD  Anesthesiology Resident, [place]-2  [facility]   [phone]  3/18/022630:45 PM    ====================================    SUBJECTIVE:   Slept well overnight, endorses some pain at chest tube site. Denies nausea.     OBJECTIVE:   1. VITAL SIGNS:   Temp:  [97  F (36.1  C)-99.3  F (37.4  C)] 97  F (36.1  C)  Pulse:  [55-84] 84  Resp:  [8-25] 23  BP: ()/() 102/55  SpO2:  [90 %-100 %] 98 %  Resp: 23      2. INTAKE/ OUTPUT:   I/O last 3 completed shifts:  In: 3300 [I.V.:300; NG/GT:720]  Out: 910 [Stool:700; Chest Tube:210]    3. PHYSICAL EXAMINATION:   General: NAD  Neuro: Alert answering questions appropriately  Resp: CTAB, breathing non-labored at rest   CV: Irregularly irregular, no murmur or rub   Abdomen: Soft, Non-distended, Non-tender  Incisions: C/D/I  Chest tube dressing WDI. Serosanguinous output    Extremities: warm and well perfused    4. INVESTIGATIONS:   Arterial Blood Gases   Recent Labs   Lab 03/12/21  0815   PH 7.45   PCO2 38   PO2 100   HCO3 26     Complete Blood Count   Recent Labs   Lab 03/18/21  0801 03/18/21  0353 03/17/21  1053 03/17/21  0336 03/16/21  0435 03/16/21  0435 03/15/21  0426 03/15/21  0426   WBC  --  13.4*  --  15.3*  --  18.2*  --  16.2*   HGB 7.9* 6.9* 7.3* 6.6*   < > 7.1*   < > 8.2*   PLT  --  287  --  248  --  237  --  179    < > = values in this interval not displayed.     Basic Metabolic Panel  Recent Labs   Lab 03/18/21  0801 03/18/21  0353 03/17/21  0336 03/16/21  0435 03/16/21  0005    NA  --  136 136 143 143   POTASSIUM 5.7* 5.7* 4.8 4.6 4.6   CHLORIDE  --  100 101 108 109   CO2  --  22 25 21 23   BUN  --  160* 118* 179* 174*   CR  --  5.05* 3.85* 4.97* 4.90*   GLC  --  150* 114* 138* 136*     Liver Function Tests  Recent Labs   Lab 03/18/21  0353 03/17/21  0336 03/16/21  0435 03/15/21  0426   * 215* 189* 330*   * 253* 255* 308*   ALKPHOS 294* 248* 195* 194*   BILITOTAL 0.4 0.4 0.4 0.4   ALBUMIN 1.8* 1.6* 1.5* 1.6*     Pancreatic Enzymes  No lab results found in last 7 days.  Coagulation Profile  Recent Labs   Lab 03/13/21  0334 03/12/21  1504 03/12/21  0355   PTT 76* 51* 45*         5. RADIOLOGY:   No results found for this or any previous visit (from the past 24 hour(s)).    =========================================

## 2021-03-18 NOTE — PLAN OF CARE
ICU End of Shift Summary. See flowsheets for vital signs and detailed assessment.    Changes this shift: Pt slept for most of night. Disoriented to time and situation, appeared more clear during evening assessment; following commands, able to respond to more assessment questions appropriately. Afebrile, VSS on room air. Evening metoprolol held. No urine output, bladder scan for 209. 1 unit PRBCs given for Hgb 6.9. Hep Xa therapeutic. Attempted to call x3; sent page to MD about K of 5.7. Did not hear back, no acute interventions, pt to dialyze today.     Plan: Continue current POC; transfer to  when bed available. Update team of changes.       Problem: Attention and Thought Clarity Impairment (Delirium)  Goal: Improved Attention and Thought Clarity  Outcome: No Change     Problem: Sleep Disturbance (Delirium)  Goal: Improved Sleep  Outcome: No Change     Problem: Hemodynamic Instability (Acute Coronary Syndrome)  Goal: Effective Cardiac Pump Function  Outcome: No Change     Problem: Bleeding (Cardiovascular Surgery)  Goal: Absence of Bleeding  Outcome: No Change     Problem: Bowel Elimination Impaired (Cardiovascular Surgery)  Goal: Effective Bowel Elimination  Outcome: No Change

## 2021-03-18 NOTE — PROGRESS NOTES
"  Nephrology Progress Note  03/18/2021         Assessment & Recommendations:   Jin Garrett is a 80 year old with PMH HTN, AAA, HLD, RA who presented to OSH with SOB and atypical CP. TTE showed severe MR with ?posterior flail leaflet, EF preserved. On arrival at [facility] an Impella was emergently placed. S/p MVR and single vessel CABG on 3/5/21. Returned from the OR on central ECMO with an open chest, now s/p decannulation on 3/9. Nephrology consulted for ADAN.     ADAN in the setting of cardiogenic shock, was on ECMO, s/p decannulation on 3/9  Unknown baseline, no h/o CKD per daughter. 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 Etiology likely ATN in the setting of shock, no obstruction per CT and urinalysis showed granular casts which is c/w ATN. UOP declined and K rising  on 3/6, so initiated on CRRT.   - discontinued CRRT on 3/12  - initiated HD on 3/13   - will daily evaluate for renal recovery and HD needs  - HD done today, but needed albumin and midodrine to finish the runs. Was unable to perform any UF, he was positive 100 ml by the end of run.   - Q day bladder scans  - Avoid nephrotoxins  - Renally dose medications  - Renal diet  - Daily weights  - Strict I/Os      Recommendations were communicated to primary team via this note     Seen and discussed with Dr. Kriss Chester MD   [phone]    Interval History :   Nursing and provider notes from last 24 hours reviewed.  In the last 24 hours Jin Garrett continued to be hypotensive. Noted to have diarrhea since yesterday evening. Denied being in pain today. Pt remained confused.    Physical Exam:   I/O last 3 completed shifts:  In: 3300 [I.V.:300; NG/GT:720]  Out: 910 [Stool:700; Chest Tube:210]   BP 99/58   Pulse 86   Temp 97.5  F (36.4  C) (Axillary)   Resp 17   Ht 1.803 m (5' 11\")   Wt 85.8 kg (189 lb 2.5 oz)   SpO2 98%   BMI 26.38 kg/m       General : Pt awake, " not in acute distress   Lungs : anterior lung fields are clear  Cardiac : S1, S2 present  Abdomen : Soft/ND/NT  LE : no Edema noted  Dialysis Access : right subclavian non tunneled line    Labs:   All labs reviewed by me  Electrolytes/Renal -   Recent Labs   Lab Test 03/18/21  0801 03/18/21  0353 03/17/21  0336 03/16/21  0435 03/16/21  0005 03/12/21  0355 03/12/21  0355 03/11/21  2210   NA  --  136 136 143 143   < > 139 142   POTASSIUM 5.7* 5.7* 4.8 4.6 4.6   < > 3.6 3.6   CHLORIDE  --  100 101 108 109   < > 107 110*   CO2  --  22 25 21 23   < > 25 24   BUN  --  160* 118* 179* 174*   < > 49* 49*   CR  --  5.05* 3.85* 4.97* 4.90*   < > 1.57* 1.57*   GLC  --  150* 114* 138* 136*   < > 123* 118*   PALLAVI  --  8.2* 7.8* 7.1* 7.2*   < > 7.7* 7.9*   MAG 2.4*  --   --   --  2.4*  --  2.5* 2.3   PHOS 6.6*  --   --   --   --   --  3.4 4.8*    < > = values in this interval not displayed.       CBC -   Recent Labs   Lab Test 03/18/21  0801 03/18/21  0353 03/17/21  1053 03/17/21  0336 03/16/21  0435 03/16/21 0435   WBC  --  13.4*  --  15.3*  --  18.2*   HGB 7.9* 6.9* 7.3* 6.6*   < > 7.1*   PLT  --  287  --  248  --  237    < > = values in this interval not displayed.       LFTs -   Recent Labs   Lab Test 03/18/21  0353 03/17/21  0336 03/16/21 0435   ALKPHOS 294* 248* 195*   BILITOTAL 0.4 0.4 0.4   * 253* 255*   * 215* 189*   PROTTOTAL 5.1* 4.7* 4.5*   ALBUMIN 1.8* 1.6* 1.5*       Current Medications:    amitriptyline  25 mg Oral or Feeding Tube At Bedtime     aspirin  81 mg Oral or NG Tube Daily     B and C vitamin Complex with folic acid  5 mL Oral Daily     fiber modular (NUTRISOURCE FIBER)  1 packet Per Feeding Tube TID     gelatin absorbable  1 each Topical During Hemodialysis (from stock)     melatonin  10 mg Oral or Feeding Tube At Bedtime     metoprolol tartrate  12.5 mg Oral or Feeding Tube BID     pantoprazole  40 mg Oral or NG Tube Daily    Or     pantoprazole  40 mg Oral Daily     propofol          QUEtiapine  75 mg Oral or Feeding Tube At Bedtime     sodium chloride (PF)  9 mL Intracatheter During Hemodialysis (from stock)     sodium chloride (PF)  9 mL Intracatheter During Hemodialysis (from stock)     warfarin ANTICOAGULANT  2.5 mg Oral or Feeding Tube ONCE at 18:00       heparin 1,250 Units/hr (03/18/21 1200)     - MEDICATION INSTRUCTIONS -       Warfarin Therapy Reminder       Andie Chester MD

## 2021-03-19 ENCOUNTER — APPOINTMENT (OUTPATIENT)
Dept: PHYSICAL THERAPY | Facility: CLINIC | Age: 81
DRG: 003 | End: 2021-03-19
Attending: INTERNAL MEDICINE
Payer: MEDICARE

## 2021-03-19 ENCOUNTER — APPOINTMENT (OUTPATIENT)
Dept: OCCUPATIONAL THERAPY | Facility: CLINIC | Age: 81
DRG: 003 | End: 2021-03-19
Attending: INTERNAL MEDICINE
Payer: MEDICARE

## 2021-03-19 ENCOUNTER — APPOINTMENT (OUTPATIENT)
Dept: SPEECH THERAPY | Facility: CLINIC | Age: 81
DRG: 003 | End: 2021-03-19
Attending: INTERNAL MEDICINE
Payer: MEDICARE

## 2021-03-19 ENCOUNTER — APPOINTMENT (OUTPATIENT)
Dept: CT IMAGING | Facility: CLINIC | Age: 81
DRG: 003 | End: 2021-03-19
Attending: INTERNAL MEDICINE
Payer: MEDICARE

## 2021-03-19 LAB
ALBUMIN SERPL-MCNC: 2.2 G/DL (ref 3.4–5)
ALP SERPL-CCNC: 237 U/L (ref 40–150)
ALT SERPL W P-5'-P-CCNC: 161 U/L (ref 0–70)
ANION GAP SERPL CALCULATED.3IONS-SCNC: 11 MMOL/L (ref 3–14)
AST SERPL W P-5'-P-CCNC: 85 U/L (ref 0–45)
BILIRUB DIRECT SERPL-MCNC: 0.2 MG/DL (ref 0–0.2)
BILIRUB SERPL-MCNC: 0.6 MG/DL (ref 0.2–1.3)
BUN SERPL-MCNC: 98 MG/DL (ref 7–30)
CALCIUM SERPL-MCNC: 8.2 MG/DL (ref 8.5–10.1)
CHLORIDE SERPL-SCNC: 97 MMOL/L (ref 94–109)
CO2 SERPL-SCNC: 25 MMOL/L (ref 20–32)
CREAT SERPL-MCNC: 3.65 MG/DL (ref 0.66–1.25)
ERYTHROCYTE [DISTWIDTH] IN BLOOD BY AUTOMATED COUNT: 17.5 % (ref 10–15)
ERYTHROCYTE [DISTWIDTH] IN BLOOD BY AUTOMATED COUNT: 18 % (ref 10–15)
GFR SERPL CREATININE-BSD FRML MDRD: 15 ML/MIN/{1.73_M2}
GLUCOSE BLDC GLUCOMTR-MCNC: 104 MG/DL (ref 70–99)
GLUCOSE BLDC GLUCOMTR-MCNC: 105 MG/DL (ref 70–99)
GLUCOSE SERPL-MCNC: 107 MG/DL (ref 70–99)
HCT VFR BLD AUTO: 22.1 % (ref 40–53)
HCT VFR BLD AUTO: 23.5 % (ref 40–53)
HGB BLD-MCNC: 7.1 G/DL (ref 13.3–17.7)
HGB BLD-MCNC: 7.5 G/DL (ref 13.3–17.7)
HGB BLD-MCNC: 7.8 G/DL (ref 13.3–17.7)
INR PPP: 1.2 (ref 0.86–1.14)
LACTATE BLD-SCNC: 0.6 MMOL/L (ref 0.7–2)
LDH SERPL L TO P-CCNC: 342 U/L (ref 85–227)
MCH RBC QN AUTO: 28.3 PG (ref 26.5–33)
MCH RBC QN AUTO: 28.7 PG (ref 26.5–33)
MCHC RBC AUTO-ENTMCNC: 31.9 G/DL (ref 31.5–36.5)
MCHC RBC AUTO-ENTMCNC: 32.1 G/DL (ref 31.5–36.5)
MCV RBC AUTO: 88 FL (ref 78–100)
MCV RBC AUTO: 90 FL (ref 78–100)
PLATELET # BLD AUTO: 316 10E9/L (ref 150–450)
PLATELET # BLD AUTO: 348 10E9/L (ref 150–450)
POTASSIUM SERPL-SCNC: 5 MMOL/L (ref 3.4–5.3)
PROT SERPL-MCNC: 5.2 G/DL (ref 6.8–8.8)
RBC # BLD AUTO: 2.51 10E12/L (ref 4.4–5.9)
RBC # BLD AUTO: 2.61 10E12/L (ref 4.4–5.9)
SODIUM SERPL-SCNC: 133 MMOL/L (ref 133–144)
UFH PPP CHRO-ACNC: 0.29 IU/ML
UFH PPP CHRO-ACNC: 0.42 IU/ML
UFH PPP CHRO-ACNC: 0.52 IU/ML
WBC # BLD AUTO: 12.7 10E9/L (ref 4–11)
WBC # BLD AUTO: 16.3 10E9/L (ref 4–11)

## 2021-03-19 PROCEDURE — 99232 SBSQ HOSP IP/OBS MODERATE 35: CPT | Mod: GC | Performed by: ANESTHESIOLOGY

## 2021-03-19 PROCEDURE — 80053 COMPREHEN METABOLIC PANEL: CPT | Performed by: THORACIC SURGERY (CARDIOTHORACIC VASCULAR SURGERY)

## 2021-03-19 PROCEDURE — 92526 ORAL FUNCTION THERAPY: CPT | Mod: GN

## 2021-03-19 PROCEDURE — 97530 THERAPEUTIC ACTIVITIES: CPT | Mod: GP

## 2021-03-19 PROCEDURE — 85027 COMPLETE CBC AUTOMATED: CPT | Performed by: STUDENT IN AN ORGANIZED HEALTH CARE EDUCATION/TRAINING PROGRAM

## 2021-03-19 PROCEDURE — 250N000011 HC RX IP 250 OP 636: Performed by: STUDENT IN AN ORGANIZED HEALTH CARE EDUCATION/TRAINING PROGRAM

## 2021-03-19 PROCEDURE — 999N000155 HC STATISTIC RAPCV CVP MONITORING

## 2021-03-19 PROCEDURE — 85610 PROTHROMBIN TIME: CPT | Performed by: THORACIC SURGERY (CARDIOTHORACIC VASCULAR SURGERY)

## 2021-03-19 PROCEDURE — 250N000013 HC RX MED GY IP 250 OP 250 PS 637: Performed by: SURGERY

## 2021-03-19 PROCEDURE — 250N000013 HC RX MED GY IP 250 OP 250 PS 637: Performed by: PHYSICIAN ASSISTANT

## 2021-03-19 PROCEDURE — 70450 CT HEAD/BRAIN W/O DYE: CPT

## 2021-03-19 PROCEDURE — 99232 SBSQ HOSP IP/OBS MODERATE 35: CPT | Mod: 24 | Performed by: INTERNAL MEDICINE

## 2021-03-19 PROCEDURE — 70450 CT HEAD/BRAIN W/O DYE: CPT | Mod: 26 | Performed by: RADIOLOGY

## 2021-03-19 PROCEDURE — 999N001017 HC STATISTIC GLUCOSE BY METER IP

## 2021-03-19 PROCEDURE — 82248 BILIRUBIN DIRECT: CPT | Performed by: THORACIC SURGERY (CARDIOTHORACIC VASCULAR SURGERY)

## 2021-03-19 PROCEDURE — 85027 COMPLETE CBC AUTOMATED: CPT | Performed by: THORACIC SURGERY (CARDIOTHORACIC VASCULAR SURGERY)

## 2021-03-19 PROCEDURE — 250N000011 HC RX IP 250 OP 636

## 2021-03-19 PROCEDURE — 83615 LACTATE (LD) (LDH) ENZYME: CPT | Performed by: THORACIC SURGERY (CARDIOTHORACIC VASCULAR SURGERY)

## 2021-03-19 PROCEDURE — 214N000001 HC R&B CCU UMMC

## 2021-03-19 PROCEDURE — 85018 HEMOGLOBIN: CPT

## 2021-03-19 PROCEDURE — 250N000013 HC RX MED GY IP 250 OP 250 PS 637: Performed by: THORACIC SURGERY (CARDIOTHORACIC VASCULAR SURGERY)

## 2021-03-19 PROCEDURE — 85520 HEPARIN ASSAY: CPT | Performed by: STUDENT IN AN ORGANIZED HEALTH CARE EDUCATION/TRAINING PROGRAM

## 2021-03-19 PROCEDURE — 250N000013 HC RX MED GY IP 250 OP 250 PS 637: Performed by: NURSE PRACTITIONER

## 2021-03-19 PROCEDURE — 250N000013 HC RX MED GY IP 250 OP 250 PS 637: Performed by: INTERNAL MEDICINE

## 2021-03-19 PROCEDURE — 97530 THERAPEUTIC ACTIVITIES: CPT | Mod: GO

## 2021-03-19 PROCEDURE — 250N000013 HC RX MED GY IP 250 OP 250 PS 637: Performed by: STUDENT IN AN ORGANIZED HEALTH CARE EDUCATION/TRAINING PROGRAM

## 2021-03-19 PROCEDURE — 83605 ASSAY OF LACTIC ACID: CPT | Performed by: THORACIC SURGERY (CARDIOTHORACIC VASCULAR SURGERY)

## 2021-03-19 PROCEDURE — 85520 HEPARIN ASSAY: CPT | Performed by: THORACIC SURGERY (CARDIOTHORACIC VASCULAR SURGERY)

## 2021-03-19 RX ORDER — MIDODRINE HYDROCHLORIDE 5 MG/1
10 TABLET ORAL
Status: DISCONTINUED | OUTPATIENT
Start: 2021-03-19 | End: 2021-03-21

## 2021-03-19 RX ORDER — QUETIAPINE FUMARATE 25 MG/1
100 TABLET, FILM COATED ORAL AT BEDTIME
Status: DISCONTINUED | OUTPATIENT
Start: 2021-03-19 | End: 2021-03-23

## 2021-03-19 RX ORDER — WARFARIN SODIUM 2.5 MG/1
2.5 TABLET ORAL
Status: COMPLETED | OUTPATIENT
Start: 2021-03-19 | End: 2021-03-19

## 2021-03-19 RX ORDER — ONDANSETRON 4 MG/1
4 TABLET, FILM COATED ORAL EVERY 6 HOURS PRN
Status: DISCONTINUED | OUTPATIENT
Start: 2021-03-19 | End: 2021-04-05 | Stop reason: HOSPADM

## 2021-03-19 RX ORDER — DIPHENHYDRAMINE HCL 25 MG
25 CAPSULE ORAL EVERY 6 HOURS PRN
Status: DISCONTINUED | OUTPATIENT
Start: 2021-03-19 | End: 2021-03-21

## 2021-03-19 RX ORDER — DIPHENHYDRAMINE HYDROCHLORIDE 50 MG/ML
25 INJECTION INTRAMUSCULAR; INTRAVENOUS EVERY 6 HOURS PRN
Status: DISCONTINUED | OUTPATIENT
Start: 2021-03-19 | End: 2021-03-21

## 2021-03-19 RX ORDER — QUETIAPINE FUMARATE 25 MG/1
25 TABLET, FILM COATED ORAL DAILY
Status: DISCONTINUED | OUTPATIENT
Start: 2021-03-19 | End: 2021-03-21

## 2021-03-19 RX ORDER — ONDANSETRON 2 MG/ML
4 INJECTION INTRAMUSCULAR; INTRAVENOUS EVERY 6 HOURS PRN
Status: DISCONTINUED | OUTPATIENT
Start: 2021-03-19 | End: 2021-04-05 | Stop reason: HOSPADM

## 2021-03-19 RX ADMIN — PANTOPRAZOLE SODIUM 40 MG: 40 TABLET, DELAYED RELEASE ORAL at 07:50

## 2021-03-19 RX ADMIN — ACETAMINOPHEN 650 MG: 325 TABLET, FILM COATED ORAL at 07:48

## 2021-03-19 RX ADMIN — HEPARIN SODIUM 1400 UNITS/HR: 10000 INJECTION, SOLUTION INTRAVENOUS at 05:10

## 2021-03-19 RX ADMIN — WARFARIN SODIUM 2.5 MG: 2.5 TABLET ORAL at 17:36

## 2021-03-19 RX ADMIN — Medication 1 PACKET: at 21:16

## 2021-03-19 RX ADMIN — QUETIAPINE 25 MG: 25 TABLET ORAL at 11:50

## 2021-03-19 RX ADMIN — Medication 1 MG: at 11:50

## 2021-03-19 RX ADMIN — Medication 1 PACKET: at 13:26

## 2021-03-19 RX ADMIN — MIDODRINE HYDROCHLORIDE 10 MG: 5 TABLET ORAL at 11:50

## 2021-03-19 RX ADMIN — Medication 1 PACKET: at 07:49

## 2021-03-19 RX ADMIN — Medication 50 MG: at 13:30

## 2021-03-19 RX ADMIN — MIDODRINE HYDROCHLORIDE 10 MG: 5 TABLET ORAL at 17:35

## 2021-03-19 RX ADMIN — ASPIRIN 81 MG CHEWABLE TABLET 81 MG: 81 TABLET CHEWABLE at 07:49

## 2021-03-19 RX ADMIN — ONDANSETRON 4 MG: 2 INJECTION INTRAMUSCULAR; INTRAVENOUS at 21:09

## 2021-03-19 RX ADMIN — Medication 50 MG: at 07:48

## 2021-03-19 RX ADMIN — Medication 5 ML: at 07:49

## 2021-03-19 ASSESSMENT — ACTIVITIES OF DAILY LIVING (ADL)
ADLS_ACUITY_SCORE: 21

## 2021-03-19 ASSESSMENT — MIFFLIN-ST. JEOR: SCORE: 1601.13

## 2021-03-19 NOTE — CONSULTS
"    Interventional Radiology Consult Service Note    Patient is on IR schedule 3/22 for a TCVC placement for HD.   Labs WNL for procedure. COVID neg. INR goal <3.0.  Orders for NPO, scrubs and antibiotics have been entered.   Consent will be done prior to procedure.     Please contact the IR charge RN at [phone] for estimated time of procedure.     Case discussed with Dr. Arriola. This is a 80 year old with PMH HTN, AAA, HLD, RA who presented to OSH with SOB and atypical CP. TTE showed severe MR with ?posterior flail leaflet, EF preserved. On arrival at [facility] 3/4 an Impella was emergently placed. Now s/p MVR and single vessel CABG on 3/5/21. Returned from the OR on central ECMO with an open chest, now s/p decannulation on 3/9. Nephrology consulted for ADAN management and need for CRRT. Currently getting HD through left subclavian line. IR is consulted for TCVC placement due to concern for long-term dialysis needs. IR will follow-up with care team prior to line placement to ensure this is clinically indicated.    Expected date of discharge: TBD    Vitals:   /77   Pulse 93   Temp 97.8  F (36.6  C)   Resp 18   Ht 1.803 m (5' 11\")   Wt 86.9 kg (191 lb 9.3 oz)   SpO2 96%   BMI 26.72 kg/m      Pertinent Labs:     Lab Results   Component Value Date    WBC 12.7 (H) 03/19/2021    WBC 13.4 (H) 03/18/2021    WBC 15.3 (H) 03/17/2021       Lab Results   Component Value Date    HGB 7.1 03/19/2021    HGB 7.9 03/18/2021    HGB 6.9 03/18/2021       Lab Results   Component Value Date     03/19/2021     03/18/2021     03/17/2021       Lab Results   Component Value Date    INR 1.20 (H) 03/19/2021    PTT 76 (H) 03/13/2021       Lab Results   Component Value Date    POTASSIUM 5.0 03/19/2021        TIMOTHY Alves CNP  Interventional Radiology  Pager: [phone]    "

## 2021-03-19 NOTE — PLAN OF CARE
ICU End of Shift Summary. See flowsheets for vital signs and detailed assessment.    Changes this shift: VS stable. 50 ml UOP and unmeasured void x1. 200 ml stool output. TF continued at goal. HepXa subtherapeutic. Bolus and gtt increased per protocol. Oriented to self only.Combative and agitated at beginning of shift. Attempting to get out of bed. Decreased agitation after scheduled seroquel, melatonin, and amytriptyline given. Restless again this am and speech more garbled/slurred. PERRL. Inconsistently following commands. CVTS resident updated. Sitter at bedside.     Plan: Recheck HepXa at 1030. Transfer to floor when bed available.

## 2021-03-19 NOTE — PROGRESS NOTES
Transfer  Transferred from:    Via:bed  Reason for transfer:Pt appropriate for 6C- improved patient condition  Family: Aware of transfer  Belongings: Sent with pt  Chart: Sent with pt  Medications: Meds from bin sent with pt  Report called from: Ananda ESPINOZA

## 2021-03-19 NOTE — PROGRESS NOTES
CV ICU PROGRESS NOTE  March 19, 2021      CO-MORBIDITIES:   Cardiogenic shock (H)  (primary encounter diagnosis)  Status post cardiac surgery  Status post cardiac surgery    ASSESSMENT: Jin Garrett is a 80 year old male with PMH of HTN, AAA, HLD and rheumatoid arthritis. He presented to an OSH with shortness of breath and atypical chest pain. TTE showed severe MR with concern for posterior flail leaflet, EF was preserved. On arrival at [facility] an Impella was emergently placed in cath lab. 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 Porras on 3/5/21. Returned from the OR on central ECMO with an open chest, intubated and sedated. Returned to the OR for a wash out on 3/9, found to have a thrombus on his mitral valve so he had the valve replaced, decannulated central ECMO, bronchoscopy and placement of left subclavian dialysis line.        TODAY'S PROGRESS:   Transfer to floor when bed available  Increase seroquel to 100 HS add seroquel 25 daily   Schedule midodrine 10mg q8h  Discuss starting epogen with renal   Iron studies sent  Repeat CBC   Plan for tunneled HD catheter on Monday (IR will place as long as INR <3)      PLAN:  Neuro/ pain/ sedation:  Post-operative pain  Delerium  - Monitor neurological status. Notify the MD for any acute changes in exam.  - APAP  - Not sleeping, confused. Sleeping during the day   - Increase Seroquel 100 mg at bedtime  - Add Seroquel 25 mg daily  - Increase melatonin 10 mg in pm   - Restart PTA amitriptyline     Pulmonary care:  Acute post-operative respiratory failure, resolved  - Extubated 3/12 to 4L/nc   - Wean O2 as tolerated, on room air   - IS      Cardiovascular:  HTN  AAA  HLD  Hx of A fib ~ 10 years ago and on admission with MI   Now with post-op atrial fib with RVR 3/16  Severe mitral regurgitation s/p MVR 3/5  CAD with 100% occlusion of the mid LCx s/p single vessel CABG 3/5  Cardiogenic shock - resolved  Vasoplegic shock - resolved  VA ECMO (central)   S/p  "redo MVR due to thrombosis, removal of ECMO 3/9, and wound closure 3/9  - Monitor hemodynamic status.   - MAP >65  - ASA 81  - Decrease Metoprolol to 12.5 BID, hold for b/p < 100 systolic or HR <55  - Continue heparin  - Continue warfarin   - Midodrine 10mg TID and prn with HD  - Received IV amiodarone load 3/15-16, transitioned to 400 mg every day po 3/16 pm   - TSH 1.56, LFT\"s rising: continue to hold Lipitor and discontinue amiodarone      GI/Nutrition:   - TF through NJ at goal  - Holding Bowel regimen with loose stools and rectal tube   - Speech consulted, NPO with ice chips per recs 3/13  - Add fiber to tube feeds   - C diff negative 3/18    Fluids/ Electrolytes/Renal:  ADAN c/w ATN post-cardiogenic shock   Hyperkalemia  - Will continue to monitor intake and output.  - Continue to monitor electrolytes  - Started CRRT 3/6, stopped 3/12  - Started iHD 3/13, nephrology following   - Creat better today at 3.85 (4.97)    - Last dialysis 3/16, unable to pull fluid. 24 I/O net +1.5 L, wt up 6 kg from admission.   - Discuss starting epogen with renal   - Plan for tunneled HD catheter on Monday (IR will place as long as INR <3)     Endocrine:  Stress hyperglycemia    TSH wnl   - Keep blood glucose <180  - stopped sliding scale insulin 3/16     ID/ Antibiotics:  Leukocytosis - likely secondary to physiologic stress  - Complete perioperative/open chest antibiotics: cefepime and vancomycin for 7 days or 24 hours after chest closure, whichever is shorter last day 3/11  - Sputum-culture 3/9 - NGTD  - Blood cultures - NGTD     Heme:  Acute blood loss anemia  Hgb continues to drift down likely due to HD      LUE US 3/14 with nonocclusive thrombus in right cephalic vein, no UE DVT   - Hgb goal >8,   - Daily CBC  - Continue heparin gtt for prior mitral valve thrombosis  - Continue warfarin  - Discuss starting epogen with renal   - Iron studies sent  - Repeat CBC     Musculoskeletal:   Recent hip surgery  - No weight bearing or " activity restrictions related to recent hip surgery per family (records not available)   - PT/OT      Prophylaxis:    - Mechanical prophylaxis for DVT.   - PRN Bowel regimen  - Heparin gtt  - PPI     Lines/ tubes/ drains:  - HD line  - Pleural tube  - PICC     Disposition:  - Floor when bed available     Patient seen, findings and plan discussed with CV ICU staff, Dr. Omer.    Britney Arriola MD  Anesthesiology Resident, CA-2    ====================================    SUBJECTIVE:   Agitated and delirious overnight, endorses some pain this morning.       OBJECTIVE:   1. VITAL SIGNS:   Temp:  [97  F (36.1  C)-99.7  F (37.6  C)] 99.7  F (37.6  C)  Pulse:  [] 92  Resp:  [8-29] 25  BP: ()/() 96/78  SpO2:  [92 %-100 %] 93 %  Resp: 25      2. INTAKE/ OUTPUT:   I/O last 3 completed shifts:  In: 3005 [I.V.:300; Other:200; NG/GT:690]  Out: 680 [Urine:150; Stool:500; Chest Tube:30]    3. PHYSICAL EXAMINATION:   General: NAD  Neuro: Alert, oriented only to self  Resp: Breathing non-labored at rest   CV: Irregularly irregular  Abdomen: Soft, Non-distended, Non-tender  Incisions: C/D/I  Chest tube dressing WDI. Minimal serosanguinous output    Extremities: warm and well perfused    4. INVESTIGATIONS:   Arterial Blood Gases   Recent Labs   Lab 03/12/21  0815   PH 7.45   PCO2 38   PO2 100   HCO3 26     Complete Blood Count   Recent Labs   Lab 03/19/21  0352 03/18/21  0801 03/18/21  0353 03/17/21  1053 03/17/21  0336 03/16/21  0435 03/16/21  0435   WBC 12.7*  --  13.4*  --  15.3*  --  18.2*   HGB 7.1* 7.9* 6.9* 7.3* 6.6*   < > 7.1*     --  287  --  248  --  237    < > = values in this interval not displayed.     Basic Metabolic Panel  Recent Labs   Lab 03/19/21  0352 03/18/21  0801 03/18/21  0353 03/17/21  0336 03/16/21  0435     --  136 136 143   POTASSIUM 5.0 5.7* 5.7* 4.8 4.6   CHLORIDE 97  --  100 101 108   CO2 25  --  22 25 21   BUN 98*  --  160* 118* 179*   CR 3.65*  --  5.05* 3.85*  4.97*   *  --  150* 114* 138*     Liver Function Tests  Recent Labs   Lab 03/19/21  0352 03/18/21  0353 03/17/21  0336 03/16/21  0435   AST 85* 133* 215* 189*   * 231* 253* 255*   ALKPHOS 237* 294* 248* 195*   BILITOTAL 0.6 0.4 0.4 0.4   ALBUMIN 2.2* 1.8* 1.6* 1.5*   INR 1.20* 1.31*  --   --      Pancreatic Enzymes  No lab results found in last 7 days.  Coagulation Profile  Recent Labs   Lab 03/19/21  0352 03/18/21  0353 03/13/21  0334 03/12/21  1504   INR 1.20* 1.31*  --   --    PTT  --   --  76* 51*         5. RADIOLOGY:   No results found for this or any previous visit (from the past 24 hour(s)).    =========================================

## 2021-03-19 NOTE — PROGRESS NOTES
SHIFT SUMMARY [id number]     PMH HTN, AAA, HLD, RA who presented to OSH with SOB and atypical CP. Followed by Nephrology for ADAN.     Neuro: language garbled, illogical. Unable to assess orientation. Pt delirious, per daughters report pt has not slept the last two nights, and small amounts during day. CT scan performed today, no acute findings.   Cardiac: Afebrile, VSS. SR-ST with PAC's.    Respiratory: 1L NC; can wean as tolerated.   GI/: External catheter, rectal tube in place.    Diet/appetite: Tolerating Strict NPO diet.   Activity: Up with lift device, reposition q2h   Pain: has PRN tramadol and dilaudid available, pt now looks comfortable after repositioning to the right.   Skin: No new deficits noted. Mepilex in place sacrum. Edema and bruising in L forearm. 2+ edema in BLE.   Lines: R DL PICC, L CVC port.   Drains: external cath to suction, CTx1 Pleural to water seal. Rectal tube to gravity.  Replacement: none     Midodrine scheduled TID.    Heparin running @ 1400 units/hr; next 10a check tomorrow am.     TF running @ goal rate of 55ml/hr. 30ml flushes q4h. NJ Enteral feedings stopped @ 3113-0674 d/t clogging; clog zapper in use, resumed feeds at 1900.    Pt has been resting comfortably, will continue to monitor and follow plan of care. CVTS pt.

## 2021-03-19 NOTE — PROGRESS NOTES
CLINICAL NUTRITION SERVICES - REASSESSMENT NOTE     Nutrition Prescription    Recommendations:  Diet per SLP     Malnutrition Status:    Does not meet criteria for malnutrition     Future Recommendations:  - RD to adjust TF provisions if/when diet advanced per SLP. Recommend maintaining FT until pt consuming 1700 kcal and 100 g protein daily (ie 75% minimum needs)        EVALUATION OF THE PROGRESS TOWARD GOALS   Diet: NPO (did not pass bedside swallow, SLP following)   Nutrition Support: Novasource Renal @ 55 ml/hr + Prosource (1 pkt TID) + Nutrisource fiber (1 pkt TID) via NDT to provide 2760 kcal (30 kcal/kg), 153 g protein (1.7 g/kg), 242 g CHO and 9 fiber daily.  Micronutrients: Renal multivitamin daily    Intake: TF @ goal rate, currently. Overall, pt received 100% of minimum protein-energy needs from TF provisions over the past week.      NEW FINDINGS   Renal: iHD. Today, K 50    GI: 250-800 ml stool/day despite added fiber supplementation. C diff (-) on 3/18.      MALNUTRITION  % Intake: No decreased intake noted  % Weight Loss: None noted since admit   Subcutaneous Fat Loss: None observed  Muscle Loss: Does not meet criteria. Noted loss of scapular bone: Mild, Upper arm (bicep, tricep): Mild and Lower arm  (forearm): Mild -- (suspect baseline sarcopenia).   Fluid Accumulation/Edema: Does not meet criteria  Malnutrition Diagnosis: Patient does not meet two of the established criteria necessary for diagnosing malnutrition    Previous Goals   Total avg nutritional intake to meet a minimum of 25 kcal/kg and 1.5 g PRO/kg daily (per dosing wt 91 kg).  Evaluation: Met    Previous Nutrition Diagnosis  Inadequate protein-energy intake  Evaluation: Improving    CURRENT NUTRITION DIAGNOSIS  Inadequate oral intake related to unsafe swallow as evidenced by NPO status dependent on enteral nutrition support to meet 100% estimated needs.      INTERVENTIONS  None additionally at this time. See future recommendations top of  progress note     Goals  Total avg nutritional intake to meet a minimum of 25 kcal/kg and 1.5 g PRO/kg daily (per dosing wt 91 kg).    Monitoring/Evaluation  Progress toward goals will be monitored and evaluated per protocol.    Ashley Ortiz RD, LD  [id number]  Pgr: [phone]

## 2021-03-19 NOTE — PROGRESS NOTES
"  Nephrology Progress Note  03/19/2021         Assessment & Recommendations:   Jin Garrett is a 80 year old with PMH HTN, AAA, HLD, RA who presented to OSH with SOB and atypical CP. TTE showed severe MR with ?posterior flail leaflet, EF preserved. On arrival at [facility] an Impella was emergently placed. S/p MVR and single vessel CABG on 3/5/21. Returned from the OR on central ECMO with an open chest, now s/p decannulation on 3/9. Nephrology consulted for ADAN.     ADAN in the setting of cardiogenic shock, was on ECMO, s/p decannulation on 3/9  Unknown baseline, no h/o CKD per daughter. 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 Etiology likely ATN in the setting of shock, no obstruction per CT and urinalysis showed granular casts which is c/w ATN. UOP declined and K rising  on 3/6, so initiated on CRRT. continued to he oligoanuric.  - discontinued CRRT on 3/12  - initiated HD on 3/13   - will daily evaluate for renal recovery and HD needs  - No acute indication for HD today based on her labs or exam   - Q day bladder scans  - Avoid nephrotoxins  - Renally dose medications  - Renal diet  - Daily weights  - Strict I/Os      Recommendations were communicated to primary team via this note     Seen and discussed with Dr. Kriss Chester MD   [phone]    Interval History :   Nursing and provider notes from last 24 hours reviewed.  In the last 24 hours Jin Garrett been stable. Continued to be confused, but denied being in pain or having SOB    Physical Exam:   I/O last 3 completed shifts:  In: 2567 [I.V.:302; Other:200; NG/GT:670]  Out: 630 [Urine:200; Stool:400; Chest Tube:30]   /77   Pulse 93   Temp 97.8  F (36.6  C)   Resp 18   Ht 1.803 m (5' 11\")   Wt 86.9 kg (191 lb 9.3 oz)   SpO2 96%   BMI 26.72 kg/m       General : Pt awake, not in acute distress   Lungs : anterior lung fields are clear  Cardiac : S1, S2 " present  Abdomen : Soft/ND/NT  LE : no Edema noted  Dialysis Access : right subclavian non tunneled cath    Labs:   All labs reviewed by me  Electrolytes/Renal -   Recent Labs   Lab Test 03/19/21  0352 03/18/21  0801 03/18/21  0353 03/17/21  0336 03/16/21  0005 03/16/21  0005 03/12/21  0355 03/12/21  0355 03/11/21  2210     --  136 136   < > 143   < > 139 142   POTASSIUM 5.0 5.7* 5.7* 4.8   < > 4.6   < > 3.6 3.6   CHLORIDE 97  --  100 101   < > 109   < > 107 110*   CO2 25  --  22 25   < > 23   < > 25 24   BUN 98*  --  160* 118*   < > 174*   < > 49* 49*   CR 3.65*  --  5.05* 3.85*   < > 4.90*   < > 1.57* 1.57*   *  --  150* 114*   < > 136*   < > 123* 118*   PALLAVI 8.2*  --  8.2* 7.8*   < > 7.2*   < > 7.7* 7.9*   MAG  --  2.4*  --   --   --  2.4*  --  2.5* 2.3   PHOS  --  6.6*  --   --   --   --   --  3.4 4.8*    < > = values in this interval not displayed.       CBC -   Recent Labs   Lab Test 03/19/21 0352 03/18/21  0801 03/18/21 0353 03/17/21 0336 03/17/21 0336   WBC 12.7*  --  13.4*  --  15.3*   HGB 7.1* 7.9* 6.9*   < > 6.6*     --  287  --  248    < > = values in this interval not displayed.       LFTs -   Recent Labs   Lab Test 03/19/21 0352 03/18/21 0353 03/17/21 0336   ALKPHOS 237* 294* 248*   BILITOTAL 0.6 0.4 0.4   * 231* 253*   AST 85* 133* 215*   PROTTOTAL 5.2* 5.1* 4.7*   ALBUMIN 2.2* 1.8* 1.6*     Current Medications:    amitriptyline  25 mg Oral or Feeding Tube At Bedtime     aspirin  81 mg Oral or NG Tube Daily     B and C vitamin Complex with folic acid  5 mL Oral Daily     fiber modular (NUTRISOURCE FIBER)  1 packet Per Feeding Tube TID     melatonin  10 mg Oral or Feeding Tube At Bedtime     metoprolol tartrate  12.5 mg Oral or Feeding Tube BID     midodrine  10 mg Oral TID w/meals     pantoprazole  40 mg Oral or NG Tube Daily    Or     pantoprazole  40 mg Oral Daily     QUEtiapine  100 mg Oral or Feeding Tube At Bedtime     QUEtiapine  25 mg Oral Daily     warfarin  ANTICOAGULANT  2.5 mg Oral ONCE at 18:00       heparin 1,400 Units/hr (03/19/21 0600)     - MEDICATION INSTRUCTIONS -       Warfarin Therapy Reminder       Andie Chester MD

## 2021-03-20 ENCOUNTER — APPOINTMENT (OUTPATIENT)
Dept: GENERAL RADIOLOGY | Facility: CLINIC | Age: 81
DRG: 003 | End: 2021-03-20
Attending: INTERNAL MEDICINE
Payer: MEDICARE

## 2021-03-20 ENCOUNTER — APPOINTMENT (OUTPATIENT)
Dept: GENERAL RADIOLOGY | Facility: CLINIC | Age: 81
DRG: 003 | End: 2021-03-20
Attending: NURSE PRACTITIONER
Payer: MEDICARE

## 2021-03-20 ENCOUNTER — APPOINTMENT (OUTPATIENT)
Dept: SPEECH THERAPY | Facility: CLINIC | Age: 81
DRG: 003 | End: 2021-03-20
Attending: INTERNAL MEDICINE
Payer: MEDICARE

## 2021-03-20 LAB
ABO + RH BLD: NORMAL
ABO + RH BLD: NORMAL
ALBUMIN SERPL-MCNC: 2 G/DL (ref 3.4–5)
ALP SERPL-CCNC: 210 U/L (ref 40–150)
ALT SERPL W P-5'-P-CCNC: 117 U/L (ref 0–70)
ANION GAP SERPL CALCULATED.3IONS-SCNC: 13 MMOL/L (ref 3–14)
ANION GAP SERPL CALCULATED.3IONS-SCNC: 8 MMOL/L (ref 3–14)
AST SERPL W P-5'-P-CCNC: 53 U/L (ref 0–45)
BASOPHILS # BLD AUTO: 0 10E9/L (ref 0–0.2)
BASOPHILS NFR BLD AUTO: 0.3 %
BILIRUB DIRECT SERPL-MCNC: 0.2 MG/DL (ref 0–0.2)
BILIRUB SERPL-MCNC: 0.6 MG/DL (ref 0.2–1.3)
BLD GP AB SCN SERPL QL: NORMAL
BLD PROD TYP BPU: NORMAL
BLD PROD TYP BPU: NORMAL
BLD UNIT ID BPU: 0
BLOOD BANK CMNT PATIENT-IMP: NORMAL
BLOOD PRODUCT CODE: NORMAL
BPU ID: NORMAL
BUN SERPL-MCNC: 139 MG/DL (ref 7–30)
BUN SERPL-MCNC: 79 MG/DL (ref 7–30)
CALCIUM SERPL-MCNC: 8.2 MG/DL (ref 8.5–10.1)
CALCIUM SERPL-MCNC: 8.6 MG/DL (ref 8.5–10.1)
CHLORIDE SERPL-SCNC: 96 MMOL/L (ref 94–109)
CHLORIDE SERPL-SCNC: 98 MMOL/L (ref 94–109)
CO2 SERPL-SCNC: 22 MMOL/L (ref 20–32)
CO2 SERPL-SCNC: 27 MMOL/L (ref 20–32)
CREAT SERPL-MCNC: 3.39 MG/DL (ref 0.66–1.25)
CREAT SERPL-MCNC: 5.02 MG/DL (ref 0.66–1.25)
DIFFERENTIAL METHOD BLD: ABNORMAL
EOSINOPHIL # BLD AUTO: 0.2 10E9/L (ref 0–0.7)
EOSINOPHIL NFR BLD AUTO: 1.4 %
ERYTHROCYTE [DISTWIDTH] IN BLOOD BY AUTOMATED COUNT: 17.3 % (ref 10–15)
FERRITIN SERPL-MCNC: 1302 NG/ML (ref 26–388)
GFR SERPL CREATININE-BSD FRML MDRD: 10 ML/MIN/{1.73_M2}
GFR SERPL CREATININE-BSD FRML MDRD: 16 ML/MIN/{1.73_M2}
GLUCOSE BLDC GLUCOMTR-MCNC: 86 MG/DL (ref 70–99)
GLUCOSE SERPL-MCNC: 112 MG/DL (ref 70–99)
GLUCOSE SERPL-MCNC: 86 MG/DL (ref 70–99)
HCT VFR BLD AUTO: 22.3 % (ref 40–53)
HGB BLD-MCNC: 7 G/DL (ref 13.3–17.7)
IMM GRANULOCYTES # BLD: 0.1 10E9/L (ref 0–0.4)
IMM GRANULOCYTES NFR BLD: 0.7 %
INR PPP: 1.31 (ref 0.86–1.14)
IRON SATN MFR SERPL: 6 % (ref 15–46)
IRON SERPL-MCNC: 11 UG/DL (ref 35–180)
LACTATE BLD-SCNC: 0.5 MMOL/L (ref 0.7–2)
LDH SERPL L TO P-CCNC: 408 U/L (ref 85–227)
LYMPHOCYTES # BLD AUTO: 0.9 10E9/L (ref 0.8–5.3)
LYMPHOCYTES NFR BLD AUTO: 5.6 %
MCH RBC QN AUTO: 29 PG (ref 26.5–33)
MCHC RBC AUTO-ENTMCNC: 31.4 G/DL (ref 31.5–36.5)
MCV RBC AUTO: 93 FL (ref 78–100)
MONOCYTES # BLD AUTO: 1 10E9/L (ref 0–1.3)
MONOCYTES NFR BLD AUTO: 6.7 %
NEUTROPHILS # BLD AUTO: 13 10E9/L (ref 1.6–8.3)
NEUTROPHILS NFR BLD AUTO: 85.3 %
NRBC # BLD AUTO: 0 10*3/UL
NRBC BLD AUTO-RTO: 0 /100
NUM BPU REQUESTED: 3
PLATELET # BLD AUTO: 333 10E9/L (ref 150–450)
POTASSIUM SERPL-SCNC: 4.8 MMOL/L (ref 3.4–5.3)
POTASSIUM SERPL-SCNC: 6.1 MMOL/L (ref 3.4–5.3)
POTASSIUM SERPL-SCNC: 6.4 MMOL/L (ref 3.4–5.3)
PROT SERPL-MCNC: 5.4 G/DL (ref 6.8–8.8)
RBC # BLD AUTO: 2.41 10E12/L (ref 4.4–5.9)
SODIUM SERPL-SCNC: 131 MMOL/L (ref 133–144)
SODIUM SERPL-SCNC: 133 MMOL/L (ref 133–144)
SPECIMEN EXP DATE BLD: NORMAL
TIBC SERPL-MCNC: 185 UG/DL (ref 240–430)
TRANSFERRIN SERPL-MCNC: 141 MG/DL (ref 210–360)
TRANSFUSION STATUS PATIENT QL: NORMAL
TRANSFUSION STATUS PATIENT QL: NORMAL
UFH PPP CHRO-ACNC: 0.21 IU/ML
UFH PPP CHRO-ACNC: 0.5 IU/ML
WBC # BLD AUTO: 15.2 10E9/L (ref 4–11)

## 2021-03-20 PROCEDURE — 999N000065 XR ABDOMEN PORT 1 VW

## 2021-03-20 PROCEDURE — 85025 COMPLETE CBC W/AUTO DIFF WBC: CPT | Performed by: NURSE PRACTITIONER

## 2021-03-20 PROCEDURE — 92526 ORAL FUNCTION THERAPY: CPT | Mod: GN

## 2021-03-20 PROCEDURE — 999N001017 HC STATISTIC GLUCOSE BY METER IP

## 2021-03-20 PROCEDURE — 250N000013 HC RX MED GY IP 250 OP 250 PS 637: Performed by: THORACIC SURGERY (CARDIOTHORACIC VASCULAR SURGERY)

## 2021-03-20 PROCEDURE — 250N000013 HC RX MED GY IP 250 OP 250 PS 637: Performed by: SURGERY

## 2021-03-20 PROCEDURE — 250N000013 HC RX MED GY IP 250 OP 250 PS 637: Performed by: PHYSICIAN ASSISTANT

## 2021-03-20 PROCEDURE — 250N000011 HC RX IP 250 OP 636: Performed by: STUDENT IN AN ORGANIZED HEALTH CARE EDUCATION/TRAINING PROGRAM

## 2021-03-20 PROCEDURE — 214N000001 HC R&B CCU UMMC

## 2021-03-20 PROCEDURE — 83550 IRON BINDING TEST: CPT | Performed by: THORACIC SURGERY (CARDIOTHORACIC VASCULAR SURGERY)

## 2021-03-20 PROCEDURE — 85520 HEPARIN ASSAY: CPT | Performed by: THORACIC SURGERY (CARDIOTHORACIC VASCULAR SURGERY)

## 2021-03-20 PROCEDURE — 80053 COMPREHEN METABOLIC PANEL: CPT | Performed by: THORACIC SURGERY (CARDIOTHORACIC VASCULAR SURGERY)

## 2021-03-20 PROCEDURE — 74018 RADEX ABDOMEN 1 VIEW: CPT | Mod: 26 | Performed by: RADIOLOGY

## 2021-03-20 PROCEDURE — 93010 ELECTROCARDIOGRAM REPORT: CPT | Performed by: INTERNAL MEDICINE

## 2021-03-20 PROCEDURE — 250N000013 HC RX MED GY IP 250 OP 250 PS 637: Performed by: INTERNAL MEDICINE

## 2021-03-20 PROCEDURE — 71045 X-RAY EXAM CHEST 1 VIEW: CPT

## 2021-03-20 PROCEDURE — 258N000003 HC RX IP 258 OP 636: Performed by: STUDENT IN AN ORGANIZED HEALTH CARE EDUCATION/TRAINING PROGRAM

## 2021-03-20 PROCEDURE — 84132 ASSAY OF SERUM POTASSIUM: CPT | Performed by: NURSE PRACTITIONER

## 2021-03-20 PROCEDURE — 36415 COLL VENOUS BLD VENIPUNCTURE: CPT | Performed by: THORACIC SURGERY (CARDIOTHORACIC VASCULAR SURGERY)

## 2021-03-20 PROCEDURE — 82728 ASSAY OF FERRITIN: CPT | Performed by: THORACIC SURGERY (CARDIOTHORACIC VASCULAR SURGERY)

## 2021-03-20 PROCEDURE — 82248 BILIRUBIN DIRECT: CPT | Performed by: THORACIC SURGERY (CARDIOTHORACIC VASCULAR SURGERY)

## 2021-03-20 PROCEDURE — 80048 BASIC METABOLIC PNL TOTAL CA: CPT | Performed by: NURSE PRACTITIONER

## 2021-03-20 PROCEDURE — 93005 ELECTROCARDIOGRAM TRACING: CPT

## 2021-03-20 PROCEDURE — 83615 LACTATE (LD) (LDH) ENZYME: CPT | Performed by: THORACIC SURGERY (CARDIOTHORACIC VASCULAR SURGERY)

## 2021-03-20 PROCEDURE — 90937 HEMODIALYSIS REPEATED EVAL: CPT

## 2021-03-20 PROCEDURE — 36592 COLLECT BLOOD FROM PICC: CPT | Performed by: THORACIC SURGERY (CARDIOTHORACIC VASCULAR SURGERY)

## 2021-03-20 PROCEDURE — 36592 COLLECT BLOOD FROM PICC: CPT | Performed by: NURSE PRACTITIONER

## 2021-03-20 PROCEDURE — 85610 PROTHROMBIN TIME: CPT | Performed by: THORACIC SURGERY (CARDIOTHORACIC VASCULAR SURGERY)

## 2021-03-20 PROCEDURE — P9016 RBC LEUKOCYTES REDUCED: HCPCS | Performed by: INTERNAL MEDICINE

## 2021-03-20 PROCEDURE — 250N000013 HC RX MED GY IP 250 OP 250 PS 637: Performed by: NURSE PRACTITIONER

## 2021-03-20 PROCEDURE — 83540 ASSAY OF IRON: CPT | Performed by: THORACIC SURGERY (CARDIOTHORACIC VASCULAR SURGERY)

## 2021-03-20 PROCEDURE — 99232 SBSQ HOSP IP/OBS MODERATE 35: CPT | Mod: 24 | Performed by: INTERNAL MEDICINE

## 2021-03-20 PROCEDURE — 250N000013 HC RX MED GY IP 250 OP 250 PS 637: Performed by: STUDENT IN AN ORGANIZED HEALTH CARE EDUCATION/TRAINING PROGRAM

## 2021-03-20 PROCEDURE — 83605 ASSAY OF LACTIC ACID: CPT | Performed by: THORACIC SURGERY (CARDIOTHORACIC VASCULAR SURGERY)

## 2021-03-20 PROCEDURE — 71045 X-RAY EXAM CHEST 1 VIEW: CPT | Mod: 26 | Performed by: RADIOLOGY

## 2021-03-20 PROCEDURE — 84466 ASSAY OF TRANSFERRIN: CPT | Performed by: THORACIC SURGERY (CARDIOTHORACIC VASCULAR SURGERY)

## 2021-03-20 RX ORDER — WARFARIN SODIUM 4 MG/1
4 TABLET ORAL
Status: COMPLETED | OUTPATIENT
Start: 2021-03-20 | End: 2021-03-20

## 2021-03-20 RX ADMIN — Medication 50 MG: at 20:53

## 2021-03-20 RX ADMIN — QUETIAPINE 25 MG: 25 TABLET ORAL at 15:10

## 2021-03-20 RX ADMIN — WARFARIN SODIUM 4 MG: 4 TABLET ORAL at 17:16

## 2021-03-20 RX ADMIN — Medication 5 ML: at 15:11

## 2021-03-20 RX ADMIN — HEPARIN SODIUM 1400 UNITS/HR: 10000 INJECTION, SOLUTION INTRAVENOUS at 22:31

## 2021-03-20 RX ADMIN — PANTOPRAZOLE SODIUM 40 MG: 40 TABLET, DELAYED RELEASE ORAL at 17:15

## 2021-03-20 RX ADMIN — QUETIAPINE 100 MG: 25 TABLET ORAL at 20:53

## 2021-03-20 RX ADMIN — QUETIAPINE 100 MG: 25 TABLET ORAL at 02:32

## 2021-03-20 RX ADMIN — ACETAMINOPHEN 650 MG: 325 TABLET, FILM COATED ORAL at 20:52

## 2021-03-20 RX ADMIN — SODIUM CHLORIDE 250 ML: 9 INJECTION, SOLUTION INTRAVENOUS at 11:47

## 2021-03-20 RX ADMIN — Medication 50 MG: at 13:47

## 2021-03-20 RX ADMIN — MIDODRINE HYDROCHLORIDE 10 MG: 5 TABLET ORAL at 17:16

## 2021-03-20 RX ADMIN — MIDODRINE HYDROCHLORIDE 10 MG: 5 TABLET ORAL at 12:42

## 2021-03-20 RX ADMIN — Medication 10 MG: at 02:31

## 2021-03-20 RX ADMIN — Medication 12.5 MG: at 02:31

## 2021-03-20 RX ADMIN — Medication 1 MG: at 12:42

## 2021-03-20 RX ADMIN — ACETAMINOPHEN 650 MG: 325 TABLET, FILM COATED ORAL at 02:32

## 2021-03-20 RX ADMIN — ASPIRIN 81 MG CHEWABLE TABLET 81 MG: 81 TABLET CHEWABLE at 15:11

## 2021-03-20 RX ADMIN — Medication 10 MG: at 20:51

## 2021-03-20 RX ADMIN — SODIUM CHLORIDE 300 ML: 9 INJECTION, SOLUTION INTRAVENOUS at 11:47

## 2021-03-20 RX ADMIN — Medication 1 PACKET: at 17:16

## 2021-03-20 RX ADMIN — Medication 1 PACKET: at 21:00

## 2021-03-20 RX ADMIN — Medication: at 11:48

## 2021-03-20 RX ADMIN — HEPARIN SODIUM 1400 UNITS/HR: 10000 INJECTION, SOLUTION INTRAVENOUS at 00:23

## 2021-03-20 ASSESSMENT — ACTIVITIES OF DAILY LIVING (ADL)
ADLS_ACUITY_SCORE: 21

## 2021-03-20 ASSESSMENT — MIFFLIN-ST. JEOR: SCORE: 1632.13

## 2021-03-20 NOTE — PROGRESS NOTES
1900 pt started spitting up bright red blood. Maia Dexter paged and aware. Pt not following commands d/t neuro disorientation. Pt holding on to spit. Concerned d/t pts inability to follow commands, pt NPO d/t aspiration events even with ice chips. Passing along to night shift nurse.

## 2021-03-20 NOTE — PROGRESS NOTES
Nephrology Progress Note  03/20/2021         Assessment & Recommendations:   Jin Garrett is a 80 year old with PMH HTN, AAA, HLD, RA who presented to OSH with SOB and atypical CP. TTE showed severe MR with ?posterior flail leaflet, EF preserved. On arrival at [facility] an Impella was emergently placed. S/p MVR and single vessel CABG on 3/5/21. Returned from the OR on central ECMO with an open chest, now s/p decannulation on 3/9. Nephrology consulted for ADAN. Initiated on CRRT 3/6, transitioned to iHD 3/13.     ADAN in the setting of cardiogenic shock, was on ECMO, s/p decannulation on 3/9  Unknown baseline, no h/o CKD per daughter. 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 Etiology likely ATN in the setting of shock, no obstruction per CT and urinalysis showed granular casts which is c/w ATN. UOP declined and K rising  on 3/6, so initiated on CRRT. Continued CRRT until 3/12, then changed to iHD. Continues to be oligoanuric and has not been tolerating iHD well due to hypotension and tachycardia.  - HD today - had to stop UF within 15 min of start of run due to hypotension and then had to end run ~20 min early due to tachycardia to 145bpm  - will daily evaluate for renal recovery and HD needs  - Q day bladder scans  - Avoid nephrotoxins  - Renally dose medications  - Renal diet  - Daily weights  - Strict I/Os      Recommendations were communicated to primary team via this note     Seen and discussed with Dr. Kriss Santamaria MD  Nephrology Fellow  [phone]    Interval History :   Nursing and provider notes from last 24 hours reviewed.  No events overnight. K elevated at 6.1 this AM. During HD, patient developed hypotension with pressures in 80s/60s and UF was stopped. Then close to end of run, he was tachycardic to 145 and run was stopped early.     Physical Exam:   I/O last 3 completed shifts:  In: 719.5 [I.V.:339.5;  "NG/GT:270]  Out: 580 [Urine:200; Emesis/NG output:200; Stool:100; Chest Tube:80]   BP (!) 112/90   Pulse 130   Temp 98.6  F (37  C)   Resp 12   Ht 1.803 m (5' 11\")   Wt 90 kg (198 lb 6.6 oz)   SpO2 96%   BMI 27.67 kg/m       General : Pt awake, not in acute distress   Lungs : anterior lung fields are clear  Cardiac : S1, S2 present  Abdomen : Soft/ND/NT  LE : trace lower extremity edema  Dialysis Access : right subclavian non tunneled cath    Labs:   All labs reviewed by me  Electrolytes/Renal -   Recent Labs   Lab Test 03/20/21  1053 03/20/21  0637 03/19/21  0352 03/18/21  0801 03/18/21  0353 03/16/21  0005 03/16/21  0005 03/12/21  0355 03/12/21  0355 03/11/21  2210   NA  --  131* 133  --  136   < > 143   < > 139 142   POTASSIUM 6.4* 6.1* 5.0 5.7* 5.7*   < > 4.6   < > 3.6 3.6   CHLORIDE  --  96 97  --  100   < > 109   < > 107 110*   CO2  --  22 25  --  22   < > 23   < > 25 24   BUN  --  139* 98*  --  160*   < > 174*   < > 49* 49*   CR  --  5.02* 3.65*  --  5.05*   < > 4.90*   < > 1.57* 1.57*   GLC  --  86 107*  --  150*   < > 136*   < > 123* 118*   PALLAVI  --  8.6 8.2*  --  8.2*   < > 7.2*   < > 7.7* 7.9*   MAG  --   --   --  2.4*  --   --  2.4*  --  2.5* 2.3   PHOS  --   --   --  6.6*  --   --   --   --  3.4 4.8*    < > = values in this interval not displayed.       CBC -   Recent Labs   Lab Test 03/20/21  0908 03/19/21  2050 03/19/21  1623 03/19/21  0352   WBC 15.2*  --  16.3* 12.7*   HGB 7.0* 7.8* 7.5* 7.1*     --  348 316       LFTs -   Recent Labs   Lab Test 03/20/21  0637 03/19/21  0352 03/18/21  0353   ALKPHOS 210* 237* 294*   BILITOTAL 0.6 0.6 0.4   * 161* 231*   AST 53* 85* 133*   PROTTOTAL 5.4* 5.2* 5.1*   ALBUMIN 2.0* 2.2* 1.8*     Current Medications:    amitriptyline  25 mg Oral or Feeding Tube At Bedtime     aspirin  81 mg Oral or NG Tube Daily     B and C vitamin Complex with folic acid  5 mL Oral Daily     fiber modular (NUTRISOURCE FIBER)  1 packet Per Feeding Tube TID     gelatin " absorbable  1 each Topical During Hemodialysis (from stock)     melatonin  10 mg Oral or Feeding Tube At Bedtime     metoprolol tartrate  12.5 mg Oral or Feeding Tube BID     midodrine  10 mg Oral TID w/meals     pantoprazole  40 mg Oral or NG Tube Daily    Or     pantoprazole  40 mg Oral Daily     QUEtiapine  100 mg Oral or Feeding Tube At Bedtime     QUEtiapine  25 mg Oral Daily       heparin 1,400 Units/hr (03/20/21 1115)     - MEDICATION INSTRUCTIONS -       Warfarin Therapy Reminder       Natividad Santamaria MD

## 2021-03-20 NOTE — PROGRESS NOTES
CVTS PROGRESS NOTE  March 19, 2021      CO-MORBIDITIES:   Cardiogenic shock (H)  (primary encounter diagnosis)  Status post cardiac surgery  Status post cardiac surgery    ASSESSMENT: Jin Garrett is a 80 year old male with PMH of HTN, AAA, HLD and rheumatoid arthritis. He presented to an OSH with shortness of breath and atypical chest pain. TTE showed severe MR with concern for posterior flail leaflet, EF was preserved. On arrival at [facility] an Impella was emergently placed in cath lab. 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 Porras on 3/5/21. Returned from the OR on central ECMO with an open chest, intubated and sedated. Returned to the OR for a wash out on 3/9, found to have a thrombus on his mitral valve so he had the valve replaced, decannulated central ECMO, bronchoscopy and placement of left subclavian dialysis line.        TODAY'S PROGRESS:   Transfer to floor when bed available  Increase seroquel to 100 HS add seroquel 25 daily   Schedule midodrine 10mg q8h  Discuss starting epogen with renal   Iron studies sent  Repeat CBC   Plan for tunneled HD catheter on Monday (IR will place as long as INR <3)      PLAN:  Neuro/ pain/ sedation:  Post-operative pain  Delerium  - Monitor neurological status. Notify the MD for any acute changes in exam.  - APAP  - Still confused with spontaneous actions, has 1:1 sitter    - Head CT 3/19: negative for acute changes   - Increased Seroquel 100 mg at bedtime, more restful at night  - Increase melatonin 10 mg in pm   - Restarted PTA amitriptyline     Pulmonary care:  Acute post-operative respiratory failure, resolved  - Extubated 3/12 to 4L/nc   - Wean O2 as tolerated, on room air   - IS     Chest tube: left pleural with 80 ml out in 24 hours. Will remove today.      Cardiovascular:  HTN  AAA  HLD  Hx of A fib ~ 10 years ago and on admission with MI   Now with post-op atrial fib with RVR 3/16  Severe mitral regurgitation s/p MVR 3/5  CAD with 100% occlusion  "of the mid LCx s/p single vessel CABG 3/5  Cardiogenic shock - resolved  Vasoplegic shock - resolved  VA ECMO (central)   S/p redo MVR due to thrombosis, removal of ECMO 3/9, and wound closure 3/9  - Monitor hemodynamic status.   - MAP >65  - ASA 81  - Decrease Metoprolol to 12.5 BID, hold for b/p < 100 systolic or HR <55  - Continue heparin  - Continue warfarin   - Midodrine 10mg TID and prn with HD  - Received IV amiodarone load 3/15-16, transitioned to 400 mg every day po 3/16 pm   - TSH 1.56, LFT\"s decreasing but still elevated. Continue to hold Lipitor and discontinue amiodarone      GI/Nutrition:   - TF through NJ at goal  - NJ pulled back to gastric last night, now the FT is plugged, unable to get meds.  - Talked with nutrition who placed NJ and recommended removing NJ and placing an NG today to be able to give meds and restart TF with HOB elevated. Will hold heparin for two hours and place NG.    - Holding Bowel regimen with loose stools and rectal tube, decreasing output with addition of fiber   - Speech consulted, NPO with ice chips per recs 3/13  - Add fiber to tube feeds   - C diff negative 3/18    Fluids/ Electrolytes/Renal:  ADAN c/w ATN post-cardiogenic shock   Hyperkalemia  - Will continue to monitor intake and output.  - Continue to monitor electrolytes  - Started CRRT 3/6, stopped 3/12  - Started iHD 3/13, nephrology following. They have been unable to pull fluid due to hypotension.   - K+ 6.1 this am, creat 5.02. Notified Nephrology, they will try iHD today, but may need CRRT    - Last dialysis 3/16, unable to pull fluid. Overall I/O is net +10.8 L, wt up 6 kg from admission.   - Started epogen 3/19   - Plan for tunneled HD catheter on Monday (IR will place as long as INR <3)     Endocrine:  Stress hyperglycemia    TSH wnl   - Keep blood glucose <180  - stopped sliding scale insulin 3/16     ID/ Antibiotics:  Leukocytosis - likely secondary to physiologic stress  - Complete perioperative/open chest " antibiotics: cefepime and vancomycin for 7 days or 24 hours after chest closure, whichever is shorter last day 3/11  - Sputum-culture 3/9 - NGTD  - Blood cultures - NGTD     Heme:  Acute blood loss anemia  Hgb continues to drift down likely due to HD      LUE US 3/14 with nonocclusive thrombus in right cephalic vein, no UE DVT   - Hgb goal >8,   - Daily CBC, pending   - Continue heparin gtt for prior mitral valve thrombosis  - Continue warfarin, INR 1.31 today   - Started epogen 3/19    Musculoskeletal:   Recent hip surgery  - No weight bearing or activity restrictions related to recent hip surgery per family (records not available)   - PT/OT      Prophylaxis:    - Mechanical prophylaxis for DVT.   - PRN Bowel regimen, holding   - Heparin gtt  - PPI     Lines/ tubes/ drains:  - HD line  - Pleural tube  - PICC     Disposition:  - transferred to floor 3/19     Mel Castillo, JOIE, CNP  P Cardiovascular Thoracic Surgery   O: [phone]  Pgr [phone]    ====================================    SUBJECTIVE:   Less agitated and delirious overnight, no pain. Has 1:1 sitter.        OBJECTIVE:   1. VITAL SIGNS:   Temp:  [97.8  F (36.6  C)-100.3  F (37.9  C)] 98.7  F (37.1  C)  Pulse:  [65-97] 85  Resp:  [18-28] 19  BP: ()/(49-84) 119/66  SpO2:  [91 %-98 %] 91 %  Resp: 19      2. INTAKE/ OUTPUT:   I/O last 3 completed shifts:  In: 719.5 [I.V.:339.5; NG/GT:270]  Out: 580 [Urine:200; Emesis/NG output:200; Stool:100; Chest Tube:80]    3. PHYSICAL EXAMINATION:   General: NAD, ongoing confusion, delirium.   Neuro: Alert, oriented only to self  Resp: Breathing non-labored at rest   CV: Irregularly irregular  Abdomen: Soft, Non-distended, Non-tender  Incisions: C/D/I  Chest tube dressing WDI. Minimal serosanguinous output, plan to remove today.   Extremities: warm and well perfused, generalized mild edema       4. INVESTIGATIONS:   Arterial Blood Gases   No lab results found in last 7 days.  Complete Blood Count   Recent  Labs   Lab 03/19/21 2050 03/19/21  1623 03/19/21  0352 03/18/21  0801 03/18/21  0353 03/17/21  0336 03/17/21  0336   WBC  --  16.3* 12.7*  --  13.4*  --  15.3*   HGB 7.8* 7.5* 7.1* 7.9* 6.9*   < > 6.6*   PLT  --  348 316  --  287  --  248    < > = values in this interval not displayed.     Basic Metabolic Panel  Recent Labs   Lab 03/20/21  0637 03/19/21  0352 03/18/21  0801 03/18/21  0353 03/17/21  0336   * 133  --  136 136   POTASSIUM 6.1* 5.0 5.7* 5.7* 4.8   CHLORIDE 96 97  --  100 101   CO2 22 25  --  22 25   * 98*  --  160* 118*   CR 5.02* 3.65*  --  5.05* 3.85*   GLC 86 107*  --  150* 114*     Liver Function Tests  Recent Labs   Lab 03/20/21  0637 03/19/21  0352 03/18/21  0353 03/17/21  0336   AST 53* 85* 133* 215*   * 161* 231* 253*   ALKPHOS 210* 237* 294* 248*   BILITOTAL 0.6 0.6 0.4 0.4   ALBUMIN 2.0* 2.2* 1.8* 1.6*   INR 1.31* 1.20* 1.31*  --      Pancreatic Enzymes  No lab results found in last 7 days.  Coagulation Profile  Recent Labs   Lab 03/20/21  0637 03/19/21  0352 03/18/21  0353   INR 1.31* 1.20* 1.31*         5. RADIOLOGY:   Recent Results (from the past 24 hour(s))   CT Head w/o Contrast    Narrative    Exam: CT HEAD W/O CONTRAST  3/19/2021 1:04 PM    History: Mental status change, unknown cause.    Additional information from the EMR: Status post cardiac  catheterization and impella placement, mitral valve replacement, and  single vessel CABG on 3/5/2021 followed by mitral valve thrombus and  subsequent replacement.    Comparison:  None available.    Technique: Using multidetector thin collimation helical acquisition  technique, axial, coronal and sagittal CT images from the skull base  to the vertex were obtained without intravenous contrast.     Findings:    No intracranial hemorrhage, mass effect, or midline shift. Mild  symmetric enlargement of the lateral ventricles, likely representing  generalized age-related atrophy. Periventricular and subcortical white  matter  hypoattenuation is nonspecific but likely represent chronic  small vessel ischemic disease in patient this age. The gray-white  matter differentiation is preserved. The basal cisterns are patent.    Mild mucosal thickening dependent debris within the sphenoid sinus  with an air-fluid level. Partial opacification of the mastoid air  cells, left greater than right. Filling defect in the left external  auditory canal, likely cerumen. The mastoid air cells are clear. No  acute abnormality of the orbits.      Impression    Impression:  1. No acute intracranial pathology.  2. Generalized cerebral atrophy and presumed chronic small vessel  ischemic disease.    I have personally reviewed the examination and initial interpretation  and I agree with the findings.    STACI POLLARD MD       =========================================

## 2021-03-20 NOTE — PROGRESS NOTES
Critical potassium 6.1. Cross cover paged, CVTS NP Mel came to see pt bedside. Consulting nephrology this am. Per MD verbal heparin stopped. NJ tube still clogged this am as well.

## 2021-03-20 NOTE — PROGRESS NOTES
Neuro: Incoherent speech, illogical, unable to make out words. Pt seeming more clear minded after dialysis today. Some Improvement in ability to respond clearly to questions when asked but still intermittent.   Cardiac: Afebrile, SR-ST.    Respiratory: 2-4L oxymask; suctioning mouth/throat PRN. Pt has dry nonproductive cough.   GI/: oliguric, on HD. External catheter. Rectal tube in place.   Diet/appetite: Tolerating strict NPO diet. TF running at goal of 55 mL/hr. 30 mL free flushes q4h.  Activity: Up with 2 assist; lift device in use.   Pain: Given PRN dilaudid and tramadol during HD. Pt was whincing when turning/repositioning.   Skin: No new deficits noted.  Lines: R DL PICC.   Drains: Rectal tube, external izquierdo cath to suction.  Replacement: HD today, labs abnormal.     CT removed this am.     Heparin stopped 2h prior to NJ tube removal. Put in new NG tube, restarted Heparin back at 1400 units/hr. Recheck 10a next am.     HD today; no fluid pulled off, had run of a-fib during tx, and low BP's.     Pt has been resting comfortably, will continue to monitor and follow plan of care. CVTS pt.

## 2021-03-20 NOTE — PROGRESS NOTES
HEMODIALYSIS TREATMENT NOTE    Date: 3/20/2021  Time: 2:41 PM    Data:  Pre Wt: 90 kg (198 lb 6.6 oz)   Desired Wt: 89 kg   Ultrafiltration - Post Run Net Total Removed (mL): 0 mL  Vascular Access Status: LIJ Tunneled  CVC Double lines:  patent  Dialyzer Rinse: Streaked, Light  Total Blood Volume Processed: 47.85 L   Total Dialysis (Treatment) Time: 2 hrs 45 mins   Dialysate Bath: K 2, Ca 2.5, Na 138, Bicarb: 32  Heparin: None    Lab:   No    Assessment:  Patent LIJ Tunneled CVC Double lines.   Pre run K/Ca:6.1/ 8.6, BUN/Cr: 139/5.02, Alb: 2.0. Hgb/HctL 7.0/ 22.3  Heparin pump gtt at 1400 unit(s)/hr  ADAN    Interventions:  Patient dialyzed for 2 hrs 45 mins of 3 hrs run. Reached BFR / DFR to 300/ 600 ml/mins d/t high BUN. Transfused 1 untis of PRBC 300 ml for Hgb. 7.0. Checked chest X-ray for NJ Tube placement, OK to use for NJ Tube. Gave water 60 ml bolus and Midodrine 10 mg PO and Dilaudid 1 mg PL for pressure support and pain at 12:45. Resumed T-feeding at 55 ml/hr.  But Pt's pain not getting better, Gave Tramadol 50 mg PL at 13:50. At: 14:00, Pt had soft BP 77/51 mmHg with tachycardia up to 135~140. Pagetara TAPIA and 15 mins early finished HD with rinse back. CVC NS locked with clear guards caps. Post V/S: 94/61 mmHg with HR 99.     Plan:    Next run per renal team.

## 2021-03-20 NOTE — PLAN OF CARE
D: Admitted 3/4 as transfer from OSH after presenting with SOB and atypical chest pain. Hx includes HTN, AAA, HLD, RA.. Followed by Nephrology for ADAN.     I: Monitored vitals and assessed pt status.   Changed:  -At 8pm Provider notified that pt continued to cough bright red blood with occasionally emesis of clots. Total output of approximately 200 mL. Order for IV zofran obtained and given. Bloody sputum/emesis resolved by 9pm. Hemoglobin checked with result of 7.8   -Nasoduodenal tube was occluded and noted to be 11 cm retracted from prior landmark at nare (93cm->82cm) and provider notified. TF had been held since prior shift. Abdominal XR obtained showing change in position. Patient care order placed to hold tube feed, but OK for med administration. Intermittently able to flush NG.   -Unable to administer amtriptyline due to occluded NG.   -Provider notified of soft pressures at 0330 (SBP 80s). Plan to continue to monitor.   -Sepsis BPA fired with lactic result of 0.6.   Running:  -Heparin running at 1400 units/hr.   PRN:  Tylenol for pain/fever.      A: Confused with garbled speech. Restless, frequently pulling at lines. Sitter at bedside. Difficult to arouse after administration of seroquel. Unable to follow commands. Febrile with tmax of 100.3. Respirations labored, lung sounds with crackles in basis. On 1-3L nc generally maintaining 97%. Apneic periods noted with sleep, intermittent desaturations to 70s. Sinus rhythm on monitor with frequent PACs - rates 80s-90s. Voiding to primofit external catheter. Rectal tube in place. Chest tube x1 to water seal, small shadowing at dressing. Skin clammy. Sternotomy incision LITA. Primapore on left leg graft site CDI. Up with lift assist. Slept poorly over night.     I/O this shift:  In: 60 [NG/GT:60]  Out: -     Temp:  [97.8  F (36.6  C)-100.3  F (37.9  C)] 99.3  F (37.4  C)  Pulse:  [83-97] 85  Resp:  [18-30] 24  BP: ()/(55-84) 89/58  SpO2:  [90 %-98 %] 95 %       P: Anticipate possible repositioning of NG. Monitor closely for s/sx of bleeding, neuro status changes. Continue to monitor Pt status and report changes to treatment team.

## 2021-03-21 ENCOUNTER — APPOINTMENT (OUTPATIENT)
Dept: GENERAL RADIOLOGY | Facility: CLINIC | Age: 81
DRG: 003 | End: 2021-03-21
Attending: NURSE PRACTITIONER
Payer: MEDICARE

## 2021-03-21 ENCOUNTER — APPOINTMENT (OUTPATIENT)
Dept: OCCUPATIONAL THERAPY | Facility: CLINIC | Age: 81
DRG: 003 | End: 2021-03-21
Attending: INTERNAL MEDICINE
Payer: MEDICARE

## 2021-03-21 ENCOUNTER — APPOINTMENT (OUTPATIENT)
Dept: SPEECH THERAPY | Facility: CLINIC | Age: 81
DRG: 003 | End: 2021-03-21
Attending: INTERNAL MEDICINE
Payer: MEDICARE

## 2021-03-21 ENCOUNTER — APPOINTMENT (OUTPATIENT)
Dept: GENERAL RADIOLOGY | Facility: CLINIC | Age: 81
DRG: 003 | End: 2021-03-21
Attending: INTERNAL MEDICINE
Payer: MEDICARE

## 2021-03-21 LAB
7AMINOCLONAZEPAM BLD CFM-MCNC: NEGATIVE NG/ML
ALBUMIN SERPL-MCNC: 2 G/DL (ref 3.4–5)
ALP SERPL-CCNC: 200 U/L (ref 40–150)
ALPRAZ SERPL-MCNC: NEGATIVE NG/ML
ALT SERPL W P-5'-P-CCNC: 98 U/L (ref 0–70)
AMPHETAMINES SPEC-MCNC: NEGATIVE NG/ML
ANION GAP SERPL CALCULATED.3IONS-SCNC: 12 MMOL/L (ref 3–14)
APAP BLD-MCNC: NEGATIVE UG/ML
AST SERPL W P-5'-P-CCNC: 53 U/L (ref 0–45)
BARBITURATES SPEC-MCNC: NEGATIVE UG/ML
BENZODIAZ SERPL QL: POSITIVE
BENZODIAZ SPEC-MCNC: POSITIVE NG/ML
BILIRUB SERPL-MCNC: 0.6 MG/DL (ref 0.2–1.3)
BUN SERPL-MCNC: 105 MG/DL (ref 7–30)
BUPRENORPHINE SERPLBLD-MCNC: NEGATIVE NG/ML
CALCIUM SERPL-MCNC: 8.7 MG/DL (ref 8.5–10.1)
CARBOXYTHC BLD-MCNC: NEGATIVE NG/ML
CARISOPRODOL IA: NEGATIVE UG/ML
CHLORDIAZEP SERPL-MCNC: NEGATIVE NG/ML
CHLORIDE SERPL-SCNC: 97 MMOL/L (ref 94–109)
CLONAZEPAM SERPL CFM-MCNC: NEGATIVE NG/ML
CO2 SERPL-SCNC: 22 MMOL/L (ref 20–32)
COCAINE METABOLITE IA: NEGATIVE NG/ML
CREAT SERPL-MCNC: 4.25 MG/DL (ref 0.66–1.25)
DECLARED MEDICATIONS: ABNORMAL
DESALKYLFLURAZ SERPL-MCNC: NEGATIVE NG/ML
DIAZEPAM SERPL-MCNC: NEGATIVE NG/ML
ERYTHROCYTE [DISTWIDTH] IN BLOOD BY AUTOMATED COUNT: 18.6 % (ref 10–15)
ETHANOL BLD-MCNC: NEGATIVE GM/DL
FENTANYL BLD CFM-MCNC: 0.5 NG/ML
FENTANYL IA: POSITIVE NG/ML
FENTANYL SPEC QL: POSITIVE
FLURAZEPAM SERPL-MCNC: NEGATIVE NG/ML
GABAPENTIN IA: NEGATIVE UG/ML
GFR SERPL CREATININE-BSD FRML MDRD: 12 ML/MIN/{1.73_M2}
GLUCOSE SERPL-MCNC: 108 MG/DL (ref 70–99)
HCT VFR BLD AUTO: 23.8 % (ref 40–53)
HGB BLD-MCNC: 7 G/DL (ref 13.3–17.7)
HGB BLD-MCNC: 7.7 G/DL (ref 13.3–17.7)
INR PPP: 1.31 (ref 0.86–1.14)
INR PPP: 1.35 (ref 0.86–1.14)
LABORATORY COMMENT REPORT: NORMAL
LDH SERPL L TO P-CCNC: 370 U/L (ref 85–227)
LORAZEPAM BLD CFM-MCNC: NEGATIVE NG/ML
MCH RBC QN AUTO: 29.5 PG (ref 26.5–33)
MCHC RBC AUTO-ENTMCNC: 32.4 G/DL (ref 31.5–36.5)
MCV RBC AUTO: 91 FL (ref 78–100)
MEPERIDINE SERPLBLD-MCNC: NEGATIVE NG/ML
METHADONE BLD-MCNC: NEGATIVE NG/ML
MIDAZOLAM BLD CFM-MCNC: 228 NG/ML
NORCHLORDIAZEP SERPL-MCNC: NEGATIVE NG/ML
NORDIAZEPAM SERPL-MCNC: NEGATIVE NG/ML
NORFENTANYL BLD CFM-MCNC: NEGATIVE NG/ML
OPIATES SPEC-MCNC: NEGATIVE NG/ML
OTHER DRUGS DETECTED: POSITIVE
OXAZEPAM SERPL-MCNC: NEGATIVE NG/ML
OXYCODONE SERPLBLD SCN-MCNC: NEGATIVE NG/ML
PCP SPEC-MCNC: NEGATIVE NG/ML
PLATELET # BLD AUTO: 326 10E9/L (ref 150–450)
POTASSIUM SERPL-SCNC: 5.2 MMOL/L (ref 3.4–5.3)
PROPOXYPH SPEC-MCNC: NEGATIVE NG/ML
PROT SERPL-MCNC: 5.4 G/DL (ref 6.8–8.8)
RBC # BLD AUTO: 2.61 10E12/L (ref 4.4–5.9)
SARS-COV-2 RNA RESP QL NAA+PROBE: NEGATIVE
SODIUM SERPL-SCNC: 131 MMOL/L (ref 133–144)
SPECIMEN SOURCE: NORMAL
TEMAZEPAM SERPL-MCNC: NEGATIVE NG/ML
TRAMADOL BLD-MCNC: NEGATIVE NG/ML
TRIAZOLAM BLD-MCNC: NEGATIVE NG/ML
UFH PPP CHRO-ACNC: 0.35 IU/ML
WBC # BLD AUTO: 13.9 10E9/L (ref 4–11)

## 2021-03-21 PROCEDURE — U0003 INFECTIOUS AGENT DETECTION BY NUCLEIC ACID (DNA OR RNA); SEVERE ACUTE RESPIRATORY SYNDROME CORONAVIRUS 2 (SARS-COV-2) (CORONAVIRUS DISEASE [COVID-19]), AMPLIFIED PROBE TECHNIQUE, MAKING USE OF HIGH THROUGHPUT TECHNOLOGIES AS DESCRIBED BY CMS-2020-01-R: HCPCS | Performed by: NURSE PRACTITIONER

## 2021-03-21 PROCEDURE — 85520 HEPARIN ASSAY: CPT | Performed by: THORACIC SURGERY (CARDIOTHORACIC VASCULAR SURGERY)

## 2021-03-21 PROCEDURE — 92526 ORAL FUNCTION THERAPY: CPT | Mod: GN

## 2021-03-21 PROCEDURE — 999N000044 HC STATISTIC CVC DRESSING CHANGE

## 2021-03-21 PROCEDURE — 71045 X-RAY EXAM CHEST 1 VIEW: CPT | Mod: 26 | Performed by: RADIOLOGY

## 2021-03-21 PROCEDURE — 97530 THERAPEUTIC ACTIVITIES: CPT | Mod: GO

## 2021-03-21 PROCEDURE — 85610 PROTHROMBIN TIME: CPT | Performed by: THORACIC SURGERY (CARDIOTHORACIC VASCULAR SURGERY)

## 2021-03-21 PROCEDURE — 99232 SBSQ HOSP IP/OBS MODERATE 35: CPT | Mod: 24 | Performed by: INTERNAL MEDICINE

## 2021-03-21 PROCEDURE — 250N000011 HC RX IP 250 OP 636: Performed by: STUDENT IN AN ORGANIZED HEALTH CARE EDUCATION/TRAINING PROGRAM

## 2021-03-21 PROCEDURE — 71045 X-RAY EXAM CHEST 1 VIEW: CPT

## 2021-03-21 PROCEDURE — 74018 RADEX ABDOMEN 1 VIEW: CPT | Mod: 26 | Performed by: RADIOLOGY

## 2021-03-21 PROCEDURE — 250N000013 HC RX MED GY IP 250 OP 250 PS 637: Performed by: STUDENT IN AN ORGANIZED HEALTH CARE EDUCATION/TRAINING PROGRAM

## 2021-03-21 PROCEDURE — 999N000007 HC SITE CHECK

## 2021-03-21 PROCEDURE — 999N000065 XR ABDOMEN PORT 1 VW

## 2021-03-21 PROCEDURE — 250N000013 HC RX MED GY IP 250 OP 250 PS 637: Performed by: INTERNAL MEDICINE

## 2021-03-21 PROCEDURE — 83615 LACTATE (LD) (LDH) ENZYME: CPT | Performed by: THORACIC SURGERY (CARDIOTHORACIC VASCULAR SURGERY)

## 2021-03-21 PROCEDURE — 250N000013 HC RX MED GY IP 250 OP 250 PS 637: Performed by: SURGERY

## 2021-03-21 PROCEDURE — 36592 COLLECT BLOOD FROM PICC: CPT | Performed by: STUDENT IN AN ORGANIZED HEALTH CARE EDUCATION/TRAINING PROGRAM

## 2021-03-21 PROCEDURE — 36592 COLLECT BLOOD FROM PICC: CPT

## 2021-03-21 PROCEDURE — 36415 COLL VENOUS BLD VENIPUNCTURE: CPT | Performed by: THORACIC SURGERY (CARDIOTHORACIC VASCULAR SURGERY)

## 2021-03-21 PROCEDURE — 71045 X-RAY EXAM CHEST 1 VIEW: CPT | Mod: 77

## 2021-03-21 PROCEDURE — 214N000001 HC R&B CCU UMMC

## 2021-03-21 PROCEDURE — 250N000013 HC RX MED GY IP 250 OP 250 PS 637: Performed by: NURSE PRACTITIONER

## 2021-03-21 PROCEDURE — 85018 HEMOGLOBIN: CPT | Performed by: STUDENT IN AN ORGANIZED HEALTH CARE EDUCATION/TRAINING PROGRAM

## 2021-03-21 PROCEDURE — 250N000013 HC RX MED GY IP 250 OP 250 PS 637: Performed by: PHYSICIAN ASSISTANT

## 2021-03-21 PROCEDURE — 250N000011 HC RX IP 250 OP 636

## 2021-03-21 PROCEDURE — 80053 COMPREHEN METABOLIC PANEL: CPT | Performed by: THORACIC SURGERY (CARDIOTHORACIC VASCULAR SURGERY)

## 2021-03-21 PROCEDURE — 250N000013 HC RX MED GY IP 250 OP 250 PS 637: Performed by: THORACIC SURGERY (CARDIOTHORACIC VASCULAR SURGERY)

## 2021-03-21 PROCEDURE — 85027 COMPLETE CBC AUTOMATED: CPT | Performed by: THORACIC SURGERY (CARDIOTHORACIC VASCULAR SURGERY)

## 2021-03-21 PROCEDURE — U0005 INFEC AGEN DETEC AMPLI PROBE: HCPCS | Performed by: NURSE PRACTITIONER

## 2021-03-21 PROCEDURE — 85610 PROTHROMBIN TIME: CPT

## 2021-03-21 RX ORDER — MIDODRINE HYDROCHLORIDE 5 MG/1
15 TABLET ORAL
Status: DISCONTINUED | OUTPATIENT
Start: 2021-03-21 | End: 2021-03-27

## 2021-03-21 RX ORDER — HALOPERIDOL 5 MG/ML
5 INJECTION INTRAMUSCULAR ONCE
Status: COMPLETED | OUTPATIENT
Start: 2021-03-21 | End: 2021-03-21

## 2021-03-21 RX ORDER — WARFARIN SODIUM 4 MG/1
4 TABLET ORAL
Status: COMPLETED | OUTPATIENT
Start: 2021-03-21 | End: 2021-03-21

## 2021-03-21 RX ORDER — ACETAMINOPHEN 325 MG/1
650 TABLET ORAL EVERY 8 HOURS
Status: DISCONTINUED | OUTPATIENT
Start: 2021-03-21 | End: 2021-04-04

## 2021-03-21 RX ORDER — QUETIAPINE FUMARATE 25 MG/1
25 TABLET, FILM COATED ORAL 2 TIMES DAILY WITH MEALS
Status: DISCONTINUED | OUTPATIENT
Start: 2021-03-21 | End: 2021-03-23

## 2021-03-21 RX ADMIN — MIDODRINE HYDROCHLORIDE 15 MG: 5 TABLET ORAL at 07:44

## 2021-03-21 RX ADMIN — Medication 1 PACKET: at 08:09

## 2021-03-21 RX ADMIN — WARFARIN SODIUM 4 MG: 4 TABLET ORAL at 17:45

## 2021-03-21 RX ADMIN — PANTOPRAZOLE SODIUM 40 MG: 40 TABLET, DELAYED RELEASE ORAL at 07:41

## 2021-03-21 RX ADMIN — Medication 1 PACKET: at 21:59

## 2021-03-21 RX ADMIN — MIDODRINE HYDROCHLORIDE 15 MG: 5 TABLET ORAL at 12:29

## 2021-03-21 RX ADMIN — Medication 12.5 MG: at 20:12

## 2021-03-21 RX ADMIN — DIPHENHYDRAMINE HYDROCHLORIDE 25 MG: 50 INJECTION, SOLUTION INTRAMUSCULAR; INTRAVENOUS at 01:54

## 2021-03-21 RX ADMIN — Medication 12.5 MG: at 07:41

## 2021-03-21 RX ADMIN — Medication 50 MG: at 15:45

## 2021-03-21 RX ADMIN — QUETIAPINE 25 MG: 25 TABLET ORAL at 17:45

## 2021-03-21 RX ADMIN — ASPIRIN 81 MG CHEWABLE TABLET 81 MG: 81 TABLET CHEWABLE at 07:41

## 2021-03-21 RX ADMIN — HEPARIN SODIUM 1550 UNITS/HR: 10000 INJECTION, SOLUTION INTRAVENOUS at 17:22

## 2021-03-21 RX ADMIN — AMITRIPTYLINE HYDROCHLORIDE 25 MG: 25 TABLET, FILM COATED ORAL at 21:45

## 2021-03-21 RX ADMIN — Medication 50 MG: at 08:00

## 2021-03-21 RX ADMIN — ACETAMINOPHEN 650 MG: 325 TABLET, FILM COATED ORAL at 23:20

## 2021-03-21 RX ADMIN — Medication 1 PACKET: at 14:35

## 2021-03-21 RX ADMIN — QUETIAPINE 100 MG: 25 TABLET ORAL at 20:12

## 2021-03-21 RX ADMIN — MIDODRINE HYDROCHLORIDE 15 MG: 5 TABLET ORAL at 21:44

## 2021-03-21 RX ADMIN — Medication 50 MG: at 23:20

## 2021-03-21 RX ADMIN — Medication 5 ML: at 07:43

## 2021-03-21 RX ADMIN — ACETAMINOPHEN 650 MG: 325 TABLET, FILM COATED ORAL at 15:45

## 2021-03-21 RX ADMIN — QUETIAPINE 25 MG: 25 TABLET ORAL at 16:40

## 2021-03-21 RX ADMIN — QUETIAPINE 25 MG: 25 TABLET ORAL at 07:42

## 2021-03-21 RX ADMIN — AMITRIPTYLINE HYDROCHLORIDE 25 MG: 25 TABLET, FILM COATED ORAL at 02:40

## 2021-03-21 RX ADMIN — HALOPERIDOL LACTATE 5 MG: 5 INJECTION, SOLUTION INTRAMUSCULAR at 18:58

## 2021-03-21 RX ADMIN — Medication 1 MG: at 10:04

## 2021-03-21 RX ADMIN — Medication 10 MG: at 20:12

## 2021-03-21 RX ADMIN — ACETAMINOPHEN 650 MG: 325 TABLET, FILM COATED ORAL at 08:00

## 2021-03-21 ASSESSMENT — ACTIVITIES OF DAILY LIVING (ADL)
ADLS_ACUITY_SCORE: 21
ADLS_ACUITY_SCORE: 23
ADLS_ACUITY_SCORE: 21
ADLS_ACUITY_SCORE: 21

## 2021-03-21 NOTE — PLAN OF CARE
D: Pt stable, SR, afebrile. Pt intermittently Incisional pain adequately controlled with tylenol, tramadol and PO dilaudid. No shortness of breath noted. Pt calm and cooperative most of shift, but increasingly restless and agitated. PRN and scheduled seroquel given with some effect. Pt coughing up blood. Surgery cross-cover notified. Heparin gtt held, ABG and chest x-ray ordered, haldol ordered and given.  Alfaro and rectal tube patent.  TF at 55 mL/hr. Strict NPO.  I: Monitored/assessed pt. Assisted with cares.  A: Pt stable and comfortable.  P: Continue to monitor/assess pt, contact provider with concerns. Plan for tunneled HD catheter in IR 3/22.

## 2021-03-21 NOTE — PROGRESS NOTES
CVTS PROGRESS NOTE  March 19, 2021      CO-MORBIDITIES:   Cardiogenic shock (H)  (primary encounter diagnosis)  Status post cardiac surgery  Status post cardiac surgery    ASSESSMENT: Jin Garrett is a 80 year old male with PMH of HTN, AAA, HLD and rheumatoid arthritis. He presented to an OSH with shortness of breath and atypical chest pain. TTE showed severe MR with concern for posterior flail leaflet, EF was preserved. On arrival at [facility] an Impella was emergently placed in cath lab. 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 Porras on 3/5/21. Returned from the OR on central ECMO with an open chest, intubated and sedated. Returned to the OR for a wash out on 3/9, found to have a thrombus on his mitral valve so he had the valve replaced, decannulated central ECMO, bronchoscopy and placement of left subclavian dialysis line.        PLAN:  Neuro/ pain/ sedation:  Post-operative pain  Delerium  - Monitor neurological status. Notify the MD for any acute changes in exam.  - APAP  - Still confused with spontaneous actions, has 1:1 sitter    - Head CT 3/19: negative for acute changes   - Seroquel 100 mg at bedtime, awake and confused all night, pulled out NG feeding tube.   - Increase daily seroquel 25 mg to BID dosing with ongoing restlessness   - Increase melatonin 10 mg in pm   - Restarted PTA amitriptyline  - Stop all narcotic, schedule tylenol  - discontinue benadryl     Pulmonary care:  Acute post-operative respiratory failure, resolved  - Extubated 3/12 to 4L/nc, on oxiplus mask at 4 l   - Wean O2 as tolerated, on room air   - IS     Chest tube: left pleural removed 3/18      Cardiovascular:  HTN  AAA  HLD  Hx of A fib ~ 10 years ago and on admission with MI   Now with post-op atrial fib with RVR 3/16  Severe mitral regurgitation s/p MVR 3/5  CAD with 100% occlusion of the mid LCx s/p single vessel CABG 3/5  Cardiogenic shock - resolved  Vasoplegic shock - resolved  VA ECMO (central)   S/p redo  "MVR due to thrombosis, removal of ECMO 3/9, and wound closure 3/9  - Monitor hemodynamic status.   - MAP >65  - ASA 81  -did not receive BB yesterday due to plugged FT and hypotension, then had tachycardia during HD run. Will increase scheduled Midodrine to 15 mg TID   - continue Metoprolol to 12.5 BID, hold for b/p < 95 systolic or HR <55  - Continue heparin until INR >2.0  - Continue warfarin   - ncrease Midodrine to 15 mg TID and prn with HD  - Received IV amiodarone load 3/15-16, transitioned to 400 mg every day po 3/16 pm   - TSH 1.56, LFT\"s decreasing but still elevated. Continue to hold Lipitor and discontinue amiodarone      GI/Nutrition:   - TF through NJ at goal  - NJ plugged 3/20, replaced with NG 3/20 and he pulled it out again. Replaced 3/21.    - Holding Bowel regimen with loose stools and rectal tube, decreasing output with addition of fiber   - Speech consulted, NPO with ice chips per recs 3/13  - Added fiber to tube feeds 3/18  - C diff negative 3/18    Fluids/ Electrolytes/Renal:  ADAN c/w ATN post-cardiogenic shock   Hyperkalemia  - Will continue to monitor intake and output.  - Continue to monitor electrolytes  - Started CRRT 3/6, stopped 3/12  - Started iHD 3/13, nephrology following. has been difficult to pull fluid during HD due to hypotension. Run ended early 3/20 due to tachycardia. Planning HD tomorrow.   - K+ 6.1 & 6.5 3/20 am, emergent iHD 3/20, K+ 4.8 post-dialysis     - 24 hour I/O is net +656 ml, wt up 6 kg from admission.   - BMP, K+ 5.2, creat 4.25 with  this am   - Plan for tunneled HD catheter on Monday (IR will place as long as INR <3).    Covid test done 3/21      Endocrine:  Stress hyperglycemia    TSH wnl   - Keep blood glucose <180  - stopped sliding scale insulin 3/16     ID/ Antibiotics:  Leukocytosis - likely secondary to physiologic stress  - Complete perioperative/open chest antibiotics: cefepime and vancomycin for 7 days or 24 hours after chest closure, whichever " is shorter last day 3/11  - Sputum-culture 3/9 - NGTD  - Blood cultures - NGTD  - WBC 13.9, trending down. Afebrile      Heme:  Acute blood loss anemia  Hgb continues to drift down likely due to HD      LUE US 3/14 with nonocclusive thrombus in right cephalic vein, no UE DVT   - Hgb goal >8,   - Daily CBC  - Hgb 7.0 3/20, a unit of RBC's given, Hgb today is 7.7  - Continue heparin gtt for prior mitral valve thrombosis  - Continue warfarin, INR pending   - Started epogen 3/19    Musculoskeletal:   Recent hip surgery  - No weight bearing or activity restrictions related to recent hip surgery per family (records not available)   - PT/OT      Prophylaxis:    - Mechanical prophylaxis for DVT.   - PRN Bowel regimen, holding   - Heparin gtt  - PPI     Lines/ tubes/ drains:  - HD line  - Pleural tube  - PICC     Disposition:  - transferred to floor 3/19   - 1:1  - rehab recommended TCU at discharge     Mel Castillo, JOIE, CNP  [facility] Cardiovascular Thoracic Surgery   O: [phone]  Pgr [phone]    ====================================    SUBJECTIVE:   Ongoing agitation, confusion, pulled out his NG out.    Has 1:1 sitter.      Hasn't slept last night or today.       OBJECTIVE:   1. VITAL SIGNS:   Temp:  [96.8  F (36  C)-99  F (37.2  C)] 98.7  F (37.1  C)  Pulse:  [] 91  Resp:  [12-29] 19  BP: ()/(51-97) 117/81  SpO2:  [90 %-100 %] 100 %  Resp: 19      2. INTAKE/ OUTPUT:   I/O last 3 completed shifts:  In: 797.5 [I.V.:337.5; NG/GT:160]  Out: 335 [Urine:200; Stool:125; Chest Tube:10]    3. PHYSICAL EXAMINATION:   General: NAD, ongoing confusion, delirium.   Neuro: Alert, oriented only to self  Resp: Breathing non-labored at rest   CV: Irregularly irregular  Abdomen: Soft, Non-distended, Non-tender  Incisions: C/D/I  Chest tube dressing WDI. Minimal serosanguinous output, plan to remove today.   Extremities: warm and well perfused, generalized mild edema       4. INVESTIGATIONS:   Arterial Blood Gases   No lab  results found in last 7 days.  Complete Blood Count   Recent Labs   Lab 03/21/21  0619 03/20/21  0908 03/19/21  2050 03/19/21  1623 03/19/21  0352   WBC 13.9* 15.2*  --  16.3* 12.7*   HGB 7.7* 7.0* 7.8* 7.5* 7.1*    333  --  348 316     Basic Metabolic Panel  Recent Labs   Lab 03/21/21  0619 03/20/21  1653 03/20/21  1053 03/20/21  0637 03/19/21  0352   * 133  --  131* 133   POTASSIUM 5.2 4.8 6.4* 6.1* 5.0   CHLORIDE 97 98  --  96 97   CO2 22 27  --  22 25   * 79*  --  139* 98*   CR 4.25* 3.39*  --  5.02* 3.65*   * 112*  --  86 107*     Liver Function Tests  Recent Labs   Lab 03/21/21  0619 03/20/21  0637 03/19/21  0352 03/18/21  0353   AST 53* 53* 85* 133*   ALT 98* 117* 161* 231*   ALKPHOS 200* 210* 237* 294*   BILITOTAL 0.6 0.6 0.6 0.4   ALBUMIN 2.0* 2.0* 2.2* 1.8*   INR 1.35* 1.31* 1.20* 1.31*     Pancreatic Enzymes  No lab results found in last 7 days.  Coagulation Profile  Recent Labs   Lab 03/20/21  0637 03/19/21  0352 03/18/21  0353   INR 1.31* 1.20* 1.31*         5. RADIOLOGY:     CXR 3/21/2021: pending     Recent Results (from the past 24 hour(s))   XR Abdomen Port 1 View    Narrative    Exam: XR ABDOMEN PORT 1 VW, 3/21/2021 3:26 AM    Indication: NGT placement    Comparison: 3/20/2021  Findings:   Single portable view of the abdomen. Lumbar levoscoliosis. Radiopaque  devices projecting over the left SI joint. Nonobstructive bowel gas  pattern. No pneumatosis. No portal venous gas. Lung bases are  unremarkable. Feeding tube removed. Gastric tube with sidehole  projecting distal to the GE junction.      Impression    Impression: Gastric tube with sidehole projecting distal to the GE  junction.    I have personally reviewed the examination and initial interpretation  and I agree with the findings.    HERMELINDA KANG MD   XR Chest Port 1 View    Narrative    EXAM: XR CHEST PORT 1 VW  3/21/2021 8:26 AM     HISTORY:  chest tube removal       COMPARISON:  3/20/2021,  3/17/2021    TECHNIQUE: Single frontal radiograph of the chest    FINDINGS:   Unchanged position of bilateral central venous catheters, postsurgical  chest and enteric tube.    Midline trachea. Unchanged right greater than left low lung volumes  and increased perihilar attenuation. No consolidation, pleural  effusion or appreciable pneumothorax.      Impression    IMPRESSION: No acute consolidation. No appreciable pneumothorax.  Grossly unchanged bibasilar atelectasis.    I have personally reviewed the examination and initial interpretation  and I agree with the findings.    HERMELINDA KANG MD       =========================================

## 2021-03-21 NOTE — PROGRESS NOTES
Nephrology Progress Note  03/21/2021         Assessment & Recommendations:   Jin Garrett is a 80 year old with PMH HTN, AAA, HLD, RA who presented to OSH with SOB and atypical CP. TTE showed severe MR with ?posterior flail leaflet, EF preserved. On arrival at [facility] an Impella was emergently placed. S/p MVR and single vessel CABG on 3/5/21. Returned from the OR on central ECMO with an open chest, now s/p decannulation on 3/9. Nephrology consulted for ADAN. Initiated on CRRT 3/6, transitioned to iHD 3/13.     ADAN in the setting of cardiogenic shock, was on ECMO, s/p decannulation on 3/9  Unknown baseline, no h/o CKD per daughter. 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 Etiology likely ATN in the setting of shock, no obstruction per CT and urinalysis showed granular casts which is c/w ATN. UOP declined and K rising  on 3/6, so initiated on CRRT. Continued CRRT until 3/12, then changed to iHD. Continues to be oligoanuric and has not been tolerating iHD well due to hypotension and tachycardia. Have been unable to pull any fluid for last 2 dialysis sessions secondary to hypotension and tachycardia.   - No HD today, plan for next run 3/22 - should be getting tunneled catheter in AM  - Q day bladder scans  - Avoid nephrotoxins  - Renally dose medications  - Renal diet  - Daily weights  - Strict I/Os      Recommendations were communicated to primary team via this note     Seen and discussed with Dr. Kriss Santamaria MD  Nephrology Fellow  [phone]    Interval History :   Nursing and provider notes from last 24 hours reviewed.  No events overnight. We did not pull fluid yesterday on HD due to hypotension and session had be terminated 19 minutes early due to tachycardia to 145. Today, patient remains minimally interactive. Denies SOB.     Physical Exam:   I/O last 3 completed shifts:  In: 1167.15 [I.V.:312.15; NG/GT:280]  Out: 450  "[Urine:400; Stool:50]   BP 91/53 (BP Location: Left arm)   Pulse 97   Temp 97.5  F (36.4  C) (Axillary)   Resp 20   Ht 1.803 m (5' 11\")   Wt 90 kg (198 lb 6.6 oz)   SpO2 97%   BMI 27.67 kg/m       General : Pt awake, not in acute distress   Lungs : anterior lung fields are clear  Cardiac : S1, S2 present  Abdomen : Soft/ND/NT  LE : trace lower extremity edema  Dialysis Access : left subclavian non tunneled cath    Labs:   All labs reviewed by me  Electrolytes/Renal -   Recent Labs   Lab Test 03/21/21  0619 03/20/21  1653 03/20/21  1053 03/20/21  0637 03/18/21  0801 03/18/21  0801 03/16/21  0005 03/16/21  0005 03/12/21  0355 03/12/21  0355 03/11/21  2210   * 133  --  131*   < >  --    < > 143   < > 139 142   POTASSIUM 5.2 4.8 6.4* 6.1*   < > 5.7*   < > 4.6   < > 3.6 3.6   CHLORIDE 97 98  --  96   < >  --    < > 109   < > 107 110*   CO2 22 27  --  22   < >  --    < > 23   < > 25 24   * 79*  --  139*   < >  --    < > 174*   < > 49* 49*   CR 4.25* 3.39*  --  5.02*   < >  --    < > 4.90*   < > 1.57* 1.57*   * 112*  --  86   < >  --    < > 136*   < > 123* 118*   PALLAVI 8.7 8.2*  --  8.6   < >  --    < > 7.2*   < > 7.7* 7.9*   MAG  --   --   --   --   --  2.4*  --  2.4*  --  2.5* 2.3   PHOS  --   --   --   --   --  6.6*  --   --   --  3.4 4.8*    < > = values in this interval not displayed.       CBC -   Recent Labs   Lab Test 03/21/21  0619 03/20/21  0908 03/19/21 2050 03/19/21  1623   WBC 13.9* 15.2*  --  16.3*   HGB 7.7* 7.0* 7.8* 7.5*    333  --  348       LFTs -   Recent Labs   Lab Test 03/21/21  0619 03/20/21  0637 03/19/21  0352   ALKPHOS 200* 210* 237*   BILITOTAL 0.6 0.6 0.6   ALT 98* 117* 161*   AST 53* 53* 85*   PROTTOTAL 5.4* 5.4* 5.2*   ALBUMIN 2.0* 2.0* 2.2*     Current Medications:    amitriptyline  25 mg Oral or Feeding Tube At Bedtime     aspirin  81 mg Oral or NG Tube Daily     B and C vitamin Complex with folic acid  5 mL Oral Daily     fiber modular (NUTRISOURCE FIBER)  1 " packet Per Feeding Tube TID     melatonin  10 mg Oral or Feeding Tube At Bedtime     metoprolol tartrate  12.5 mg Oral or Feeding Tube BID     midodrine  15 mg Oral TID w/meals     pantoprazole  40 mg Oral or NG Tube Daily    Or     pantoprazole  40 mg Oral Daily     QUEtiapine  100 mg Oral or Feeding Tube At Bedtime     QUEtiapine  25 mg Oral Daily       heparin 1,550 Units/hr (03/21/21 0813)     - MEDICATION INSTRUCTIONS -       Warfarin Therapy Reminder       Natividad Santamaria MD

## 2021-03-21 NOTE — PLAN OF CARE
D: Admitted 3/4 as transfer from OSH after presenting with SOB and atypical chest pain. Now s/p single vessel CABG and MVR. Hx includes HTN, AAA, HLD, RA. Followed by Nephrology for ADAN.    I: Monitored vitals and assessed pt status.   Changed:  -Pt pulled out his NG tube ~2200. Provider notified. Heparin held for two hrs and NG was replaced - verified by Xray.   -Sepsis BPA fired with lactic result of 0.5.   -Metoprolol held per parameters.   Running:  -Heparin gtt at 1550 units/hr - next Xa check 0745.   PRN:  -IV benadryl x1  -Tramadol x1  -Tylenol x1    A: Confused with garbled speech. Restless and frequently pulling at lines. Sitter at bedside. Not following commands. Afebrile this shift. On 2-4 lpm oxiplus mask. Frequent dry cough. Sinus rhythm on monitor with frequent PACs - rates 80s-100s. Afebrile. Minimal output to external urinary catheter and rectal tube. Chest tube pulled yesterday, dressing CDI. Sternotomy incision with small scabbing at inferior section. Left groin site CDI. Primapore on left leg graft site CDI. Up with lift assist. Appeared to sleep much better over night.     Temp:  [96.8  F (36  C)-99  F (37.2  C)] 98.7  F (37.1  C)  Pulse:  [] 91  Resp:  [12-29] 19  BP: ()/(49-97) 117/81  SpO2:  [90 %-100 %] 100 %      P: Continue to monitor Pt status and report changes to treatment team.

## 2021-03-21 NOTE — PROGRESS NOTES
Cross-cover note: 6:52 PM 3/21/2021     03/21/2021    General Surgery Cross Cover Note    Called by nursing regarding pt spitting/coughing up blood. Reportedly pot has been doing this, but this is an increase. Also quite delirious and agitated    Patient delirious, not responsive to questions    B/P: 118/90, T: 98.5, P: 102, R: 20    PE:  Oriented to self, trashing, spitting  Breathing non-labored on RA, satting 93%. Frequently spitting blood tinged phlegm/spit, also occasional clots. Suction canister mixed with water but fairly bloody  Extr. Warm to touch  Incisions clean, dry, intact    Plan:  Pt is an 80 year old s/p MVR, CABG , complicated afib, renal failure , bleeding requiring transfusions in the past days, renal failure, delirium. With increased blood apparently from respiratory source. Possibly ng causing mucosal injury although no blood per nose. sating well on RA. Mild tachycardia to 103.  - stat Hgb  - CXR  - hold heparin infusion  - haldol 5mg IV x1 for agitation/delirium  - night resident will follow up and discuss results with fellow    Germaine Alexander MD  Surgery Resident PGY-1  Please page primary team with questions

## 2021-03-22 ENCOUNTER — APPOINTMENT (OUTPATIENT)
Dept: INTERVENTIONAL RADIOLOGY/VASCULAR | Facility: CLINIC | Age: 81
DRG: 003 | End: 2021-03-22
Attending: NURSE PRACTITIONER
Payer: MEDICARE

## 2021-03-22 ENCOUNTER — APPOINTMENT (OUTPATIENT)
Dept: GENERAL RADIOLOGY | Facility: CLINIC | Age: 81
DRG: 003 | End: 2021-03-22
Attending: PHYSICIAN ASSISTANT
Payer: MEDICARE

## 2021-03-22 LAB
ALBUMIN SERPL-MCNC: 1.8 G/DL (ref 3.4–5)
ALBUMIN UR-MCNC: 30 MG/DL
ALP SERPL-CCNC: 178 U/L (ref 40–150)
ALT SERPL W P-5'-P-CCNC: 76 U/L (ref 0–70)
ANION GAP SERPL CALCULATED.3IONS-SCNC: 14 MMOL/L (ref 3–14)
APPEARANCE UR: CLEAR
AST SERPL W P-5'-P-CCNC: 38 U/L (ref 0–45)
BILIRUB SERPL-MCNC: 0.7 MG/DL (ref 0.2–1.3)
BILIRUB UR QL STRIP: NEGATIVE
BLD PROD TYP BPU: NORMAL
BLD UNIT ID BPU: 0
BLOOD PRODUCT CODE: NORMAL
BPU ID: NORMAL
BUN SERPL-MCNC: 143 MG/DL (ref 7–30)
CALCIUM SERPL-MCNC: 8.4 MG/DL (ref 8.5–10.1)
CHLORIDE SERPL-SCNC: 96 MMOL/L (ref 94–109)
CO2 SERPL-SCNC: 23 MMOL/L (ref 20–32)
COLOR UR AUTO: YELLOW
CREAT SERPL-MCNC: 5.45 MG/DL (ref 0.66–1.25)
CRP SERPL-MCNC: 200 MG/L (ref 0–8)
ERYTHROCYTE [DISTWIDTH] IN BLOOD BY AUTOMATED COUNT: 17.7 % (ref 10–15)
GFR SERPL CREATININE-BSD FRML MDRD: 9 ML/MIN/{1.73_M2}
GLUCOSE SERPL-MCNC: 103 MG/DL (ref 70–99)
GLUCOSE UR STRIP-MCNC: NEGATIVE MG/DL
HCT VFR BLD AUTO: 18.8 % (ref 40–53)
HGB BLD-MCNC: 5.7 G/DL (ref 13.3–17.7)
HGB BLD-MCNC: 6.1 G/DL (ref 13.3–17.7)
HGB UR QL STRIP: NEGATIVE
INR PPP: 1.21 (ref 0.86–1.14)
INTERPRETATION ECG - MUSE: NORMAL
KETONES UR STRIP-MCNC: NEGATIVE MG/DL
LACTATE BLD-SCNC: 0.5 MMOL/L (ref 0.7–2)
LDH SERPL L TO P-CCNC: 365 U/L (ref 85–227)
LDH SERPL L TO P-CCNC: 390 U/L (ref 85–227)
LEUKOCYTE ESTERASE UR QL STRIP: NEGATIVE
MCH RBC QN AUTO: 29.3 PG (ref 26.5–33)
MCHC RBC AUTO-ENTMCNC: 32.4 G/DL (ref 31.5–36.5)
MCV RBC AUTO: 90 FL (ref 78–100)
NITRATE UR QL: NEGATIVE
PH UR STRIP: 5.5 PH (ref 5–7)
PLATELET # BLD AUTO: 310 10E9/L (ref 150–450)
POTASSIUM SERPL-SCNC: 5.6 MMOL/L (ref 3.4–5.3)
PROT SERPL-MCNC: 5.2 G/DL (ref 6.8–8.8)
RBC # BLD AUTO: 2.08 10E12/L (ref 4.4–5.9)
RBC #/AREA URNS AUTO: 1 /HPF (ref 0–2)
SODIUM SERPL-SCNC: 133 MMOL/L (ref 133–144)
SOURCE: ABNORMAL
SP GR UR STRIP: 1.01 (ref 1–1.03)
SQUAMOUS #/AREA URNS AUTO: 0 /HPF (ref 0–1)
TRANSFUSION STATUS PATIENT QL: NORMAL
UFH PPP CHRO-ACNC: <0.1 IU/ML
UROBILINOGEN UR STRIP-MCNC: NORMAL MG/DL (ref 0–2)
WBC # BLD AUTO: 14.5 10E9/L (ref 4–11)
WBC #/AREA URNS AUTO: 3 /HPF (ref 0–5)

## 2021-03-22 PROCEDURE — 85027 COMPLETE CBC AUTOMATED: CPT | Performed by: THORACIC SURGERY (CARDIOTHORACIC VASCULAR SURGERY)

## 2021-03-22 PROCEDURE — 85610 PROTHROMBIN TIME: CPT | Performed by: THORACIC SURGERY (CARDIOTHORACIC VASCULAR SURGERY)

## 2021-03-22 PROCEDURE — 250N000011 HC RX IP 250 OP 636: Performed by: NURSE PRACTITIONER

## 2021-03-22 PROCEDURE — 999N001063 HC STATISTIC THAWING COMPONENT: Performed by: PHYSICIAN ASSISTANT

## 2021-03-22 PROCEDURE — 85520 HEPARIN ASSAY: CPT | Performed by: THORACIC SURGERY (CARDIOTHORACIC VASCULAR SURGERY)

## 2021-03-22 PROCEDURE — 83605 ASSAY OF LACTIC ACID: CPT | Performed by: THORACIC SURGERY (CARDIOTHORACIC VASCULAR SURGERY)

## 2021-03-22 PROCEDURE — C1769 GUIDE WIRE: HCPCS

## 2021-03-22 PROCEDURE — 85018 HEMOGLOBIN: CPT | Performed by: PHYSICIAN ASSISTANT

## 2021-03-22 PROCEDURE — 86900 BLOOD TYPING SEROLOGIC ABO: CPT

## 2021-03-22 PROCEDURE — P9059 PLASMA, FRZ BETWEEN 8-24HOUR: HCPCS | Performed by: PHYSICIAN ASSISTANT

## 2021-03-22 PROCEDURE — 76937 US GUIDE VASCULAR ACCESS: CPT | Mod: 26 | Performed by: PHYSICIAN ASSISTANT

## 2021-03-22 PROCEDURE — 99233 SBSQ HOSP IP/OBS HIGH 50: CPT | Mod: GC | Performed by: INTERNAL MEDICINE

## 2021-03-22 PROCEDURE — 250N000013 HC RX MED GY IP 250 OP 250 PS 637: Performed by: PHYSICIAN ASSISTANT

## 2021-03-22 PROCEDURE — 272N000504 HC NEEDLE CR4

## 2021-03-22 PROCEDURE — 74018 RADEX ABDOMEN 1 VIEW: CPT | Mod: 26 | Performed by: STUDENT IN AN ORGANIZED HEALTH CARE EDUCATION/TRAINING PROGRAM

## 2021-03-22 PROCEDURE — 36558 INSERT TUNNELED CV CATH: CPT | Performed by: PHYSICIAN ASSISTANT

## 2021-03-22 PROCEDURE — 258N000003 HC RX IP 258 OP 636: Performed by: INTERNAL MEDICINE

## 2021-03-22 PROCEDURE — 81001 URINALYSIS AUTO W/SCOPE: CPT | Performed by: PHYSICIAN ASSISTANT

## 2021-03-22 PROCEDURE — 71046 X-RAY EXAM CHEST 2 VIEWS: CPT

## 2021-03-22 PROCEDURE — 250N000013 HC RX MED GY IP 250 OP 250 PS 637: Performed by: THORACIC SURGERY (CARDIOTHORACIC VASCULAR SURGERY)

## 2021-03-22 PROCEDURE — P9016 RBC LEUKOCYTES REDUCED: HCPCS

## 2021-03-22 PROCEDURE — 93010 ELECTROCARDIOGRAM REPORT: CPT | Performed by: INTERNAL MEDICINE

## 2021-03-22 PROCEDURE — 77001 FLUOROGUIDE FOR VEIN DEVICE: CPT

## 2021-03-22 PROCEDURE — 71046 X-RAY EXAM CHEST 2 VIEWS: CPT | Mod: 26 | Performed by: RADIOLOGY

## 2021-03-22 PROCEDURE — 250N000011 HC RX IP 250 OP 636: Performed by: PHYSICIAN ASSISTANT

## 2021-03-22 PROCEDURE — 83010 ASSAY OF HAPTOGLOBIN QUANT: CPT | Performed by: INTERNAL MEDICINE

## 2021-03-22 PROCEDURE — 250N000013 HC RX MED GY IP 250 OP 250 PS 637: Performed by: STUDENT IN AN ORGANIZED HEALTH CARE EDUCATION/TRAINING PROGRAM

## 2021-03-22 PROCEDURE — C1750 CATH, HEMODIALYSIS,LONG-TERM: HCPCS

## 2021-03-22 PROCEDURE — 80053 COMPREHEN METABOLIC PANEL: CPT | Performed by: THORACIC SURGERY (CARDIOTHORACIC VASCULAR SURGERY)

## 2021-03-22 PROCEDURE — 250N000013 HC RX MED GY IP 250 OP 250 PS 637: Performed by: NURSE PRACTITIONER

## 2021-03-22 PROCEDURE — 250N000013 HC RX MED GY IP 250 OP 250 PS 637: Performed by: SURGERY

## 2021-03-22 PROCEDURE — 36415 COLL VENOUS BLD VENIPUNCTURE: CPT | Performed by: THORACIC SURGERY (CARDIOTHORACIC VASCULAR SURGERY)

## 2021-03-22 PROCEDURE — 76937 US GUIDE VASCULAR ACCESS: CPT

## 2021-03-22 PROCEDURE — 36592 COLLECT BLOOD FROM PICC: CPT | Performed by: THORACIC SURGERY (CARDIOTHORACIC VASCULAR SURGERY)

## 2021-03-22 PROCEDURE — 77001 FLUOROGUIDE FOR VEIN DEVICE: CPT | Mod: 26 | Performed by: PHYSICIAN ASSISTANT

## 2021-03-22 PROCEDURE — 0JH63XZ INSERTION OF TUNNELED VASCULAR ACCESS DEVICE INTO CHEST SUBCUTANEOUS TISSUE AND FASCIA, PERCUTANEOUS APPROACH: ICD-10-PCS | Performed by: PHYSICIAN ASSISTANT

## 2021-03-22 PROCEDURE — 86850 RBC ANTIBODY SCREEN: CPT

## 2021-03-22 PROCEDURE — 214N000001 HC R&B CCU UMMC

## 2021-03-22 PROCEDURE — 250N000013 HC RX MED GY IP 250 OP 250 PS 637: Performed by: INTERNAL MEDICINE

## 2021-03-22 PROCEDURE — 86901 BLOOD TYPING SEROLOGIC RH(D): CPT

## 2021-03-22 PROCEDURE — 83615 LACTATE (LD) (LDH) ENZYME: CPT | Performed by: THORACIC SURGERY (CARDIOTHORACIC VASCULAR SURGERY)

## 2021-03-22 PROCEDURE — 90937 HEMODIALYSIS REPEATED EVAL: CPT

## 2021-03-22 PROCEDURE — 272N000602 HC WOUND GLUE CR1

## 2021-03-22 PROCEDURE — 36592 COLLECT BLOOD FROM PICC: CPT | Performed by: PHYSICIAN ASSISTANT

## 2021-03-22 PROCEDURE — 86923 COMPATIBILITY TEST ELECTRIC: CPT

## 2021-03-22 PROCEDURE — 86140 C-REACTIVE PROTEIN: CPT | Performed by: THORACIC SURGERY (CARDIOTHORACIC VASCULAR SURGERY)

## 2021-03-22 PROCEDURE — 83615 LACTATE (LD) (LDH) ENZYME: CPT | Performed by: INTERNAL MEDICINE

## 2021-03-22 PROCEDURE — 02H633Z INSERTION OF INFUSION DEVICE INTO RIGHT ATRIUM, PERCUTANEOUS APPROACH: ICD-10-PCS | Performed by: PHYSICIAN ASSISTANT

## 2021-03-22 PROCEDURE — 250N000009 HC RX 250: Performed by: PHYSICIAN ASSISTANT

## 2021-03-22 PROCEDURE — 74018 RADEX ABDOMEN 1 VIEW: CPT

## 2021-03-22 RX ORDER — NALOXONE HYDROCHLORIDE 0.4 MG/ML
0.4 INJECTION, SOLUTION INTRAMUSCULAR; INTRAVENOUS; SUBCUTANEOUS
Status: DISCONTINUED | OUTPATIENT
Start: 2021-03-22 | End: 2021-03-22 | Stop reason: HOSPADM

## 2021-03-22 RX ORDER — HEPARIN SODIUM 1000 [USP'U]/ML
3 INJECTION, SOLUTION INTRAVENOUS; SUBCUTANEOUS ONCE
Status: COMPLETED | OUTPATIENT
Start: 2021-03-22 | End: 2021-03-22

## 2021-03-22 RX ORDER — FLUMAZENIL 0.1 MG/ML
0.2 INJECTION, SOLUTION INTRAVENOUS
Status: DISCONTINUED | OUTPATIENT
Start: 2021-03-22 | End: 2021-03-22 | Stop reason: HOSPADM

## 2021-03-22 RX ORDER — CEFAZOLIN SODIUM 2 G/100ML
2 INJECTION, SOLUTION INTRAVENOUS
Status: COMPLETED | OUTPATIENT
Start: 2021-03-22 | End: 2021-03-22

## 2021-03-22 RX ORDER — ALBUMIN (HUMAN) 12.5 G/50ML
50 SOLUTION INTRAVENOUS
Status: DISCONTINUED | OUTPATIENT
Start: 2021-03-22 | End: 2021-03-22

## 2021-03-22 RX ORDER — FENTANYL CITRATE 50 UG/ML
25-50 INJECTION, SOLUTION INTRAMUSCULAR; INTRAVENOUS EVERY 5 MIN PRN
Status: DISCONTINUED | OUTPATIENT
Start: 2021-03-22 | End: 2021-03-22 | Stop reason: HOSPADM

## 2021-03-22 RX ORDER — HEPARIN SODIUM 1000 [USP'U]/ML
3 INJECTION, SOLUTION INTRAVENOUS; SUBCUTANEOUS ONCE
Status: CANCELLED | OUTPATIENT
Start: 2021-03-22 | End: 2021-03-22

## 2021-03-22 RX ORDER — WARFARIN SODIUM 4 MG/1
4 TABLET ORAL
Status: COMPLETED | OUTPATIENT
Start: 2021-03-22 | End: 2021-03-22

## 2021-03-22 RX ORDER — NALOXONE HYDROCHLORIDE 0.4 MG/ML
0.2 INJECTION, SOLUTION INTRAMUSCULAR; INTRAVENOUS; SUBCUTANEOUS
Status: DISCONTINUED | OUTPATIENT
Start: 2021-03-22 | End: 2021-03-22 | Stop reason: HOSPADM

## 2021-03-22 RX ORDER — BUMETANIDE 0.25 MG/ML
3 INJECTION INTRAMUSCULAR; INTRAVENOUS ONCE
Status: COMPLETED | OUTPATIENT
Start: 2021-03-22 | End: 2021-03-22

## 2021-03-22 RX ADMIN — Medication 5 ML: at 07:57

## 2021-03-22 RX ADMIN — Medication 1 PACKET: at 20:27

## 2021-03-22 RX ADMIN — CEFAZOLIN SODIUM 2 G: 2 INJECTION, SOLUTION INTRAVENOUS at 09:18

## 2021-03-22 RX ADMIN — Medication 10 MG: at 20:27

## 2021-03-22 RX ADMIN — AMITRIPTYLINE HYDROCHLORIDE 25 MG: 25 TABLET, FILM COATED ORAL at 22:17

## 2021-03-22 RX ADMIN — SODIUM CHLORIDE 300 ML: 9 INJECTION, SOLUTION INTRAVENOUS at 15:05

## 2021-03-22 RX ADMIN — LIDOCAINE HYDROCHLORIDE 8 ML: 10 INJECTION, SOLUTION EPIDURAL; INFILTRATION; INTRACAUDAL; PERINEURAL at 09:39

## 2021-03-22 RX ADMIN — BUMETANIDE 3 MG: 0.25 INJECTION INTRAMUSCULAR; INTRAVENOUS at 10:11

## 2021-03-22 RX ADMIN — Medication: at 15:21

## 2021-03-22 RX ADMIN — MIDODRINE HYDROCHLORIDE 15 MG: 5 TABLET ORAL at 12:11

## 2021-03-22 RX ADMIN — Medication 12.5 MG: at 07:58

## 2021-03-22 RX ADMIN — Medication 220 MG: at 12:12

## 2021-03-22 RX ADMIN — SODIUM CHLORIDE 250 ML: 9 INJECTION, SOLUTION INTRAVENOUS at 15:20

## 2021-03-22 RX ADMIN — HEPARIN SODIUM 3000 UNITS: 1000 INJECTION INTRAVENOUS; SUBCUTANEOUS at 09:39

## 2021-03-22 RX ADMIN — MIDODRINE HYDROCHLORIDE 15 MG: 5 TABLET ORAL at 07:57

## 2021-03-22 RX ADMIN — ASPIRIN 81 MG CHEWABLE TABLET 81 MG: 81 TABLET CHEWABLE at 12:11

## 2021-03-22 RX ADMIN — QUETIAPINE 25 MG: 25 TABLET ORAL at 19:21

## 2021-03-22 RX ADMIN — QUETIAPINE 25 MG: 25 TABLET ORAL at 07:58

## 2021-03-22 RX ADMIN — ACETAMINOPHEN 650 MG: 325 TABLET, FILM COATED ORAL at 07:57

## 2021-03-22 RX ADMIN — WARFARIN SODIUM 4 MG: 4 TABLET ORAL at 19:21

## 2021-03-22 RX ADMIN — Medication 12.5 MG: at 20:27

## 2021-03-22 RX ADMIN — AMIODARONE HYDROCHLORIDE 150 MG: 1.5 INJECTION, SOLUTION INTRAVENOUS at 10:17

## 2021-03-22 RX ADMIN — MIDODRINE HYDROCHLORIDE 15 MG: 5 TABLET ORAL at 19:21

## 2021-03-22 RX ADMIN — QUETIAPINE 100 MG: 25 TABLET ORAL at 20:27

## 2021-03-22 RX ADMIN — PANTOPRAZOLE SODIUM 40 MG: 40 TABLET, DELAYED RELEASE ORAL at 07:58

## 2021-03-22 ASSESSMENT — ACTIVITIES OF DAILY LIVING (ADL)
ADLS_ACUITY_SCORE: 21
ADLS_ACUITY_SCORE: 23
ADLS_ACUITY_SCORE: 21
ADLS_ACUITY_SCORE: 20
ADLS_ACUITY_SCORE: 21
ADLS_ACUITY_SCORE: 20

## 2021-03-22 ASSESSMENT — MIFFLIN-ST. JEOR: SCORE: 1616.13

## 2021-03-22 NOTE — PROGRESS NOTES
Patient Name: Jin Garrett  Medical Record Number: [id number]  Today's Date: 3/22/2021    Procedure: Tunneled central venous catheter placement  Proceduralist: Mikal Fitch PA-C, Gudelia Perrin PA-C    Procedure Start: 0920  Procedure end: 0942  Sedation medications administered: None     Report given to: Angela ESPINOZA 6C  : ARIANA    Other Notes: Pt arrived to IR room 5 from . Consent reviewed. Pt denies any questions or concerns regarding procedure. Pt positioned supine and monitored per protocol. Pt tolerated procedure without any noted complications. Pt transferred back to .    Line heparin locked per protocol and ready for use.

## 2021-03-22 NOTE — PROGRESS NOTES
HEMODIALYSIS TREATMENT NOTE    Date: 3/22/2021  Time: 6:32 PM    Data:  Pre Wt: 88.4 kg (194 lb 14.2 oz)   Desired Wt: 88.4 kg   Post Wt: 88.4 kg (194 lb 14.2 oz) estimated  Weight change: 0 kg  Ultrafiltration - Post Run Net Total Removed (mL): 0 mL  Vascular Access Status: patent  Dialyzer Rinse: Streaked, Light  Total Blood Volume Processed: 52.15  Liters  Total Dialysis (Treatment) Time: 3hours    Lab:   Yes- LDH and Haptoglobin and urine sample    Assessment/Interventions:  HD treatment initiated via newly placed tunneled R internal jugular CVC, 2k 3Ca bath, 300BFR/500DFR. Patient received 1 unit packed RBCS, no transfusion reaction noted. Patient tolerated treatment well, UF removed 0. Patient became more alert post treatment, family stayed at bedside for most of patient's run. Report given to primary RN post run.       Plan:    Continue with plan per renal

## 2021-03-22 NOTE — PLAN OF CARE
"D: Admitted 3/4 as transfer from OSH after presenting with SOB and atypical chest pain. Now s/p single vessel CABG and MVR. Hx includes HTN, AAA, HLD, RA. Followed by Nephrology for ADAN.     I: Monitored vitals and assessed pt status.   Changed:  -Pt was spitting up significant amounts of blood/clots for first 4 hours of shift. Hgb recheck 7.0 - provider notified.  -Heparin on hold for bleeding.     -Gastric tube feeds stopped since midnight for tunneled CVC placement in IR today.     Running:  -Double lumen PICC saline locked.     PRN:  -Tramadol x1     A: Confused with garbled speech. Was able to say his name and birthday. Restless and frequently pulling at lines. Sitter at bedside. Not following commands. Afebrile this shift. On 2-4 lpm oxiplus mask. Frequent cough with bloody sputum/clots resolved ~0000, requiring frequent oral suctioning. Subsequently had frequent dry cough. Lung sounds coarse. Sinus rhythm on monitor with up to 15 PACs/min - rates 80s-100s. Afebrile. Minimal output to external urinary catheter and rectal tube. OId chest tube site dressing CDI. Sternotomy incision with small scabbing at inferior section. Left groin site CDI. Primapore on left leg graft site CDI. Up with lift assist. Was eventually able to get intermittent sleep. Had occasional muscle jerks which disrupted sleep. Consider robaxin?      BP 92/70 (BP Location: Left arm)   Pulse 94   Temp 98.8  F (37.1  C) (Axillary)   Resp 20   Ht 1.803 m (5' 11\")   Wt 90 kg (198 lb 6.6 oz)   SpO2 99%   BMI 27.67 kg/m         P: Anticipate tunneled dialysis line placement today. Anticipate possible dialysis today. Continue to monitor Pt status and report changes to treatment team.  "

## 2021-03-22 NOTE — PROCEDURES
[facility]    Procedure: IR Procedure Note    Date/Time: 3/22/2021 9:48 AM  Performed by: Mikal Fitch PA-C  Authorized by: Mikal Fitch PA-C     UNIVERSAL PROTOCOL   Site Marked: NA  Prior Images Obtained and Reviewed:  Yes  Required items: Required blood products, implants, devices and special equipment available    Patient identity confirmed:  Verbally with patient, arm band, provided demographic data and hospital-assigned identification number  Patient was reevaluated immediately before administering moderate or deep sedation or anesthesia  Confirmation Checklist:  Patient's identity using two indicators, relevant allergies, procedure was appropriate and matched the consent or emergent situation and correct equipment/implants were available  Time out: Immediately prior to the procedure a time out was called    Universal Protocol: the Joint Commission Universal Protocol was followed    Preparation: Patient was prepped and draped in usual sterile fashion           ANESTHESIA    Anesthesia: Local infiltration  Local Anesthetic:  Lidocaine 1% without epinephrine  Anesthetic Total (mL):  8      SEDATION    Patient Sedated: No    Vital signs: Vital signs monitored during sedation    Fluoroscopy Time: 1 minute(s)  See dictated procedure note for full details.  Findings: No sedation given - local anesthesia only    Specimens: none    Complications: None    Condition: Stable    Plan: No restrictions.     PROCEDURE   Patient Tolerance:  Patient tolerated the procedure well with no immediate complications  Describe Procedure: Completed image-guided placement of 14.5 [place], 28 cm double lumen tunneled central venous catheter via right IJ. Aspirates and flushes freely, heparin locked and ready for immediate use. Tip in right atrium.    Length of time physician/provider present for 1:1 monitoring during sedation: 0

## 2021-03-22 NOTE — PROGRESS NOTES
Cardiovascular Surgery Progress Note  03/22/2021         Assessment and Plan:     Jin Garrett is a 80 year old male with PMH of HTN, AAA, HLD and rheumatoid arthritis. He presented to an OSH with shortness of breath and atypical chest pain. TTE showed severe MR with concern for posterior flail leaflet, EF was preserved. On arrival at [facility] (3/4/21) an Impella was emergently placed in cath lab. He was found to have a ruptured posterior papillary muscle causing severe MR.   Jin is now s/p mitral valve replacement and single vessel CABG with Dr. Porras on 3/5/21. Returned from the OR on central ECMO with an open chest, intubated and sedated. Returned to the OR for a wash out on 3/9, found to have a thrombus on his mitral valve so he had the valve replaced, decannulated central ECMO, bronchoscopy and placement of left subclavian dialysis line.      Cardiovascular:   Hx of HTN, HLD, AAA  Acute severe MR due to ruptured papillary muscle, s/p tissue MVR  Cardiogenic shock secondary to severe MR  S/p Impella placement and conversion to central VA ECMO  Weaned off mechanical support, ECMO was utilized from 3/5 to 3/9.   CAD, mid LCx with 100% occlusion, s/p 1 vessel CAB   S/P redo MVR due to thrombus in left atrium and on bioprosthetic MV  Most recent echo showed LV EF 55-60%  ASA 81 mg, atorvastatin on hold due to elevated LFTs.  LUE US 3/14 with nonocclusive thrombus in right cephalic vein, no UE DVT   Pre and post-op Atrial fibrillation   Has been mostly rate controlled and anticoagulated. Hep gtt held 3/22 due to epistaxis.   EKG 3/22 confirming A-fib ('s), likely contributing to hypotension, giving Amio 150 mg IV bolus x 1 dose 3/22.    Hypotension   Patient requiring midodrine 15 mg TID to maintain MAPS > 60.     Pulmonary:  Acute respiratory failure secondary to acute MR (resolved)  - Extubated POD 3 (chest closure and ecmo decan) to 4 lpm via NC; now saturating well on 2-4 lpm.   - Supplemental O2 PRN to  keep sats > 92%. Wean off as tolerated.  - Confusion impeding progress with pulm toilet, IS, activity and deep breathing    Neurology / MSK:  Delirium  Insomnia    - Had extended time on sedation due to need for central VA ECMO. Avoiding narcs and benadryl.   - Continues on 1:1 sitter and PRN hand mitts.   - Melatonin 10 mg q PM.   - Acute post-operative pain; pain well controlled with acetaminophen and tramadol.   Recent hip surgery  - No weight bearing or activity restrictions related to recent hip surgery per family (records not available).     HEENT:   Hemoptysis  Epistaxis    - Suspecting hemoptysis is secondary epistaxis due to trauma to posterior nasal septum from the NG tube bridle and him tugging on it.    - Has hand mitts and tube taped to L cheek tightly.      / Renal:  ADAN secondary to cardiogenic shock  Hyperkalemia   - No Hx of renal disease. Most recent creatinine 5.4 and rising  - Oliguric and has condom catheter.  Will try Bumex 3 mg IV on 3/22 am.   - Nephrology following; most likely cardiogenic shock induced ATN. CRRT transitioned to intermittent HD, fluid removal impeded by hypoTN.     GI / FEN:   Dysphagia and Delirium   - Speech team following and recommending NPO with ice chips. Continuing therapies.   - NG in place for all nutrition and meds. Needs to be advanced 5 cm on 3/22.  - Rectal tube in place due to persistent loose stools.  C diff negative 3/18.   - Replace electrolytes as needed, hepatic enzymes improving.    Endocrine:  Stress induced hyperglycemia  - Initially managed on insulin drip, transitioned to sliding scale and now off insulin (3/16). No acute needs.     Infectious Disease:  Stress induced leukocytosis vs other  - WBC 14.5 and rising, remains afebrile (Tmax 99.9 F), no signs or symptoms of infection. Resending UA on 3/22.   - Completed perioperative antibiotics.     Hematology:   Acute blood loss anemia  Epistaxis   - Hgb 5.7 this AM. Giving 2 units PRBC and 1 unit  "plasma on 3/22.    - Plt WNL, no signs or symptoms of active bleeding.   - LUE US 3/14 with nonocclusive thrombus in right cephalic vein, no UE DVT     Anticoagulation:   - ASA only  - Coumadin for atrial fibrillation and left atrial thrombus, INR goal 2-3. Most recent INR 1.21.    - Low intensity heparin gtt bridging to therapeutic INR, HELD 3/22 am due to epistaxis.     Prophylaxis:   - Stress ulcer prophylaxis: Pantoprazole 40 mg daily for 30 days  - DVT prophylaxis: Subcutaneous heparin, SCD    Disposition:   - Transferred to  on 3/19   - Therapies recommending discharge to TCU. Still requiring 1:1 sitter.     Discussed with CVTS Fellow as needed.  Staff surgeons have been informed of changes through both verbal and written communication.      Bhavik Lemus PA-C  Cardiothoracic Surgery  Pager [phone]    7:40 AM   March 22, 2021        Interval History:     Overnight events- still with 1:1 sitter. Hemoptysis overnight and held Hep gtt,   Not complaining of pain on current regimen and avoiding narcs. Slept well overnight.  Remains NPO and tolerating tube feeds, is passing flatus, + BM via rectal tube. No nausea or vomiting.  Breathing well without complaints. Productive coughing at times.  Denies chest pain, palpitations, dizziness, syncopal symptoms, fevers, chills, myalgias, or sternal popping/clicking.         Physical Exam:   Blood pressure 92/70, pulse 105, temperature 98.4  F (36.9  C), temperature source Axillary, resp. rate 20, height 1.803 m (5' 11\"), weight 88.4 kg (194 lb 14.2 oz), SpO2 99 %.  Vitals:    03/19/21 0000 03/20/21 0603 03/22/21 0516   Weight: 86.9 kg (191 lb 9.3 oz) 90 kg (198 lb 6.6 oz) 88.4 kg (194 lb 14.2 oz)      Weight; + 9 kg since admit and trending down.   24 hr Fluid status; net gain 1.3 L.   MAPs: 63 - 79     Gen: A&Ox4, NAD  Neuro: no focal deficits, answering questions this AM. Spontaneous extremity movement and able to follow commands. Garbled speech at times.   CV: " irregular, normal S1 S2, no murmurs, rubs or gallops.   Pulm: CTA, no wheezing or rhonchi, normal breathing on 4 lpm via facemask  Abd: nondistended, normal BS, soft, nontender  Ext: trace pedal bilateral peripheral edema  Incision: clean, dry, intact, no erythema, sternum stable  Tubes/drain sites: dressing clean and dry         Data:    Imaging:  reviewed recent imaging, has L sided opacity on CXR 3/21, repeat pending.     Labs:  BMP  Recent Labs   Lab 03/22/21  0553 03/21/21  0619 03/20/21  1653 03/20/21  1053 03/20/21  0637    131* 133  --  131*   POTASSIUM 5.6* 5.2 4.8 6.4* 6.1*   CHLORIDE 96 97 98  --  96   PALLAVI 8.4* 8.7 8.2*  --  8.6   CO2 23 22 27  --  22   * 105* 79*  --  139*   CR 5.45* 4.25* 3.39*  --  5.02*   * 108* 112*  --  86     CBC  Recent Labs   Lab 03/22/21  0553 03/21/21  1928 03/21/21  0619 03/20/21  0908 03/19/21  1623 03/19/21  1623   WBC 14.5*  --  13.9* 15.2*  --  16.3*   RBC 2.08*  --  2.61* 2.41*  --  2.61*   HGB 6.1* 7.0* 7.7* 7.0*   < > 7.5*   HCT 18.8*  --  23.8* 22.3*  --  23.5*   MCV 90  --  91 93  --  90   MCH 29.3  --  29.5 29.0  --  28.7   MCHC 32.4  --  32.4 31.4*  --  31.9   RDW 17.7*  --  18.6* 17.3*  --  18.0*     --  326 333  --  348    < > = values in this interval not displayed.     INR  Recent Labs   Lab 03/22/21  0553 03/21/21  2009 03/21/21  0619 03/20/21  0637   INR 1.21* 1.31* 1.35* 1.31*      Liver Function Studies -   Recent Labs   Lab Test 03/22/21  0553   PROTTOTAL 5.2*   ALBUMIN 1.8*   BILITOTAL 0.7   ALKPHOS 178*   AST 38   ALT 76*     GLUCOSE:   Recent Labs   Lab 03/22/21  0553 03/21/21  0619 03/20/21  1653 03/20/21  0637 03/20/21  0232 03/19/21  2226 03/19/21  0356 03/19/21  0352 03/18/21  0353 03/17/21  1946 03/17/21  1053 03/16/21 2005 03/16/21 2005   * 108* 112* 86  --   --   --  107* 150*  --   --    < >  --    BGM  --   --   --   --  86 105* 104*  --   --  124* 95  --  112*    < > = values in this interval not displayed.

## 2021-03-22 NOTE — PROGRESS NOTES
Nephrology Progress Note  03/22/2021         Assessment & Recommendations:   Jin Garrett is a 80 year old with PMH HTN, AAA, HLD, RA who presented to OSH with SOB and atypical CP. TTE showed severe MR with ?posterior flail leaflet, EF preserved. On arrival at [facility] an Impella was emergently placed. S/p MVR and single vessel CABG on 3/5/21. Returned from the OR on central ECMO with an open chest, now s/p decannulation on 3/9. Nephrology consulted for ADAN. Initiated on CRRT 3/6, transitioned to iHD 3/13.     ADAN in the setting of cardiogenic shock, was on ECMO, s/p decannulation on 3/9  Unknown baseline, no h/o CKD per daughter. 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 Etiology likely ATN in the setting of shock, no obstruction per CT and urinalysis showed granular casts which is c/w ATN. UOP declined and K rising  on 3/6, so initiated on CRRT. Continued CRRT until 3/12, then changed to iHD. Continues to be oligoanuric and has not been tolerating iHD well due to hypotension and tachycardia. Have been unable to pull any fluid for last 2 dialysis sessions secondary to hypotension and tachycardia.   - Appreciate tunneled cath placed by IR today  - Getting 3 U PRBCs and 1 U FFP today as well as Amio bolus; will see if these therapies help pressures  - iHD run today for hyperkalemia and uremia; will consider giving albumin if hypotensive during run   - If continues to not tolerate iHD, will need to consider switch to CRRT vs. Short-term vasopressors during iHD runs  - Q day bladder scans  - Avoid nephrotoxins  - Renally dose medications  - Renal diet  - Daily weights  - Strict I/Os      Recommendations were communicated to primary team via this note     Seen and discussed with Dr. Armstrong.     Jay Pritchett MD  Internal Medicine PGY-1  [phone]        Interval History :   Nursing and provider notes from last 24 hours reviewed.  No  "events overnight. Due for another iHD run today, but will need to be cautious given previous runs ending early due to hypotension.  Today, patient remains minimally interactive. Denies SOB.     Physical Exam:   I/O last 3 completed shifts:  In: 1944.1 [I.V.:189.1; NG/GT:820]  Out: 975 [Urine:600; Emesis/NG output:200; Stool:175]   BP 97/61 (BP Location: Left arm)   Pulse 81   Temp 98.8  F (37.1  C) (Oral)   Resp 21   Ht 1.803 m (5' 11\")   Wt 88.4 kg (194 lb 14.2 oz)   SpO2 95%   BMI 27.18 kg/m       General : Pt awake, not in acute distress   Lungs : anterior lung fields are clear  Cardiac : S1, S2 present  Abdomen : Soft/ND/NT  LE : trace lower extremity edema  Dialysis Access : tunneled cath placed by IR this morning    Labs:   All labs reviewed by me  Electrolytes/Renal -   Recent Labs   Lab Test 03/22/21  0553 03/21/21  0619 03/20/21  1653 03/18/21  0801 03/18/21  0801 03/16/21  0005 03/16/21  0005 03/12/21  0355 03/12/21  0355 03/11/21  2210    131* 133   < >  --    < > 143   < > 139 142   POTASSIUM 5.6* 5.2 4.8   < > 5.7*   < > 4.6   < > 3.6 3.6   CHLORIDE 96 97 98   < >  --    < > 109   < > 107 110*   CO2 23 22 27   < >  --    < > 23   < > 25 24   * 105* 79*   < >  --    < > 174*   < > 49* 49*   CR 5.45* 4.25* 3.39*   < >  --    < > 4.90*   < > 1.57* 1.57*   * 108* 112*   < >  --    < > 136*   < > 123* 118*   PALLAVI 8.4* 8.7 8.2*   < >  --    < > 7.2*   < > 7.7* 7.9*   MAG  --   --   --   --  2.4*  --  2.4*  --  2.5* 2.3   PHOS  --   --   --   --  6.6*  --   --   --  3.4 4.8*    < > = values in this interval not displayed.       CBC -   Recent Labs   Lab Test 03/22/21  0817 03/22/21  0553 03/21/21  1928 03/21/21  0619 03/20/21  0908   WBC  --  14.5*  --  13.9* 15.2*   HGB 5.7* 6.1* 7.0* 7.7* 7.0*   PLT  --  310  --  326 333       LFTs -   Recent Labs   Lab Test 03/22/21  0553 03/21/21  0619 03/20/21  0637   ALKPHOS 178* 200* 210*   BILITOTAL 0.7 0.6 0.6   ALT 76* 98* 117*   AST 38 53* " 53*   PROTTOTAL 5.2* 5.4* 5.4*   ALBUMIN 1.8* 2.0* 2.0*     Current Medications:    sodium chloride 0.9%  250 mL Intravenous Once in dialysis     sodium chloride 0.9%  300 mL Hemodialysis Machine Once     acetaminophen  650 mg Oral or Feeding Tube Q8H     amitriptyline  25 mg Oral or Feeding Tube At Bedtime     aspirin  81 mg Oral or NG Tube Daily     B and C vitamin Complex with folic acid  5 mL Oral Daily     ferrous sulfate  220 mg Oral Daily     fiber modular (NUTRISOURCE FIBER)  1 packet Per Feeding Tube TID     gelatin absorbable  1 each Topical During Hemodialysis (from stock)     melatonin  10 mg Oral or Feeding Tube At Bedtime     metoprolol tartrate  12.5 mg Oral or Feeding Tube BID     midodrine  15 mg Oral TID w/meals     - MEDICATION INSTRUCTIONS -   Does not apply Once     pantoprazole  40 mg Oral Daily     QUEtiapine  100 mg Oral or Feeding Tube At Bedtime     QUEtiapine  25 mg Oral BID w/meals     warfarin ANTICOAGULANT  4 mg Oral ONCE at 18:00       [Held by provider] heparin Stopped (03/21/21 9912)     - MEDICATION INSTRUCTIONS -       Warfarin Therapy Reminder       Jay Pritchett MD    I was present with the resident during the history and exam.  I discussed the case with the resident and agree with the findings as documented in the assessment and plan. Dialysis today but low BP and tachy thus no UF attempted  Le MD Patti   of Medicine  Department of Nephrology  [facility]

## 2021-03-22 NOTE — PLAN OF CARE
Cancel: Attempted to see pt today for speech therapy follow up however pt unable to achieve adequate alertness to participate. Speech therapy will follow up tomorrow as pt is able and appropriate.

## 2021-03-22 NOTE — PLAN OF CARE
D: Pt in a-fib, higher rates >100. Rates down to 80s after amiodarone bolus. No drip. 1 of 2 units PRBCs given after hgb recheck 5.7. Plasma also pending. No shortness of breath noted. Pt sleeping most of shift. Tunneled HD catheter placed in IR.    External urinary catheter with no UOP this shift after IV bumex. Urine sample ordered. Rectal tube patent.  TF held pending abdominal x-ray. Strict NPO.  I: Monitored/assessed pt. Assisted with cares.  A: Pt stable and comfortable.  P: Continue to monitor/assess pt, contact provider with concerns. Pt at HD at time of writing.

## 2021-03-22 NOTE — PRE-PROCEDURE
GENERAL PRE-PROCEDURE:   Procedure:  TCVC placement  Date/Time:  3/22/2021 8:58 AM    Verbal consent obtained?: No    Written consent obtained?: Yes    Risks and benefits: Risks, benefits and alternatives were discussed    Consent given by:  Power of   Patient states understanding of procedure being performed: Yes    Patient's understanding of procedure matches consent: Yes    Procedure consent matches procedure scheduled: Yes    Expected level of sedation:  Moderate  Appropriately NPO:  Yes  ASA Class:  Class 2- mild systemic disease, no acute problems, no functional limitations  Mallampati  :  Grade 2- soft palate, base of uvula, tonsillar pillars, and portion of posterior pharyngeal wall visible  Lungs:  Other (comment)  Lung exam comment:  Coarse lungs sounds bilaterally  Heart:  Normal heart sounds and rate  History & Physical reviewed:  History and physical reviewed and no updates needed  Statement of review:  I have reviewed the lab findings, diagnostic data, medications, and the plan for sedation

## 2021-03-23 ENCOUNTER — APPOINTMENT (OUTPATIENT)
Dept: GENERAL RADIOLOGY | Facility: CLINIC | Age: 81
DRG: 003 | End: 2021-03-23
Attending: INTERNAL MEDICINE
Payer: MEDICARE

## 2021-03-23 ENCOUNTER — APPOINTMENT (OUTPATIENT)
Dept: SPEECH THERAPY | Facility: CLINIC | Age: 81
DRG: 003 | End: 2021-03-23
Attending: INTERNAL MEDICINE
Payer: MEDICARE

## 2021-03-23 LAB
ABO + RH BLD: NORMAL
ABO + RH BLD: NORMAL
ALBUMIN SERPL-MCNC: 1.9 G/DL (ref 3.4–5)
ALP SERPL-CCNC: 175 U/L (ref 40–150)
ALT SERPL W P-5'-P-CCNC: 52 U/L (ref 0–70)
ANION GAP SERPL CALCULATED.3IONS-SCNC: 7 MMOL/L (ref 3–14)
AST SERPL W P-5'-P-CCNC: 13 U/L (ref 0–45)
BILIRUB DIRECT SERPL-MCNC: 0.2 MG/DL (ref 0–0.2)
BILIRUB SERPL-MCNC: 0.5 MG/DL (ref 0.2–1.3)
BLD GP AB SCN SERPL QL: NORMAL
BLD PROD TYP BPU: NORMAL
BLD PROD TYP BPU: NORMAL
BLD UNIT ID BPU: 0
BLOOD BANK CMNT PATIENT-IMP: NORMAL
BLOOD PRODUCT CODE: NORMAL
BPU ID: NORMAL
BUN SERPL-MCNC: 90 MG/DL (ref 7–30)
CALCIUM SERPL-MCNC: 8.4 MG/DL (ref 8.5–10.1)
CHLORIDE SERPL-SCNC: 99 MMOL/L (ref 94–109)
CO2 SERPL-SCNC: 27 MMOL/L (ref 20–32)
CREAT SERPL-MCNC: 3.99 MG/DL (ref 0.66–1.25)
CRP SERPL-MCNC: 170 MG/L (ref 0–8)
ERYTHROCYTE [DISTWIDTH] IN BLOOD BY AUTOMATED COUNT: 17.7 % (ref 10–15)
GFR SERPL CREATININE-BSD FRML MDRD: 13 ML/MIN/{1.73_M2}
GLUCOSE SERPL-MCNC: 111 MG/DL (ref 70–99)
HAPTOGLOB SERPL-MCNC: 279 MG/DL (ref 32–197)
HCT VFR BLD AUTO: 22.2 % (ref 40–53)
HGB BLD-MCNC: 6.7 G/DL (ref 13.3–17.7)
HGB BLD-MCNC: 7.3 G/DL (ref 13.3–17.7)
INR PPP: 1.3 (ref 0.86–1.14)
LACTATE BLD-SCNC: 0.5 MMOL/L (ref 0.7–2)
LDH SERPL L TO P-CCNC: 377 U/L (ref 85–227)
MCH RBC QN AUTO: 29.4 PG (ref 26.5–33)
MCHC RBC AUTO-ENTMCNC: 32.9 G/DL (ref 31.5–36.5)
MCV RBC AUTO: 90 FL (ref 78–100)
NUM BPU REQUESTED: 3
PLATELET # BLD AUTO: 307 10E9/L (ref 150–450)
POTASSIUM SERPL-SCNC: 4.6 MMOL/L (ref 3.4–5.3)
PROT SERPL-MCNC: 5.3 G/DL (ref 6.8–8.8)
RBC # BLD AUTO: 2.48 10E12/L (ref 4.4–5.9)
SODIUM SERPL-SCNC: 133 MMOL/L (ref 133–144)
SPECIMEN EXP DATE BLD: NORMAL
TRANSFUSION STATUS PATIENT QL: NORMAL
TRANSFUSION STATUS PATIENT QL: NORMAL
WBC # BLD AUTO: 12.2 10E9/L (ref 4–11)

## 2021-03-23 PROCEDURE — 250N000013 HC RX MED GY IP 250 OP 250 PS 637: Performed by: STUDENT IN AN ORGANIZED HEALTH CARE EDUCATION/TRAINING PROGRAM

## 2021-03-23 PROCEDURE — 36592 COLLECT BLOOD FROM PICC: CPT | Performed by: PHYSICIAN ASSISTANT

## 2021-03-23 PROCEDURE — 85027 COMPLETE CBC AUTOMATED: CPT | Performed by: PHYSICIAN ASSISTANT

## 2021-03-23 PROCEDURE — 71045 X-RAY EXAM CHEST 1 VIEW: CPT

## 2021-03-23 PROCEDURE — 92526 ORAL FUNCTION THERAPY: CPT | Mod: GN

## 2021-03-23 PROCEDURE — 90937 HEMODIALYSIS REPEATED EVAL: CPT

## 2021-03-23 PROCEDURE — 250N000013 HC RX MED GY IP 250 OP 250 PS 637: Performed by: PHYSICIAN ASSISTANT

## 2021-03-23 PROCEDURE — 214N000001 HC R&B CCU UMMC

## 2021-03-23 PROCEDURE — 258N000003 HC RX IP 258 OP 636: Performed by: INTERNAL MEDICINE

## 2021-03-23 PROCEDURE — 250N000013 HC RX MED GY IP 250 OP 250 PS 637: Performed by: INTERNAL MEDICINE

## 2021-03-23 PROCEDURE — 71045 X-RAY EXAM CHEST 1 VIEW: CPT | Mod: 26 | Performed by: RADIOLOGY

## 2021-03-23 PROCEDURE — 81001 URINALYSIS AUTO W/SCOPE: CPT | Performed by: STUDENT IN AN ORGANIZED HEALTH CARE EDUCATION/TRAINING PROGRAM

## 2021-03-23 PROCEDURE — 86140 C-REACTIVE PROTEIN: CPT | Performed by: PHYSICIAN ASSISTANT

## 2021-03-23 PROCEDURE — 83615 LACTATE (LD) (LDH) ENZYME: CPT | Performed by: THORACIC SURGERY (CARDIOTHORACIC VASCULAR SURGERY)

## 2021-03-23 PROCEDURE — 80053 COMPREHEN METABOLIC PANEL: CPT | Performed by: THORACIC SURGERY (CARDIOTHORACIC VASCULAR SURGERY)

## 2021-03-23 PROCEDURE — 250N000013 HC RX MED GY IP 250 OP 250 PS 637: Performed by: THORACIC SURGERY (CARDIOTHORACIC VASCULAR SURGERY)

## 2021-03-23 PROCEDURE — 99233 SBSQ HOSP IP/OBS HIGH 50: CPT | Mod: GC | Performed by: INTERNAL MEDICINE

## 2021-03-23 PROCEDURE — 86140 C-REACTIVE PROTEIN: CPT | Performed by: THORACIC SURGERY (CARDIOTHORACIC VASCULAR SURGERY)

## 2021-03-23 PROCEDURE — 87040 BLOOD CULTURE FOR BACTERIA: CPT | Performed by: STUDENT IN AN ORGANIZED HEALTH CARE EDUCATION/TRAINING PROGRAM

## 2021-03-23 PROCEDURE — 36415 COLL VENOUS BLD VENIPUNCTURE: CPT | Performed by: THORACIC SURGERY (CARDIOTHORACIC VASCULAR SURGERY)

## 2021-03-23 PROCEDURE — 250N000013 HC RX MED GY IP 250 OP 250 PS 637: Performed by: SURGERY

## 2021-03-23 PROCEDURE — 250N000013 HC RX MED GY IP 250 OP 250 PS 637: Performed by: NURSE PRACTITIONER

## 2021-03-23 PROCEDURE — 36415 COLL VENOUS BLD VENIPUNCTURE: CPT | Performed by: STUDENT IN AN ORGANIZED HEALTH CARE EDUCATION/TRAINING PROGRAM

## 2021-03-23 PROCEDURE — P9016 RBC LEUKOCYTES REDUCED: HCPCS

## 2021-03-23 PROCEDURE — 85018 HEMOGLOBIN: CPT | Performed by: PHYSICIAN ASSISTANT

## 2021-03-23 PROCEDURE — 83605 ASSAY OF LACTIC ACID: CPT | Performed by: THORACIC SURGERY (CARDIOTHORACIC VASCULAR SURGERY)

## 2021-03-23 PROCEDURE — 82248 BILIRUBIN DIRECT: CPT | Performed by: THORACIC SURGERY (CARDIOTHORACIC VASCULAR SURGERY)

## 2021-03-23 PROCEDURE — 85610 PROTHROMBIN TIME: CPT | Performed by: THORACIC SURGERY (CARDIOTHORACIC VASCULAR SURGERY)

## 2021-03-23 RX ORDER — AMIODARONE HYDROCHLORIDE 200 MG/1
400 TABLET ORAL 2 TIMES DAILY
Status: DISCONTINUED | OUTPATIENT
Start: 2021-03-23 | End: 2021-03-29

## 2021-03-23 RX ORDER — QUETIAPINE FUMARATE 25 MG/1
50 TABLET, FILM COATED ORAL AT BEDTIME
Status: DISCONTINUED | OUTPATIENT
Start: 2021-03-23 | End: 2021-03-25

## 2021-03-23 RX ORDER — WARFARIN SODIUM 4 MG/1
4 TABLET ORAL
Status: COMPLETED | OUTPATIENT
Start: 2021-03-23 | End: 2021-03-23

## 2021-03-23 RX ORDER — ALBUMIN (HUMAN) 12.5 G/50ML
50 SOLUTION INTRAVENOUS
Status: DISCONTINUED | OUTPATIENT
Start: 2021-03-23 | End: 2021-03-23

## 2021-03-23 RX ORDER — FLUORIDE TOOTHPASTE
15 TOOTHPASTE DENTAL 4 TIMES DAILY
Status: DISCONTINUED | OUTPATIENT
Start: 2021-03-23 | End: 2021-04-05 | Stop reason: HOSPADM

## 2021-03-23 RX ADMIN — Medication 1 PACKET: at 13:10

## 2021-03-23 RX ADMIN — Medication 220 MG: at 11:14

## 2021-03-23 RX ADMIN — Medication 1 PACKET: at 09:04

## 2021-03-23 RX ADMIN — QUETIAPINE 50 MG: 25 TABLET ORAL at 20:12

## 2021-03-23 RX ADMIN — SODIUM CHLORIDE 300 ML: 9 INJECTION, SOLUTION INTRAVENOUS at 15:44

## 2021-03-23 RX ADMIN — QUETIAPINE 25 MG: 25 TABLET ORAL at 23:40

## 2021-03-23 RX ADMIN — Medication: at 15:45

## 2021-03-23 RX ADMIN — SODIUM CHLORIDE 250 ML: 9 INJECTION, SOLUTION INTRAVENOUS at 15:45

## 2021-03-23 RX ADMIN — MIDODRINE HYDROCHLORIDE 15 MG: 5 TABLET ORAL at 11:15

## 2021-03-23 RX ADMIN — Medication 15 ML: at 17:03

## 2021-03-23 RX ADMIN — Medication 12.5 MG: at 20:12

## 2021-03-23 RX ADMIN — WARFARIN SODIUM 4 MG: 4 TABLET ORAL at 17:47

## 2021-03-23 RX ADMIN — AMIODARONE HYDROCHLORIDE 400 MG: 200 TABLET ORAL at 11:15

## 2021-03-23 RX ADMIN — ASPIRIN 81 MG CHEWABLE TABLET 81 MG: 81 TABLET CHEWABLE at 08:50

## 2021-03-23 RX ADMIN — AMITRIPTYLINE HYDROCHLORIDE 25 MG: 25 TABLET, FILM COATED ORAL at 23:15

## 2021-03-23 RX ADMIN — Medication 12.5 MG: at 08:49

## 2021-03-23 RX ADMIN — Medication 15 ML: at 20:14

## 2021-03-23 RX ADMIN — AMIODARONE HYDROCHLORIDE 400 MG: 200 TABLET ORAL at 20:13

## 2021-03-23 RX ADMIN — QUETIAPINE 25 MG: 25 TABLET ORAL at 08:50

## 2021-03-23 RX ADMIN — PANTOPRAZOLE SODIUM 40 MG: 40 TABLET, DELAYED RELEASE ORAL at 08:50

## 2021-03-23 RX ADMIN — Medication 1 PACKET: at 20:14

## 2021-03-23 RX ADMIN — MIDODRINE HYDROCHLORIDE 15 MG: 5 TABLET ORAL at 17:47

## 2021-03-23 RX ADMIN — ACETAMINOPHEN 650 MG: 325 TABLET, FILM COATED ORAL at 04:18

## 2021-03-23 RX ADMIN — ACETAMINOPHEN 650 MG: 325 TABLET, FILM COATED ORAL at 17:02

## 2021-03-23 RX ADMIN — ACETAMINOPHEN 650 MG: 325 TABLET, FILM COATED ORAL at 23:15

## 2021-03-23 RX ADMIN — MIDODRINE HYDROCHLORIDE 15 MG: 5 TABLET ORAL at 08:51

## 2021-03-23 RX ADMIN — Medication 10 MG: at 20:13

## 2021-03-23 RX ADMIN — Medication 15 ML: at 13:10

## 2021-03-23 RX ADMIN — Medication 5 ML: at 08:51

## 2021-03-23 ASSESSMENT — ACTIVITIES OF DAILY LIVING (ADL)
ADLS_ACUITY_SCORE: 23
ADLS_ACUITY_SCORE: 24
ADLS_ACUITY_SCORE: 23

## 2021-03-23 NOTE — PROGRESS NOTES
Cardiovascular Surgery Progress Note  03/23/2021         Assessment and Plan:     Jin Garrett is a 80 year old male with PMH of HTN, AAA, HLD and rheumatoid arthritis. He presented to an OSH with shortness of breath and atypical chest pain. TTE showed severe MR with concern for posterior flail leaflet, EF was preserved. On arrival at [facility] (3/4/21) an Impella was emergently placed in cath lab. He was found to have a ruptured posterior papillary muscle causing severe MR.   Jin is now s/p mitral valve replacement and single vessel CABG with Dr. Porras on 3/5/21. Returned from the OR on central ECMO with an open chest, intubated and sedated. Returned to the OR for a wash out on 3/9, found to have a thrombus on his mitral valve so he had the valve replaced, decannulated central ECMO, bronchoscopy and placement of left subclavian dialysis line.      Cardiovascular:   Hx of HTN, HLD, AAA  Acute severe MR due to ruptured papillary muscle, s/p tissue MVR  Cardiogenic shock secondary to severe MR  S/p Impella placement and conversion to central VA ECMO  Weaned off mechanical support, ECMO was utilized from 3/5 to 3/9.   CAD, mid LCx with 100% occlusion, s/p 1 vessel CAB   S/P redo MVR due to thrombus in left atrium and on bioprosthetic MV  Most recent echo showed LV EF 55-60%.    ASA 81 mg, atorvastatin on hold due to elevated LFTs.   LUE US 3/14 with nonocclusive thrombus in right cephalic vein, no UE DVT.   Pre and post-op Atrial fibrillation   Has been mostly rate controlled and anticoagulated. Hep gtt held since 3/22 due to epistaxis. EKG 3/22 confirming A-fib ('s), likely contributing to hypotension, given Amio 150 mg IV bolus x 1 dose 3/22 and restarted 400 mg PO BID 3/23 AM.   Hypotension   Patient requiring midodrine 15 mg TID to maintain MAPS > 60.      Pulmonary:  Acute respiratory failure secondary to acute MR (resolved)  - Extubated POD 3 (chest closure and ecmo decan) to 4 lpm via NC; now saturating  well on 2-4 lpm.   - Supplemental O2 PRN to keep sats > 92%. Wean off as tolerated.  - Confusion impeding progress with pulm toilet, IS, activity and deep breathing     Neurology / MSK:  Delirium  Insomnia    - Had extended time on sedation due to need for central VA ECMO. Avoiding narcs and benadryl.   - Continues on 1:1 sitter and PRN hand mitts.   - Melatonin 10 mg q PM. Seroquel 12.5 mg BID and 50 mg q HS (reduced 3/23).   - Acute post-operative pain; pain well controlled with acetaminophen and tramadol.   Chronic back pain   Recent hip surgery  - No weight bearing or activity restrictions related to recent hip surgery per family (records not available).      HEENT:   Hemoptysis  Epistaxis    - Suspecting hemoptysis is secondary epistaxis due to trauma to posterior nasal septum from the NG tube bridle and him tugging on it.    - Has hand mitts and tube taped to cheek tightly. Tube is no longer bridled.        / Renal:  ADAN secondary to cardiogenic shock  Hyperkalemia   - No Hx of renal disease. Most recent creatinine 5.4 and rising  - Oliguric, has condom catheter. Bumex 3 mg IV challenge on 3/22 am, minimal UOP.  - Nephrology following; most likely cardiogenic shock induced ATN. CRRT transitioned to intermittent HD, fluid removal impeded by hypoTN.      GI / FEN:   Dysphagia and Delirium   - Speech team following and recommending NPO with ice chips. Continuing therapies.   - NG in place for all nutrition and meds. Needs to be advanced 5 cm on 3/22.  - Rectal tube in place due to persistent loose stools.  C diff negative 3/18.   - Replace electrolytes as needed, hepatic enzymes improving.  Melena   - Noticed 3/23 am. Had epistaxis 2 days ago and likely swallowed some of the blood. He is also on Iron supplement and this can cause stools to darken. Will continue to monitor Hgb.      Endocrine:  Stress induced hyperglycemia  - Initially managed on insulin drip, transitioned to sliding scale and now off insulin  (3/16). No acute needs.       Infectious Disease:  Stress induced leukocytosis vs other  - WBC 12.2 and improving, remains afebrile (Tmax 100 F), no signs or symptoms of infection. His UA on 3/22 was clean and CRP slowly trending down.   - Completed perioperative antibiotics.      Hematology:   Acute blood loss anemia  Epistaxis   - Hgb 5.7 on 3/22. Given 2 units PRBC and 1 unit plasma on 3/22.  Hgb 7.3.   - Plt WNL, no persistent signs or symptoms of active bleeding, melena 3/22 overnight could be residual from previous epistaxis. Recheck Hgb 3/23 after dialysis.   - LUE US 3/14 with non-occlusive thrombus in right cephalic vein, no UE DVT       Anticoagulation:   - Coumadin for atrial fibrillation and left atrial thrombus, INR goal 2-3. Most recent INR 1.30.    - Low intensity heparin gtt bridging to therapeutic INR, HELD since 3/22 am due to epistaxis.      Prophylaxis:   - Stress ulcer prophylaxis: Pantoprazole 40 mg daily for 30 days  - DVT prophylaxis: Subcutaneous heparin, SCD     Disposition:   - Transferred to  on 3/19   - Therapies recommending discharge to TCU. Still requiring 1:1 sitter.   - Family would like to have a family care conference in next two days and hope to transfer to [facility]). Family would like to consider DNR/DNI.     Discussed with CVTS Fellow as needed.  Staff surgeons have been informed of changes through both verbal and written communication.      Bhavik Lemus PA-C  Cardiothoracic Surgery  Pager [phone]    11:34 AM   March 23, 2021        Interval History:     No overnight events. No further bleeding. Some melena reported overnight.   1:1 sitter for delirium.     States pain is well managed on current regimen.  Tolerating tube feeds, is passing flatus, + BM. No nausea or vomiting.  Breathing well without complaints but has wet cough.   Denies chest pain, palpitations, dizziness, syncopal symptoms, fevers, chills, myalgias, or sternal popping/clicking.          "Physical Exam:   Blood pressure 95/78, pulse 102, temperature 98.4  F (36.9  C), temperature source Axillary, resp. rate 20, height 1.803 m (5' 11\"), weight 88.4 kg (194 lb 14.2 oz), SpO2 97 %.  Vitals:    03/19/21 0000 03/20/21 0603 03/22/21 0516   Weight: 86.9 kg (191 lb 9.3 oz) 90 kg (198 lb 6.6 oz) 88.4 kg (194 lb 14.2 oz)      Weight; + 9 kg since admit and trending down.   24 hr Fluid status; net gain 936 mL.   MAPs: 70 - 84     Gen: A&Ox4, NAD  Neuro: no focal deficits   CV: irregular, normal S1 S2, no murmurs, rubs or gallops.   Pulm: CTA, no wheezing but soft expiratory rhonchi, normal breathing on 1-2 lpm  Abd: nondistended, normal BS, soft, nontender  Ext: trace peripheral edema, 1+ pitting  Incision: clean, dry, intact, no erythema, sternum stable  Tubes/drain sites: dressing clean and dry         Data:    Imaging:  reviewed recent imaging, no acute concerns    Labs:  BMP  Recent Labs   Lab 03/23/21  0623 03/22/21  0553 03/21/21  0619 03/20/21  1653    133 131* 133   POTASSIUM 4.6 5.6* 5.2 4.8   CHLORIDE 99 96 97 98   PALLAVI 8.4* 8.4* 8.7 8.2*   CO2 27 23 22 27   BUN 90* 143* 105* 79*   CR 3.99* 5.45* 4.25* 3.39*   * 103* 108* 112*     CBC  Recent Labs   Lab 03/23/21  1025 03/22/21  0817 03/22/21  0553 03/21/21  1928 03/21/21  0619 03/20/21  0908   WBC 12.2*  --  14.5*  --  13.9* 15.2*   RBC 2.48*  --  2.08*  --  2.61* 2.41*   HGB 7.3* 5.7* 6.1* 7.0* 7.7* 7.0*   HCT 22.2*  --  18.8*  --  23.8* 22.3*   MCV 90  --  90  --  91 93   MCH 29.4  --  29.3  --  29.5 29.0   MCHC 32.9  --  32.4  --  32.4 31.4*   RDW 17.7*  --  17.7*  --  18.6* 17.3*     --  310  --  326 333     INR  Recent Labs   Lab 03/23/21 0623 03/22/21 0553 03/21/21 2009 03/21/21 0619   INR 1.30* 1.21* 1.31* 1.35*      Recent Labs   Lab Test 03/23/21 0623   PROTTOTAL 5.3*   ALBUMIN 1.9*   BILITOTAL 0.5   ALKPHOS 175*   AST 13   ALT 52     GLUCOSE:   Recent Labs   Lab 03/23/21 0623 03/22/21 0553 03/21/21 0619 " 03/20/21  1653 03/20/21  0637 03/20/21  0232 03/19/21  2226 03/19/21  0356 03/19/21  0352 03/17/21  1946 03/17/21  1946 03/17/21  1053 03/16/21 2005 03/16/21  2005   * 103* 108* 112* 86  --   --   --  107*   < >  --   --    < >  --    BGM  --   --   --   --   --  86 105* 104*  --   --  124* 95  --  112*    < > = values in this interval not displayed.

## 2021-03-23 NOTE — PLAN OF CARE
Temp: 98  F (36.7  C) Temp src: Axillary BP: 107/70 Pulse: 112   Resp: 20 SpO2: 96 % O2 Device: Oxymask Oxygen Delivery: 2 LPM     D: S/p MVR and CABG x1 on 3/5 c/b ECMO, and ADAN. Now on HD. Hx HTN, AAA, HLD, RA    I/A: Jin (he/him) is A&O to self only, very restless. Sitter and mitts in place. Tele in place, afib. T-max 100F, recheck WDL. Other VSS on 2L OxyMask. PICC to HOWARD BELTRE with TF running at 55ml/hr. Tunneled HD lines to R and L chest. Rectal tube and external catheter in place. Having loose tarry black stools. 1U plasma given on previous shift. Up with lift assist. Slept poorly overnight    P: Continue to monitor and follow POC. Plan for HD in am. Notify CVTS with changes

## 2021-03-23 NOTE — PROGRESS NOTES
D:  S/p MVR and CABG x1 on 3/5 c/b ECMO, and ADAN. Now on HD. Hx HTN, AAA, HLD, RA  ?  A/I : Monitored vitals and assessed pt status. Disoriented x4. VSS on 2L oxymask, but did trigger sepsis protocol, VS and lactic acid WDL. A. Fib 80-110s. Afebrile. Condom cath and rectal tube in place, stool watery and black and tarry. Had small amount of UOP this shift, bladder scanned at 1700 for 226mL. CVTS aware of melena and POC will be to continue to monitor for SxS of bleeding and hbg recheck at 1800. Patient does not appear SOB and no overt signs of pain/discomfort, on scheduled tylenol (Hx of back pain). Restless at times and tries to pull at lines. Mitts on for safety and sitter at bedside. Productive cough occasionally, will encourage use of IS and flutter valve, although patient unable to follow directions at this time. TF running at 55mL/hr with flush of 30 mL of free water q 4 hours via NG, negligible residuals. No new skin concerns observed. Dressing over old CT sites changed, minimal drainage.  ?  P: Continue to monitor Pt status and report changes to treatment team.

## 2021-03-23 NOTE — PROGRESS NOTES
HEMODIALYSIS TREATMENT NOTE    Date: 3/23/2021  Time: 4:29 PM    Data:  Pre Wt: 88.4 kg (194 lb 14.2 oz)   Desired Wt: 87.4 kg   Post Wt: 87.4 kg (194 lb 14.2 oz)(estimated)  Weight change: 1 kg  Ultrafiltration - Post Run Net Total Removed (mL): 1000 mL  Vascular Access Status: CVC  patent  Dialyzer Rinse: Clear  Total Blood Volume Processed: 41.62 L   Total Dialysis (Treatment) Time: 2hr   Dialysate Bath: K 2, Ca 3  Heparin: None    Lab:   No    Interventions:Assessment:  Pt dialyzed for 2 hrs on K 2, Ca 3. 1000 ml fluid was removed and 41.6 total blood volume was processed. VSS. BP was soft but pt was able to maintain SBP>100. CVC was patent and site was clean,dry, and intact. 400 BFR was maintained throughout the tx with appropriated venous and arterial pressure. Handoff report was given to OLGA Tay.      Plan:    Per renal team

## 2021-03-23 NOTE — PLAN OF CARE
D-S/p MVR and single vessel CABG on 03/05/21. Returned to OR for wash out on 03/09/21. Returned to 6C from dialysis around 1900. INR 1.21.See flow sheets for vs and assessments.  I-NG advanced 5 cm per order and tube feedings were restarted. Bladder scan for 339cc. Pt voided 200 ml after bladder scan. Per dialysis nurse report, pt voided in dialysis and urine specimen was sent to the lab, but no urine was documented on flow sheet. I called dialysis, but they were gone for the day. 4 mg coumadin given per order.1 unit plasma infusing. Temperature at start of plasma, 97.8 axillary. Temperature up to 99.5 axillary. Most recent temperature 98.3 axillary. Discussed with Stacy Carvalho, surgery cross cover. Instructed to give scheduled tylenol when due.  A-Remains in afib. Confused and agitated. Oriented to self only.1:1 attendant at bedside.  P-Continue with current poc. Maintain safe environment.

## 2021-03-23 NOTE — PROGRESS NOTES
Nephrology Progress Note  03/23/2021         Assessment & Recommendations:   Jin Garrett is a 80 year old with PMH HTN, AAA, HLD, RA who presented to OSH with SOB and atypical CP. TTE showed severe MR with ?posterior flail leaflet, EF preserved. On arrival at [facility] an Impella was emergently placed. S/p MVR and single vessel CABG on 3/5/21. Returned from the OR on central ECMO with an open chest, now s/p decannulation on 3/9. Nephrology consulted for ADAN. Initiated on CRRT 3/6, transitioned to iHD 3/13.     ADAN in the setting of cardiogenic shock, was on ECMO, s/p decannulation on 3/9  Unknown baseline, no h/o CKD per daughter. 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 Etiology likely ATN in the setting of shock, no obstruction per CT and urinalysis showed granular casts which is c/w ATN. UOP declined and K rising  on 3/6, so initiated on CRRT. Continued CRRT until 3/12, then changed to iHD. Continues to be oligoanuric. Was previously not tolerating iHD due to hypotension during runs, but was able to tolerate iHD without any fluid pulled on 3/22 after receiving 2 U PRBCs and 1 U FFP.  - Will do another iHD run today with goal to pull 1 L off after significant intake over the past few days   - CTM pressures during iHD runs as this was an issue previously  - Q day bladder scans  - Avoid nephrotoxins  - Renally dose medications  - Renal diet  - Daily weights  - Strict I/Os      Recommendations were communicated to primary team via this note     Seen and discussed with Dr. Armstrong.     Jay Pritchett MD  Internal Medicine PGY-1  [phone]        Interval History :   Nursing and provider notes from last 24 hours reviewed.  Yesterday, blood pressures remained good enough for patient to tolerate a 2 hr HD run (no fluid removal). NAEO. Patient much more interactive today; seems his alertness and interaction improved yesterday after  "HD.    Physical Exam:   I/O last 3 completed shifts:  In: 2556 [I.V.:20; NG/GT:775]  Out: 500 [Urine:500]   BP 97/65   Pulse 104   Temp 98.1  F (36.7  C) (Axillary)   Resp 28   Ht 1.803 m (5' 11\")   Wt 88.4 kg (194 lb 14.2 oz)   SpO2 97%   BMI 27.18 kg/m       General : Pt awake, not in acute distress   Lungs : anterior lung fields are clear  Cardiac : S1, S2 present  Abdomen : Soft/ND/NT  LE : trace lower extremity edema  NEURO: Awake and alert. Interactive. A&Ox4.  Dialysis Access : tunneled cath placed by IR this morning    Labs:   All labs reviewed by me  Electrolytes/Renal -   Recent Labs   Lab Test 03/23/21  0623 03/22/21  0553 03/21/21  0619 03/18/21  0801 03/18/21  0801 03/16/21  0005 03/16/21  0005 03/12/21  0355 03/12/21  0355 03/11/21  2210    133 131*   < >  --    < > 143   < > 139 142   POTASSIUM 4.6 5.6* 5.2   < > 5.7*   < > 4.6   < > 3.6 3.6   CHLORIDE 99 96 97   < >  --    < > 109   < > 107 110*   CO2 27 23 22   < >  --    < > 23   < > 25 24   BUN 90* 143* 105*   < >  --    < > 174*   < > 49* 49*   CR 3.99* 5.45* 4.25*   < >  --    < > 4.90*   < > 1.57* 1.57*   * 103* 108*   < >  --    < > 136*   < > 123* 118*   PALLAVI 8.4* 8.4* 8.7   < >  --    < > 7.2*   < > 7.7* 7.9*   MAG  --   --   --   --  2.4*  --  2.4*  --  2.5* 2.3   PHOS  --   --   --   --  6.6*  --   --   --  3.4 4.8*    < > = values in this interval not displayed.       CBC -   Recent Labs   Lab Test 03/23/21  1025 03/22/21  0817 03/22/21  0553 03/21/21  0619 03/21/21  0619   WBC 12.2*  --  14.5*  --  13.9*   HGB 7.3* 5.7* 6.1*   < > 7.7*     --  310  --  326    < > = values in this interval not displayed.       LFTs -   Recent Labs   Lab Test 03/23/21  0623 03/22/21  0553 03/21/21 0619   ALKPHOS 175* 178* 200*   BILITOTAL 0.5 0.7 0.6   ALT 52 76* 98*   AST 13 38 53*   PROTTOTAL 5.3* 5.2* 5.4*   ALBUMIN 1.9* 1.8* 2.0*     Current Medications:    sodium chloride 0.9%  250 mL Intravenous Once in dialysis     sodium " chloride 0.9%  300 mL Hemodialysis Machine Once     acetaminophen  650 mg Oral or Feeding Tube Q8H     amiodarone  400 mg Oral BID     amitriptyline  25 mg Oral or Feeding Tube At Bedtime     artificial saliva  15 mL Swish & Spit 4x Daily     aspirin  81 mg Oral or NG Tube Daily     B and C vitamin Complex with folic acid  5 mL Oral Daily     ferrous sulfate  220 mg Oral Daily     fiber modular (NUTRISOURCE FIBER)  1 packet Per Feeding Tube TID     gelatin absorbable  1 each Topical During Hemodialysis (from stock)     melatonin  10 mg Oral or Feeding Tube At Bedtime     metoprolol tartrate  12.5 mg Oral or Feeding Tube BID     midodrine  15 mg Oral TID w/meals     - MEDICATION INSTRUCTIONS -   Does not apply Once     pantoprazole  40 mg Oral Daily     QUEtiapine  12.5 mg Oral BID w/meals     QUEtiapine  50 mg Oral or Feeding Tube At Bedtime     warfarin ANTICOAGULANT  4 mg Oral ONCE at 18:00       [Held by provider] heparin Stopped (03/21/21 9307)     - MEDICATION INSTRUCTIONS -       Warfarin Therapy Reminder       Jay Pritchett MD    I was present with the resident during the history and exam.  I discussed the case with the resident and agree with the findings as documented in the assessment and plan. Dialysis today with some UF as tolerated and for more clearance for significant azotemia.  Le Armstrong MD   of Medicine  Department of Nephrology  [facility]

## 2021-03-23 NOTE — PROGRESS NOTES
Care Management Follow Up    Length of Stay (days): 19    Expected Discharge Date: TBD     Concerns to be Addressed: care conference     Patient plan of care discussed at interdisciplinary rounds: Yes    Anticipated Discharge Disposition: TCU v hospital transfer  Anticipated Discharge Services:    Anticipated Discharge DME:        Additional Information:  Pt daughter, Blanca ([phone]) lives in [place] and is a nurse for 37 years - she called to set up a care conference and request patient be transferred back to [facility] in [place].   After talking with her family, dad's girlfriend, and Pamela (sister who is designated visitor) they are in agreement with this plan. Blanca reached out to Dr. Kumar from [facility] last Friday and is waiting to hear back about possible transfer. Patient would pay for private ambulance ride if insurance does not cover.   Since family is all in [place] they would prefer that he is closer to home. Pamela has been here for 3 weeks and has a business to run. The family has questions about what the future looks like for the the patient and want to give input on what his QOL preferences would be.   The family is flexible for day and time.     Spoke with Bhavik, YANG who is in agreement with care conference and inviting nephrology. He is available preferably Thursday or Wednesday as well. Per Bhavik he is unsure if patient is stable at this moment for transfer to [facility], but will start looking into this plan as well. This morning pt was still on a sitter.     Addendum: Nephrology (#[id number] Dr. Armstrong) - is available Thu 3/25 at 1PM. Called Blanca, who is in agreement with plan and gave her conference call info. She will share with rest of family. CVTS and SW updated. RNCC will find room for providers.     Plan:  Care conference scheduled for Thursday, 3/25 at 1PM. RNCC will find conference room and update CVTS and Nephrology (#[id number] Dr. Armstrong)  Conference Call Info: phone: [phone]  ID:  [id number]  Pin: [phone]    Ranjana Zuniga RN, [place]  Float Care Coordinator  Covering 6C [facility]   Pager: [phone]

## 2021-03-24 ENCOUNTER — APPOINTMENT (OUTPATIENT)
Dept: PHYSICAL THERAPY | Facility: CLINIC | Age: 81
DRG: 003 | End: 2021-03-24
Attending: INTERNAL MEDICINE
Payer: MEDICARE

## 2021-03-24 LAB
ALBUMIN SERPL-MCNC: 1.9 G/DL (ref 3.4–5)
ALBUMIN UR-MCNC: 50 MG/DL
ALP SERPL-CCNC: 182 U/L (ref 40–150)
ALT SERPL W P-5'-P-CCNC: 36 U/L (ref 0–70)
ANION GAP SERPL CALCULATED.3IONS-SCNC: 9 MMOL/L (ref 3–14)
APPEARANCE UR: ABNORMAL
AST SERPL W P-5'-P-CCNC: 36 U/L (ref 0–45)
BILIRUB SERPL-MCNC: 0.5 MG/DL (ref 0.2–1.3)
BILIRUB UR QL STRIP: NEGATIVE
BUN SERPL-MCNC: 73 MG/DL (ref 7–30)
CALCIUM SERPL-MCNC: 8.3 MG/DL (ref 8.5–10.1)
CHLORIDE SERPL-SCNC: 99 MMOL/L (ref 94–109)
CO2 SERPL-SCNC: 26 MMOL/L (ref 20–32)
COLOR UR AUTO: YELLOW
CREAT SERPL-MCNC: 3.64 MG/DL (ref 0.66–1.25)
ERYTHROCYTE [DISTWIDTH] IN BLOOD BY AUTOMATED COUNT: 17 % (ref 10–15)
GFR SERPL CREATININE-BSD FRML MDRD: 15 ML/MIN/{1.73_M2}
GLUCOSE SERPL-MCNC: 112 MG/DL (ref 70–99)
GLUCOSE UR STRIP-MCNC: NEGATIVE MG/DL
HCT VFR BLD AUTO: 23.4 % (ref 40–53)
HGB BLD-MCNC: 7.5 G/DL (ref 13.3–17.7)
HGB BLD-MCNC: 7.8 G/DL (ref 13.3–17.7)
HGB BLD-MCNC: 8.6 G/DL (ref 13.3–17.7)
HGB UR QL STRIP: ABNORMAL
INR PPP: 1.23 (ref 0.86–1.14)
INTERPRETATION ECG - MUSE: NORMAL
KETONES UR STRIP-MCNC: NEGATIVE MG/DL
LDH SERPL L TO P-CCNC: 357 U/L (ref 85–227)
LEUKOCYTE ESTERASE UR QL STRIP: NEGATIVE
MCH RBC QN AUTO: 28.6 PG (ref 26.5–33)
MCHC RBC AUTO-ENTMCNC: 32.1 G/DL (ref 31.5–36.5)
MCV RBC AUTO: 89 FL (ref 78–100)
MUCOUS THREADS #/AREA URNS LPF: PRESENT /LPF
NITRATE UR QL: NEGATIVE
PH UR STRIP: 5.5 PH (ref 5–7)
PHOSPHATE SERPL-MCNC: 5 MG/DL (ref 2.5–4.5)
PLATELET # BLD AUTO: 255 10E9/L (ref 150–450)
POTASSIUM SERPL-SCNC: 4 MMOL/L (ref 3.4–5.3)
PROT SERPL-MCNC: 5.4 G/DL (ref 6.8–8.8)
RBC # BLD AUTO: 2.62 10E12/L (ref 4.4–5.9)
RBC #/AREA URNS AUTO: 6 /HPF (ref 0–2)
SODIUM SERPL-SCNC: 133 MMOL/L (ref 133–144)
SOURCE: ABNORMAL
SP GR UR STRIP: 1.01 (ref 1–1.03)
SQUAMOUS #/AREA URNS AUTO: 1 /HPF (ref 0–1)
UROBILINOGEN UR STRIP-MCNC: NORMAL MG/DL (ref 0–2)
WBC # BLD AUTO: 10.3 10E9/L (ref 4–11)
WBC #/AREA URNS AUTO: 7 /HPF (ref 0–5)

## 2021-03-24 PROCEDURE — 97530 THERAPEUTIC ACTIVITIES: CPT | Mod: GP | Performed by: PHYSICAL THERAPIST

## 2021-03-24 PROCEDURE — 83615 LACTATE (LD) (LDH) ENZYME: CPT | Performed by: THORACIC SURGERY (CARDIOTHORACIC VASCULAR SURGERY)

## 2021-03-24 PROCEDURE — 85018 HEMOGLOBIN: CPT | Performed by: PHYSICIAN ASSISTANT

## 2021-03-24 PROCEDURE — 250N000013 HC RX MED GY IP 250 OP 250 PS 637: Performed by: INTERNAL MEDICINE

## 2021-03-24 PROCEDURE — 250N000013 HC RX MED GY IP 250 OP 250 PS 637: Performed by: NURSE PRACTITIONER

## 2021-03-24 PROCEDURE — 80053 COMPREHEN METABOLIC PANEL: CPT | Performed by: THORACIC SURGERY (CARDIOTHORACIC VASCULAR SURGERY)

## 2021-03-24 PROCEDURE — 36415 COLL VENOUS BLD VENIPUNCTURE: CPT | Performed by: THORACIC SURGERY (CARDIOTHORACIC VASCULAR SURGERY)

## 2021-03-24 PROCEDURE — 250N000011 HC RX IP 250 OP 636: Performed by: PHYSICIAN ASSISTANT

## 2021-03-24 PROCEDURE — 250N000009 HC RX 250

## 2021-03-24 PROCEDURE — 250N000013 HC RX MED GY IP 250 OP 250 PS 637: Performed by: PHYSICIAN ASSISTANT

## 2021-03-24 PROCEDURE — 85027 COMPLETE CBC AUTOMATED: CPT | Performed by: THORACIC SURGERY (CARDIOTHORACIC VASCULAR SURGERY)

## 2021-03-24 PROCEDURE — 84100 ASSAY OF PHOSPHORUS: CPT | Performed by: THORACIC SURGERY (CARDIOTHORACIC VASCULAR SURGERY)

## 2021-03-24 PROCEDURE — 214N000001 HC R&B CCU UMMC

## 2021-03-24 PROCEDURE — 99233 SBSQ HOSP IP/OBS HIGH 50: CPT | Mod: GC | Performed by: INTERNAL MEDICINE

## 2021-03-24 PROCEDURE — 258N000003 HC RX IP 258 OP 636: Performed by: INTERNAL MEDICINE

## 2021-03-24 PROCEDURE — 36415 COLL VENOUS BLD VENIPUNCTURE: CPT | Performed by: PHYSICIAN ASSISTANT

## 2021-03-24 PROCEDURE — 90937 HEMODIALYSIS REPEATED EVAL: CPT

## 2021-03-24 PROCEDURE — 250N000013 HC RX MED GY IP 250 OP 250 PS 637: Performed by: THORACIC SURGERY (CARDIOTHORACIC VASCULAR SURGERY)

## 2021-03-24 PROCEDURE — 85610 PROTHROMBIN TIME: CPT | Performed by: THORACIC SURGERY (CARDIOTHORACIC VASCULAR SURGERY)

## 2021-03-24 PROCEDURE — 250N000013 HC RX MED GY IP 250 OP 250 PS 637: Performed by: SURGERY

## 2021-03-24 PROCEDURE — 36592 COLLECT BLOOD FROM PICC: CPT | Performed by: PHYSICIAN ASSISTANT

## 2021-03-24 RX ORDER — COLCHICINE 0.6 MG/1
0.6 TABLET ORAL DAILY
Status: DISCONTINUED | OUTPATIENT
Start: 2021-03-24 | End: 2021-03-24

## 2021-03-24 RX ORDER — LIDOCAINE 4 G/G
2 PATCH TOPICAL
Status: DISCONTINUED | OUTPATIENT
Start: 2021-03-24 | End: 2021-04-05 | Stop reason: HOSPADM

## 2021-03-24 RX ORDER — ALBUMIN (HUMAN) 12.5 G/50ML
50 SOLUTION INTRAVENOUS
Status: DISCONTINUED | OUTPATIENT
Start: 2021-03-24 | End: 2021-03-24

## 2021-03-24 RX ORDER — BUMETANIDE 0.25 MG/ML
4 INJECTION INTRAMUSCULAR; INTRAVENOUS ONCE
Status: COMPLETED | OUTPATIENT
Start: 2021-03-24 | End: 2021-03-24

## 2021-03-24 RX ORDER — WARFARIN SODIUM 5 MG/1
5 TABLET ORAL
Status: COMPLETED | OUTPATIENT
Start: 2021-03-24 | End: 2021-03-24

## 2021-03-24 RX ORDER — AMINO AC/PROTEIN HYDR/WHEY PRO 10G-100/30
1 LIQUID (ML) ORAL 3 TIMES DAILY
Status: DISCONTINUED | OUTPATIENT
Start: 2021-03-24 | End: 2021-04-02

## 2021-03-24 RX ADMIN — Medication 10 MG: at 21:14

## 2021-03-24 RX ADMIN — Medication 220 MG: at 11:48

## 2021-03-24 RX ADMIN — ASPIRIN 81 MG CHEWABLE TABLET 81 MG: 81 TABLET CHEWABLE at 07:35

## 2021-03-24 RX ADMIN — Medication: at 16:09

## 2021-03-24 RX ADMIN — ACETAMINOPHEN 650 MG: 325 TABLET, FILM COATED ORAL at 16:27

## 2021-03-24 RX ADMIN — Medication 15 ML: at 21:20

## 2021-03-24 RX ADMIN — Medication 15 ML: at 07:37

## 2021-03-24 RX ADMIN — Medication 15 ML: at 11:47

## 2021-03-24 RX ADMIN — Medication 15 ML: at 17:43

## 2021-03-24 RX ADMIN — MIDODRINE HYDROCHLORIDE 15 MG: 5 TABLET ORAL at 11:48

## 2021-03-24 RX ADMIN — SODIUM CHLORIDE 300 ML: 9 INJECTION, SOLUTION INTRAVENOUS at 16:07

## 2021-03-24 RX ADMIN — Medication 1 PACKET: at 17:43

## 2021-03-24 RX ADMIN — Medication 12.5 MG: at 21:14

## 2021-03-24 RX ADMIN — Medication 12.5 MG: at 07:34

## 2021-03-24 RX ADMIN — Medication 5 ML: at 07:35

## 2021-03-24 RX ADMIN — SODIUM CHLORIDE 250 ML: 9 INJECTION, SOLUTION INTRAVENOUS at 16:08

## 2021-03-24 RX ADMIN — PANTOPRAZOLE SODIUM 40 MG: 40 TABLET, DELAYED RELEASE ORAL at 07:35

## 2021-03-24 RX ADMIN — Medication 12.5 MG: at 07:37

## 2021-03-24 RX ADMIN — WARFARIN SODIUM 5 MG: 5 TABLET ORAL at 17:43

## 2021-03-24 RX ADMIN — AMITRIPTYLINE HYDROCHLORIDE 25 MG: 25 TABLET, FILM COATED ORAL at 21:14

## 2021-03-24 RX ADMIN — ACETAMINOPHEN 650 MG: 325 TABLET, FILM COATED ORAL at 07:35

## 2021-03-24 RX ADMIN — MIDODRINE HYDROCHLORIDE 15 MG: 5 TABLET ORAL at 07:35

## 2021-03-24 RX ADMIN — ANAKINRA 100 MG: 100 INJECTION, SOLUTION SUBCUTANEOUS at 21:55

## 2021-03-24 RX ADMIN — BUMETANIDE 4 MG: 0.25 INJECTION INTRAMUSCULAR; INTRAVENOUS at 09:35

## 2021-03-24 RX ADMIN — Medication 1 PACKET: at 21:19

## 2021-03-24 RX ADMIN — AMIODARONE HYDROCHLORIDE 400 MG: 200 TABLET ORAL at 21:14

## 2021-03-24 RX ADMIN — MIDODRINE HYDROCHLORIDE 15 MG: 5 TABLET ORAL at 17:43

## 2021-03-24 RX ADMIN — AMIODARONE HYDROCHLORIDE 400 MG: 200 TABLET ORAL at 07:35

## 2021-03-24 RX ADMIN — QUETIAPINE 50 MG: 25 TABLET ORAL at 21:13

## 2021-03-24 RX ADMIN — Medication 1 PACKET: at 21:20

## 2021-03-24 RX ADMIN — Medication 1 PACKET: at 07:37

## 2021-03-24 RX ADMIN — Medication 1 PACKET: at 09:43

## 2021-03-24 ASSESSMENT — ACTIVITIES OF DAILY LIVING (ADL)
ADLS_ACUITY_SCORE: 25
ADLS_ACUITY_SCORE: 27
ADLS_ACUITY_SCORE: 27
ADLS_ACUITY_SCORE: 25
ADLS_ACUITY_SCORE: 23

## 2021-03-24 ASSESSMENT — MIFFLIN-ST. JEOR: SCORE: 1666.13

## 2021-03-24 NOTE — PLAN OF CARE
OT/CR 6C: Cx, pt was with other provider in AM then taken to dialysis in PM. Will reschedule per POC.

## 2021-03-24 NOTE — PLAN OF CARE
D: admitted 3/04 S/p MVR and CABG x1 on 3/5 c/b ECMO, and ADAN. Now on HD. Hx HTN, AAA, HLD, RA     I: Monitored vitals and assessed pt status.   Changed:   - pt received 1 unit PRBC Hgb from this AM pending  - pt had one febrile temp of 102.2 axillary. Provider notified. CXR, blood cultures, and UA ordered d/t concern for PNA.   - caps changed on PICC  Running:   - TFs 55 mL/hr continuous   - FWF 30 mL q4h  PRN: Seroquel x1      A: A0x1; only oriented to self. Garbled speech, illogical. Pt often restless and pulling at lines - mitts on and sitter at bedside for pt safety. VSS on 2L OxyMask. Tmax 102.1 (axillary). Tele shows A-fib, rate 80-100s. Productive cough; not able to follow instructions to use IS. Pt denied pain when asked; on scheduled tylenol for hx back pain. Dressing over old CT sites CDI, no new skin concerns. Urinating adequately via external catheter, replaced overnight. Orders to bladder scan q4h and insert Alfaro if >400 mL. Rectal tube in place; small amount of leakage overnight (black, tarry). Pneumatic compression devices on. Intermittent sleep throughout the night.      P: Continue to monitor Pt status and report changes to CVTS.     [id number]  Emily Forrester RN on 3/24/2021 at 6:40 AM

## 2021-03-24 NOTE — PROGRESS NOTES
HEMODIALYSIS TREATMENT NOTE    Date: 3/24/2021  Time: 5:26 PM    Data:  Pre Wt: 88.4 kg (194 lb 14.2 oz)   Desired Wt: 87.4 kg   Post Wt: 87.4 kg (194 lb 14.2 oz)(estimated)  Weight change: 1 kg  Ultrafiltration - Post Run Net Total Removed (mL): 1000 mL  Vascular Access Status: CVC  patent  Dialyzer Rinse: Clear  Total Blood Volume Processed: 68.27 L   Total Dialysis (Treatment) Time: 3   Dialysate Bath: K 3, Ca 3  Heparin: None    Lab:   No    Interventions:Assessment:  Pt dialyzed for 3 hrs on K 3, Ca 3. 1000 ml fluid was removed and 68.27 L total blood volume was processed. VSS. BP was soft but pt was able to maintain SBP>100. Scheduled 650mg Tylenol was given for pain.CVC was patent and site was clean,dry, and intact. 400 BFR was maintained throughout the tx with appropriated venous and arterial pressure. CVC was locked with saline.Handoff report was given to OLGA Tay     Plan:    Per Renal Team

## 2021-03-24 NOTE — PROGRESS NOTES
Nephrology Progress Note  03/24/2021         Assessment & Recommendations:   Jin Garrett is a 80 year old with PMH HTN, AAA, HLD, RA who presented to OSH with SOB and atypical CP. TTE showed severe MR with ?posterior flail leaflet, EF preserved. On arrival at [facility] an Impella was emergently placed. S/p MVR and single vessel CABG on 3/5/21. Returned from the OR on central ECMO with an open chest, now s/p decannulation on 3/9. Nephrology consulted for ADAN. Initiated on CRRT 3/6, transitioned to iHD 3/13.     ADAN in the setting of cardiogenic shock, was on ECMO, s/p decannulation on 3/9  Unknown baseline, no h/o CKD per daughter. 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 Etiology likely ATN in the setting of shock, no obstruction per CT and urinalysis showed granular casts which is c/w ATN. UOP declined and K rising  on 3/6, so initiated on CRRT. Continued CRRT until 3/12, then changed to iHD. Continues to be oligoanuric. Was previously not tolerating iHD due to hypotension during runs, but was able to tolerate iHD without any fluid pulled on 3/22 after receiving 2 U PRBCs and 1 U FFP.  - HD today , target UF 1 Litre  - CTM pressures during iHD runs as this was an issue previously  - Q day bladder scans  - Avoid nephrotoxins  - Renally dose medications  - Renal diet  - Daily weights  - Strict I/Os   - care conference tomorrow      Recommendations were communicated to primary team via this note     Seen and discussed with Dr. Armstrong.           Interval History :   Nursing and provider notes from last 24 hours reviewed.  Tolerated hd with 1 L UF yesterday   Hb trending down . Has not had epistaxis. Nursing reported melena       ROS  + melena , no abd pain /n/v/diarrhea  No CP. Has some cough , no hemoptysis. No SOB  Fatigued but no focal weakness        Physical Exam:   I/O last 3 completed shifts:  In: 1500 [I.V.:20; NG/GT:880]  Out:  "1285 [Urine:285; Other:1000]   BP (!) 118/94   Pulse 83   Temp 97.2  F (36.2  C) (Axillary)   Resp 18   Ht 1.803 m (5' 11\")   Wt 93.4 kg (205 lb 14.6 oz)   SpO2 98%   BMI 28.72 kg/m       General : Pt awake, not in acute distress   Lungs : anterior lung fields are clear  Cardiac : S1, S2 present  Abdomen : Soft/ND/NT  LE : trace lower extremity edema  NEURO: Awake and alert. Interactive. A&Ox4.  Dialysis Access : tunneled cath placed by IR this morning    Labs:   All labs reviewed by me  Electrolytes/Renal -   Recent Labs   Lab Test 03/24/21  0615 03/23/21  0623 03/22/21  0553 03/18/21  0801 03/18/21  0801 03/16/21  0005 03/16/21  0005 03/12/21  0355 03/12/21  0355    133 133   < >  --    < > 143   < > 139   POTASSIUM 4.0 4.6 5.6*   < > 5.7*   < > 4.6   < > 3.6   CHLORIDE 99 99 96   < >  --    < > 109   < > 107   CO2 26 27 23   < >  --    < > 23   < > 25   BUN 73* 90* 143*   < >  --    < > 174*   < > 49*   CR 3.64* 3.99* 5.45*   < >  --    < > 4.90*   < > 1.57*   * 111* 103*   < >  --    < > 136*   < > 123*   PALLAVI 8.3* 8.4* 8.4*   < >  --    < > 7.2*   < > 7.7*   MAG  --   --   --   --  2.4*  --  2.4*  --  2.5*   PHOS 5.0*  --   --   --  6.6*  --   --   --  3.4    < > = values in this interval not displayed.       CBC -   Recent Labs   Lab Test 03/24/21  1204 03/24/21  0615 03/23/21  1833 03/23/21  1025 03/22/21  0553 03/22/21  0553   WBC  --  10.3  --  12.2*  --  14.5*   HGB 7.8* 7.5* 6.7* 7.3*   < > 6.1*   PLT  --  255  --  307  --  310    < > = values in this interval not displayed.       LFTs -   Recent Labs   Lab Test 03/24/21  0615 03/23/21  0623 03/22/21  0553   ALKPHOS 182* 175* 178*   BILITOTAL 0.5 0.5 0.7   ALT 36 52 76*   AST 36 13 38   PROTTOTAL 5.4* 5.3* 5.2*   ALBUMIN 1.9* 1.9* 1.8*     Current Medications:    acetaminophen  650 mg Oral or Feeding Tube Q8H     amiodarone  400 mg Oral BID     amitriptyline  25 mg Oral or Feeding Tube At Bedtime     artificial saliva  15 mL Swish & " Spit 4x Daily     aspirin  81 mg Oral or NG Tube Daily     B and C vitamin Complex with folic acid  5 mL Oral Daily     ferrous sulfate  220 mg Oral Daily     fiber modular (NUTRISOURCE FIBER)  1 packet Per Feeding Tube TID     gelatin absorbable  1 each Topical During Hemodialysis (from stock)     lidocaine  2 patch Transdermal Q24h    And     lidocaine   Transdermal Q8H     melatonin  10 mg Oral or Feeding Tube At Bedtime     metoprolol tartrate  12.5 mg Oral or Feeding Tube BID     midodrine  15 mg Oral TID w/meals     pantoprazole  40 mg Oral Daily     protein modular  1 packet Per Feeding Tube TID     QUEtiapine  50 mg Oral or Feeding Tube At Bedtime     warfarin ANTICOAGULANT  5 mg Oral ONCE at 18:00       [Held by provider] heparin Stopped (03/21/21 7869)     - MEDICATION INSTRUCTIONS -       Warfarin Therapy Reminder       Catia Davis MD    I was present with the fellow during the history and exam.  I discussed the case with the fellow and agree with the findings as documented in the assessment and plan. Dialysis today, no dialysis planned tomorrow. Bumex given with no significant response  Le Jer Armstrong

## 2021-03-24 NOTE — PROGRESS NOTES
D: S/p MVR and CABG x1 on 3/5 c/b ECMO, and ADAN. Now on HD. Hx HTN, AAA, HLD, RA  ?  A/I : Monitored vitals and assessed pt status. Disoriented x3-4, not always oriented to self. VSS on 2L oxymask. A fib 90-110s. Afebrile. IV access is right PICC, Right internal jugular tunneled line for dialysis and left subclavian central line, CVTS working on if left subclavian line can be removed. Oliguric and incontinent of urine, primafit in place. Stools watery and black and tarry, rectal tube in place, occasionally bypassing. Continuing to monitor hbg and further signs and symptoms of bleeding No overt signs of pain observed, patient did state he had a headache this morning, but unable to describe and recall later when asked, scheduled tylenol due at the time of complaint. No SOB observed. Restless at times and tries to pull at lines. Mitts on for safety and sitter at bedside. Productive cough occasionally, will encourage use of IS and flutter valve, although patient unable to follow directions at this time. TF running at 55mL/hr with flush of 30 mL of free water q 4 hours via NG, negligible residuals. No new skin concerns observed. Mattress exchanged for pulsate mattress. Patient mentation improving throughout day, getting better at following directions and responding to questions with appropriate answers.    Paged surgical crossover for increasing concerns of left arm swelling and pain. New onset great right toe swelling and tenderness and LLQ abdominal pain and distention. MD states he will come exam left arm. Kineret ordered for potential gout of right great toe. Will continue to monitor abdominal discomfort and consider pulling rectal tube d/t low output.   ?  P: Continue to monitor Pt status and report changes to treatment team.

## 2021-03-24 NOTE — PROGRESS NOTES
CLINICAL NUTRITION SERVICES    Checking TFs    Diet: NPO per SLP (as per SLP recs 3/23).  TF (via NGT): Novasource Renal at goal 55 mL/hr with Nutrisource Fiber (1 pkt three times daily).    INTERVENTIONS:  Implementation:  Enteral Nutrition: Ordered Prosource TF modular (1 pkt three times daily) per previous RD.    Follow up/Monitoring:  Will continue to follow pt.    Angela Garcia, MS, RD, LD, CNSC   6C Pgr: [phone]

## 2021-03-24 NOTE — PROGRESS NOTES
Cardiovascular Surgery Progress Note  03/24/2021         Assessment and Plan:     Jin Garrett is a 80 year old male with PMH of HTN, AAA, HLD and rheumatoid arthritis. He presented to an OSH with shortness of breath and atypical chest pain. TTE showed severe MR with concern for posterior flail leaflet, EF was preserved. On arrival at [facility] (3/4/21) an Impella was emergently placed in cath lab. He was found to have a ruptured posterior papillary muscle causing severe MR.   Jin is now s/p mitral valve replacement and single vessel CABG with Dr. Porras on 3/5/21. Returned from the OR on central ECMO with an open chest, intubated and sedated. Returned to the OR for a wash out on 3/9, found to have a thrombus on his mitral valve so he had the valve replaced, decannulated central ECMO, bronchoscopy and placement of left subclavian dialysis line.      Cardiovascular:   Hx of HTN, HLD, AAA  Acute severe MR due to ruptured papillary muscle, s/p tissue MVR  Cardiogenic shock secondary to severe MR  S/p Impella placement and conversion to central VA ECMO  Weaned off mechanical support, ECMO was utilized from 3/5 to 3/9.   CAD, mid LCx with 100% occlusion, s/p 1 vessel CAB   S/P redo MVR due to thrombus in left atrium and on bioprosthetic MV  Most recent echo showed LV EF 55-60%.    ASA 81 mg, atorvastatin on hold due to elevated LFTs.   LUE US 3/14 with nonocclusive thrombus in right cephalic vein, no UE DVT.   Pre and post-op Atrial fibrillation   Has been mostly rate controlled and anticoagulated. Hep gtt held since 3/22 due to epistaxis. EKG 3/22 confirming A-fib ('s), likely contributing to hypotension, given Amio 150 mg IV bolus x 1 dose 3/22 and restarted 400 mg PO BID 3/23 AM.   Hypotension   Patient requiring midodrine 15 mg TID to maintain MAPS > 60.      Pulmonary:  Acute respiratory failure secondary to acute MR (resolved)  - Extubated POD 3 (chest closure and ecmo decan) to 4 lpm via NC; now saturating  well on 2-4 lpm.   - Supplemental O2 PRN to keep sats > 92%. Wean off as tolerated.  - Confusion impeding progress with pulm toilet, IS, activity and deep breathing     Neurology / MSK:  Delirium  Insomnia    - Had extended time on sedation due to need for central VA ECMO. Avoiding narcs and benadryl.   - Continues on 1:1 sitter and PRN hand mitts.   - Melatonin 10 mg q PM. Seroquel 50 mg q HS (reduced 3/23 and day-time off since 3/24).   - Acute post-operative pain; pain well controlled with acetaminophen and tramadol.   Chronic back pain   Recent hip surgery  - No weight bearing or activity restrictions related to recent hip surgery per family (records not available).      HEENT:   Hemoptysis  Epistaxis    - Suspecting hemoptysis is secondary epistaxis due to trauma to posterior nasal septum from the NG tube bridle and him tugging on it.    - Has hand mitts and tube taped to cheek tightly. Tube is no longer bridled.        / Renal:  ADAN secondary to cardiogenic shock  Hyperkalemia   - No Hx of renal disease. Most recent creatinine 5.4 and rising  - Oliguric, has condom catheter. Bumex 3 mg IV challenge on 3/22 am, minimal UOP.  Rechallenge 4 mg IV 3/24.   - Nephrology following; most likely cardiogenic shock induced ATN. CRRT transitioned to intermittent HD, fluid removal impeded by hypoTN. HD run on 3/23 was finally able to pull 1 L of fluid.      GI / FEN:   Dysphagia and Delirium   - Speech team following and recommending NPO with ice chips. Continuing therapies.   - NG in place for all nutrition and meds. Needs to be advanced 5 cm on 3/22.  - Rectal tube in place due to persistent loose stools.  C diff negative 3/18.   - Replace electrolytes as needed, hepatic enzymes improving.  Melena   - First noticed 3/23 am. Had epistaxis 2 days prior and likely swallowed some of the blood. He is also on Iron supplement and this can cause stools to darken. Will continue to monitor Hgb, consider GI consult.       Endocrine:  Stress induced hyperglycemia  - Initially managed on insulin drip, transitioned to sliding scale and now off insulin (3/16). No acute needs.       Infectious Disease:  Stress induced leukocytosis vs other  - Completed perioperative antibiotics.   - WBC 10.3 and now improved, afebrile once overnight (Tmax 102.2 F), no other signs or symptoms of infection. UA on 3/22 and 3/23 were clean and CRP has been slowly trending down.      Hematology:   Acute blood loss anemia  Epistaxis   - Hgb 5.7 on 3/22. Given 2 units PRBC and 1 unit plasma on 3/22, one unit PRBC 3/23.  Hgb 7.5. Recheck at 12 pm was 7.8.   - Plt WNL, no persistent signs or symptoms of active bleeding, melena 3/22 overnight could be residual from previous epistaxis.   - LUE US 3/14 with non-occlusive thrombus in right cephalic vein, no UE DVT       Anticoagulation:   - Coumadin for atrial fibrillation and left atrial thrombus, INR goal 2-3. Most recent INR 1.30.    - Low intensity heparin gtt bridging to therapeutic INR, HELD since 3/22 am due to epistaxis and melena.      Prophylaxis:   - Stress ulcer prophylaxis: Pantoprazole 40 mg daily for 30 days  - DVT prophylaxis: Subcutaneous heparin, SCD     Disposition:   - Transferred to  on 3/19   - Therapies recommending discharge to TCU. Still requiring 1:1 sitter.   - Family would like to have a family care conference in next two days and hope to transfer to [facility]. Family would like to consider DNR/DNI.     Discussed with CVTS Fellow as needed.  Staff surgeons have been informed of changes through both verbal and written communication.      Bhavik Lemus PA-C  Cardiothoracic Surgery  Pager [phone]    8:31 AM   March 24, 2021        Interval History:     Overnight events- sepsis protocol overnight and given 1 unit RBC last night. Continues on 1:1 sitter for delirium. Tolerated 1 L removed via HD yesterday.     States pain is well managed on current regimen. Slept poorly  "overnight, continues to be sleepy during the day and constantly in and out of sleep/wake cycle.   Tolerating tube feeds, is passing flatus, + BM via rectal tube. No nausea or vomiting.  Breathing well without complaints on 2 lpm.   Denies chest pain, palpitations, dizziness, syncopal symptoms, fevers, chills, myalgias, or sternal popping/clicking.         Physical Exam:   Blood pressure 102/64, pulse 94, temperature 98.5  F (36.9  C), temperature source Axillary, resp. rate 20, height 1.803 m (5' 11\"), weight 88.4 kg (194 lb 14.2 oz), SpO2 96 %.  Vitals:    03/19/21 0000 03/20/21 0603 03/22/21 0516   Weight: 86.9 kg (191 lb 9.3 oz) 90 kg (198 lb 6.6 oz) 88.4 kg (194 lb 14.2 oz)      24 hr Fluid status; net gain 1.1 L (he had 1 L removed in dialysis)  MAPs: 65 - 101    Gen: Oriented to self and much more interactive this afternoon, NAD, responds appropriately to questions and conversation  Neuro: no focal deficits, sensation and motor intact  CV: RRR, normal S1 S2, no murmurs, rubs or gallops.    Pulm: CTA, no wheezing or rhonchi, normal breathing on 2 lpm  Abd: nondistended, normal BS, soft, nontender. Rectal tube with dark output.   Ext: trace peripheral edema, 1+ pitting  Incision: clean, dry, intact, no erythema, sternum stable  Tubes/drain sites: dressing clean and dry         Data:    Imaging:  reviewed recent imaging, no acute concerns    Labs:  BMP  Recent Labs   Lab 03/24/21  0615 03/23/21  0623 03/22/21  0553 03/21/21  0619    133 133 131*   POTASSIUM 4.0 4.6 5.6* 5.2   CHLORIDE 99 99 96 97   PALLAVI 8.3* 8.4* 8.4* 8.7   CO2 26 27 23 22   BUN 73* 90* 143* 105*   CR 3.64* 3.99* 5.45* 4.25*   * 111* 103* 108*     CBC  Recent Labs   Lab 03/24/21  0615 03/23/21  1833 03/23/21  1025 03/22/21  0817 03/22/21  0553 03/21/21  0619 03/21/21  0619   WBC 10.3  --  12.2*  --  14.5*  --  13.9*   RBC 2.62*  --  2.48*  --  2.08*  --  2.61*   HGB 7.5* 6.7* 7.3* 5.7* 6.1*   < > 7.7*   HCT 23.4*  --  22.2*  --  18.8* "  --  23.8*   MCV 89  --  90  --  90  --  91   MCH 28.6  --  29.4  --  29.3  --  29.5   MCHC 32.1  --  32.9  --  32.4  --  32.4   RDW 17.0*  --  17.7*  --  17.7*  --  18.6*     --  307  --  310  --  326    < > = values in this interval not displayed.     INR  Recent Labs   Lab 03/24/21  0615 03/23/21  0623 03/22/21  0553 03/21/21 2009   INR 1.23* 1.30* 1.21* 1.31*      Liver Function Studies -   Recent Labs   Lab Test 03/24/21 0615   PROTTOTAL 5.4*   ALBUMIN 1.9*   BILITOTAL 0.5   ALKPHOS 182*   AST 36   ALT 36     GLUCOSE:   Recent Labs   Lab 03/24/21  0615 03/23/21  0623 03/22/21  0553 03/21/21  0619 03/20/21  1653 03/20/21  0637 03/20/21  0232 03/19/21  2226 03/19/21  0356 03/17/21  1946 03/17/21  1946 03/17/21  1053   * 111* 103* 108* 112* 86  --   --   --    < >  --   --    BGM  --   --   --   --   --   --  86 105* 104*  --  124* 95    < > = values in this interval not displayed.

## 2021-03-25 ENCOUNTER — APPOINTMENT (OUTPATIENT)
Dept: PHYSICAL THERAPY | Facility: CLINIC | Age: 81
DRG: 003 | End: 2021-03-25
Attending: INTERNAL MEDICINE
Payer: MEDICARE

## 2021-03-25 ENCOUNTER — APPOINTMENT (OUTPATIENT)
Dept: GENERAL RADIOLOGY | Facility: CLINIC | Age: 81
DRG: 003 | End: 2021-03-25
Attending: INTERNAL MEDICINE
Payer: MEDICARE

## 2021-03-25 ENCOUNTER — APPOINTMENT (OUTPATIENT)
Dept: OCCUPATIONAL THERAPY | Facility: CLINIC | Age: 81
DRG: 003 | End: 2021-03-25
Attending: INTERNAL MEDICINE
Payer: MEDICARE

## 2021-03-25 ENCOUNTER — APPOINTMENT (OUTPATIENT)
Dept: ULTRASOUND IMAGING | Facility: CLINIC | Age: 81
DRG: 003 | End: 2021-03-25
Attending: PHYSICIAN ASSISTANT
Payer: MEDICARE

## 2021-03-25 ENCOUNTER — APPOINTMENT (OUTPATIENT)
Dept: SPEECH THERAPY | Facility: CLINIC | Age: 81
DRG: 003 | End: 2021-03-25
Attending: INTERNAL MEDICINE
Payer: MEDICARE

## 2021-03-25 LAB
ALBUMIN SERPL-MCNC: 1.9 G/DL (ref 3.4–5)
ALP SERPL-CCNC: 200 U/L (ref 40–150)
ALT SERPL W P-5'-P-CCNC: 30 U/L (ref 0–70)
ANION GAP SERPL CALCULATED.3IONS-SCNC: 9 MMOL/L (ref 3–14)
AST SERPL W P-5'-P-CCNC: 34 U/L (ref 0–45)
BILIRUB SERPL-MCNC: 0.4 MG/DL (ref 0.2–1.3)
BUN SERPL-MCNC: 56 MG/DL (ref 7–30)
CALCIUM SERPL-MCNC: 8.6 MG/DL (ref 8.5–10.1)
CHLORIDE SERPL-SCNC: 99 MMOL/L (ref 94–109)
CO2 SERPL-SCNC: 24 MMOL/L (ref 20–32)
CREAT SERPL-MCNC: 2.9 MG/DL (ref 0.66–1.25)
CRP SERPL-MCNC: 120 MG/L (ref 0–8)
ERYTHROCYTE [DISTWIDTH] IN BLOOD BY AUTOMATED COUNT: 16.8 % (ref 10–15)
GFR SERPL CREATININE-BSD FRML MDRD: 19 ML/MIN/{1.73_M2}
GLUCOSE SERPL-MCNC: 116 MG/DL (ref 70–99)
HCT VFR BLD AUTO: 26.1 % (ref 40–53)
HGB BLD-MCNC: 8.2 G/DL (ref 13.3–17.7)
INR PPP: 1.31 (ref 0.86–1.14)
LABORATORY COMMENT REPORT: NORMAL
LDH SERPL L TO P-CCNC: 344 U/L (ref 85–227)
MCH RBC QN AUTO: 29.3 PG (ref 26.5–33)
MCHC RBC AUTO-ENTMCNC: 31.4 G/DL (ref 31.5–36.5)
MCV RBC AUTO: 93 FL (ref 78–100)
PLATELET # BLD AUTO: 236 10E9/L (ref 150–450)
POTASSIUM SERPL-SCNC: 4 MMOL/L (ref 3.4–5.3)
PROCALCITONIN SERPL-MCNC: 2.49 NG/ML
PROT SERPL-MCNC: 5.4 G/DL (ref 6.8–8.8)
RBC # BLD AUTO: 2.8 10E12/L (ref 4.4–5.9)
SARS-COV-2 RNA RESP QL NAA+PROBE: NEGATIVE
SODIUM SERPL-SCNC: 132 MMOL/L (ref 133–144)
SPECIMEN SOURCE: NORMAL
WBC # BLD AUTO: 10.1 10E9/L (ref 4–11)

## 2021-03-25 PROCEDURE — 83615 LACTATE (LD) (LDH) ENZYME: CPT | Performed by: THORACIC SURGERY (CARDIOTHORACIC VASCULAR SURGERY)

## 2021-03-25 PROCEDURE — 250N000013 HC RX MED GY IP 250 OP 250 PS 637: Performed by: THORACIC SURGERY (CARDIOTHORACIC VASCULAR SURGERY)

## 2021-03-25 PROCEDURE — 250N000011 HC RX IP 250 OP 636: Performed by: PHYSICIAN ASSISTANT

## 2021-03-25 PROCEDURE — 99207 PR CONSULT E&M CHANGED TO INITIAL LEVEL: CPT | Performed by: PSYCHIATRY & NEUROLOGY

## 2021-03-25 PROCEDURE — 71045 X-RAY EXAM CHEST 1 VIEW: CPT | Mod: 26 | Performed by: RADIOLOGY

## 2021-03-25 PROCEDURE — 214N000001 HC R&B CCU UMMC

## 2021-03-25 PROCEDURE — U0003 INFECTIOUS AGENT DETECTION BY NUCLEIC ACID (DNA OR RNA); SEVERE ACUTE RESPIRATORY SYNDROME CORONAVIRUS 2 (SARS-COV-2) (CORONAVIRUS DISEASE [COVID-19]), AMPLIFIED PROBE TECHNIQUE, MAKING USE OF HIGH THROUGHPUT TECHNOLOGIES AS DESCRIBED BY CMS-2020-01-R: HCPCS | Performed by: PHYSICIAN ASSISTANT

## 2021-03-25 PROCEDURE — 250N000013 HC RX MED GY IP 250 OP 250 PS 637: Performed by: INTERNAL MEDICINE

## 2021-03-25 PROCEDURE — 71045 X-RAY EXAM CHEST 1 VIEW: CPT

## 2021-03-25 PROCEDURE — 99233 SBSQ HOSP IP/OBS HIGH 50: CPT | Mod: GC | Performed by: INTERNAL MEDICINE

## 2021-03-25 PROCEDURE — 99223 1ST HOSP IP/OBS HIGH 75: CPT | Performed by: PSYCHIATRY & NEUROLOGY

## 2021-03-25 PROCEDURE — 97530 THERAPEUTIC ACTIVITIES: CPT | Mod: GO

## 2021-03-25 PROCEDURE — 86140 C-REACTIVE PROTEIN: CPT | Performed by: THORACIC SURGERY (CARDIOTHORACIC VASCULAR SURGERY)

## 2021-03-25 PROCEDURE — U0005 INFEC AGEN DETEC AMPLI PROBE: HCPCS | Performed by: PHYSICIAN ASSISTANT

## 2021-03-25 PROCEDURE — 250N000009 HC RX 250

## 2021-03-25 PROCEDURE — 93971 EXTREMITY STUDY: CPT | Mod: LT

## 2021-03-25 PROCEDURE — 92526 ORAL FUNCTION THERAPY: CPT | Mod: GN

## 2021-03-25 PROCEDURE — 250N000013 HC RX MED GY IP 250 OP 250 PS 637: Performed by: STUDENT IN AN ORGANIZED HEALTH CARE EDUCATION/TRAINING PROGRAM

## 2021-03-25 PROCEDURE — 97530 THERAPEUTIC ACTIVITIES: CPT | Mod: GP | Performed by: PHYSICAL THERAPIST

## 2021-03-25 PROCEDURE — 80053 COMPREHEN METABOLIC PANEL: CPT | Performed by: THORACIC SURGERY (CARDIOTHORACIC VASCULAR SURGERY)

## 2021-03-25 PROCEDURE — 250N000013 HC RX MED GY IP 250 OP 250 PS 637: Performed by: PHYSICIAN ASSISTANT

## 2021-03-25 PROCEDURE — 36415 COLL VENOUS BLD VENIPUNCTURE: CPT | Performed by: THORACIC SURGERY (CARDIOTHORACIC VASCULAR SURGERY)

## 2021-03-25 PROCEDURE — 250N000013 HC RX MED GY IP 250 OP 250 PS 637: Performed by: SURGERY

## 2021-03-25 PROCEDURE — 84145 PROCALCITONIN (PCT): CPT | Performed by: THORACIC SURGERY (CARDIOTHORACIC VASCULAR SURGERY)

## 2021-03-25 PROCEDURE — 93971 EXTREMITY STUDY: CPT | Mod: 26 | Performed by: RADIOLOGY

## 2021-03-25 PROCEDURE — 85610 PROTHROMBIN TIME: CPT | Performed by: THORACIC SURGERY (CARDIOTHORACIC VASCULAR SURGERY)

## 2021-03-25 PROCEDURE — 250N000013 HC RX MED GY IP 250 OP 250 PS 637: Performed by: NURSE PRACTITIONER

## 2021-03-25 PROCEDURE — 85027 COMPLETE CBC AUTOMATED: CPT | Performed by: THORACIC SURGERY (CARDIOTHORACIC VASCULAR SURGERY)

## 2021-03-25 RX ORDER — OLANZAPINE 2.5 MG/1
5 TABLET, FILM COATED ORAL AT BEDTIME
Status: DISCONTINUED | OUTPATIENT
Start: 2021-03-25 | End: 2021-03-26

## 2021-03-25 RX ORDER — WARFARIN SODIUM 3 MG/1
6 TABLET ORAL
Status: COMPLETED | OUTPATIENT
Start: 2021-03-25 | End: 2021-03-25

## 2021-03-25 RX ORDER — BUMETANIDE 0.25 MG/ML
4 INJECTION INTRAMUSCULAR; INTRAVENOUS ONCE
Status: COMPLETED | OUTPATIENT
Start: 2021-03-25 | End: 2021-03-25

## 2021-03-25 RX ORDER — HEPARIN SODIUM 10000 [USP'U]/100ML
500 INJECTION, SOLUTION INTRAVENOUS CONTINUOUS
Status: DISCONTINUED | OUTPATIENT
Start: 2021-03-25 | End: 2021-03-27

## 2021-03-25 RX ADMIN — Medication 12.5 MG: at 08:04

## 2021-03-25 RX ADMIN — ACETAMINOPHEN 650 MG: 325 TABLET, FILM COATED ORAL at 12:32

## 2021-03-25 RX ADMIN — QUETIAPINE 25 MG: 25 TABLET ORAL at 23:50

## 2021-03-25 RX ADMIN — BUMETANIDE 4 MG: 0.25 INJECTION INTRAMUSCULAR; INTRAVENOUS at 15:18

## 2021-03-25 RX ADMIN — Medication 1 PACKET: at 08:06

## 2021-03-25 RX ADMIN — ACETAMINOPHEN 650 MG: 325 TABLET, FILM COATED ORAL at 08:04

## 2021-03-25 RX ADMIN — QUETIAPINE 25 MG: 25 TABLET ORAL at 01:32

## 2021-03-25 RX ADMIN — Medication 1 PACKET: at 12:08

## 2021-03-25 RX ADMIN — PANTOPRAZOLE SODIUM 40 MG: 40 TABLET, DELAYED RELEASE ORAL at 08:04

## 2021-03-25 RX ADMIN — Medication 5 ML: at 08:05

## 2021-03-25 RX ADMIN — AMIODARONE HYDROCHLORIDE 400 MG: 200 TABLET ORAL at 20:08

## 2021-03-25 RX ADMIN — AMIODARONE HYDROCHLORIDE 400 MG: 200 TABLET ORAL at 08:04

## 2021-03-25 RX ADMIN — Medication 220 MG: at 12:06

## 2021-03-25 RX ADMIN — Medication 15 ML: at 20:08

## 2021-03-25 RX ADMIN — Medication 15 ML: at 17:30

## 2021-03-25 RX ADMIN — Medication 1 PACKET: at 20:10

## 2021-03-25 RX ADMIN — HEPARIN SODIUM 500 UNITS/HR: 10000 INJECTION, SOLUTION INTRAVENOUS at 21:09

## 2021-03-25 RX ADMIN — LIDOCAINE 2 PATCH: 560 PATCH PERCUTANEOUS; TOPICAL; TRANSDERMAL at 20:11

## 2021-03-25 RX ADMIN — Medication 1 PACKET: at 12:07

## 2021-03-25 RX ADMIN — Medication 10 MG: at 20:08

## 2021-03-25 RX ADMIN — ANAKINRA 100 MG: 100 INJECTION, SOLUTION SUBCUTANEOUS at 20:17

## 2021-03-25 RX ADMIN — AMITRIPTYLINE HYDROCHLORIDE 25 MG: 25 TABLET, FILM COATED ORAL at 21:31

## 2021-03-25 RX ADMIN — Medication 12.5 MG: at 20:07

## 2021-03-25 RX ADMIN — WARFARIN SODIUM 6 MG: 3 TABLET ORAL at 17:29

## 2021-03-25 RX ADMIN — ACETAMINOPHEN 650 MG: 325 TABLET, FILM COATED ORAL at 23:50

## 2021-03-25 RX ADMIN — ASPIRIN 81 MG CHEWABLE TABLET 81 MG: 81 TABLET CHEWABLE at 08:04

## 2021-03-25 RX ADMIN — Medication 15 ML: at 12:06

## 2021-03-25 RX ADMIN — MIDODRINE HYDROCHLORIDE 15 MG: 5 TABLET ORAL at 12:06

## 2021-03-25 RX ADMIN — MIDODRINE HYDROCHLORIDE 15 MG: 5 TABLET ORAL at 17:30

## 2021-03-25 RX ADMIN — ACETAMINOPHEN 650 MG: 325 TABLET, FILM COATED ORAL at 01:13

## 2021-03-25 RX ADMIN — Medication 1 PACKET: at 20:09

## 2021-03-25 RX ADMIN — MIDODRINE HYDROCHLORIDE 15 MG: 5 TABLET ORAL at 08:07

## 2021-03-25 RX ADMIN — OLANZAPINE 5 MG: 2.5 TABLET, FILM COATED ORAL at 21:30

## 2021-03-25 ASSESSMENT — ACTIVITIES OF DAILY LIVING (ADL)
ADLS_ACUITY_SCORE: 26
ADLS_ACUITY_SCORE: 25
ADLS_ACUITY_SCORE: 25
ADLS_ACUITY_SCORE: 26
ADLS_ACUITY_SCORE: 25
ADLS_ACUITY_SCORE: 26

## 2021-03-25 NOTE — PLAN OF CARE
D: admitted 3/04 S/p MVR and CABG x1 on 3/5 c/b ECMO, and ADAN. Now on HD. Hx HTN, AAA, HLD, RA     I: Monitored vitals and assessed pt status.   Changed:   - 2100 Hgb check 8.6  - dressing over old CT sites changed. No drainage. Scabbing present.   Running:   - TFs 55 mL/hr continuous   - FWF 30 mL q4h  PRN: Seroquel x1      A: A0x1; only oriented to self. Garbled speech, hyperverbal, illogical. Pt often restless and pulling at lines - mitts on and sitter at bedside for pt safety. VSS on 2L OxyMask. Afebrile. Tele shows A-fib, rate 80-100s. Productive cough; not able to follow instructions to use IS. Pt denied pain when asked; on scheduled tylenol and lidocaine patches for hx back pain, but pt refused to be turned to apply lido patches. Dressing over old CT sites CDI, no new skin concerns. Oliguric, primafit in place - replaced x2 overnight. Orders to bladder scan q4h and insert Alfaro if >400 mL. Rectal tube in place; small amount of leakage overnight (black, tarry). Pneumatic compression devices on. Turned q2h as able/as pt allowed. Intermittent sleep throughout the night.      P: Continue to monitor Pt status and report changes to CVTS.     [id number]  Emily Forrester RN on 3/25/2021 at 6:05 AM

## 2021-03-25 NOTE — PROGRESS NOTES
"Care Management Follow Up    Length of Stay (days): 21    Expected Discharge Date: 03/26/21     Concerns to be Addressed: discharge planning    Patient plan of care discussed at interdisciplinary rounds: Yes    Anticipated Discharge Disposition: transfer back to [facility] in [place]    Received call from [facility] in [place] asking if we have a transfer agreement on file for pt. There is no transfer agreement in pt's paper or electronic chart. [facility] states that since this is a lateral transfer with no transfer agreement pt's family \"may\" have to pay out of pocket for pt's care there but they are unsure. [facility] states they have an accepting physician and will discuss next steps with him.    Spoke with pt's dtr Pamela (ph [phone]) and updated her on what [facility] stated. Pamela to call pt's insurance company and request information about payment for a lateral transfer.    LEONEL Barton, [facility] CenterSW  6C   [facility]  Pager [phone]  Phone [phone]                "

## 2021-03-25 NOTE — PROGRESS NOTES
Care Conference: Care Coordinator  Goal: to determine when it is safe to to possibly transfer pt to [facility] in [place], [place] to be closer to family.  Per search:; there is no transfer agreement with [facility]  Telephone conference: [phone] ID # [id number]  Pin [id number]  Held in: 6C conference room  Attendees:  Dr Le Armstrong ([phone]) nephrology  JOHN [facility]man CVTS ([phone])  Pt's daughter Pamela Crockett Ph: [phone]--has been here at hospital  6C CC; myself  On phone:   Daughter: Maru Joya: [phone]  Significant other: Karen Telles  Soon to be DIL: Vee Gutierres updated that pt has been improving over the last 2-3 days with following commands and answering questions but still has poor memory and is impulsive, likely due to lack of sleep. Psychiatry was consulted for help with meds. Pt still not eating until he is more awake is on NJTF.. Hgb stable today at 8.2.  Dr Armstrong said he is making approx 200-400 ml of urine/day and they were able to take some fluid off in dialysis. They give pt's 3 months to really determine if longterm HD will be needed and we are currently at approx 3 week timeline. She says he has a 60-70% chance of recovery.     Will be on warfaroin    Bhavik says pt is stable enough now to transfer to [facility] with a BLS ambulance crew/stretcher ride. Bhavik to ask if they would pay for it or family is aware that it is approx $10,000 cost.  Informed family that this is a lateral transfer and [facility] may not accept him due to this. Usually only cover when it is s transfer to a higher level of care.  Bhavik to call [facility] admissions: [phone] and he will relay their answer to me and to Pamela, to relay to her family.

## 2021-03-25 NOTE — PROGRESS NOTES
Cardiovascular Surgery Progress Note  03/25/2021         Assessment and Plan:     Jin Garrett is a 80 year old male with PMH of HTN, AAA, HLD and rheumatoid arthritis. He presented to an OSH with shortness of breath and atypical chest pain. TTE showed severe MR with concern for posterior flail leaflet, EF was preserved. On arrival at [facility] (3/4/21) an Impella was emergently placed in cath lab. He was found to have a ruptured posterior papillary muscle causing severe MR.   Jin is now s/p mitral valve replacement and single vessel CABG with Dr. Porras on 3/5/21. Returned from the OR on central ECMO with an open chest, intubated and sedated. Returned to the OR for a wash out on 3/9, found to have a thrombus on his mitral valve so he had the valve replaced, decannulated central ECMO, bronchoscopy and placement of temporary L subclavian HD line. Exchanged Left subclavian for Right IJ Tunneled dialysis line.      Cardiovascular:   Hx of HTN, HLD, AAA  Acute severe MR due to ruptured papillary muscle, s/p tissue MVR  Cardiogenic shock secondary to severe MR  S/p Impella placement and conversion to central VA ECMO  Weaned off mechanical support, ECMO was utilized from 3/5 to 3/9.   CAD, mid LCx with 100% occlusion, s/p 1 vessel CAB   S/P redo MVR due to thrombus in left atrium and on bioprosthetic MV  Most recent echo showed LV EF 55-60%.    ASA 81 mg, atorvastatin on hold due to elevated LFTs.   LUE US 3/14 with nonocclusive thrombus in right cephalic vein, no UE DVT.   Pre and post-op Atrial fibrillation   Has been mostly rate controlled and anticoagulated. Hep gtt held since 3/22 due to epistaxis. EKG 3/22 confirming A-fib ('s), likely contributing to hypotension, given Amio 150 mg IV bolus x 1 dose 3/22 and restarted 400 mg PO BID 3/23 AM.   Hypotension   Patient requiring midodrine 15 mg TID to maintain MAPS > 60.      Pulmonary:  Acute respiratory failure secondary to acute MR (resolved)  - Extubated POD 3  (chest closure and ecmo decan) to 4 lpm via NC; now saturating well on 2-4 lpm.   - Supplemental O2 PRN to keep sats > 92%. Wean off as tolerated.  - Confusion impeding progress with pulm toilet, IS, activity and deep breathing     Neurology / MSK:  Delirium  Insomnia    - Had extended time on sedation due to need for central VA ECMO. Avoiding narcs and benadryl.   - Continues on 1:1 sitter and PRN hand mitts.   - Melatonin 10 mg q PM. Seroquel 50 mg q HS (reduced 3/23 and day-time off since 3/24). Psych consult 3/25, will switch seroquel to zyprexa 5 mg and can reduce to 2.5 mg if too sedated.   - Acute post-operative pain; pain well controlled with acetaminophen and tramadol.   Chronic back pain   Recent hip surgery  - No weight bearing or activity restrictions related to recent hip surgery per family (records not available).   - Aqua K pad helping.      HEENT:   Hemoptysis  Epistaxis    - Suspecting hemoptysis is secondary epistaxis due to trauma to posterior nasal septum from the NG tube bridle and him tugging on it.    - Has hand mitts and tube taped to cheek tightly. Tube is no longer bridled.        / Renal:  ADAN secondary to cardiogenic shock  Hyperkalemia   - No Hx of renal disease. Most recent creatinine 2.9 and improving.   - Oliguric, has condom catheter. Bumex 3 mg IV challenge on 3/22 am, minimal UOP.  Rechallenge 4 mg IV on 3/24 and 3/25. Making slightly more urine.   - Nephrology following; most likely cardiogenic shock induced ATN. CRRT transitioned to intermittent HD, fluid removal impeded by hypoTN. HD runs have now been able to pull 1 L of fluid on 3/23 and 3/24.     GI / FEN:   Dysphagia and Delirium   - Speech team following and recommending NPO with ice chips. Continuing therapies.   - NG in place for all nutrition and meds. Needs to be advanced 5 cm on 3/22.  - Rectal tube in place due to persistent loose stools.  C diff negative 3/18.   - Replace electrolytes as needed, hepatic  enzymes improving.  Melena   - First noticed 3/23 am. Had epistaxis 2 days prior and likely swallowed some of the blood. He is also on Iron supplement and this can cause stools to darken. Will continue to monitor Hgb, consider GI consult if Hgb not stable.      Endocrine:  Stress induced hyperglycemia  - Initially managed on insulin drip, transitioned to sliding scale and now off insulin (3/16). No acute needs.       Infectious Disease:  Stress induced leukocytosis vs other  - Completed perioperative antibiotics.   - WBC 10.1 and now improved, no longer having febrile episodes (Tmax 98.4 F), no other signs or symptoms of infection. UA on 3/22 and 3/23 were clean and CRP has been slowly trending down.      Hematology:   Acute blood loss anemia  Epistaxis   - Hgb 5.7 on 3/22. Given 2 units PRBC and 1 unit plasma on 3/22, one unit PRBC 3/23.  Hgb 8.2 and stable.     - Plt WNL, no persistent signs or symptoms of active bleeding, melena 3/22 overnight could be residual from previous epistaxis.   - LUE US 3/14 with non-occlusive thrombus in right cephalic vein, no UE DVT       Anticoagulation:   - Coumadin for atrial fibrillation and left atrial thrombus, INR goal 2-3. Most recent INR 1.31.    - Low intensity heparin gtt bridging to therapeutic INR, HELD since 3/22 am due to epistaxis and melena. Restarting straight rate 3/25 to see if he is tolerating it now that Hgb is improving.      Prophylaxis:   - Stress ulcer prophylaxis: Pantoprazole 40 mg daily for 30 days  - DVT prophylaxis: Subcutaneous heparin, SCD     Disposition:   - Transferred to  on 3/19   - Therapies recommending discharge to TCU. Still requiring 1:1 sitter.   - Family care conference Thurs 3/25. Family would like to consider DNR/DNI.   - Family hopes to transfer to [facility]).     Discussed with CVTS Fellow as needed.  Staff surgeons have been informed of changes through both verbal and written communication.      Bhavik Lemus PA-C  "  Cardiothoracic Surgery  Pager [phone]    11:15 AM   March 25, 2021        Interval History:     No overnight events.  Still on 1:1 sitter with some impulsivity. L arm pain with BP checks.   States pain is well managed on current regimen. Slept ok overnight.  Tolerating tube feeds via NG, is passing flatus, + BM. No nausea or vomiting.  Breathing well without complaints.   Denies chest pain, palpitations, dizziness, syncopal symptoms, fevers, chills, myalgias, or sternal popping/clicking.         Physical Exam:   Blood pressure 119/69, pulse 79, temperature 97.6  F (36.4  C), temperature source Axillary, resp. rate 20, height 1.803 m (5' 11\"), weight 93.4 kg (205 lb 14.6 oz), SpO2 96 %.  Vitals:    03/20/21 0603 03/22/21 0516 03/24/21 1100   Weight: 90 kg (198 lb 6.6 oz) 88.4 kg (194 lb 14.2 oz) 93.4 kg (205 lb 14.6 oz)      Weight; + 14.4 kg since admit and 2.3 kg since OR, trending up.   24 hr Fluid status; net loss 300 mL.   MAPs: 78 - 96    Gen: alert and interactive, conversational, NAD, short term memory issues.   Neuro: no focal deficits   CV: irregular, normal S1 S2, no murmurs, rubs or gallops.   Pulm: CTA, no wheezing or rhonchi, normal breathing on 1 lpm  Abd: nondistended, normal BS, soft, non-tender. Rectal tube with dark stool.   Ext: trace peripheral edema, 1+ pitting. L arm edema.   Incision: clean, dry, intact, no erythema, sternum stable  Tubes/drain sites: dressing clean and dry         Data:    Imaging:  reviewed recent imaging, no acute concerns    Labs:  BMP  Recent Labs   Lab 03/25/21  0604 03/24/21  0615 03/23/21  0623 03/22/21  0553   * 133 133 133   POTASSIUM 4.0 4.0 4.6 5.6*   CHLORIDE 99 99 99 96   PALLAVI 8.6 8.3* 8.4* 8.4*   CO2 24 26 27 23   BUN 56* 73* 90* 143*   CR 2.90* 3.64* 3.99* 5.45*   * 112* 111* 103*     CBC  Recent Labs   Lab 03/25/21  0604 03/24/21  2111 03/24/21  1204 03/24/21  0615 03/23/21  1025 03/23/21  1025 03/22/21  0553 03/22/21  0553   WBC 10.1  --   " --  10.3  --  12.2*  --  14.5*   RBC 2.80*  --   --  2.62*  --  2.48*  --  2.08*   HGB 8.2* 8.6* 7.8* 7.5*   < > 7.3*   < > 6.1*   HCT 26.1*  --   --  23.4*  --  22.2*  --  18.8*   MCV 93  --   --  89  --  90  --  90   MCH 29.3  --   --  28.6  --  29.4  --  29.3   MCHC 31.4*  --   --  32.1  --  32.9  --  32.4   RDW 16.8*  --   --  17.0*  --  17.7*  --  17.7*     --   --  255  --  307  --  310    < > = values in this interval not displayed.     INR  Recent Labs   Lab 03/25/21  0604 03/24/21  0615 03/23/21  0623 03/22/21  0553   INR 1.31* 1.23* 1.30* 1.21*      Liver Function Studies -   Recent Labs   Lab Test 03/25/21  0604   PROTTOTAL 5.4*   ALBUMIN 1.9*   BILITOTAL 0.4   ALKPHOS 200*   AST 34   ALT 30     GLUCOSE:   Recent Labs   Lab 03/25/21  0604 03/24/21  0615 03/23/21  0623 03/22/21  0553 03/21/21  0619 03/20/21  1653 03/20/21  0232 03/20/21  0232 03/19/21  2226 03/19/21  0356   * 112* 111* 103* 108* 112*   < >  --   --   --    BGM  --   --   --   --   --   --   --  86 105* 104*    < > = values in this interval not displayed.        16-Jan-2021

## 2021-03-25 NOTE — CONSULTS
Consult Date:  03/25/2021      PSYCHIATRIC CONSULTATION      IDENTIFICATION:  Mr. Jin Garrett is an 80-year-old white male who developed very significant cardiac issues.  He presented to an outside hospital with shortness of breath and chest pain.  TTE showed severe mitral regurgitation and concern for posterior flail leaflet.  He was transferred to the [facility], where an Impella was emergently placed, he ended up on central ECMO and although his heart is recovering very well, he is unfortunately requiring dialysis and has quite significant delirium.  I am asked to evaluate his delirium and mood by Bhavik Miller PA-C.      I had an opportunity to discuss this case with the treatment team.  They report that Mr. Garrett' family is interested in antidepressant therapy.  I note that the patient has been placed on Elavil 25.  Since that is an anticholinergic medication, I do think it should be discontinued.  I suggested to the treatment team that if the patient's family felt strongly about an antidepressant, we could  start Lexapro 5 mg, that would likely not cause any difficulties.  However, it appears to me that his biggest issue is severe delirium.      On my interview, the patient did not know why he was in the hospital, nor did he know that he was in a hospital.  He was disoriented to time and place as well as situation.  I did have an opportunity to watch him during a Physical Therapy session and noted that he continues to be impulsive and has almost no short-term memory.  He is obviously a significant challenge for therapy and nursing, though, the therapists were able to develop a rapport with him.  At times, he was quite pleasant and cooperative and at other times he was quite obstreperous and angry.  He had marked mood lability, which would be consistent with delirium.      From what I understand, this patient was not suffering from clinical dementia prior to his current event.  However, in  reviewing the CT scan from 03/19, he clearly had very large ventricles and cerebral sulci consistent with significant generalized brain atrophy.      PAST MEDICAL HISTORY:  Hypertension, hyperlipidemia, abdominal aortic aneurysm, rheumatoid arthritis, PFO.      FAMILY HISTORY:  This patient's family psychiatric history is currently unclear.      SOCIAL HISTORY:  The patient lives in [place].  He apparently had an acreage that he was able to take very good care.  He has 2 daughters, who are actively engaged in his care.        ALLERGIES:  THE PATIENT HAS ALLERGIES TO OXYCODONE.      PHYSICAL REVIEW OF SYSTEMS:  Unfortunately, the patient was unable to participate in a complete review of systems.  He does complain of back pain.      MENTAL STATUS EXAMINATION:  On my interview, the patient was at times standing up with Physical Therapy and later lying down in bed.  His mood was quite labile as was his affect.  At times, he was quite angry, obstreperous and somewhat growling.  At other times, he seemed to be pleasant and cooperative and happy to have staff to interact with.  His speech is generally coherent and goal oriented.  His associations were not always tight nor were his thought processes always logical and linear.  His content of thought was without obvious psychosis or suicidal ideation.  Recent and remote memory were quite impaired, as was fund of knowledge.  Use of language was generally within normal limits.  Concentration was impaired.  He was alert, oriented to himself, but not to time, place or situation.  Insight and judgment are quite guarded.  Muscle strength and tone are obviously decreased.  Working with Physical Therapy, it is apparent that he has some generalized weakness, likely from disuse.  Recent vitals include a blood pressure of 119/69, temperature of 97.6, pulse of 84, respiration rate of 20, oxygen saturation 97%.      COMMENT:  In discussing this case with the treatment team, it appears  that they have tried to cut down on his sedating medications, which has made him much more active during the day, but now he is not sleeping.      ASSESSMENT:  Delirium.      RECOMMENDATIONS:   1.  Discontinue regularly scheduled Seroquel and use olanzapine 5 mg at bedtime to see if he can get a good night's sleep, but continue p.r.n. Seroquel for daytime agitation.   2.  If the family would like to go ahead and try an antidepressant, I think low-dose Lexapro at 5 mg in the morning is unlikely to cause any problems.   3.  Please discontinue Elavil, as it is quite an anticholinergic medication and likely not indicated in delirium.         SASHA ARREOLA MD             D: 2021   T: 2021   MT: ALTHEA      Name:     YADY BARNES   MRN:      [id number]        Account:       [id number]   :      1940           Consult Date:  2021      Document: [id number]

## 2021-03-26 ENCOUNTER — APPOINTMENT (OUTPATIENT)
Dept: GENERAL RADIOLOGY | Facility: CLINIC | Age: 81
DRG: 003 | End: 2021-03-26
Attending: NURSE PRACTITIONER
Payer: MEDICARE

## 2021-03-26 ENCOUNTER — APPOINTMENT (OUTPATIENT)
Dept: PHYSICAL THERAPY | Facility: CLINIC | Age: 81
DRG: 003 | End: 2021-03-26
Attending: INTERNAL MEDICINE
Payer: MEDICARE

## 2021-03-26 ENCOUNTER — APPOINTMENT (OUTPATIENT)
Dept: OCCUPATIONAL THERAPY | Facility: CLINIC | Age: 81
DRG: 003 | End: 2021-03-26
Attending: INTERNAL MEDICINE
Payer: MEDICARE

## 2021-03-26 LAB
ALBUMIN SERPL-MCNC: 2 G/DL (ref 3.4–5)
ALP SERPL-CCNC: 203 U/L (ref 40–150)
ALT SERPL W P-5'-P-CCNC: 26 U/L (ref 0–70)
ANION GAP SERPL CALCULATED.3IONS-SCNC: 11 MMOL/L (ref 3–14)
AST SERPL W P-5'-P-CCNC: 29 U/L (ref 0–45)
BILIRUB SERPL-MCNC: 0.4 MG/DL (ref 0.2–1.3)
BUN SERPL-MCNC: 95 MG/DL (ref 7–30)
CALCIUM SERPL-MCNC: 8.5 MG/DL (ref 8.5–10.1)
CHLORIDE SERPL-SCNC: 97 MMOL/L (ref 94–109)
CO2 SERPL-SCNC: 25 MMOL/L (ref 20–32)
CREAT SERPL-MCNC: 3.99 MG/DL (ref 0.66–1.25)
ERYTHROCYTE [DISTWIDTH] IN BLOOD BY AUTOMATED COUNT: 16.6 % (ref 10–15)
GFR SERPL CREATININE-BSD FRML MDRD: 13 ML/MIN/{1.73_M2}
GLUCOSE BLDC GLUCOMTR-MCNC: 108 MG/DL (ref 70–99)
GLUCOSE BLDC GLUCOMTR-MCNC: 89 MG/DL (ref 70–99)
GLUCOSE SERPL-MCNC: 94 MG/DL (ref 70–99)
HCT VFR BLD AUTO: 25.2 % (ref 40–53)
HGB BLD-MCNC: 7.9 G/DL (ref 13.3–17.7)
HGB BLD-MCNC: 8.4 G/DL (ref 13.3–17.7)
INR PPP: 1.38 (ref 0.86–1.14)
LDH SERPL L TO P-CCNC: 345 U/L (ref 85–227)
MAGNESIUM SERPL-MCNC: 1.8 MG/DL (ref 1.6–2.3)
MAGNESIUM SERPL-MCNC: 2 MG/DL (ref 1.6–2.3)
MCH RBC QN AUTO: 28.4 PG (ref 26.5–33)
MCHC RBC AUTO-ENTMCNC: 31.3 G/DL (ref 31.5–36.5)
MCV RBC AUTO: 91 FL (ref 78–100)
PHOSPHATE SERPL-MCNC: 3 MG/DL (ref 2.5–4.5)
PHOSPHATE SERPL-MCNC: 5.2 MG/DL (ref 2.5–4.5)
PLATELET # BLD AUTO: 234 10E9/L (ref 150–450)
POTASSIUM SERPL-SCNC: 3.8 MMOL/L (ref 3.4–5.3)
POTASSIUM SERPL-SCNC: 4 MMOL/L (ref 3.4–5.3)
PROT SERPL-MCNC: 5.5 G/DL (ref 6.8–8.8)
RBC # BLD AUTO: 2.78 10E12/L (ref 4.4–5.9)
SODIUM SERPL-SCNC: 132 MMOL/L (ref 133–144)
UFH PPP CHRO-ACNC: <0.1 IU/ML
UFH PPP CHRO-ACNC: <0.1 IU/ML
WBC # BLD AUTO: 9.1 10E9/L (ref 4–11)

## 2021-03-26 PROCEDURE — 36415 COLL VENOUS BLD VENIPUNCTURE: CPT | Performed by: THORACIC SURGERY (CARDIOTHORACIC VASCULAR SURGERY)

## 2021-03-26 PROCEDURE — 97530 THERAPEUTIC ACTIVITIES: CPT | Mod: GO

## 2021-03-26 PROCEDURE — 74018 RADEX ABDOMEN 1 VIEW: CPT | Mod: 26 | Performed by: RADIOLOGY

## 2021-03-26 PROCEDURE — 250N000013 HC RX MED GY IP 250 OP 250 PS 637: Performed by: THORACIC SURGERY (CARDIOTHORACIC VASCULAR SURGERY)

## 2021-03-26 PROCEDURE — 85520 HEPARIN ASSAY: CPT | Performed by: THORACIC SURGERY (CARDIOTHORACIC VASCULAR SURGERY)

## 2021-03-26 PROCEDURE — 85027 COMPLETE CBC AUTOMATED: CPT | Performed by: THORACIC SURGERY (CARDIOTHORACIC VASCULAR SURGERY)

## 2021-03-26 PROCEDURE — 250N000011 HC RX IP 250 OP 636

## 2021-03-26 PROCEDURE — 999N001017 HC STATISTIC GLUCOSE BY METER IP

## 2021-03-26 PROCEDURE — 250N000011 HC RX IP 250 OP 636: Performed by: INTERNAL MEDICINE

## 2021-03-26 PROCEDURE — 250N000013 HC RX MED GY IP 250 OP 250 PS 637: Performed by: PHYSICIAN ASSISTANT

## 2021-03-26 PROCEDURE — 999N000065 XR ABDOMEN PORT 1 VW

## 2021-03-26 PROCEDURE — 36592 COLLECT BLOOD FROM PICC: CPT | Performed by: PHYSICIAN ASSISTANT

## 2021-03-26 PROCEDURE — 250N000013 HC RX MED GY IP 250 OP 250 PS 637

## 2021-03-26 PROCEDURE — 214N000001 HC R&B CCU UMMC

## 2021-03-26 PROCEDURE — 83735 ASSAY OF MAGNESIUM: CPT | Performed by: THORACIC SURGERY (CARDIOTHORACIC VASCULAR SURGERY)

## 2021-03-26 PROCEDURE — 83735 ASSAY OF MAGNESIUM: CPT | Performed by: INTERNAL MEDICINE

## 2021-03-26 PROCEDURE — 250N000013 HC RX MED GY IP 250 OP 250 PS 637: Performed by: INTERNAL MEDICINE

## 2021-03-26 PROCEDURE — 250N000013 HC RX MED GY IP 250 OP 250 PS 637: Performed by: NURSE PRACTITIONER

## 2021-03-26 PROCEDURE — 36415 COLL VENOUS BLD VENIPUNCTURE: CPT | Performed by: INTERNAL MEDICINE

## 2021-03-26 PROCEDURE — 85610 PROTHROMBIN TIME: CPT | Performed by: THORACIC SURGERY (CARDIOTHORACIC VASCULAR SURGERY)

## 2021-03-26 PROCEDURE — 85520 HEPARIN ASSAY: CPT | Performed by: INTERNAL MEDICINE

## 2021-03-26 PROCEDURE — 84100 ASSAY OF PHOSPHORUS: CPT | Performed by: INTERNAL MEDICINE

## 2021-03-26 PROCEDURE — 85018 HEMOGLOBIN: CPT | Performed by: PHYSICIAN ASSISTANT

## 2021-03-26 PROCEDURE — 97530 THERAPEUTIC ACTIVITIES: CPT | Mod: GP | Performed by: PHYSICAL THERAPIST

## 2021-03-26 PROCEDURE — 84100 ASSAY OF PHOSPHORUS: CPT | Performed by: THORACIC SURGERY (CARDIOTHORACIC VASCULAR SURGERY)

## 2021-03-26 PROCEDURE — 84132 ASSAY OF SERUM POTASSIUM: CPT | Performed by: INTERNAL MEDICINE

## 2021-03-26 PROCEDURE — 80053 COMPREHEN METABOLIC PANEL: CPT | Performed by: THORACIC SURGERY (CARDIOTHORACIC VASCULAR SURGERY)

## 2021-03-26 PROCEDURE — 83615 LACTATE (LD) (LDH) ENZYME: CPT | Performed by: THORACIC SURGERY (CARDIOTHORACIC VASCULAR SURGERY)

## 2021-03-26 PROCEDURE — 258N000003 HC RX IP 258 OP 636: Performed by: INTERNAL MEDICINE

## 2021-03-26 PROCEDURE — 90937 HEMODIALYSIS REPEATED EVAL: CPT

## 2021-03-26 PROCEDURE — 99233 SBSQ HOSP IP/OBS HIGH 50: CPT | Mod: GC | Performed by: INTERNAL MEDICINE

## 2021-03-26 RX ORDER — DIPHENHYDRAMINE HYDROCHLORIDE 50 MG/ML
25 INJECTION INTRAMUSCULAR; INTRAVENOUS
Status: DISCONTINUED | OUTPATIENT
Start: 2021-03-26 | End: 2021-03-26

## 2021-03-26 RX ORDER — MAGNESIUM SULFATE HEPTAHYDRATE 40 MG/ML
2 INJECTION, SOLUTION INTRAVENOUS ONCE
Status: COMPLETED | OUTPATIENT
Start: 2021-03-26 | End: 2021-03-27

## 2021-03-26 RX ORDER — BUMETANIDE 0.25 MG/ML
4 INJECTION INTRAMUSCULAR; INTRAVENOUS ONCE
Status: COMPLETED | OUTPATIENT
Start: 2021-03-27 | End: 2021-03-27

## 2021-03-26 RX ORDER — MAGNESIUM SULFATE HEPTAHYDRATE 40 MG/ML
2 INJECTION, SOLUTION INTRAVENOUS ONCE
Status: DISCONTINUED | OUTPATIENT
Start: 2021-03-26 | End: 2021-04-05 | Stop reason: HOSPADM

## 2021-03-26 RX ORDER — QUETIAPINE FUMARATE 25 MG/1
50 TABLET, FILM COATED ORAL AT BEDTIME
Status: DISCONTINUED | OUTPATIENT
Start: 2021-03-26 | End: 2021-03-29

## 2021-03-26 RX ORDER — DIPHENHYDRAMINE HYDROCHLORIDE 50 MG/ML
25 INJECTION INTRAMUSCULAR; INTRAVENOUS EVERY 6 HOURS PRN
Status: DISCONTINUED | OUTPATIENT
Start: 2021-03-26 | End: 2021-03-26

## 2021-03-26 RX ORDER — QUETIAPINE FUMARATE 25 MG/1
25 TABLET, FILM COATED ORAL
Status: COMPLETED | OUTPATIENT
Start: 2021-03-26 | End: 2021-03-26

## 2021-03-26 RX ORDER — POTASSIUM CHLORIDE 20MEQ/15ML
10 LIQUID (ML) ORAL ONCE
Status: COMPLETED | OUTPATIENT
Start: 2021-03-26 | End: 2021-03-26

## 2021-03-26 RX ORDER — WARFARIN SODIUM 7.5 MG/1
7.5 TABLET ORAL
Status: COMPLETED | OUTPATIENT
Start: 2021-03-26 | End: 2021-03-26

## 2021-03-26 RX ADMIN — Medication 10 MG: at 20:17

## 2021-03-26 RX ADMIN — Medication 12.5 MG: at 23:04

## 2021-03-26 RX ADMIN — ACETAMINOPHEN 650 MG: 325 TABLET, FILM COATED ORAL at 20:15

## 2021-03-26 RX ADMIN — DIPHENHYDRAMINE HYDROCHLORIDE 25 MG: 50 INJECTION, SOLUTION INTRAMUSCULAR; INTRAVENOUS at 03:12

## 2021-03-26 RX ADMIN — QUETIAPINE 50 MG: 25 TABLET ORAL at 23:03

## 2021-03-26 RX ADMIN — SODIUM CHLORIDE 300 ML: 9 INJECTION, SOLUTION INTRAVENOUS at 09:29

## 2021-03-26 RX ADMIN — SODIUM CHLORIDE 250 ML: 9 INJECTION, SOLUTION INTRAVENOUS at 09:29

## 2021-03-26 RX ADMIN — QUETIAPINE 25 MG: 25 TABLET ORAL at 20:17

## 2021-03-26 RX ADMIN — Medication 1 PACKET: at 20:18

## 2021-03-26 RX ADMIN — MIDODRINE HYDROCHLORIDE 15 MG: 5 TABLET ORAL at 20:16

## 2021-03-26 RX ADMIN — WARFARIN SODIUM 7.5 MG: 7.5 TABLET ORAL at 20:17

## 2021-03-26 RX ADMIN — POTASSIUM CHLORIDE 10 MEQ: 20 SOLUTION ORAL at 23:02

## 2021-03-26 RX ADMIN — AMIODARONE HYDROCHLORIDE 400 MG: 200 TABLET ORAL at 23:03

## 2021-03-26 RX ADMIN — AMITRIPTYLINE HYDROCHLORIDE 25 MG: 25 TABLET, FILM COATED ORAL at 23:03

## 2021-03-26 RX ADMIN — Medication: at 09:30

## 2021-03-26 RX ADMIN — MAGNESIUM SULFATE IN WATER 2 G: 40 INJECTION, SOLUTION INTRAVENOUS at 23:09

## 2021-03-26 ASSESSMENT — ACTIVITIES OF DAILY LIVING (ADL)
ADLS_ACUITY_SCORE: 25

## 2021-03-26 ASSESSMENT — MIFFLIN-ST. JEOR: SCORE: 1656.13

## 2021-03-26 NOTE — PROGRESS NOTES
Care Management Follow Up    Length of Stay (days): 22    Expected Discharge Date: 03/29/21     Concerns to be Addressed: Return hospital transfer  Patient plan of care discussed at interdisciplinary rounds: Yes    Anticipated Discharge Disposition: TBD, possible return to [facility]    Private pay costs discussed: Not at this time, awaiting decision from [facility] in regards to transfer and will need to discuss transportation.    Additional Information:  This writer called the [facility] Transfer Line (Ph: [phone]) and was directed to call [facility]  (Ph: [phone]) in regards to transfer, this writer left a voicemail requesting return call.     CC will continue to monitor patient's medical condition and progress towards discharge.  Sujata Light RN BSN  6C Unit Care Coordinator  Phone number: [phone]  Pager: [phone]    Addendum 3/26 at 1045:  This writer received a call back from [facility]  who stated that since pt does not have a transfer agreement on file his insurance would not cover his hospitalization at their facility since it is a lateral transfer. This writer discussed that family is understanding that transportation would not be covered, but asked if it was insurance specific that the hospitalization would not be covered. No stated that this is the case for all return transfers.   This writer spoke with pt's daughter, Pamela who states she called pt's insurance directly this morning and the representative told her that they would not pay for the transportation, but would continue to cover hospitalization. This writer called and left No a follow up voicemail requesting return call.     1455: This writer received a call back from No stating that per their  they would need prior auth confirming that insurance would continue to cover hospitalization and that they would not secure this prior auth. If  prior auth secured, they would then re-consider return transfer.     CC will continue to monitor patient's medical condition and progress towards discharge.  Sujata Light RN BSN  6C Unit Care Coordinator  Phone number: [phone]  Pager: [phone]

## 2021-03-26 NOTE — PROGRESS NOTES
Cardiovascular Surgery Progress Note  03/26/2021         Assessment and Plan:     Jin Garrett is a 80 year old male with PMH of HTN, AAA, HLD and rheumatoid arthritis. He presented to an OSH with shortness of breath and atypical chest pain. TTE showed severe MR with concern for posterior flail leaflet, EF was preserved. On arrival at [facility] (3/4/21) an Impella was emergently placed in cath lab. He was found to have a ruptured posterior papillary muscle causing severe MR.   Jin is now s/p mitral valve replacement and single vessel CABG with Dr. Porras on 3/5/21. Returned from the OR on central ECMO with an open chest, intubated and sedated. Returned to the OR for a wash out on 3/9, found to have a thrombus on his mitral valve so he had the valve replaced, decannulated central ECMO, bronchoscopy and placement of temporary L subclavian HD line. Exchanged Left subclavian for Right IJ Tunneled dialysis line.      Cardiovascular:   Hx of HTN, HLD, AAA  Acute severe MR due to ruptured papillary muscle, s/p tissue MVR  Cardiogenic shock secondary to severe MR  S/p Impella placement and conversion to central VA ECMO  Weaned off mechanical support, ECMO was utilized from 3/5 to 3/9.   CAD, mid LCx with 100% occlusion, s/p 1 vessel CAB   S/P redo MVR due to thrombus in left atrium and on bioprosthetic MV  Most recent echo showed LV EF 55-60%.    ASA 81 mg, atorvastatin on hold due to elevated LFTs.   LUE US 3/14 with nonocclusive thrombus in right cephalic vein, no UE DVT.   Pre and post-op Atrial fibrillation   Has been mostly rate controlled and anticoagulated. Hep gtt held since 3/22 due to epistaxis. EKG 3/22 confirming A-fib ('s), likely contributing to hypotension, given Amio 150 mg IV bolus x 1 dose 3/22 and restarted 400 mg PO BID 3/23 AM.   Hypotension   Patient requiring midodrine 15 mg TID to maintain MAPS > 60.      Pulmonary:  Acute respiratory failure secondary to acute MR (resolved)  - Extubated POD 3  (chest closure and ecmo decan) to 4 lpm via NC; now saturating well on 2-4 lpm.   - Supplemental O2 PRN to keep sats > 92%. Wean off as tolerated.  - Confusion impeding progress with pulm toilet, IS, activity and deep breathing     Neurology / MSK:  Delirium  Insomnia    - Had extended time on sedation due to need for central VA ECMO. Avoiding narcs and benadryl.   - Continues on 1:1 sitter and PRN hand mitts.   - Melatonin 10 mg q PM. Seroquel 50 mg q HS (reduced 3/23 and day-time off since 3/24). Psych consult 3/25, will switch seroquel to zyprexa 5 mg and can reduce to 2.5 mg if too sedated.   - Acute post-operative pain; pain well controlled with acetaminophen and tramadol.   Chronic back pain   Recent hip surgery  - No weight bearing or activity restrictions related to recent hip surgery per family (records not available).   - Aqua K pad helping.      HEENT:   Hemoptysis  Epistaxis    - Suspecting hemoptysis is secondary epistaxis due to trauma to posterior nasal septum from the NG tube bridle and him tugging on it.    - Has hand mitts and tube taped to cheek tightly. Tube is no longer bridled.        / Renal:  ADAN secondary to cardiogenic shock  Hyperkalemia   - No Hx of renal disease. Most recent creatinine 2.9 and improving.   - Oliguric, has condom catheter. Bumex 3 mg IV challenge on 3/22 am, minimal UOP.  Rechallenge 4 mg IV on 3/24 and 3/25. Making slightly more urine.   - Nephrology following; most likely cardiogenic shock induced ATN. CRRT transitioned to intermittent HD, fluid removal impeded by hypoTN. HD runs have now been able to pull 1 L of fluid on 3/23 and 3/24.      GI / FEN:   Dysphagia and Delirium   - Speech team following and recommending NPO with ice chips. Continuing therapies.   - NG in place for all nutrition and meds. Needs to be advanced 5 cm on 3/22.  - Rectal tube in place due to persistent loose stools.  C diff negative 3/18.   - Replace electrolytes as needed, hepatic  enzymes improving.  Melena   - First noticed 3/23 am. Had epistaxis 2 days prior and likely swallowed some of the blood. He is also on Iron supplement and this can cause stools to darken. Will continue to monitor Hgb, consider GI consult if Hgb not stable.      Endocrine:  Stress induced hyperglycemia  - Initially managed on insulin drip, transitioned to sliding scale and now off insulin (3/16). No acute needs.       Infectious Disease:  Stress induced leukocytosis vs other  - Completed perioperative antibiotics.   - WBC 10.1 and now improved, no longer having febrile episodes (Tmax 98.4 F), no other signs or symptoms of infection. UA on 3/22 and 3/23 were clean and CRP has been slowly trending down.      Hematology:   Acute blood loss anemia  Epistaxis   - Hgb 5.7 on 3/22. Given 2 units PRBC and 1 unit plasma on 3/22, one unit PRBC 3/23.  Hgb 7.9 and stable.  Recheck pending.  Will plan to give 1 unit if < 7.5.    - Plt WNL, no persistent signs or symptoms of active bleeding, melena 3/22 overnight could be residual from previous epistaxis.   - LUE US 3/14 with non-occlusive thrombus in right cephalic vein, no UE DVT       Anticoagulation:   - Coumadin for atrial fibrillation and left atrial thrombus, INR goal 2-3. Most recent INR 1.38.    - Low intensity heparin gtt bridging to therapeutic INR, HELD since 3/22 am due to epistaxis and melena. Restarting straight rate 3/25 to see if he is tolerating it now that Hgb is improving. Seems to be tolerating Hep gtt.      Prophylaxis:   - Stress ulcer prophylaxis: Pantoprazole 40 mg daily for 30 days  - DVT prophylaxis: Subcutaneous heparin, SCD     Disposition:   - Transferred to  on 3/19   - Therapies recommending discharge to TCU. Still requiring 1:1 sitter.   - Family care conference Thurs 3/25. Family would like to consider DNR/DNI.   - Family hopes to transfer to [facility]). Having insurance issues with transfer     Discussed with CVTS Fellow as  "needed.  Staff surgeons have been informed of changes through both verbal and written communication.      Bhavik Lemus PA-C  Cardiothoracic Surgery  Pager [phone]    3:51 PM   March 26, 2021        Interval History:     Overnight events- pulled NG tube overnight and had NG replaced at bedside today. Will have Speech follow, advanced to full liquids today.   Had 1 L off in HD today.      States pain is well managed on current regimen. Slept poorly overnight.  Advancing to Full liquid diet and tube feeds, is passing flatus, + BM. No nausea or vomiting.  Breathing well without complaints. Complains of headche.          Physical Exam:   Blood pressure (!) 118/90, pulse 85, temperature 97.9  F (36.6  C), temperature source Axillary, resp. rate 16, height 1.803 m (5' 11\"), weight 92.4 kg (203 lb 11.3 oz), SpO2 96 %.  Vitals:    03/22/21 0516 03/24/21 1100 03/26/21 1215   Weight: 88.4 kg (194 lb 14.2 oz) 93.4 kg (205 lb 14.6 oz) 92.4 kg (203 lb 11.3 oz)      Weight; + 13.4 kg since admit and trending down x 1 day  24 hr Fluid status; net gain 830 mL.   MAPs: 81 - 98    Gen: A&Ox4, NAD  Neuro: no focal deficits   CV: RRR, normal S1 S2, no murmurs, rubs or gallops.   Pulm: CTA, no wheezing or rhonchi, normal breathing on 2 lpm  Abd: nondistended, normal BS, soft, nontender  Incision: clean, dry, intact, no erythema, sternum stable  Tubes/drain sites: dressing clean and dry         Data:    Imaging:  reviewed recent imaging, no acute concerns    Labs:  BMP  Recent Labs   Lab 03/26/21  1438 03/26/21  0646 03/25/21  0604 03/24/21  0615 03/23/21  0623   NA  --  132* 132* 133 133   POTASSIUM 3.8 4.0 4.0 4.0 4.6   CHLORIDE  --  97 99 99 99   PALLAVI  --  8.5 8.6 8.3* 8.4*   CO2  --  25 24 26 27   BUN  --  95* 56* 73* 90*   CR  --  3.99* 2.90* 3.64* 3.99*   GLC  --  94 116* 112* 111*     CBC  Recent Labs   Lab 03/26/21  0646 03/25/21  0604 03/24/21  2111 03/24/21  1204 03/24/21  0615 03/23/21  1025 03/23/21  1025   WBC 9.1 10.1  " --   --  10.3  --  12.2*   RBC 2.78* 2.80*  --   --  2.62*  --  2.48*   HGB 7.9* 8.2* 8.6* 7.8* 7.5*   < > 7.3*   HCT 25.2* 26.1*  --   --  23.4*  --  22.2*   MCV 91 93  --   --  89  --  90   MCH 28.4 29.3  --   --  28.6  --  29.4   MCHC 31.3* 31.4*  --   --  32.1  --  32.9   RDW 16.6* 16.8*  --   --  17.0*  --  17.7*    236  --   --  255  --  307    < > = values in this interval not displayed.     INR  Recent Labs   Lab 03/26/21  0646 03/25/21  0604 03/24/21  0615 03/23/21  0623   INR 1.38* 1.31* 1.23* 1.30*      Liver Function Studies -   Recent Labs   Lab Test 03/26/21  0646   PROTTOTAL 5.5*   ALBUMIN 2.0*   BILITOTAL 0.4   ALKPHOS 203*   AST 29   ALT 26     GLUCOSE:   Recent Labs   Lab 03/26/21  1540 03/26/21  0646 03/25/21  0604 03/24/21  0615 03/23/21  0623 03/22/21  0553 03/21/21  0619 03/20/21  0232 03/20/21  0232 03/19/21  2226   GLC  --  94 116* 112* 111* 103* 108*   < >  --   --    BGM 89  --   --   --   --   --   --   --  86 105*    < > = values in this interval not displayed.

## 2021-03-26 NOTE — PROGRESS NOTES
HEMODIALYSIS TREATMENT NOTE    Date: 3/26/2021  Time: 12:24 PM    Data:  Pre Wt: 93.4 kg (205 lb 14.6 oz)   Desired Wt: 92.4 kg   Post Wt: 92.4 kg (203 lb 11.3 oz)(estimate)  Weight change: 1 kg  Ultrafiltration - Post Run Net Total Removed (mL): 1000 mL  Vascular Access Status: CVC  patent  Dialyzer Rinse: Clear  Total Blood Volume Processed: 70.2 L   Total Dialysis (Treatment) Time: 3hrs   Dialysate Bath: K 3, Ca 2.25  Heparin: None    Lab:   No    Interventions & Assessment:  Writer took over with 1.5hrs remaining.  3hr TX, 1L removed.  TX uneventful, pt stable & tolerated TX well.  CVC saline locked with clearguards in place. Handoff report given to PCN.     Plan:    Next tx per renal team.

## 2021-03-26 NOTE — PLAN OF CARE
NEURO: restless in the beginning of shift, pulling at things, more difficult to redirect. As day progressed pt became more alert, re-directable and less restless.   RESPIRATORY:  LS diminished in bases, SpO2>92% on 1 LPM oxymask.   CARDIO: Afib- 70-80s.  Other VSS. Heparin gtt to be started tonight at 2100.   GI/: BS active. Rectal tube removed as little to no output was observed over 8 hours. Pt incontinent of BM. BMs loose and dark. Primfit external catheter applied. Pt voided x1. Bladder scanned q 4 hours. Bladder scan amount 140-200 ml. NG in place and patent- tube feeding at goal rate (55/hour), pt tolerating. Medications are crushed and put down tube. Per speech eval today- tomorrow pt can be advanced to full liquid diet.   SKIN: Mediastinal incision- approximated, WNL, LITA. Chest tube sites- covered, eschar scabbing in each site- WNL, no drainage. Groin and knee sites WNL- LITA. Left arm swollen and bruised- US done, no DVT found.   ACTIVITY: Lift transfers. Pt repositioned q 2 hours with 2 assist. On pulsate mattress.   PAIN: Reported pain in arms- given tylenol scheduled.   LINES: PICC saline locked. Right HD CVC - site WNL. Left CVC pulled by nephrology MD.   PLAN:  Continue POC- possible transfer to [facility].

## 2021-03-26 NOTE — PROGRESS NOTES
Pt NG replaced per RN staff. @1600.New orders for  Xray to check placement stat.Unable to give any medications for pain or agitation via NG as  RN still waiting 40 minutes plus for Xray to come and check placement. Pt having increased confusion and altered mental status along with head/neck pain.  Pt care team aware. RN offers hot packs for pain. Pt redirected and repositioned with sitter. Daughter in room. Vitals stable. RA sats 93%.  Will continue to monitor pt and continue with POC.

## 2021-03-26 NOTE — PROGRESS NOTES
Nephrology Progress Note  03/26/2021         Assessment & Recommendations:   Jin Garrett is a 80 year old with PMH HTN, AAA, HLD, RA who presented to OSH with SOB and atypical CP. TTE showed severe MR with ?posterior flail leaflet, EF preserved. On arrival at [facility] an Impella was emergently placed. S/p MVR and single vessel CABG on 3/5/21. Returned from the OR on central ECMO with an open chest, now s/p decannulation on 3/9. Nephrology consulted for ADAN. Initiated on CRRT 3/6, transitioned to iHD 3/13.     ADAN in the setting of cardiogenic shock, was on ECMO, s/p decannulation on 3/9  Unknown baseline, no h/o CKD per daughter.  Patient is showing some renal recovery with 24 hr  ml  Etiology likely ATN in the setting of shock, no obstruction per CT and urinalysis showed granular casts which is c/w ATN.   Was previously not tolerating iHD due to hypotension during runs, but was able to tolerate iHD without any fluid pulled on 3/22 after receiving 2 U PRBCs and 1 U FFP. Patient has   Now been tolerating HD     Access : RI TDC.    BP - stable  Hypervolemia , improving    No acute electrolyte issues  No acute acid base issues  Anemia - 7.9 Stable             Recommendations     - HD today  , target UF 1 Litre. Will plan for MWF schedule   - CTM for renal recovery   - Renal diet  - Daily weights  - Strict I/Os   - care conference , held today . Plan is to transfer to [facility].       Recommendations were communicated to primary team via this note  Seen and discussed with Dr. Armstrong.      Ryan Bardales MD, FACP  Nephrology Fellow   [facility]   Pager [phone]          Interval History :   Nursing and provider notes from last 24 hours reviewed.  Patient hemodynamically stable.  No chest pain or shortness of breath.  No further epistaxis.  Hemoglobin stable.  Patient's urine output is improving.      ROS  No melena, no abd pain /n/v/diarrhea  No epistaxis  No CP. Has some cough , no hemoptysis.  "No SOB  Fatigued but no focal weakness        Physical Exam:   I/O last 3 completed shifts:  In: 1275 [I.V.:20; NG/GT:375]  Out: 2075 [Urine:1075; Other:1000]   BP (!) 118/90 (BP Location: Left arm)   Pulse 85   Temp 97.8  F (36.6  C) (Axillary)   Resp 16   Ht 1.803 m (5' 11\")   Wt 92.4 kg (203 lb 11.3 oz)   SpO2 96%   BMI 28.41 kg/m       General : Pt awake, not in acute distress   Lungs : anterior lung fields are clear  Cardiac : S1, S2 present  Abdomen : Soft/ND/NT  LE : trace lower extremity edema  NEURO: Awake and alert. Interactive. A&Ox4.  Dialysis Access : Right IJ TDC placed on 3/22/2021 -site no signs of infection.  Labs:   All labs reviewed by me  Electrolytes/Renal -   Recent Labs   Lab Test 03/26/21  1438 03/26/21  0646 03/25/21  0604 03/24/21  0615 03/18/21  0801 03/18/21  0801   NA  --  132* 132* 133   < >  --    POTASSIUM 3.8 4.0 4.0 4.0   < > 5.7*   CHLORIDE  --  97 99 99   < >  --    CO2  --  25 24 26   < >  --    BUN  --  95* 56* 73*   < >  --    CR  --  3.99* 2.90* 3.64*   < >  --    GLC  --  94 116* 112*   < >  --    PALLAVI  --  8.5 8.6 8.3*   < >  --    MAG 1.8 2.0  --   --   --  2.4*   PHOS 3.0 5.2*  --  5.0*  --  6.6*    < > = values in this interval not displayed.       CBC -   Recent Labs   Lab Test 03/26/21  1626 03/26/21  0646 03/25/21  0604 03/24/21  0615 03/24/21  0615   WBC  --  9.1 10.1  --  10.3   HGB 8.4* 7.9* 8.2*   < > 7.5*   PLT  --  234 236  --  255    < > = values in this interval not displayed.       LFTs -   Recent Labs   Lab Test 03/26/21  0646 03/25/21  0604 03/24/21  0615   ALKPHOS 203* 200* 182*   BILITOTAL 0.4 0.4 0.5   ALT 26 30 36   AST 29 34 36   PROTTOTAL 5.5* 5.4* 5.4*   ALBUMIN 2.0* 1.9* 1.9*     Current Medications:    acetaminophen  650 mg Oral or Feeding Tube Q8H     amiodarone  400 mg Oral BID     amitriptyline  25 mg Oral or Feeding Tube At Bedtime     anakinra  100 mg Subcutaneous QPM     artificial saliva  15 mL Swish & Spit 4x Daily     aspirin  81 mg " Oral or NG Tube Daily     B and C vitamin Complex with folic acid  5 mL Oral Daily     [START ON 3/27/2021] bumetanide  4 mg Intravenous Once     ferrous sulfate  220 mg Oral Daily     fiber modular (NUTRISOURCE FIBER)  1 packet Per Feeding Tube TID     lidocaine  2 patch Transdermal Q24h    And     lidocaine   Transdermal Q8H     magnesium sulfate  2 g Intravenous Once     melatonin  10 mg Oral or Feeding Tube QPM     metoprolol tartrate  12.5 mg Oral or Feeding Tube BID     midodrine  15 mg Oral TID w/meals     pantoprazole  40 mg Oral Daily     protein modular  1 packet Per Feeding Tube TID     QUEtiapine  50 mg Oral At Bedtime     warfarin ANTICOAGULANT  7.5 mg Oral ONCE at 18:00       heparin 500 Units/hr (03/26/21 7202)     - MEDICATION INSTRUCTIONS -       Warfarin Therapy Reminder       Catia Davis MD    I was present with the fellow during the history and exam.  I discussed the case with the fellow and agree with the findings as documented in the assessment and plan.  Le Armstrong

## 2021-03-26 NOTE — PLAN OF CARE
"BP (!) 132/96 (BP Location: Left arm)   Pulse 83   Temp 98  F (36.7  C) (Axillary)   Resp 20   Ht 1.803 m (5' 11\")   Wt 93.4 kg (205 lb 14.6 oz)   SpO2 98%   BMI 28.72 kg/m      D: admitted 3/04 S/p MVR and CABG x1 on 3/5 c/b ECMO, and ADAN. Now on HD. Hx HTN, AAA, HLD, RA.  I/A: Patient restless, agitated, aggressive and figeting  only alert to self.  Afib on oxygen at 2L/m via simple mask.  TF at 55ml/hr Navasource Renal tubing changed and bag changed, has a external catheter for urine collection.  Patient of low intensity heparin for afib it bridge to warfarin. At 2:30 incontinent of stool, while cleaning the patient, it become very difficult to proceed with the cleaning while patient remained agitated and resisting cares and at also trying to hit the attendant.  Called Dr. Marte and informed of the patient situation.  Patient was given 2mg of benadryl and slept from 4am-5.30am and he is at baseline (impulsive, restless and figeting).   P: Will continue to monitor and keep patient safe.  "

## 2021-03-26 NOTE — PLAN OF CARE
From outside hospital with chest pain, dyspnea on exertion found to have mitral valve regurgitation. Ipella placed emergently. S/p MVR and single vessel CABG 3/5. Returned from the OR on central ECMO with an open chest, intubated and sedated. Returned to the OR for a wash out on 3/9, found to have a thrombus on his mitral valve. Mitral valve replaced again. Pt decannulated central ECMO, bronchoscopy and placement of left subclavian dialysis line for kidney injury. Medical Hx includes: HTN, AAA, HLD and rheumatoid arthritis. Patient sleeping at start of shift, heparin running at flat rate with no RN protocol and unchanged at 500 units / hour. NG continuous feeding running at 55 mL / hour. 0849, patient up to commode for BM pulled NG tube. Patient taken to HD at 0900. VSS, disoriented to time, place, situation, and person. Patient is redirectable but impulsive. Daughter in room after HD. CVTS aware of situation. Continuing to monitor.

## 2021-03-26 NOTE — PROGRESS NOTES
Nephrology Progress Note  03/25/2021         Assessment & Recommendations:   Jin Garrett is a 80 year old with PMH HTN, AAA, HLD, RA who presented to OSH with SOB and atypical CP. TTE showed severe MR with ?posterior flail leaflet, EF preserved. On arrival at [facility] an Impella was emergently placed. S/p MVR and single vessel CABG on 3/5/21. Returned from the OR on central ECMO with an open chest, now s/p decannulation on 3/9. Nephrology consulted for ADAN. Initiated on CRRT 3/6, transitioned to iHD 3/13.     ADAN in the setting of cardiogenic shock, was on ECMO, s/p decannulation on 3/9  Unknown baseline, no h/o CKD per daughter.  Patient is showing some renal recovery -  ml in 24 hrs and additional 650 ml since MN   Etiology likely ATN in the setting of shock, no obstruction per CT and urinalysis showed granular casts which is c/w ATN.   Was previously not tolerating iHD due to hypotension during runs, but was able to tolerate iHD without any fluid pulled on 3/22 after receiving 2 U PRBCs and 1 U FFP. Patient has tolerated last 2 subsequent iHD runs  Each with 1 L UF    Access : [facility] TD.                 L Subclavian Non tunneled  HD line removed today       Recommendations     - HD tomorrow  , target UF 1 Litre  - CTM for renal recovery   - Renal diet  - Daily weights  - Strict I/Os   - care conference , held today . Plan is to transfer to [facility].       Note  Left Subclavian HD non tunneled  Catheter removal   Under sterile condition, I removed his left subclavian HD line without any complications  Adequate compression given to HD exit site . No bleeding observed . Patient tolerated procedure well   Patient scheduled to be started on therapeutic heparin drip  Per primary team . I told patient's bedside RN Ms Banuelos , to monitor site of exit of left subclavian HD cathter for bleeding. If no bleeding , patient can be initiated on heparin drip as per order per primary team in 2 hours from removal of  "subclavian hd catheter , around 9 pm today   Recommendations were communicated to primary team via this note  Seen and discussed with Dr. Armstrong.      Ryan Bardales MD, FACP  Nephrology Fellow   [facility]   Pager [phone]          Interval History :   Nursing and provider notes from last 24 hours reviewed.  Patient hemodynamically stable.  No chest pain or shortness of breath.  No further epistaxis.  Hemoglobin stable.  Patient's urine output is improving.      ROS  No melena, no abd pain /n/v/diarrhea  No epistaxis  No CP. Has some cough , no hemoptysis. No SOB  Fatigued but no focal weakness        Physical Exam:   I/O last 3 completed shifts:  In: 990 [NG/GT:550]  Out: 1250 [Urine:250; Other:1000]   /89 (BP Location: Left arm)   Pulse 83   Temp 98  F (36.7  C) (Oral)   Resp 20   Ht 1.803 m (5' 11\")   Wt 93.4 kg (205 lb 14.6 oz)   SpO2 98%   BMI 28.72 kg/m       General : Pt awake, not in acute distress   Lungs : anterior lung fields are clear  Cardiac : S1, S2 present  Abdomen : Soft/ND/NT  LE : trace lower extremity edema  NEURO: Awake and alert. Interactive. A&Ox4.  Dialysis Access : Right IJ TDC placed on 3/22/2021 -site no signs of infection.  Left subclavian nontunneled HD catheter removed today  Labs:   All labs reviewed by me  Electrolytes/Renal -   Recent Labs   Lab Test 03/25/21  0604 03/24/21  0615 03/23/21  0623 03/18/21  0801 03/18/21  0801 03/16/21  0005 03/16/21  0005 03/12/21  0355 03/12/21  0355   * 133 133   < >  --    < > 143   < > 139   POTASSIUM 4.0 4.0 4.6   < > 5.7*   < > 4.6   < > 3.6   CHLORIDE 99 99 99   < >  --    < > 109   < > 107   CO2 24 26 27   < >  --    < > 23   < > 25   BUN 56* 73* 90*   < >  --    < > 174*   < > 49*   CR 2.90* 3.64* 3.99*   < >  --    < > 4.90*   < > 1.57*   * 112* 111*   < >  --    < > 136*   < > 123*   PALLAVI 8.6 8.3* 8.4*   < >  --    < > 7.2*   < > 7.7*   MAG  --   --   --   --  2.4*  --  2.4*  --  2.5*   PHOS  --  5.0* "  --   --  6.6*  --   --   --  3.4    < > = values in this interval not displayed.       CBC -   Recent Labs   Lab Test 03/25/21  0604 03/24/21  2111 03/24/21  1204 03/24/21  0615 03/23/21  1025 03/23/21  1025   WBC 10.1  --   --  10.3  --  12.2*   HGB 8.2* 8.6* 7.8* 7.5*   < > 7.3*     --   --  255  --  307    < > = values in this interval not displayed.       LFTs -   Recent Labs   Lab Test 03/25/21  0604 03/24/21  0615 03/23/21  0623   ALKPHOS 200* 182* 175*   BILITOTAL 0.4 0.5 0.5   ALT 30 36 52   AST 34 36 13   PROTTOTAL 5.4* 5.4* 5.3*   ALBUMIN 1.9* 1.9* 1.9*     Current Medications:    acetaminophen  650 mg Oral or Feeding Tube Q8H     amiodarone  400 mg Oral BID     amitriptyline  25 mg Oral or Feeding Tube At Bedtime     anakinra  100 mg Subcutaneous QPM     artificial saliva  15 mL Swish & Spit 4x Daily     aspirin  81 mg Oral or NG Tube Daily     B and C vitamin Complex with folic acid  5 mL Oral Daily     ferrous sulfate  220 mg Oral Daily     fiber modular (NUTRISOURCE FIBER)  1 packet Per Feeding Tube TID     lidocaine  2 patch Transdermal Q24h    And     lidocaine   Transdermal Q8H     melatonin  10 mg Oral or Feeding Tube At Bedtime     metoprolol tartrate  12.5 mg Oral or Feeding Tube BID     midodrine  15 mg Oral TID w/meals     OLANZapine  5 mg Oral or Feeding Tube At Bedtime     pantoprazole  40 mg Oral Daily     protein modular  1 packet Per Feeding Tube TID       [Held by provider] heparin Stopped (03/21/21 1857)     heparin       - MEDICATION INSTRUCTIONS -       Warfarin Therapy Reminder       Catia Davis MD    I was present with the fellow during the history and exam.  I discussed the case with the fellow and agree with the findings as documented in the assessment and plan.  Le Armstrong

## 2021-03-26 NOTE — PROGRESS NOTES
CLINICAL NUTRITION SERVICES - REASSESSMENT NOTE     Nutrition Prescription    RECOMMENDATIONS FOR MDs/PROVIDERS TO ORDER:  --Paged primary team x 2 this afternoon (once this writer became aware of FT removed by pt). Radiology (for XR feeding tube placement) discussed with this writer that FT will likely not be placed today given timing of order placed and current schedule. Radiology confirmed with this writer that FTs are not placed on weekends, so the FT will not be placed until Monday. Strongly recommend RN place NGT at bedside (size 12-16 Fr) and this writer can bridle FT (if okay with team). Once FT is replaced and position confirmed via AXR, re-start TF @ goal rate.     Addendum 14:37: Spoke with primary team, okay for RN to place NGT at bedside with no bridle placement 2/2 pt pulled a FT + bridle, which caused epistaxis earlier in stay, per provider. Discussed with RN.     --If aligns with GOC, consider long-term enteral access (G-tube vs GJ-tube).     Malnutrition Status:    Non-severe malnutrition in the context of acute on chronic illness    Recommendations already ordered by Registered Dietitian (RD):  --Once enteral access is obtained and placement is confirmed via AXR, resume TF @ goal rate.       --GOAL: Novasource Renal @ 55 ml/hr (1320 ml) + Prosource (1 pkt TID) provides 2760 kcal (30 kcal/kg), 153 g pro (1.7 g/kg), 242 g CHO, 946 ml free water, and 0 g fiber daily.     Future/Additional Recommendations:  --Ongoing TF tolerance, weight trends, GOC/POC.     EVALUATION OF THE PROGRESS TOWARD GOALS   Diet: NPO --> SLP following  Nutrition Support:   3/18-___: Novasource Renal @ 55 ml/hr (1320 ml) + Prosource (1 pkt TID) provides 2760 kcal (30 kcal/kg), 153 g pro (1.7 g/kg), 242 g CHO, 946 ml free water, and 0 g fiber daily.   Intake: per I/O, pt received an acerage of 471 ml TF volume over the past 7 days. Suspect inaccurate documentation, as RN notes indicate TF has been running @ goal rate. Unable to  accurately determine TF intake.     TF was running @ goal rate, however pt pulled NGT before HD. Spoke with RN once pt returned from HD. RN reports orders to place NGT at bedside for meds, remove, and then have radiology place a NJT. Note XR feeding tube placement orders in with a note to not bridle the FT. Paged team asking if RN can place NGT and reason behind not bridling FT (this writer is trained in bridling FTs). Concerned that FT placement will not be completed given time of day and coming up on the weekend. Spoke with radiology, they state there are currently 3 FT placements left on schedule and they are not confident FT will be placed today, and confirmed FT placements do not occur on weekends. Paged primary team again with this information and recommendation for RN to place NGT and asked if RD could bridle. Have not heard a response at this time. Discussed all recs with RN, as well.      NEW FINDINGS   Weight: fluctuations likely 2/2 fluid shifts, overall stable  Labs: Na 132 (L), K+ 4.0 (WNL), BUN 95 (H), Cr 3.99 (H), Phos 5.2 (H), Alk phos 203 (H)  Meds: nephronex, ferrous sulfate, nutrisource fiber TID, coumadin  GI: abdomen soft, watery BM 3/16 (1-3 loose BMs/day)  Renal: HD MWF    MALNUTRITION  % Intake: Unable to assess 2/2 poor documentation of TF administration  % Weight Loss: None noted  Subcutaneous Fat Loss: Facial region:  Mild, some likely age-related wasting  Muscle Loss: Temporal:  mild and Thoracic region (clavicle, acromium bone, deltoid, trapezius, pectoral):  Mild - some likely age-related wasting  Fluid Accumulation/Edema: trace 1+  Malnutrition Diagnosis: Non-severe malnutrition in the context of acute on chronic illness    Previous Goals   Total avg nutritional intake to meet a minimum of 25 kcal/kg and 1.5 g PRO/kg daily (per dosing wt 91 kg).  Evaluation: Unable to evaluate    Previous Nutrition Diagnosis  Inadequate oral intake related to unsafe swallow as evidenced by NPO status  dependent on enteral nutrition support to meet 100% estimated needs.    Evaluation: No change    CURRENT NUTRITION DIAGNOSIS  Inadequate oral intake related to unsafe swallow as evidenced by NPO status dependent on enteral nutrition support to meet 100% estimated needs.       INTERVENTIONS  Implementation  Collaboration with other providers - paged CVTS team x 2, spoke with RN, and spoke with radiology   Enteral Nutrition - continue once enteral access is obtained    Goals  Diet adv v nutrition support within 2-3 days.  Total avg nutritional intake to meet a minimum of 25 kcal/kg and 1.2-1.5 g PRO/kg daily (per dosing wt 91 kg).    Monitoring/Evaluation  Progress toward goals will be monitored and evaluated per protocol.    Bridgette Johansen, MS, RD, LD, CNSC  6C RD Pager: [phone]

## 2021-03-26 NOTE — PLAN OF CARE
SLP: Cancel - Attempted to see pt x2 for dysphagia management. Pt at dialysis in AM. In PM, pt NPO; RN unsure if pt was NPO for procedure, paged provider. Unable to check back once new SLP orders were entered. Will reschedule for 3/27.

## 2021-03-27 ENCOUNTER — APPOINTMENT (OUTPATIENT)
Dept: GENERAL RADIOLOGY | Facility: CLINIC | Age: 81
DRG: 003 | End: 2021-03-27
Attending: INTERNAL MEDICINE
Payer: MEDICARE

## 2021-03-27 ENCOUNTER — APPOINTMENT (OUTPATIENT)
Dept: SPEECH THERAPY | Facility: CLINIC | Age: 81
DRG: 003 | End: 2021-03-27
Attending: PHYSICIAN ASSISTANT
Payer: MEDICARE

## 2021-03-27 LAB
ALBUMIN SERPL-MCNC: 2 G/DL (ref 3.4–5)
ALP SERPL-CCNC: 178 U/L (ref 40–150)
ALT SERPL W P-5'-P-CCNC: 26 U/L (ref 0–70)
ANION GAP SERPL CALCULATED.3IONS-SCNC: 9 MMOL/L (ref 3–14)
APTT PPP: 42 SEC (ref 22–37)
AST SERPL W P-5'-P-CCNC: 32 U/L (ref 0–45)
BILIRUB SERPL-MCNC: 0.6 MG/DL (ref 0.2–1.3)
BUN SERPL-MCNC: 64 MG/DL (ref 7–30)
CALCIUM SERPL-MCNC: 8.3 MG/DL (ref 8.5–10.1)
CHLORIDE SERPL-SCNC: 100 MMOL/L (ref 94–109)
CO2 SERPL-SCNC: 26 MMOL/L (ref 20–32)
CREAT SERPL-MCNC: 3.11 MG/DL (ref 0.66–1.25)
ERYTHROCYTE [DISTWIDTH] IN BLOOD BY AUTOMATED COUNT: 16.4 % (ref 10–15)
GFR SERPL CREATININE-BSD FRML MDRD: 18 ML/MIN/{1.73_M2}
GLUCOSE SERPL-MCNC: 116 MG/DL (ref 70–99)
HCT VFR BLD AUTO: 26.4 % (ref 40–53)
HGB BLD-MCNC: 8.2 G/DL (ref 13.3–17.7)
INR PPP: 1.42 (ref 0.86–1.14)
LDH SERPL L TO P-CCNC: 346 U/L (ref 85–227)
MAGNESIUM SERPL-MCNC: 2.4 MG/DL (ref 1.6–2.3)
MCH RBC QN AUTO: 29.2 PG (ref 26.5–33)
MCHC RBC AUTO-ENTMCNC: 31.1 G/DL (ref 31.5–36.5)
MCV RBC AUTO: 94 FL (ref 78–100)
PHOSPHATE SERPL-MCNC: 4.6 MG/DL (ref 2.5–4.5)
PLATELET # BLD AUTO: 234 10E9/L (ref 150–450)
POTASSIUM SERPL-SCNC: 3.8 MMOL/L (ref 3.4–5.3)
PROT SERPL-MCNC: 5.4 G/DL (ref 6.8–8.8)
RBC # BLD AUTO: 2.81 10E12/L (ref 4.4–5.9)
SODIUM SERPL-SCNC: 136 MMOL/L (ref 133–144)
UFH PPP CHRO-ACNC: 0.22 IU/ML
UFH PPP CHRO-ACNC: 0.36 IU/ML
UFH PPP CHRO-ACNC: <0.1 IU/ML
WBC # BLD AUTO: 6.9 10E9/L (ref 4–11)

## 2021-03-27 PROCEDURE — 93005 ELECTROCARDIOGRAM TRACING: CPT

## 2021-03-27 PROCEDURE — 80053 COMPREHEN METABOLIC PANEL: CPT | Performed by: THORACIC SURGERY (CARDIOTHORACIC VASCULAR SURGERY)

## 2021-03-27 PROCEDURE — 83615 LACTATE (LD) (LDH) ENZYME: CPT | Performed by: THORACIC SURGERY (CARDIOTHORACIC VASCULAR SURGERY)

## 2021-03-27 PROCEDURE — 85027 COMPLETE CBC AUTOMATED: CPT | Performed by: THORACIC SURGERY (CARDIOTHORACIC VASCULAR SURGERY)

## 2021-03-27 PROCEDURE — 83735 ASSAY OF MAGNESIUM: CPT | Performed by: THORACIC SURGERY (CARDIOTHORACIC VASCULAR SURGERY)

## 2021-03-27 PROCEDURE — 85520 HEPARIN ASSAY: CPT | Performed by: INTERNAL MEDICINE

## 2021-03-27 PROCEDURE — 74018 RADEX ABDOMEN 1 VIEW: CPT | Mod: 26 | Performed by: STUDENT IN AN ORGANIZED HEALTH CARE EDUCATION/TRAINING PROGRAM

## 2021-03-27 PROCEDURE — 250N000013 HC RX MED GY IP 250 OP 250 PS 637: Performed by: NURSE PRACTITIONER

## 2021-03-27 PROCEDURE — 214N000001 HC R&B CCU UMMC

## 2021-03-27 PROCEDURE — 85520 HEPARIN ASSAY: CPT | Performed by: THORACIC SURGERY (CARDIOTHORACIC VASCULAR SURGERY)

## 2021-03-27 PROCEDURE — 250N000011 HC RX IP 250 OP 636: Performed by: INTERNAL MEDICINE

## 2021-03-27 PROCEDURE — 36592 COLLECT BLOOD FROM PICC: CPT | Performed by: PHYSICIAN ASSISTANT

## 2021-03-27 PROCEDURE — 250N000013 HC RX MED GY IP 250 OP 250 PS 637: Performed by: THORACIC SURGERY (CARDIOTHORACIC VASCULAR SURGERY)

## 2021-03-27 PROCEDURE — 250N000013 HC RX MED GY IP 250 OP 250 PS 637: Performed by: INTERNAL MEDICINE

## 2021-03-27 PROCEDURE — 250N000011 HC RX IP 250 OP 636: Performed by: PHYSICIAN ASSISTANT

## 2021-03-27 PROCEDURE — 84100 ASSAY OF PHOSPHORUS: CPT | Performed by: THORACIC SURGERY (CARDIOTHORACIC VASCULAR SURGERY)

## 2021-03-27 PROCEDURE — 99233 SBSQ HOSP IP/OBS HIGH 50: CPT | Mod: GC | Performed by: INTERNAL MEDICINE

## 2021-03-27 PROCEDURE — 85730 THROMBOPLASTIN TIME PARTIAL: CPT | Performed by: PHYSICIAN ASSISTANT

## 2021-03-27 PROCEDURE — 85520 HEPARIN ASSAY: CPT | Performed by: PHYSICIAN ASSISTANT

## 2021-03-27 PROCEDURE — 250N000009 HC RX 250

## 2021-03-27 PROCEDURE — 250N000009 HC RX 250: Performed by: PHYSICIAN ASSISTANT

## 2021-03-27 PROCEDURE — 36592 COLLECT BLOOD FROM PICC: CPT | Performed by: INTERNAL MEDICINE

## 2021-03-27 PROCEDURE — 250N000013 HC RX MED GY IP 250 OP 250 PS 637: Performed by: SURGERY

## 2021-03-27 PROCEDURE — 85610 PROTHROMBIN TIME: CPT | Performed by: THORACIC SURGERY (CARDIOTHORACIC VASCULAR SURGERY)

## 2021-03-27 PROCEDURE — 36592 COLLECT BLOOD FROM PICC: CPT | Performed by: THORACIC SURGERY (CARDIOTHORACIC VASCULAR SURGERY)

## 2021-03-27 PROCEDURE — 999N000065 XR ABDOMEN PORT 1 VW

## 2021-03-27 PROCEDURE — 250N000013 HC RX MED GY IP 250 OP 250 PS 637: Performed by: PHYSICIAN ASSISTANT

## 2021-03-27 PROCEDURE — 92526 ORAL FUNCTION THERAPY: CPT | Mod: GN

## 2021-03-27 RX ORDER — WARFARIN SODIUM 7.5 MG/1
7.5 TABLET ORAL
Status: COMPLETED | OUTPATIENT
Start: 2021-03-27 | End: 2021-03-27

## 2021-03-27 RX ORDER — BUMETANIDE 0.25 MG/ML
4 INJECTION INTRAMUSCULAR; INTRAVENOUS ONCE
Status: COMPLETED | OUTPATIENT
Start: 2021-03-28 | End: 2021-03-28

## 2021-03-27 RX ORDER — POTASSIUM CHLORIDE 7.45 MG/ML
10 INJECTION INTRAVENOUS ONCE
Status: COMPLETED | OUTPATIENT
Start: 2021-03-27 | End: 2021-03-27

## 2021-03-27 RX ORDER — BUMETANIDE 0.25 MG/ML
2 INJECTION INTRAMUSCULAR; INTRAVENOUS ONCE
Status: COMPLETED | OUTPATIENT
Start: 2021-03-27 | End: 2021-03-27

## 2021-03-27 RX ORDER — HEPARIN SODIUM 10000 [USP'U]/100ML
0-5000 INJECTION, SOLUTION INTRAVENOUS CONTINUOUS
Status: DISCONTINUED | OUTPATIENT
Start: 2021-03-27 | End: 2021-03-28

## 2021-03-27 RX ADMIN — Medication 5 ML: at 08:41

## 2021-03-27 RX ADMIN — Medication 220 MG: at 12:12

## 2021-03-27 RX ADMIN — Medication 1 PACKET: at 13:13

## 2021-03-27 RX ADMIN — AMITRIPTYLINE HYDROCHLORIDE 25 MG: 25 TABLET, FILM COATED ORAL at 22:29

## 2021-03-27 RX ADMIN — ACETAMINOPHEN 650 MG: 325 TABLET, FILM COATED ORAL at 16:14

## 2021-03-27 RX ADMIN — Medication 1 PACKET: at 20:53

## 2021-03-27 RX ADMIN — Medication 12.5 MG: at 20:49

## 2021-03-27 RX ADMIN — Medication 15 ML: at 08:41

## 2021-03-27 RX ADMIN — ACETAMINOPHEN 650 MG: 325 TABLET, FILM COATED ORAL at 10:35

## 2021-03-27 RX ADMIN — ANAKINRA 100 MG: 100 INJECTION, SOLUTION SUBCUTANEOUS at 00:17

## 2021-03-27 RX ADMIN — Medication 1 PACKET: at 08:39

## 2021-03-27 RX ADMIN — POTASSIUM CHLORIDE 10 MEQ: 7.46 INJECTION, SOLUTION INTRAVENOUS at 09:26

## 2021-03-27 RX ADMIN — HEPARIN SODIUM 1000 UNITS/HR: 10000 INJECTION, SOLUTION INTRAVENOUS at 09:29

## 2021-03-27 RX ADMIN — AMIODARONE HYDROCHLORIDE 400 MG: 200 TABLET ORAL at 20:04

## 2021-03-27 RX ADMIN — PANTOPRAZOLE SODIUM 40 MG: 40 TABLET, DELAYED RELEASE ORAL at 08:41

## 2021-03-27 RX ADMIN — QUETIAPINE 50 MG: 25 TABLET ORAL at 22:29

## 2021-03-27 RX ADMIN — BUMETANIDE 2 MG: 0.25 INJECTION INTRAMUSCULAR; INTRAVENOUS at 20:48

## 2021-03-27 RX ADMIN — Medication 1 PACKET: at 12:12

## 2021-03-27 RX ADMIN — ACETAMINOPHEN 650 MG: 325 TABLET, FILM COATED ORAL at 05:23

## 2021-03-27 RX ADMIN — BUMETANIDE 4 MG: 0.25 INJECTION INTRAMUSCULAR; INTRAVENOUS at 08:39

## 2021-03-27 RX ADMIN — TOPICAL ANESTHETIC 0.5 ML: 200 SPRAY DENTAL; PERIODONTAL at 13:13

## 2021-03-27 RX ADMIN — WARFARIN SODIUM 7.5 MG: 7.5 TABLET ORAL at 17:56

## 2021-03-27 RX ADMIN — MIDODRINE HYDROCHLORIDE 15 MG: 5 TABLET ORAL at 08:40

## 2021-03-27 RX ADMIN — ASPIRIN 81 MG CHEWABLE TABLET 81 MG: 81 TABLET CHEWABLE at 08:40

## 2021-03-27 RX ADMIN — Medication 10 MG: at 17:56

## 2021-03-27 RX ADMIN — Medication 12.5 MG: at 08:40

## 2021-03-27 RX ADMIN — HEPARIN SODIUM 1000 UNITS/HR: 10000 INJECTION, SOLUTION INTRAVENOUS at 11:12

## 2021-03-27 RX ADMIN — AMIODARONE HYDROCHLORIDE 400 MG: 200 TABLET ORAL at 08:40

## 2021-03-27 ASSESSMENT — ACTIVITIES OF DAILY LIVING (ADL)
ADLS_ACUITY_SCORE: 21

## 2021-03-27 ASSESSMENT — MIFFLIN-ST. JEOR: SCORE: 1570.83

## 2021-03-27 NOTE — PROGRESS NOTES
"/84 (BP Location: Right arm)   Pulse 83   Temp 98  F (36.7  C) (Oral)   Resp 18   Ht 1.803 m (5' 11\")   Wt 83.9 kg (184 lb 14.4 oz)   SpO2 97%   BMI 25.79 kg/m      Pain: reports throat discomfort, gave tylenol. Decreased pain reported.   Neuro: A&Ox2, oriented to self and situation.   Cardiac:   A-fib, BP is increasing, BP in 130's, Midodrine not given this shift, per MD. Heparin infusing at 1150 units/hr, next Hep10a at 2145.   Respiratory: Lung sounds clear/diminished on 2L oximask.   GI/Diet/Appetite: Full liquid diet and TF at goal of 55ml/hr. Appetite is poor/fair. Swallowing without difficulty.   :  GUOP.   LDA's: PICC, TL infusing heparin.  Skin: Incisions healing, open to air. Bruising. Blanchable redness to coccyx/sacral area.   Activity: Ax2 w/GB/walker.   Tests/Procedures:   Pertinent Labs/Lab Collection:      Plan: Continue w/POC.       "

## 2021-03-27 NOTE — PROGRESS NOTES
Cardiovascular Surgery Progress Note  03/27/2021         Assessment and Plan:     Jin Garrett is a 80 year old male with PMH of HTN, AAA, HLD and rheumatoid arthritis. He presented to an OSH with shortness of breath and atypical chest pain. TTE showed severe MR with concern for posterior flail leaflet, EF was preserved. On arrival at [facility] (3/4/21) an Impella was emergently placed in cath lab. He was found to have a ruptured posterior papillary muscle causing severe MR.   Jin is now s/p mitral valve replacement and single vessel CABG with Dr. Porras on 3/5/21. Returned from the OR on central ECMO with an open chest, intubated and sedated. Returned to the OR for a wash out on 3/9, found to have a thrombus on his mitral valve so he had the valve replaced, decannulated central ECMO, bronchoscopy and placement of temporary L subclavian HD line. Exchanged Left subclavian for Right IJ Tunneled dialysis line.      Cardiovascular:   Hx of HTN, HLD, AAA  Acute severe MR due to ruptured papillary muscle, s/p tissue MVR  Cardiogenic shock secondary to severe MR  S/p Impella placement and conversion to central VA ECMO  Weaned off mechanical support, ECMO was utilized from 3/5 to 3/9.   CAD, mid LCx with 100% occlusion, s/p 1 vessel CAB   S/P redo MVR due to thrombus in left atrium and on bioprosthetic MV  Most recent echo showed LV EF 55-60%.    ASA 81 mg, atorvastatin on hold due to elevated LFTs.   LUE US 3/14 with nonocclusive thrombus in right cephalic vein, no UE DVT.   Pre and post-op Atrial fibrillation   Has been mostly rate controlled and anticoagulated. Hep gtt held since 3/22 due to epistaxis. EKG 3/22 confirming A-fib ('s), likely contributing to hypotension, given Amio 150 mg IV bolus x 1 dose 3/22 and restarted 400 mg PO BID 3/23 AM.   Hypotension   Patient requiring midodrine 15 mg TID to maintain MAPS > 60.   Improved since amiodarone restart.      Pulmonary:  Acute respiratory failure secondary to  acute MR (resolved)  - Extubated POD 3 (chest closure and ecmo decan) to 4 lpm via NC; now saturating well on 2 lpm.   - Supplemental O2 PRN to keep sats > 92%. Wean off as tolerated.  - Confusion impeding progress with pulm toilet, IS, activity and deep breathing     Neurology / MSK:  Delirium  Insomnia    - Had extended time on sedation due to need for central VA ECMO. Avoiding narcs and benadryl.   - Continues on 1:1 sitter and PRN hand mitts.   - Melatonin 10 mg q PM. Seroquel 50 mg q HS (reduced 3/23 and day-time off since 3/24). Psych consult 3/25, tried zyprexa 5 mg but did not tolerate it, returned to Seroquel q HS.   - Acute post-operative pain; pain well controlled with acetaminophen and tramadol.   Chronic back pain   Recent hip surgery  - No weight bearing or activity restrictions related to recent hip surgery per family (records not available).   - Aqua K pad helping.      HEENT:   Hemoptysis  Epistaxis    - Suspecting hemoptysis is secondary epistaxis due to trauma to posterior nasal septum from the NG tube bridle and him tugging on it.    - Has hand mitts and tube taped to cheek tightly. Tube is no longer bridled.        / Renal:  ADAN secondary to cardiogenic shock  Hyperkalemia   - No Hx of renal disease. Most recent creatinine 2.9 and improving.   - Oliguric, has condom catheter. Bumex 3 mg IV challenge on 3/22 am, minimal UOP.  Bumex 4 mg IV on non-HD days. Making slightly more urine.   - Nephrology following; most likely cardiogenic shock induced ATN. CRRT transitioned to intermittent HD, fluid removal impeded by hypoTN. HD runs have now been able to pull 1 L of fluid on 3/23, 3/24, 3/26.      GI / FEN:   Dysphagia and Delirium   - Speech team following and recommending NPO with ice chips. Continuing therapies.   - NG in place for all nutrition and meds.  - Rectal tube removed, no further persistent loose stools.  C diff negative 3/18.   - Replace electrolytes as needed, hepatic  enzymes improving.  Melena   - First noticed 3/23 am. Had epistaxis 2 days prior and likely swallowed some of the blood. He is also on Iron supplement and this can cause stools to darken. Will continue to monitor Hgb, consider GI consult if Hgb not stable.      Endocrine:  Stress induced hyperglycemia  - Initially managed on insulin drip, transitioned to sliding scale and now off insulin (3/16). No acute needs.       Infectious Disease:  Stress induced leukocytosis vs other  - Completed perioperative antibiotics.   - WBC WNL, no longer having febrile episodes, no other signs or symptoms of infection. UA on 3/22 and 3/23 were clean and CRP has been slowly trending down.      Hematology:   Acute blood loss anemia  Epistaxis, resolved  - Hgb 5.7 on 3/22. Given 2 units PRBC and 1 unit plasma on 3/22, one unit PRBC 3/23.  Hgb 8.2 and stable. Will plan to give 1 unit if < 7.5.    - Plt WNL, no persistent signs or symptoms of active bleeding, melena 3/22 overnight could be residual from previous epistaxis.   - LUE US 3/14 with non-occlusive thrombus in right cephalic vein, no UE DVT.       Anticoagulation:   - Coumadin for atrial fibrillation and left atrial thrombus, INR goal 2-3. Most recent INR 1.42.    - Low intensity heparin gtt bridging to therapeutic INR, HELD since 3/22 am due to epistaxis and melena. Restarted straight rate 3/25 to see if he is tolerating it now that Hgb is improving. Advance to low intensity Hep gtt 3/27.      Prophylaxis:   - Stress ulcer prophylaxis: Pantoprazole 40 mg daily for 30 days  - DVT prophylaxis: Subcutaneous heparin, SCD     Disposition:   - Transferred to  on 3/19   - Therapies recommending discharge to TCU. Still requiring 1:1 sitter.   - Family care conference Thurs 3/25. Family would like to consider DNR/DNI.   - Family hopes to transfer to [facility]). Having insurance issues with transfer    Discussed with CVTS Fellow as needed.  Staff surgeons have been informed of  "changes through both verbal and written communication.      Bhavik eLmus PA-C  Cardiothoracic Surgery  Pager [phone]    8:51 AM   March 27, 2021        Interval History:     No overnight events. 1 L off in dialysis yesterday.   States pain is well managed on current regimen. Slept ok overnight.  Tolerating diet and tube feeds, is passing flatus, + BM. No nausea or vomiting.  Breathing well without complaints. Still deconditioned.   Denies chest pain, palpitations, dizziness, syncopal symptoms, fevers, chills, myalgias, or sternal popping/clicking.         Physical Exam:   Blood pressure 121/79, pulse 82, temperature 97  F (36.1  C), temperature source Axillary, resp. rate 18, height 1.803 m (5' 11\"), weight 83.9 kg (184 lb 14.4 oz), SpO2 96 %.  Vitals:    03/24/21 1100 03/26/21 1215 03/27/21 0517   Weight: 93.4 kg (205 lb 14.6 oz) 92.4 kg (203 lb 11.3 oz) 83.9 kg (184 lb 14.4 oz)      Weight; +5 kg since admit and trending down.   24 hr Fluid status; net loss 655 mL.   MAPs: 74 - 100    Gen: A&O, NAD  Neuro: no focal deficits, answering questions, following commands  CV: RRR, normal S1 S2, no murmurs, rubs or gallops.   Pulm: CTA, no wheezing or rhonchi, normal breathing on 2 lpm  Abd: nondistended, normal BS, soft, nontender  Ext: no peripheral edema  Incision: clean, dry, intact, no erythema, sternum stable  Tubes/drain sites: dressing clean and dry         Data:    Imaging:  reviewed recent imaging, no acute concerns    Labs:  BMP  Recent Labs   Lab 03/27/21  0554 03/26/21  1438 03/26/21  0646 03/25/21  0604 03/24/21  0615     --  132* 132* 133   POTASSIUM 3.8 3.8 4.0 4.0 4.0   CHLORIDE 100  --  97 99 99   PALLAVI 8.3*  --  8.5 8.6 8.3*   CO2 26  --  25 24 26   BUN 64*  --  95* 56* 73*   CR 3.11*  --  3.99* 2.90* 3.64*   *  --  94 116* 112*     CBC  Recent Labs   Lab 03/27/21  0554 03/26/21  1626 03/26/21  0646 03/25/21  0604 03/24/21  0615 03/24/21  0615   WBC 6.9  --  9.1 10.1  --  10.3   RBC " 2.81*  --  2.78* 2.80*  --  2.62*   HGB 8.2* 8.4* 7.9* 8.2*   < > 7.5*   HCT 26.4*  --  25.2* 26.1*  --  23.4*   MCV 94  --  91 93  --  89   MCH 29.2  --  28.4 29.3  --  28.6   MCHC 31.1*  --  31.3* 31.4*  --  32.1   RDW 16.4*  --  16.6* 16.8*  --  17.0*     --  234 236  --  255    < > = values in this interval not displayed.     INR  Recent Labs   Lab 03/27/21  0554 03/26/21  0646 03/25/21  0604 03/24/21  0615   INR 1.42* 1.38* 1.31* 1.23*      Liver Function Studies -   Recent Labs   Lab Test 03/27/21  0554   PROTTOTAL 5.4*   ALBUMIN 2.0*   BILITOTAL 0.6   ALKPHOS 178*   AST 32   ALT 26     GLUCOSE:   Recent Labs   Lab 03/27/21  0554 03/26/21  2317 03/26/21  1540 03/26/21  0646 03/25/21  0604 03/24/21  0615 03/23/21  0623 03/22/21  0553   *  --   --  94 116* 112* 111* 103*   BGM  --  108* 89  --   --   --   --   --

## 2021-03-27 NOTE — PLAN OF CARE
From outside hospital with chest pain, dyspnea on exertion found to have mitral valve regurgitation. Ipella placed emergently. S/p MVR and single vessel CABG 3/5. Returned from the OR on central ECMO with an open chest, intubated and sedated. Returned to the OR for a wash out on 3/9, found to have a thrombus on his mitral valve. Mitral valve replaced again. Pt decannulated central ECMO, bronchoscopy and placement of left subclavian dialysis line for kidney injury. Medical Hx includes: HTN, AAA, HLD and rheumatoid arthritis. Patient alert and oriented X 3-4, able to make needs known / pleasant and cooperative. NG tube running 55 mL / hour Novasource Renal. Vitals signs stable. Potassium replaced. Low intensity heparin protocol initiated; heparin running at 1000 units / hour with 1500 lab re-check. 1:1 sitter continues. Daughter visiting.

## 2021-03-27 NOTE — PLAN OF CARE
"D: Jin Garrett is a 80 year old with PMH HTN, AAA, HLD, RA who presented to OSH with SOB and atypical CP. TTE showed severe MR with ?posterior flail leaflet, EF preserved. On arrival at [facility] an Impella was emergently placed. S/p MVR and single vessel CABG on 3/5/21. Returned from the OR on central ECMO with an open chest, now s/p decannulation on 3/9. Nephrology consulted for ADAN. Initiated on CRRT 3/6, transitioned to HD 3/13.       I: Monitored vitals and assessed pt status.   Running: Heparin 500u/hr      A: A0x1. VSS, on 2L Face mask. Afib. Afebrile. Pain controlled with tylenol. Lytes replaced per protocol. UO increasing, verbalizing he needs to void and uses urinal with assistance. Got the ok to use NG last evening after pulling back 8cm, for a landmark of 72. TF at 55ml/hr and afternoon meds caught up. Order for pt to have a full liquid diet. Sitter at bedside for safety. Pt slept for a solid 8hrs, even during VS and turning. Mitt on L hand intermittently for safety of patency of NG. Pt stood on scale with minimal assist this am. Also very pleasant and more clear. Pt looked at RN and said, \"am I in the hospital?\" He asked why and was told he had heart surgery and he said \"ya but that was awhile ago, why am I still here?\" RN discussed in light detail his need for HD, nutrition, PT.     I/O this shift:  In: 520 [I.V.:100; NG/GT:90]  Out: -     Temp:  [97.6  F (36.4  C)-98  F (36.7  C)] 97.9  F (36.6  C)  Pulse:  [78-98] 96  Resp:  [11-28] 18  BP: ()/() 106/53  FiO2 (%):  [30 %] 30 %  SpO2:  [93 %-100 %] 97 %      P: Continue to monitor Pt status and report changes to treatment team.          "

## 2021-03-28 LAB
ALBUMIN SERPL-MCNC: 2 G/DL (ref 3.4–5)
ALP SERPL-CCNC: 176 U/L (ref 40–150)
ALT SERPL W P-5'-P-CCNC: 27 U/L (ref 0–70)
ANION GAP SERPL CALCULATED.3IONS-SCNC: 7 MMOL/L (ref 3–14)
AST SERPL W P-5'-P-CCNC: 26 U/L (ref 0–45)
BILIRUB SERPL-MCNC: 0.3 MG/DL (ref 0.2–1.3)
BUN SERPL-MCNC: 90 MG/DL (ref 7–30)
CALCIUM SERPL-MCNC: 8.2 MG/DL (ref 8.5–10.1)
CHLORIDE SERPL-SCNC: 101 MMOL/L (ref 94–109)
CO2 SERPL-SCNC: 26 MMOL/L (ref 20–32)
CREAT SERPL-MCNC: 3.96 MG/DL (ref 0.66–1.25)
ERYTHROCYTE [DISTWIDTH] IN BLOOD BY AUTOMATED COUNT: 16.8 % (ref 10–15)
GFR SERPL CREATININE-BSD FRML MDRD: 13 ML/MIN/{1.73_M2}
GLUCOSE SERPL-MCNC: 126 MG/DL (ref 70–99)
HCT VFR BLD AUTO: 25.3 % (ref 40–53)
HGB BLD-MCNC: 7.8 G/DL (ref 13.3–17.7)
INR PPP: 1.86 (ref 0.86–1.14)
LDH SERPL L TO P-CCNC: 327 U/L (ref 85–227)
MAGNESIUM SERPL-MCNC: 2.4 MG/DL (ref 1.6–2.3)
MCH RBC QN AUTO: 28.5 PG (ref 26.5–33)
MCHC RBC AUTO-ENTMCNC: 30.8 G/DL (ref 31.5–36.5)
MCV RBC AUTO: 92 FL (ref 78–100)
PHOSPHATE SERPL-MCNC: 4.7 MG/DL (ref 2.5–4.5)
PLATELET # BLD AUTO: 240 10E9/L (ref 150–450)
POTASSIUM SERPL-SCNC: 3.8 MMOL/L (ref 3.4–5.3)
PROT SERPL-MCNC: 5.3 G/DL (ref 6.8–8.8)
RBC # BLD AUTO: 2.74 10E12/L (ref 4.4–5.9)
SODIUM SERPL-SCNC: 134 MMOL/L (ref 133–144)
UFH PPP CHRO-ACNC: 0.42 IU/ML
WBC # BLD AUTO: 9 10E9/L (ref 4–11)

## 2021-03-28 PROCEDURE — 99233 SBSQ HOSP IP/OBS HIGH 50: CPT | Mod: GC | Performed by: INTERNAL MEDICINE

## 2021-03-28 PROCEDURE — 250N000013 HC RX MED GY IP 250 OP 250 PS 637: Performed by: INTERNAL MEDICINE

## 2021-03-28 PROCEDURE — 999N000044 HC STATISTIC CVC DRESSING CHANGE

## 2021-03-28 PROCEDURE — 36415 COLL VENOUS BLD VENIPUNCTURE: CPT | Performed by: SURGERY

## 2021-03-28 PROCEDURE — 84100 ASSAY OF PHOSPHORUS: CPT | Performed by: SURGERY

## 2021-03-28 PROCEDURE — 80053 COMPREHEN METABOLIC PANEL: CPT | Performed by: SURGERY

## 2021-03-28 PROCEDURE — 250N000013 HC RX MED GY IP 250 OP 250 PS 637: Performed by: NURSE PRACTITIONER

## 2021-03-28 PROCEDURE — 250N000013 HC RX MED GY IP 250 OP 250 PS 637: Performed by: SURGERY

## 2021-03-28 PROCEDURE — 83615 LACTATE (LD) (LDH) ENZYME: CPT | Performed by: SURGERY

## 2021-03-28 PROCEDURE — 85610 PROTHROMBIN TIME: CPT | Performed by: SURGERY

## 2021-03-28 PROCEDURE — 250N000011 HC RX IP 250 OP 636: Performed by: PHYSICIAN ASSISTANT

## 2021-03-28 PROCEDURE — 250N000013 HC RX MED GY IP 250 OP 250 PS 637

## 2021-03-28 PROCEDURE — 250N000013 HC RX MED GY IP 250 OP 250 PS 637: Performed by: THORACIC SURGERY (CARDIOTHORACIC VASCULAR SURGERY)

## 2021-03-28 PROCEDURE — 214N000001 HC R&B CCU UMMC

## 2021-03-28 PROCEDURE — 85520 HEPARIN ASSAY: CPT | Performed by: SURGERY

## 2021-03-28 PROCEDURE — 83735 ASSAY OF MAGNESIUM: CPT | Performed by: SURGERY

## 2021-03-28 PROCEDURE — 250N000013 HC RX MED GY IP 250 OP 250 PS 637: Performed by: PHYSICIAN ASSISTANT

## 2021-03-28 PROCEDURE — 85027 COMPLETE CBC AUTOMATED: CPT | Performed by: SURGERY

## 2021-03-28 RX ORDER — BUMETANIDE 0.25 MG/ML
2 INJECTION INTRAMUSCULAR; INTRAVENOUS 2 TIMES DAILY
Status: DISCONTINUED | OUTPATIENT
Start: 2021-03-28 | End: 2021-03-29

## 2021-03-28 RX ORDER — WARFARIN SODIUM 7.5 MG/1
7.5 TABLET ORAL
Status: COMPLETED | OUTPATIENT
Start: 2021-03-28 | End: 2021-03-28

## 2021-03-28 RX ORDER — POTASSIUM CHLORIDE 20MEQ/15ML
10 LIQUID (ML) ORAL ONCE
Status: COMPLETED | OUTPATIENT
Start: 2021-03-28 | End: 2021-03-28

## 2021-03-28 RX ORDER — POTASSIUM CHLORIDE 20MEQ/15ML
20 LIQUID (ML) ORAL DAILY
Status: COMPLETED | OUTPATIENT
Start: 2021-03-28 | End: 2021-03-29

## 2021-03-28 RX ORDER — METOPROLOL TARTRATE 25 MG/1
25 TABLET, FILM COATED ORAL 2 TIMES DAILY
Status: DISCONTINUED | OUTPATIENT
Start: 2021-03-28 | End: 2021-04-05 | Stop reason: HOSPADM

## 2021-03-28 RX ADMIN — Medication 10 MG: at 18:34

## 2021-03-28 RX ADMIN — ACETAMINOPHEN 650 MG: 325 TABLET, FILM COATED ORAL at 08:20

## 2021-03-28 RX ADMIN — POTASSIUM CHLORIDE 10 MEQ: 20 SOLUTION ORAL at 06:33

## 2021-03-28 RX ADMIN — PANTOPRAZOLE SODIUM 40 MG: 40 TABLET, DELAYED RELEASE ORAL at 08:20

## 2021-03-28 RX ADMIN — METOPROLOL TARTRATE 25 MG: 25 TABLET, FILM COATED ORAL at 19:49

## 2021-03-28 RX ADMIN — HEPARIN SODIUM 1150 UNITS/HR: 10000 INJECTION, SOLUTION INTRAVENOUS at 09:51

## 2021-03-28 RX ADMIN — Medication 220 MG: at 11:41

## 2021-03-28 RX ADMIN — BUMETANIDE 2 MG: 0.25 INJECTION INTRAMUSCULAR; INTRAVENOUS at 15:44

## 2021-03-28 RX ADMIN — AMITRIPTYLINE HYDROCHLORIDE 25 MG: 25 TABLET, FILM COATED ORAL at 19:50

## 2021-03-28 RX ADMIN — Medication 5 ML: at 08:19

## 2021-03-28 RX ADMIN — Medication 1 PACKET: at 13:23

## 2021-03-28 RX ADMIN — AMIODARONE HYDROCHLORIDE 400 MG: 200 TABLET ORAL at 19:49

## 2021-03-28 RX ADMIN — Medication 1 PACKET: at 19:56

## 2021-03-28 RX ADMIN — ASPIRIN 81 MG CHEWABLE TABLET 81 MG: 81 TABLET CHEWABLE at 08:20

## 2021-03-28 RX ADMIN — Medication 1 PACKET: at 08:21

## 2021-03-28 RX ADMIN — ACETAMINOPHEN 650 MG: 325 TABLET, FILM COATED ORAL at 15:43

## 2021-03-28 RX ADMIN — Medication 12.5 MG: at 08:19

## 2021-03-28 RX ADMIN — AMIODARONE HYDROCHLORIDE 400 MG: 200 TABLET ORAL at 08:20

## 2021-03-28 RX ADMIN — POTASSIUM CHLORIDE 20 MEQ: 20 SOLUTION ORAL at 15:44

## 2021-03-28 RX ADMIN — BENZOCAINE AND MENTHOL 1 LOZENGE: 15; 3.6 LOZENGE ORAL at 18:42

## 2021-03-28 RX ADMIN — Medication 15 ML: at 08:19

## 2021-03-28 RX ADMIN — QUETIAPINE 50 MG: 25 TABLET ORAL at 19:49

## 2021-03-28 RX ADMIN — WARFARIN SODIUM 7.5 MG: 7.5 TABLET ORAL at 18:34

## 2021-03-28 RX ADMIN — BUMETANIDE 4 MG: 0.25 INJECTION INTRAMUSCULAR; INTRAVENOUS at 08:19

## 2021-03-28 RX ADMIN — Medication 15 ML: at 15:47

## 2021-03-28 ASSESSMENT — ACTIVITIES OF DAILY LIVING (ADL)
ADLS_ACUITY_SCORE: 22
ADLS_ACUITY_SCORE: 21
ADLS_ACUITY_SCORE: 21
ADLS_ACUITY_SCORE: 22
ADLS_ACUITY_SCORE: 21
ADLS_ACUITY_SCORE: 22

## 2021-03-28 ASSESSMENT — MIFFLIN-ST. JEOR: SCORE: 1567.2

## 2021-03-28 NOTE — PLAN OF CARE
From outside hospital with chest pain, dyspnea on exertion found to have mitral valve regurgitation. Ipella placed emergently. S/p MVR and single vessel CABG 3/5. Returned from the OR on central ECMO with an open chest, intubated and sedated. Returned to the OR for a wash out on 3/9, found to have a thrombus on his mitral valve. Pt decannulated central ECMO, bronchoscopy and placement of left subclavian dialysis line for kidney injury. Medical Hx includes: HTN, AAA, HLD and rheumatoid arthritis. Patient alert and oriented X 3-4, able to make needs known / pleasant and cooperative. NG tube running 55 mL / hour NovasRadce Renal. Vitals signs stable. Electrolytes to be rechecked in AM. Low intensity heparin protocol; heparin running at 1150 units / hour with 3/29 AM lab re-check. 1:1 sitter continues. BM on shift (no evidence of bloody stool), voiding adequately. Walker in room; patient able to stand at bedside with assist of two and gait belt, up to chair and repositioned in bed left to right.

## 2021-03-28 NOTE — PROGRESS NOTES
"  Nephrology Progress Note  03/28/2021    Assessment & Recommendations:   Jin Garrett is a 80 year old with PMH HTN, AAA, HLD, RA who presented to OSH with SOB and atypical CP. TTE showed severe MR with ?posterior flail leaflet, EF preserved. On arrival at [facility] an Impella was emergently placed. S/p MVR and single vessel CABG on 3/5/21. Returned from the OR on central ECMO with an open chest, now s/p decannulation on 3/9. Nephrology consulted for ADAN. Initiated on CRRT 3/6, transitioned to iHD 3/13.     ADAN in the setting of cardiogenic shock, was on ECMO, s/p decannulation on 3/9  Unknown baseline, no h/o CKD per daughter.  Etiology likely ATN in the setting of shock, no obstruction per CT and urinalysis showed granular casts which is c/w ATN.  Access: RIJ TDC.       BP - stable, has needed midodrine but stopped since 3/27  Hypervolemia - improving  No acute electrolyte issues  No acute acid base issues  Anemia - Stable              Recommendations:  - Will reassess daily for HD, UOP improving and hopeful about renal recovery  - Agree with diuretic for now, with continued recovery pt may even develop a post-ATN diuresis so will need to watch UOP and adjust diuretic dose accordingly  - Renal diet  - Daily weights  - Strict I/Os   - Plan is to transfer to [facility] once able     Recommendations were communicated to primary team via note     Seen and discussed with Dr. Patti Potts MD  [phone]    Interval History:  Nursing and provider notes from last 24 hours reviewed.  In the last 24 hours Jin Garrett remained clinically stable and UOP improved with diuretic. Denies any new concerns today    Review of Systems:   ROS neg as above    Physical Exam:   I/O last 3 completed shifts:  In: 1314.14 [P.O.:320; I.V.:244.14; NG/GT:90]  Out: 3170 [Urine:3170]   BP (!) 143/84 (BP Location: Left arm)   Pulse 89   Temp 98.6  F (37  C) (Oral)   Resp 18   Ht 1.803 m (5' 11\")   Wt 83.5 kg (184 lb 1.6 oz)   " SpO2 98%   BMI 25.68 kg/m       GENERAL APPEARANCE: NAD  EYES: no scleral icterus, pupils equal  PULM: breathing non-labored  CV: regular rhythm, normal rate     -edema trace  GI: soft, nondistended  NEURO: AOx2, speech normal  Access RIJ TDC    Labs:   All labs reviewed by me  Electrolytes/Renal -   Recent Labs   Lab Test 03/28/21  0446 03/27/21  0554 03/26/21  1438 03/26/21  0646    136  --  132*   POTASSIUM 3.8 3.8 3.8 4.0   CHLORIDE 101 100  --  97   CO2 26 26  --  25   BUN 90* 64*  --  95*   CR 3.96* 3.11*  --  3.99*   * 116*  --  94   PALLAVI 8.2* 8.3*  --  8.5   MAG 2.4* 2.4* 1.8 2.0   PHOS 4.7* 4.6* 3.0 5.2*       CBC -   Recent Labs   Lab Test 03/28/21  0446 03/27/21  0554 03/26/21  1626 03/26/21  0646   WBC 9.0 6.9  --  9.1   HGB 7.8* 8.2* 8.4* 7.9*    234  --  234       LFTs -   Recent Labs   Lab Test 03/28/21  0446 03/27/21  0554 03/26/21  0646   ALKPHOS 176* 178* 203*   BILITOTAL 0.3 0.6 0.4   ALT 27 26 26   AST 26 32 29   PROTTOTAL 5.3* 5.4* 5.5*   ALBUMIN 2.0* 2.0* 2.0*       Iron Panel -   Recent Labs   Lab Test 03/20/21  0637   IRON 11*   IRONSAT 6*   RAYNE 1,302*       Imaging:  All imaging studies reviewed by me.     Current Medications:    acetaminophen  650 mg Oral or Feeding Tube Q8H     amiodarone  400 mg Oral BID     amitriptyline  25 mg Oral or Feeding Tube At Bedtime     artificial saliva  15 mL Swish & Spit 4x Daily     aspirin  81 mg Oral or NG Tube Daily     B and C vitamin Complex with folic acid  5 mL Oral Daily     bumetanide  2 mg Intravenous BID     ferrous sulfate  220 mg Oral Daily     fiber modular (NUTRISOURCE FIBER)  1 packet Per Feeding Tube TID     lidocaine  2 patch Transdermal Q24h    And     lidocaine   Transdermal Q8H     magnesium sulfate  2 g Intravenous Once     melatonin  10 mg Oral or Feeding Tube QPM     metoprolol tartrate  25 mg Oral or Feeding Tube BID     pantoprazole  40 mg Oral Daily     potassium chloride  20 mEq Oral Daily     protein modular   1 packet Per Feeding Tube TID     QUEtiapine  50 mg Oral At Bedtime     warfarin ANTICOAGULANT  7.5 mg Oral ONCE at 18:00       - MEDICATION INSTRUCTIONS -       Warfarin Therapy Reminder       Ramses Potts MD    I was present with the fellow during the history and exam.  I discussed the case with the fellow and agree with the findings as documented in the assessment and plan.  Le Armstrong

## 2021-03-28 NOTE — PROGRESS NOTES
Cardiovascular Surgery Progress Note  03/28/2021         Assessment and Plan:     Jin Garrett is a 80 year old male with PMH of HTN, AAA, HLD and rheumatoid arthritis. He presented to an OSH with shortness of breath and atypical chest pain. TTE showed severe MR with concern for posterior flail leaflet, EF was preserved. On arrival at [facility] (3/4/21) an Impella was emergently placed in cath lab. He was found to have a ruptured posterior papillary muscle causing severe MR.   Jin is now s/p mitral valve replacement and single vessel CABG with Dr. Porras on 3/5/21. Returned from the OR on central ECMO with an open chest, intubated and sedated. Returned to the OR for a wash out on 3/9, found to have a thrombus on his mitral valve so he had the valve replaced, decannulated central ECMO, bronchoscopy and placement of temporary L subclavian HD line. Exchanged Left subclavian for Right IJ Tunneled dialysis line. Return of UOP 3/27.      Cardiovascular:   Hx of HTN, HLD, AAA  Acute severe MR due to ruptured papillary muscle, s/p tissue MVR  Cardiogenic shock secondary to severe MR  S/p Impella placement and conversion to central VA ECMO  Weaned off mechanical support, ECMO was utilized from 3/5 to 3/9.   CAD, mid LCx with 100% occlusion, s/p 1 vessel CAB   S/P redo MVR due to thrombus in left atrium and on bioprosthetic MV  Most recent echo showed LV EF 55-60%.    ASA 81 mg, atorvastatin on hold due to elevated LFTs.   LUE US 3/14 with nonocclusive thrombus in right cephalic vein, no UE DVT.   Metoprolol 25 mg PO BID.   Pre and post-op Atrial fibrillation   Has been mostly rate controlled and anticoagulated. Hep gtt held since 3/22 due to epistaxis. EKG 3/22 confirming A-fib ('s), likely contributing to hypotension, given Amio 150 mg IV bolus x 1 dose 3/22 and restarted 400 mg PO BID 3/23 AM.   Hypotension (resolved)  Patient requiring midodrine 15 mg TID to maintain MAPS > 60. Improved since amiodarone restart.  Stopped midodrine 3/27 am.      Pulmonary:  Acute respiratory failure secondary to acute MR (resolved)  - Extubated POD 3 (chest closure and ecmo decan) to 4 lpm via NC; now saturating well on 2 lpm.   - Supplemental O2 PRN to keep sats > 92%. Wean off as tolerated.  - Confusion impeding progress with pulm toilet, IS, activity and deep breathing     Neurology / MSK:  Delirium  Insomnia    - Had extended time on sedation due to need for central VA ECMO. Avoiding narcs and benadryl.   - Continues on 1:1 sitter and PRN hand mitts.  Now improving and may be off sitter tonight or tomorrow am.   - Melatonin 10 mg q PM. Seroquel 50 mg q HS (reduced 3/23 and day-time off since 3/24). Psych consult 3/25, tried zyprexa 5 mg but did not tolerate it, returned to Seroquel q HS.   - Acute post-operative pain; pain well controlled with acetaminophen and tramadol.   Chronic back pain   Recent hip surgery  - No weight bearing or activity restrictions related to recent hip surgery per family (records not available).    - Aqua K pad helping.      HEENT:   Hemoptysis  Epistaxis    - Suspecting hemoptysis is secondary epistaxis due to trauma to posterior nasal septum from the NG tube bridle and him tugging on it.    - Has hand mitts and tube taped to cheek tightly. Tube is no longer bridled.        / Renal:  ADAN secondary to cardiogenic shock  Hyperkalemia   - No Hx of renal disease. Most recent creatinine 2.9 and improving.   - Oliguric, has condom catheter. Bumex 3 mg IV challenge on 3/22 am, minimal UOP.  Bumex 4 mg IV on non-HD days. Now making much more urine. Starting Bumex 2 mg IV BID.   - Nephrology following; most likely cardiogenic shock induced ATN. CRRT transitioned to intermittent HD, fluid removal impeded by hypoTN. HD runs have now been able to pull 1 L of fluid on 3/23, 3/24, 3/26.      GI / FEN:   Dysphagia and Delirium   - Speech team following and recommending NPO with ice chips. Continuing therapies.   - NG in  place for all nutrition and meds.  - Rectal tube removed, no further persistent loose stools.  C diff negative 3/18.   - Replace electrolytes as needed, hepatic enzymes improving.  Melena   - First noticed 3/23 am. Had epistaxis 2 days prior and likely swallowed some of the blood. He is also on Iron supplement and this can cause stools to darken. Will continue to monitor Hgb, consider GI consult if Hgb not stable.      Endocrine:  Stress induced hyperglycemia  - Initially managed on insulin drip, transitioned to sliding scale and now off insulin (3/16). No acute needs.       Infectious Disease:  Stress induced leukocytosis vs other  - Completed perioperative antibiotics.   - WBC WNL, no longer having febrile episodes, no other signs or symptoms of infection. UA on 3/22 and 3/23 were clean and CRP has been slowly trending down.      Hematology:   Acute blood loss anemia  Epistaxis, resolved   - Hgb 5.7 on 3/22. Given 2 units PRBC and 1 unit plasma on 3/22, one unit PRBC 3/23.  Hgb 7.8 and stable. Will plan to give 1 unit if < 7.5.    - Plt WNL, no persistent signs or symptoms of active bleeding, melena 3/22 overnight could be residual from previous epistaxis.   - LUE US 3/14 with non-occlusive thrombus in right cephalic vein, no UE DVT.       Anticoagulation:   - Coumadin for atrial fibrillation and left atrial thrombus, INR goal 2-3. Most recent INR 1.86.    - Low intensity heparin gtt bridging to therapeutic INR, HELD since 3/22 am due to epistaxis and melena. Restarted straight rate 3/25 to see if he is tolerating it now that Hgb is improving. Advanced to low intensity Hep gtt 3/27 and off 3/28 due to INR 1.8 and rising quickly in setting of recent epistaxis.      Prophylaxis:   - Stress ulcer prophylaxis: Pantoprazole 40 mg daily for 30 days  - DVT prophylaxis: Subcutaneous heparin, SCD     Disposition:   - Transferred to  on 3/19   - Therapies recommending discharge to TCU. Still requiring 1:1 sitter.   -  "Family care conference Thurs 3/25. Family would like to consider DNR/DNI.   - Family hopes to transfer to [facility]). Having insurance issues with transfer    Discussed with CVTS Fellow as needed.  Staff surgeons have been informed of changes through both verbal and written communication.      Bhavik Lemus PA-C  Cardiothoracic Surgery  Pager [phone]    1:39 PM   March 28, 2021        Interval History:     No overnight events. Now much improved UOP.   States pain is well managed on current regimen. Slept well overnight.  Tolerating diet minimally and tube feeds, is passing flatus, + BM. No nausea or vomiting.  Breathing well without complaints.   Working with therapies and ambulating in halls without assistance.   Denies chest pain, palpitations, dizziness, syncopal symptoms, fevers, chills, myalgias, or sternal popping/clicking.         Physical Exam:   Blood pressure 130/70, pulse 80, temperature 97.5  F (36.4  C), temperature source Oral, resp. rate 18, height 1.803 m (5' 11\"), weight 83.5 kg (184 lb 1.6 oz), SpO2 98 %.  Vitals:    03/26/21 1215 03/27/21 0517 03/28/21 0200   Weight: 92.4 kg (203 lb 11.3 oz) 83.9 kg (184 lb 14.4 oz) 83.5 kg (184 lb 1.6 oz)      Weight; + 4.5 kg since admit and trending down.   24 hr Fluid status; net gain 185 mL.   MAPs: 82 - 89    Gen: A&O with conversation, NAD  Neuro: no focal deficits, much more interactive today!   CV: RRR, normal S1 S2, no murmurs, rubs or gallops.   Pulm: CTA, no wheezing or rhonchi, normal breathing on RA  Abd: nondistended, normal BS, soft, nontender  Incision: clean, dry, intact, no erythema, sternum stable  Tubes/drain sites: dressing clean and dry           Data:    Imaging:  reviewed recent imaging, no acute concerns    Labs:  BMP  Recent Labs   Lab 03/28/21  0446 03/27/21  0554 03/26/21  1438 03/26/21  0646 03/25/21  0604    136  --  132* 132*   POTASSIUM 3.8 3.8 3.8 4.0 4.0   CHLORIDE 101 100  --  97 99   PALLAVI 8.2* 8.3*  --  8.5 " 8.6   CO2 26 26  --  25 24   BUN 90* 64*  --  95* 56*   CR 3.96* 3.11*  --  3.99* 2.90*   * 116*  --  94 116*     CBC  Recent Labs   Lab 03/28/21  0446 03/27/21  0554 03/26/21  1626 03/26/21  0646 03/25/21  0604   WBC 9.0 6.9  --  9.1 10.1   RBC 2.74* 2.81*  --  2.78* 2.80*   HGB 7.8* 8.2* 8.4* 7.9* 8.2*   HCT 25.3* 26.4*  --  25.2* 26.1*   MCV 92 94  --  91 93   MCH 28.5 29.2  --  28.4 29.3   MCHC 30.8* 31.1*  --  31.3* 31.4*   RDW 16.8* 16.4*  --  16.6* 16.8*    234  --  234 236     INR  Recent Labs   Lab 03/28/21 0446 03/27/21  0554 03/26/21  0646 03/25/21  0604   INR 1.86* 1.42* 1.38* 1.31*      Liver Function Studies -   Recent Labs   Lab Test 03/28/21 0446   PROTTOTAL 5.3*   ALBUMIN 2.0*   BILITOTAL 0.3   ALKPHOS 176*   AST 26   ALT 27     GLUCOSE:   Recent Labs   Lab 03/28/21  0446 03/27/21  0554 03/26/21  2317 03/26/21  1540 03/26/21  0646 03/25/21  0604 03/24/21  0615 03/23/21  0623   * 116*  --   --  94 116* 112* 111*   BGM  --   --  108* 89  --   --   --   --

## 2021-03-28 NOTE — PROGRESS NOTES
"  Nephrology Progress Note  03/27/2021         Assessment & Recommendations:   Jin Garrett is a 80 year old with PMH HTN, AAA, HLD, RA who presented to OSH with SOB and atypical CP. TTE showed severe MR with ?posterior flail leaflet, EF preserved. On arrival at [facility] an Impella was emergently placed. S/p MVR and single vessel CABG on 3/5/21. Returned from the OR on central ECMO with an open chest, now s/p decannulation on 3/9. Nephrology consulted for ADAN. Initiated on CRRT 3/6, transitioned to iHD 3/13.     ADAN in the setting of cardiogenic shock, was on ECMO, s/p decannulation on 3/9  Unknown baseline, no h/o CKD per daughter.  Etiology likely ATN in the setting of shock, no obstruction per CT and urinalysis showed granular casts which is c/w ATN.  Access: RIJ TDC.      BP - stable, has needed midodrine  Hypervolemia - improving  No acute electrolyte issues  No acute acid base issues  Anemia - Stable              Recommendations:     - Will reassess daily for HD, likely pursue MWF schedule  - Agree with diuretic  - CTM for renal recovery   - Renal diet  - Daily weights  - Strict I/Os   - Plan is to transfer to [facility] once able    Recommendations were communicated to primary team via note    Seen and discussed with Dr. Patti Potts MD   [phone]    Interval History :   Nursing and provider notes from last 24 hours reviewed.  In the last 24 hours Jin Garrett remained clinically stable. Tolerated HD 3/26 with 1L UF off. UOP appears to be improving. Feels tired and notes a sore throat but denies any new concerns    Review of Systems:   ROS neg as above    Physical Exam:   I/O last 3 completed shifts:  In: 1564.25 [P.O.:210; I.V.:334.25; NG/GT:360]  Out: 750 [Urine:750]   /77 (BP Location: Right arm)   Pulse 86   Temp 98  F (36.7  C) (Oral)   Resp 18   Ht 1.803 m (5' 11\")   Wt 83.9 kg (184 lb 14.4 oz)   SpO2 97%   BMI 25.79 kg/m       GENERAL APPEARANCE: NAD  EYES:  no scleral " icterus, pupils equal  PULM: breathing non-labored  CV: regular rhythm, normal rate     -edema trace  GI: soft, nondistended  NEURO:  AOx2, speech normal  Access RIJ TDC    Labs:   All labs reviewed by me  Electrolytes/Renal -   Recent Labs   Lab Test 03/27/21  0554 03/26/21  1438 03/26/21  0646 03/25/21  0604     --  132* 132*   POTASSIUM 3.8 3.8 4.0 4.0   CHLORIDE 100  --  97 99   CO2 26  --  25 24   BUN 64*  --  95* 56*   CR 3.11*  --  3.99* 2.90*   *  --  94 116*   PALLAVI 8.3*  --  8.5 8.6   MAG 2.4* 1.8 2.0  --    PHOS 4.6* 3.0 5.2*  --        CBC -   Recent Labs   Lab Test 03/27/21  0554 03/26/21  1626 03/26/21  0646 03/25/21  0604   WBC 6.9  --  9.1 10.1   HGB 8.2* 8.4* 7.9* 8.2*     --  234 236       LFTs -   Recent Labs   Lab Test 03/27/21  0554 03/26/21  0646 03/25/21  0604   ALKPHOS 178* 203* 200*   BILITOTAL 0.6 0.4 0.4   ALT 26 26 30   AST 32 29 34   PROTTOTAL 5.4* 5.5* 5.4*   ALBUMIN 2.0* 2.0* 1.9*       Iron Panel -   Recent Labs   Lab Test 03/20/21  0637   IRON 11*   IRONSAT 6*   RAYNE 1,302*       Imaging:  All imaging studies reviewed by me.     Current Medications:    acetaminophen  650 mg Oral or Feeding Tube Q8H     amiodarone  400 mg Oral BID     amitriptyline  25 mg Oral or Feeding Tube At Bedtime     artificial saliva  15 mL Swish & Spit 4x Daily     aspirin  81 mg Oral or NG Tube Daily     B and C vitamin Complex with folic acid  5 mL Oral Daily     bumetanide  2 mg Intravenous Once     [START ON 3/28/2021] bumetanide  4 mg Intravenous Once     ferrous sulfate  220 mg Oral Daily     fiber modular (NUTRISOURCE FIBER)  1 packet Per Feeding Tube TID     lidocaine  2 patch Transdermal Q24h    And     lidocaine   Transdermal Q8H     magnesium sulfate  2 g Intravenous Once     melatonin  10 mg Oral or Feeding Tube QPM     metoprolol tartrate  12.5 mg Oral or Feeding Tube BID     pantoprazole  40 mg Oral Daily     protein modular  1 packet Per Feeding Tube TID     QUEtiapine  50 mg  Oral At Bedtime       heparin 1,150 Units/hr (03/27/21 2297)     - MEDICATION INSTRUCTIONS -       Warfarin Therapy Reminder       Ramses Potts MD    I was present with the fellow during the history and exam.  I discussed the case with the fellow and agree with the findings as documented in the assessment and plan.  Le Armstrong

## 2021-03-28 NOTE — PLAN OF CARE
D: Patient admitted 3/4 with chest pain, dyspnea on exertion found to have mitral valve regurgitation. Patient s/p mitral valve replacement and single vessel CABG on 3/5. Hx: HTN, AAA, HLD and rheumatoid arthritis.    I: Monitored vitals and assessed patient status. Full liquid diet with supervision.  Changed: heparin gtt discontinued  Running: PICC saline locked; TF's at 55 ml/hr  PRN: cepacol lozenge    A: VSS, afebrile. A0x4. Urinating adequately via urinal. Lift assist/assist of 2 with gait belt and walker.    P: Anticipate discontinuing sitter tonight or tomorrow. Discharge to TCU when medically stable. Continue to assess and monitor, notify CVTS with concerns.

## 2021-03-28 NOTE — PROGRESS NOTES
Blood clot and active bleeding from penis observed while patient sitting at bedside to use urinal. No external cut observed. Findings shared with CVTS.    Continuing to monitor.

## 2021-03-28 NOTE — PLAN OF CARE
D: Jin Garrett is a 80 year old with PMH HTN, AAA, HLD, RA who presented to OSH with SOB and atypical CP. TTE showed severe MR with ?posterior flail leaflet, EF preserved. On arrival at [facility] an Impella was emergently placed. S/p MVR and single vessel CABG on 3/5/21. Returned from the OR on central ECMO with an open chest, now s/p decannulation on 3/9. Nephrology consulted for ADAN. Initiated on CRRT 3/6, transitioned to HD 3/13.     I: Monitored vitals and assessed pt status.   Running: Heparin 1150u/hr       A: A0x2. VSS, on RA. Afib. Afebrile. Pain controlled with tylenol. UO increasing, verbalizing he needs to void and uses urinal with assistance. TF at 55ml/hr and full liquid diet. Sitter at bedside for safety. Pt slept well. Pt stood on scale with A1 this am. At the start of shift writer noticed NG landmark had changed. Yesterday 72 cm, now 60. TF held and Dr Johnson notified. Abd XR obtained and VO given it was ok to use.     P: Continue to monitor Pt status and report changes to treatment team.

## 2021-03-29 ENCOUNTER — APPOINTMENT (OUTPATIENT)
Dept: GENERAL RADIOLOGY | Facility: CLINIC | Age: 81
DRG: 003 | End: 2021-03-29
Attending: PHYSICIAN ASSISTANT
Payer: MEDICARE

## 2021-03-29 ENCOUNTER — APPOINTMENT (OUTPATIENT)
Dept: OCCUPATIONAL THERAPY | Facility: CLINIC | Age: 81
DRG: 003 | End: 2021-03-29
Attending: INTERNAL MEDICINE
Payer: MEDICARE

## 2021-03-29 ENCOUNTER — APPOINTMENT (OUTPATIENT)
Dept: PHYSICAL THERAPY | Facility: CLINIC | Age: 81
DRG: 003 | End: 2021-03-29
Attending: INTERNAL MEDICINE
Payer: MEDICARE

## 2021-03-29 ENCOUNTER — APPOINTMENT (OUTPATIENT)
Dept: SPEECH THERAPY | Facility: CLINIC | Age: 81
DRG: 003 | End: 2021-03-29
Attending: INTERNAL MEDICINE
Payer: MEDICARE

## 2021-03-29 LAB
ALBUMIN SERPL-MCNC: 2.1 G/DL (ref 3.4–5)
ALP SERPL-CCNC: 191 U/L (ref 40–150)
ALT SERPL W P-5'-P-CCNC: 32 U/L (ref 0–70)
ANION GAP SERPL CALCULATED.3IONS-SCNC: 9 MMOL/L (ref 3–14)
AST SERPL W P-5'-P-CCNC: 32 U/L (ref 0–45)
BACTERIA SPEC CULT: NO GROWTH
BACTERIA SPEC CULT: NO GROWTH
BILIRUB SERPL-MCNC: 0.4 MG/DL (ref 0.2–1.3)
BUN SERPL-MCNC: 113 MG/DL (ref 7–30)
CALCIUM SERPL-MCNC: 8.7 MG/DL (ref 8.5–10.1)
CHLORIDE SERPL-SCNC: 101 MMOL/L (ref 94–109)
CO2 SERPL-SCNC: 26 MMOL/L (ref 20–32)
CREAT SERPL-MCNC: 4.49 MG/DL (ref 0.66–1.25)
ERYTHROCYTE [DISTWIDTH] IN BLOOD BY AUTOMATED COUNT: 16.7 % (ref 10–15)
GFR SERPL CREATININE-BSD FRML MDRD: 11 ML/MIN/{1.73_M2}
GLUCOSE SERPL-MCNC: 111 MG/DL (ref 70–99)
HCT VFR BLD AUTO: 26.8 % (ref 40–53)
HGB BLD-MCNC: 8.3 G/DL (ref 13.3–17.7)
INR PPP: 2.14 (ref 0.86–1.14)
LDH SERPL L TO P-CCNC: 355 U/L (ref 85–227)
MAGNESIUM SERPL-MCNC: 2.4 MG/DL (ref 1.6–2.3)
MCH RBC QN AUTO: 28.4 PG (ref 26.5–33)
MCHC RBC AUTO-ENTMCNC: 31 G/DL (ref 31.5–36.5)
MCV RBC AUTO: 92 FL (ref 78–100)
PHOSPHATE SERPL-MCNC: 4.7 MG/DL (ref 2.5–4.5)
PLATELET # BLD AUTO: 243 10E9/L (ref 150–450)
POTASSIUM SERPL-SCNC: 4 MMOL/L (ref 3.4–5.3)
PROT SERPL-MCNC: 5.7 G/DL (ref 6.8–8.8)
RBC # BLD AUTO: 2.92 10E12/L (ref 4.4–5.9)
SODIUM SERPL-SCNC: 136 MMOL/L (ref 133–144)
SPECIMEN SOURCE: NORMAL
SPECIMEN SOURCE: NORMAL
UFH PPP CHRO-ACNC: <0.1 IU/ML
WBC # BLD AUTO: 8.6 10E9/L (ref 4–11)

## 2021-03-29 PROCEDURE — 250N000013 HC RX MED GY IP 250 OP 250 PS 637: Performed by: PHYSICIAN ASSISTANT

## 2021-03-29 PROCEDURE — 250N000013 HC RX MED GY IP 250 OP 250 PS 637: Performed by: INTERNAL MEDICINE

## 2021-03-29 PROCEDURE — 74340 X-RAY GUIDE FOR GI TUBE: CPT | Mod: 26 | Performed by: RADIOLOGY

## 2021-03-29 PROCEDURE — 80053 COMPREHEN METABOLIC PANEL: CPT | Performed by: SURGERY

## 2021-03-29 PROCEDURE — 250N000013 HC RX MED GY IP 250 OP 250 PS 637: Performed by: SURGERY

## 2021-03-29 PROCEDURE — 85520 HEPARIN ASSAY: CPT | Performed by: SURGERY

## 2021-03-29 PROCEDURE — 44500 INTRO GASTROINTESTINAL TUBE: CPT

## 2021-03-29 PROCEDURE — 97530 THERAPEUTIC ACTIVITIES: CPT | Mod: GP | Performed by: PHYSICAL THERAPIST

## 2021-03-29 PROCEDURE — 97530 THERAPEUTIC ACTIVITIES: CPT | Mod: GO | Performed by: OCCUPATIONAL THERAPIST

## 2021-03-29 PROCEDURE — 85610 PROTHROMBIN TIME: CPT | Performed by: SURGERY

## 2021-03-29 PROCEDURE — 85027 COMPLETE CBC AUTOMATED: CPT | Performed by: SURGERY

## 2021-03-29 PROCEDURE — 44500 INTRO GASTROINTESTINAL TUBE: CPT | Mod: GC | Performed by: RADIOLOGY

## 2021-03-29 PROCEDURE — 83615 LACTATE (LD) (LDH) ENZYME: CPT | Performed by: SURGERY

## 2021-03-29 PROCEDURE — 36415 COLL VENOUS BLD VENIPUNCTURE: CPT | Performed by: SURGERY

## 2021-03-29 PROCEDURE — 250N000013 HC RX MED GY IP 250 OP 250 PS 637: Performed by: THORACIC SURGERY (CARDIOTHORACIC VASCULAR SURGERY)

## 2021-03-29 PROCEDURE — 84132 ASSAY OF SERUM POTASSIUM: CPT | Performed by: SURGERY

## 2021-03-29 PROCEDURE — 250N000009 HC RX 250: Performed by: RADIOLOGY

## 2021-03-29 PROCEDURE — 250N000011 HC RX IP 250 OP 636: Performed by: PHYSICIAN ASSISTANT

## 2021-03-29 PROCEDURE — 71046 X-RAY EXAM CHEST 2 VIEWS: CPT | Mod: 26 | Performed by: RADIOLOGY

## 2021-03-29 PROCEDURE — 71046 X-RAY EXAM CHEST 2 VIEWS: CPT

## 2021-03-29 PROCEDURE — 92526 ORAL FUNCTION THERAPY: CPT | Mod: GN

## 2021-03-29 PROCEDURE — 84100 ASSAY OF PHOSPHORUS: CPT | Performed by: SURGERY

## 2021-03-29 PROCEDURE — 99233 SBSQ HOSP IP/OBS HIGH 50: CPT | Mod: GC | Performed by: INTERNAL MEDICINE

## 2021-03-29 PROCEDURE — 74340 X-RAY GUIDE FOR GI TUBE: CPT

## 2021-03-29 PROCEDURE — 250N000013 HC RX MED GY IP 250 OP 250 PS 637: Performed by: NURSE PRACTITIONER

## 2021-03-29 PROCEDURE — 214N000001 HC R&B CCU UMMC

## 2021-03-29 PROCEDURE — 83735 ASSAY OF MAGNESIUM: CPT | Performed by: SURGERY

## 2021-03-29 RX ORDER — BUMETANIDE 0.25 MG/ML
2 INJECTION INTRAMUSCULAR; INTRAVENOUS 2 TIMES DAILY
Status: DISCONTINUED | OUTPATIENT
Start: 2021-03-29 | End: 2021-03-30

## 2021-03-29 RX ORDER — WARFARIN SODIUM 7.5 MG/1
7.5 TABLET ORAL
Status: COMPLETED | OUTPATIENT
Start: 2021-03-29 | End: 2021-03-29

## 2021-03-29 RX ORDER — LIDOCAINE HYDROCHLORIDE 20 MG/ML
5 SOLUTION OROPHARYNGEAL ONCE
Status: COMPLETED | OUTPATIENT
Start: 2021-03-29 | End: 2021-03-29

## 2021-03-29 RX ORDER — QUETIAPINE FUMARATE 25 MG/1
25 TABLET, FILM COATED ORAL AT BEDTIME
Status: DISCONTINUED | OUTPATIENT
Start: 2021-03-29 | End: 2021-03-31

## 2021-03-29 RX ORDER — BUMETANIDE 0.25 MG/ML
4 INJECTION INTRAMUSCULAR; INTRAVENOUS 2 TIMES DAILY
Status: DISCONTINUED | OUTPATIENT
Start: 2021-03-29 | End: 2021-03-29

## 2021-03-29 RX ORDER — AMIODARONE HYDROCHLORIDE 200 MG/1
200 TABLET ORAL 2 TIMES DAILY
Status: DISCONTINUED | OUTPATIENT
Start: 2021-03-29 | End: 2021-04-05 | Stop reason: HOSPADM

## 2021-03-29 RX ADMIN — Medication 220 MG: at 13:12

## 2021-03-29 RX ADMIN — QUETIAPINE FUMARATE 25 MG: 25 TABLET ORAL at 21:52

## 2021-03-29 RX ADMIN — AMIODARONE HYDROCHLORIDE 200 MG: 200 TABLET ORAL at 20:27

## 2021-03-29 RX ADMIN — BUMETANIDE 2 MG: 0.25 INJECTION INTRAMUSCULAR; INTRAVENOUS at 17:01

## 2021-03-29 RX ADMIN — Medication 1 PACKET: at 10:07

## 2021-03-29 RX ADMIN — Medication 1 PACKET: at 20:28

## 2021-03-29 RX ADMIN — Medication 1 PACKET: at 13:11

## 2021-03-29 RX ADMIN — ASPIRIN 81 MG CHEWABLE TABLET 81 MG: 81 TABLET CHEWABLE at 10:05

## 2021-03-29 RX ADMIN — Medication 10 MG: at 18:39

## 2021-03-29 RX ADMIN — AMITRIPTYLINE HYDROCHLORIDE 25 MG: 25 TABLET, FILM COATED ORAL at 21:52

## 2021-03-29 RX ADMIN — Medication 5 ML: at 10:05

## 2021-03-29 RX ADMIN — PANTOPRAZOLE SODIUM 40 MG: 40 TABLET, DELAYED RELEASE ORAL at 10:06

## 2021-03-29 RX ADMIN — AMIODARONE HYDROCHLORIDE 400 MG: 200 TABLET ORAL at 10:05

## 2021-03-29 RX ADMIN — LIDOCAINE HYDROCHLORIDE 5 ML: 20 SOLUTION ORAL; TOPICAL at 09:27

## 2021-03-29 RX ADMIN — BUMETANIDE 2 MG: 0.25 INJECTION INTRAMUSCULAR; INTRAVENOUS at 10:06

## 2021-03-29 RX ADMIN — POTASSIUM CHLORIDE 20 MEQ: 20 SOLUTION ORAL at 10:06

## 2021-03-29 RX ADMIN — METOPROLOL TARTRATE 25 MG: 25 TABLET, FILM COATED ORAL at 10:05

## 2021-03-29 RX ADMIN — METOPROLOL TARTRATE 25 MG: 25 TABLET, FILM COATED ORAL at 20:27

## 2021-03-29 RX ADMIN — ACETAMINOPHEN 650 MG: 325 TABLET, FILM COATED ORAL at 17:01

## 2021-03-29 RX ADMIN — WARFARIN SODIUM 7.5 MG: 7.5 TABLET ORAL at 17:01

## 2021-03-29 RX ADMIN — Medication 15 ML: at 12:00

## 2021-03-29 RX ADMIN — ACETAMINOPHEN 650 MG: 325 TABLET, FILM COATED ORAL at 10:05

## 2021-03-29 ASSESSMENT — MIFFLIN-ST. JEOR: SCORE: 1546.34

## 2021-03-29 ASSESSMENT — ACTIVITIES OF DAILY LIVING (ADL)
ADLS_ACUITY_SCORE: 21
ADLS_ACUITY_SCORE: 19
ADLS_ACUITY_SCORE: 21

## 2021-03-29 NOTE — PLAN OF CARE
D: Jin Garrett presented to OSH with SOB/CP. On arrival at [facility] an Impella was emergently placed. S/p MVR and single vessel CABG on 3/5/21. Nephrology consulted for ADAN, started HD 3/13. PMH: HTN, AAA, HLD, RA     I: Monitored vitals and assessed pt status.   Running: TF @ 55ml/hr via NG.     A: A0x3 and becoming more clear. VSS, on RA. NSR. Afebrile. Pain controlled with tylenol. UO increasing, verbalizing he needs to void and uses urinal with assistance. TF at 55ml/hr and full liquid diet. Sitter at bedside for safety. Pt slept well. Pt stood on scale with A1 this am.     P: Continue to monitor Pt status and report changes to treatment team.

## 2021-03-29 NOTE — PLAN OF CARE
From outside hospital with chest pain, dyspnea on exertion found to have mitral valve regurgitation. Ipella placed emergently. S/p MVR and single vessel CABG 3/5. Returned from the OR on central ECMO with an open chest, intubated and sedated. Returned to the OR for a wash out on 3/9, found to have a thrombus on mitral valve. Pt decannulated central ECMO, bronchoscopy and placement of left subclavian dialysis line for kidney injury. Medical Hx includes: HTN, AAA, HLD and rheumatoid arthritis. Patient alert and oriented X 3-4, able to make needs known / pleasant and cooperative. NJ tube running 55 mL / hour [employer] Renal. Vitals signs stable. Electrolytes to be rechecked in AM. Heparin d/c'd. 1:1 sitter d/c'd. Ng tube came out overnight when patient was in the shower. CVTS informed; NJ placed. Walker in room; patient able to stand at bedside with assist of one to two and gait belt, up to chair and repositioned in bed left to right. Bed alarm on. Daughter in room visiting. PT working with patient. Newly on regular diet, thin liquids, calorie count for three days. Nephrology following also; kidney numbers improving and will reevaluate labs daily to decide if patient needs hemodialysis. No HD today. Continuing to monitor.

## 2021-03-29 NOTE — PROGRESS NOTES
Cardiovascular Surgery Progress Note  03/29/2021         Assessment and Plan:     Jin Garrett is a 80 year old male with PMH of HTN, AAA, HLD and rheumatoid arthritis. He presented to an OSH with shortness of breath and atypical chest pain. TTE showed severe MR with concern for posterior flail leaflet, EF was preserved. On arrival at [facility] (3/4/21) an Impella was emergently placed in cath lab. He was found to have a ruptured posterior papillary muscle causing severe MR.   Jin is now s/p mitral valve replacement and single vessel CABG with Dr. Porras on 3/5/21. Returned from the OR on central ECMO with an open chest, intubated and sedated. Returned to the OR for a wash out on 3/9, found to have a thrombus on his mitral valve so he had the valve replaced, decannulated central ECMO, bronchoscopy and placement of temporary L subclavian HD line. Exchanged Left subclavian for Right IJ Tunneled dialysis line. Return of UOP and delirium cleared 3/27.      Cardiovascular:   Hx of HTN, HLD, AAA  Acute severe MR due to ruptured papillary muscle, s/p tissue MVR  Cardiogenic shock secondary to severe MR  S/p Impella placement and conversion to central VA ECMO  Weaned off mechanical support, ECMO was utilized from 3/5 to 3/9.   CAD, mid LCx with 100% occlusion, s/p 1 vessel CAB   S/P redo MVR due to thrombus in left atrium and on bioprosthetic MV  Most recent echo showed LV EF 55-60%.    ASA 81 mg, atorvastatin on hold due to elevated LFTs.   LUE US 3/14 with nonocclusive thrombus in right cephalic vein, no UE DVT.   Metoprolol 25 mg PO BID.   Pre and post-op Atrial fibrillation   Has been mostly rate controlled and anticoagulated. Hep gtt held since 3/22 due to epistaxis. EKG 3/22 confirming A-fib ('s), likely contributing to hypotension, given Amio 150 mg IV bolus x 1 dose 3/22 and restarted 400 mg PO BID 3/23 AM with return to NSR, now reduced to 200 mg PO BID 3/29.    Hypotension (resolved)  Patient requiring  midodrine 15 mg TID to maintain MAPS > 60. Improved since amiodarone restart and conversion to NSR. Stopped midodrine 3/27 am.      Pulmonary:  Acute respiratory failure secondary to acute MR (resolved)  - Extubated POD 3 (chest closure and ecmo decan) to 4 lpm via NC; now saturating well on 2 lpm.   - Supplemental O2 PRN to keep sats > 92%. Wean off as tolerated.  - Confusion impeding progress with pulm toilet, IS, activity and deep breathing     Neurology / MSK:  Delirium  Insomnia    - Had extended time on sedation due to need for central VA ECMO. Avoiding narcs and benadryl.   - Continues on 1:1 sitter and PRN hand mitts. Now improving, OFF sitter and no hand mitts since 3/28.   - Melatonin 10 mg q PM. Seroquel 50 mg q HS (reduced 3/23 and day-time off since 3/24). Psych consult 3/25, tried zyprexa 5 mg but did not tolerate it, returned to Seroquel q HS.   - Acute post-operative pain; pain well controlled with acetaminophen and tramadol.   Chronic back pain   Recent hip surgery  - No weight bearing or activity restrictions related to recent hip surgery per family (records not available).    - Aqua K pad helping.      HEENT:   Hemoptysis  Epistaxis    - Suspecting hemoptysis is secondary epistaxis due to trauma to posterior nasal septum from the NG tube bridle and him tugging on it.    - Has hand mitts PRN. Tube is no longer bridled.        / Renal:  ADAN secondary to cardiogenic shock  Hyperkalemia   - No Hx of renal disease. Most recent creatinine 2.9 and improving.   - Oliguric, has condom catheter. Bumex 3 mg IV challenge on 3/22 am, minimal UOP.  Bumex 4 mg IV on non-HD days. Now making much more urine. Continue Bumex 2 mg IV BID per Nephrology.    - Nephrology following; most likely cardiogenic shock induced ATN. CRRT transitioned to intermittent HD, fluid removal impeded by hypoTN. HD runs have now been able to pull 1 L of fluid on 3/23, 3/24, 3/26.      GI / FEN:   Dysphagia and Delirium   - Speech team  following and recommending Regular Diet with thin liquids.  - NJ in place for all nutrition supplement. Started Calorie counts 3/29.   - Rectal tube removed, no further persistent loose stools.  C diff negative 3/18.   - Replace electrolytes as needed, hepatic enzymes improving.  Melena   - First noticed 3/23 am. Had epistaxis 2 days prior and likely swallowed some of the blood. He is also on Iron supplement and this can cause stools to darken. Will continue to monitor Hgb, consider GI consult if Hgb not stable.      Endocrine:  Stress induced hyperglycemia  - Initially managed on insulin drip, transitioned to sliding scale and now off insulin (3/16). No acute needs.       Infectious Disease:  Stress induced leukocytosis vs other  - Completed perioperative antibiotics.   - WBC WNL, no longer having febrile episodes, no other signs or symptoms of infection. UA on 3/22 and 3/23 were clean and CRP has been slowly trending down.      Hematology:   Acute blood loss anemia  Epistaxis, resolved   - Hgb 5.7 on 3/22. Given 2 units PRBC and 1 unit plasma on 3/22, one unit PRBC 3/23.  Hgb 8.3 and stable. Will plan to give 1 unit if < 7.5.    - Plt WNL, no persistent signs or symptoms of active bleeding, melena 3/22 overnight could be residual from previous epistaxis.   - LUE US 3/14 with non-occlusive thrombus in right cephalic vein, no UE DVT.     Anticoagulation:   - Coumadin for atrial fibrillation and left atrial thrombus, INR goal 2-3. Most recent INR 2.14.    - Low intensity heparin gtt bridging to therapeutic INR, HELD since 3/22 am due to epistaxis and melena. Restarted straight rate 3/25 to see if he is tolerating it now that Hgb is improving. Advanced to low intensity Hep gtt 3/27 and off 3/28 due to INR 1.8 and rising quickly in setting of recent epistaxis.      Prophylaxis:   - Stress ulcer prophylaxis: Pantoprazole 40 mg daily for 30 days  - DVT prophylaxis: Subcutaneous heparin, SCD     Disposition:   -  "Transferred to  on 3/19   - Therapies recommending discharge to TCU. OFF his 1:1 sitter.    - Family hopes to transfer back up to [place]. Will know more about dialysis needs on 3/30.      Discussed with CVTS Fellow as needed.  Staff surgeons have been informed of changes through both verbal and written communication.      Bhavik Lemus PA-C  Cardiothoracic Surgery  Pager [phone]    10:53 AM   March 29, 2021        Interval History:     No overnight events. NJ replaced this AM (nonbridled in case he pulls in it again)  States pain is well managed on current regimen. Slept well overnight.  Tolerating diet and tube feeds, is passing flatus, + BM. No nausea or vomiting.  Breathing well without complaints.   Working with therapies and starting to walk with heavy assistance.   Denies chest pain, palpitations, dizziness, syncopal symptoms, fevers, chills, myalgias, or sternal popping/clicking.         Physical Exam:   Blood pressure 119/75, pulse 83, temperature 97.8  F (36.6  C), temperature source Oral, resp. rate 18, height 1.803 m (5' 11\"), weight 81.4 kg (179 lb 8 oz), SpO2 94 %.  Vitals:    03/27/21 0517 03/28/21 0200 03/29/21 0640   Weight: 83.9 kg (184 lb 14.4 oz) 83.5 kg (184 lb 1.6 oz) 81.4 kg (179 lb 8 oz)      Weight; + 2.4 kg since admit and trending down.   24 hr Fluid status; net loss 1.4 L.   MAPs: 86 - 93     Gen: A&Ox4, NAD  Neuro: no focal deficits, weak but neuro intact  CV: RRR, normal S1 S2, no murmurs, rubs or gallops.   Pulm: CTA, no wheezing or rhonchi, normal breathing on RA  Abd: nondistended, normal BS, soft, nontender  Ext: no peripheral edema  Incision: clean, dry, intact, no erythema, sternum stable  Tubes/drain sites: dressing clean and dry         Data:    Imaging:  reviewed recent imaging, no acute concerns    Labs:  [facility]  Recent Labs   Lab 03/29/21  0535 03/28/21  0446 03/27/21  0554 03/26/21  1438 03/26/21  0646    134 136  --  132*   POTASSIUM 4.0 3.8 3.8 3.8 4.0   CHLORIDE " 101 101 100  --  97   PALLAVI 8.7 8.2* 8.3*  --  8.5   CO2 26 26 26  --  25   * 90* 64*  --  95*   CR 4.49* 3.96* 3.11*  --  3.99*   * 126* 116*  --  94     CBC  Recent Labs   Lab 03/29/21  0535 03/28/21  0446 03/27/21  0554 03/26/21  1626 03/26/21  0646   WBC 8.6 9.0 6.9  --  9.1   RBC 2.92* 2.74* 2.81*  --  2.78*   HGB 8.3* 7.8* 8.2* 8.4* 7.9*   HCT 26.8* 25.3* 26.4*  --  25.2*   MCV 92 92 94  --  91   MCH 28.4 28.5 29.2  --  28.4   MCHC 31.0* 30.8* 31.1*  --  31.3*   RDW 16.7* 16.8* 16.4*  --  16.6*    240 234  --  234     INR  Recent Labs   Lab 03/29/21  0535 03/28/21  0446 03/27/21  0554 03/26/21  0646   INR 2.14* 1.86* 1.42* 1.38*      Liver Function Studies -   Recent Labs   Lab Test 03/29/21  0535   PROTTOTAL 5.7*   ALBUMIN 2.1*   BILITOTAL 0.4   ALKPHOS 191*   AST 32   ALT 32     GLUCOSE:   Recent Labs   Lab 03/29/21  0535 03/28/21  0446 03/27/21  0554 03/26/21  2317 03/26/21  1540 03/26/21  0646 03/25/21  0604 03/24/21  0615   * 126* 116*  --   --  94 116* 112*   BGM  --   --   --  108* 89  --   --   --

## 2021-03-29 NOTE — PROGRESS NOTES
CLINICAL NUTRITION SERVICES - BRIEF NOTE     Nutrition Prescription    RECOMMENDATIONS FOR MDs/PROVIDERS TO ORDER:  None today    Malnutrition Status:    Non-severe malnutrition in the context of acute on chronic illness    Recommendations already ordered by Registered Dietitian (RD):  Calorie count  Nepro BID     Future/Additional Recommendations:  Recommend maintaining FT until pt consuming 1700 kcal and 100 g protein daily (ie 75% minimum needs)   Post CABG/Heart healthy diet education prior to discharge as appropriate      Findings  Pt pulled NGT 3/26, replaced that evening. Advanced post pyloric 3/29: The feeding tube was advanced under fluoroscopic guidance with final position of tip in the fourth portion of the duodenum.  Tolerating goal feeding of  Novasource Renal @ 55 ml/hr (1320 ml) + Prosource (1 pkt TID) provides 2760 kcal (30 kcal/kg), 153 g pro (1.7 g/kg), 242 g CHO, 946 ml free water, and 0 g fiber daily. 30 ml FWF q 4 hrs  Has been eating 1/2 of 1 or 2 items on full liquid trays. Diet advanced to regular today, started on calorie count. Per daughter (at bedside) pt takes ensure at home, will order nepro BID    INTERVENTIONS  Implementation  Encouraged intakes of meals and supplements with hopes of weaning EN   Calorie count  Nepro BID     Follow up/Monitoring  Progress toward goals will be monitored and evaluated per protocol.    Emily Larios MS, RD, LDN  Unit Pager [phone]

## 2021-03-29 NOTE — PROGRESS NOTES
"  Nephrology Progress Note  03/29/2021    Assessment & Recommendations:  Jin Garrett is a 80 year old with PMH HTN, AAA, HLD, RA who presented to OSH with SOB and atypical CP. TTE showed severe MR with ?posterior flail leaflet, EF preserved. On arrival at [facility] an Impella was emergently placed. S/p MVR and single vessel CABG on 3/5/21. Returned from the OR on central ECMO with an open chest, now s/p decannulation on 3/9. Nephrology consulted for ADAN. Initiated on CRRT 3/6, transitioned to iHD 3/13.    ADAN in the setting of cardiogenic shock, was on ECMO, s/p decannulation on 3/9  Unknown baseline, no h/o CKD per daughter.  Etiology likely ATN in the setting of shock, no obstruction per CT and urinalysis showed granular casts which is c/w ATN.  Access: RI TDC.       BP - stable, has needed midodrine but stopped since 3/27  Hypervolemia - improving  No acute electrolyte issues  No acute acid base issues  Anemia - Stable              Recommendations:  - Will reassess daily for HD, UOP improving and hopeful about renal recovery  - Agree with diuretic for now, with continued recovery pt may even develop a post-ATN diuresis so will need to watch UOP and adjust diuretic dose accordingly  - Renal diet  - Daily weights  - Strict I/Os   - Plan is to transfer to [facility] once able     Recommendations were communicated to primary team via note     Seen and discussed with Dr. Freddy Potts MD  [phone]    Interval History :   Nursing and provider notes from last 24 hours reviewed.  In the last 24 hours Jin Garrett remained clinically stable and UOP improved with diuretic. Denies any new concerns today    Review of Systems:   ROS neg as above    Physical Exam:   I/O last 3 completed shifts:  In: 2100 [P.O.:350; I.V.:20; NG/GT:410]  Out: 2075 [Urine:2075]   /74 (BP Location: Left arm)   Pulse 80   Temp 97.9  F (36.6  C) (Oral)   Resp 18   Ht 1.803 m (5' 11\")   Wt 81.4 kg (179 lb 8 oz)   SpO2 " 94%   BMI 25.04 kg/m       GENERAL APPEARANCE: NAD  EYES: no scleral icterus, pupils equal  PULM: breathing non-labored  CV: regular rhythm, normal rate     -edema trace  GI: soft, nondistended  NEURO: AOx2, speech normal  Access RIJ TDC    Labs:   All labs reviewed by me  Electrolytes/Renal -   Recent Labs   Lab Test 03/29/21  0535 03/28/21  0446 03/27/21  0554    134 136   POTASSIUM 4.0 3.8 3.8   CHLORIDE 101 101 100   CO2 26 26 26   * 90* 64*   CR 4.49* 3.96* 3.11*   * 126* 116*   PALLAVI 8.7 8.2* 8.3*   MAG 2.4* 2.4* 2.4*   PHOS 4.7* 4.7* 4.6*       CBC -   Recent Labs   Lab Test 03/29/21  0535 03/28/21  0446 03/27/21  0554   WBC 8.6 9.0 6.9   HGB 8.3* 7.8* 8.2*    240 234       LFTs -   Recent Labs   Lab Test 03/29/21  0535 03/28/21  0446 03/27/21  0554   ALKPHOS 191* 176* 178*   BILITOTAL 0.4 0.3 0.6   ALT 32 27 26   AST 32 26 32   PROTTOTAL 5.7* 5.3* 5.4*   ALBUMIN 2.1* 2.0* 2.0*       Iron Panel -   Recent Labs   Lab Test 03/20/21  0637   IRON 11*   IRONSAT 6*   RAYNE 1,302*       Imaging:  All imaging studies reviewed by me.     Current Medications:    acetaminophen  650 mg Oral or Feeding Tube Q8H     amiodarone  200 mg Oral BID     amitriptyline  25 mg Oral or Feeding Tube At Bedtime     artificial saliva  15 mL Swish & Spit 4x Daily     aspirin  81 mg Oral or NG Tube Daily     B and C vitamin Complex with folic acid  5 mL Oral Daily     bumetanide  2 mg Intravenous BID     ferrous sulfate  220 mg Oral Daily     fiber modular (NUTRISOURCE FIBER)  1 packet Per Feeding Tube TID     lidocaine  2 patch Transdermal Q24h    And     lidocaine   Transdermal Q8H     magnesium sulfate  2 g Intravenous Once     melatonin  10 mg Oral or Feeding Tube QPM     metoprolol tartrate  25 mg Oral or Feeding Tube BID     pantoprazole  40 mg Oral Daily     protein modular  1 packet Per Feeding Tube TID     QUEtiapine  25 mg Oral At Bedtime       - MEDICATION INSTRUCTIONS -       Warfarin Therapy Reminder        Ramses Potts MD

## 2021-03-30 ENCOUNTER — APPOINTMENT (OUTPATIENT)
Dept: SPEECH THERAPY | Facility: CLINIC | Age: 81
DRG: 003 | End: 2021-03-30
Attending: INTERNAL MEDICINE
Payer: MEDICARE

## 2021-03-30 ENCOUNTER — APPOINTMENT (OUTPATIENT)
Dept: OCCUPATIONAL THERAPY | Facility: CLINIC | Age: 81
DRG: 003 | End: 2021-03-30
Attending: INTERNAL MEDICINE
Payer: MEDICARE

## 2021-03-30 ENCOUNTER — APPOINTMENT (OUTPATIENT)
Dept: PHYSICAL THERAPY | Facility: CLINIC | Age: 81
DRG: 003 | End: 2021-03-30
Attending: INTERNAL MEDICINE
Payer: MEDICARE

## 2021-03-30 LAB
ALBUMIN SERPL-MCNC: 2.4 G/DL (ref 3.4–5)
ALP SERPL-CCNC: 197 U/L (ref 40–150)
ALT SERPL W P-5'-P-CCNC: 42 U/L (ref 0–70)
ANION GAP SERPL CALCULATED.3IONS-SCNC: 12 MMOL/L (ref 3–14)
AST SERPL W P-5'-P-CCNC: 43 U/L (ref 0–45)
BILIRUB SERPL-MCNC: 0.5 MG/DL (ref 0.2–1.3)
BUN SERPL-MCNC: 134 MG/DL (ref 7–30)
CALCIUM SERPL-MCNC: 9.1 MG/DL (ref 8.5–10.1)
CHLORIDE SERPL-SCNC: 100 MMOL/L (ref 94–109)
CO2 SERPL-SCNC: 27 MMOL/L (ref 20–32)
CREAT SERPL-MCNC: 4.9 MG/DL (ref 0.66–1.25)
ERYTHROCYTE [DISTWIDTH] IN BLOOD BY AUTOMATED COUNT: 16.7 % (ref 10–15)
GFR SERPL CREATININE-BSD FRML MDRD: 10 ML/MIN/{1.73_M2}
GLUCOSE SERPL-MCNC: 98 MG/DL (ref 70–99)
HCT VFR BLD AUTO: 26.5 % (ref 40–53)
HGB BLD-MCNC: 8.2 G/DL (ref 13.3–17.7)
INR PPP: 2.61 (ref 0.86–1.14)
LDH SERPL L TO P-CCNC: 394 U/L (ref 85–227)
MAGNESIUM SERPL-MCNC: 2.4 MG/DL (ref 1.6–2.3)
MCH RBC QN AUTO: 28.2 PG (ref 26.5–33)
MCHC RBC AUTO-ENTMCNC: 30.9 G/DL (ref 31.5–36.5)
MCV RBC AUTO: 91 FL (ref 78–100)
PHOSPHATE SERPL-MCNC: 4.9 MG/DL (ref 2.5–4.5)
PLATELET # BLD AUTO: 268 10E9/L (ref 150–450)
POTASSIUM SERPL-SCNC: 3.9 MMOL/L (ref 3.4–5.3)
PROT SERPL-MCNC: 6 G/DL (ref 6.8–8.8)
RBC # BLD AUTO: 2.91 10E12/L (ref 4.4–5.9)
SODIUM SERPL-SCNC: 139 MMOL/L (ref 133–144)
WBC # BLD AUTO: 10.1 10E9/L (ref 4–11)

## 2021-03-30 PROCEDURE — 83735 ASSAY OF MAGNESIUM: CPT | Performed by: SURGERY

## 2021-03-30 PROCEDURE — 214N000001 HC R&B CCU UMMC

## 2021-03-30 PROCEDURE — 85610 PROTHROMBIN TIME: CPT | Performed by: SURGERY

## 2021-03-30 PROCEDURE — 92526 ORAL FUNCTION THERAPY: CPT | Mod: GN

## 2021-03-30 PROCEDURE — 84100 ASSAY OF PHOSPHORUS: CPT | Performed by: SURGERY

## 2021-03-30 PROCEDURE — 250N000013 HC RX MED GY IP 250 OP 250 PS 637: Performed by: THORACIC SURGERY (CARDIOTHORACIC VASCULAR SURGERY)

## 2021-03-30 PROCEDURE — 250N000011 HC RX IP 250 OP 636: Performed by: PHYSICIAN ASSISTANT

## 2021-03-30 PROCEDURE — 250N000013 HC RX MED GY IP 250 OP 250 PS 637: Performed by: NURSE PRACTITIONER

## 2021-03-30 PROCEDURE — 250N000013 HC RX MED GY IP 250 OP 250 PS 637: Performed by: PHYSICIAN ASSISTANT

## 2021-03-30 PROCEDURE — 250N000013 HC RX MED GY IP 250 OP 250 PS 637

## 2021-03-30 PROCEDURE — 250N000013 HC RX MED GY IP 250 OP 250 PS 637: Performed by: INTERNAL MEDICINE

## 2021-03-30 PROCEDURE — 36415 COLL VENOUS BLD VENIPUNCTURE: CPT | Performed by: SURGERY

## 2021-03-30 PROCEDURE — 83615 LACTATE (LD) (LDH) ENZYME: CPT | Performed by: SURGERY

## 2021-03-30 PROCEDURE — 97530 THERAPEUTIC ACTIVITIES: CPT | Mod: GP | Performed by: PHYSICAL THERAPIST

## 2021-03-30 PROCEDURE — 80053 COMPREHEN METABOLIC PANEL: CPT | Performed by: SURGERY

## 2021-03-30 PROCEDURE — 85027 COMPLETE CBC AUTOMATED: CPT | Performed by: SURGERY

## 2021-03-30 PROCEDURE — 97530 THERAPEUTIC ACTIVITIES: CPT | Mod: GO

## 2021-03-30 PROCEDURE — 97116 GAIT TRAINING THERAPY: CPT | Mod: GP | Performed by: PHYSICAL THERAPIST

## 2021-03-30 PROCEDURE — 250N000013 HC RX MED GY IP 250 OP 250 PS 637: Performed by: SURGERY

## 2021-03-30 PROCEDURE — 99233 SBSQ HOSP IP/OBS HIGH 50: CPT | Mod: GC | Performed by: INTERNAL MEDICINE

## 2021-03-30 RX ORDER — WARFARIN SODIUM 5 MG/1
5 TABLET ORAL
Status: COMPLETED | OUTPATIENT
Start: 2021-03-30 | End: 2021-03-30

## 2021-03-30 RX ORDER — BUMETANIDE 2 MG/1
2 TABLET ORAL
Status: DISCONTINUED | OUTPATIENT
Start: 2021-03-30 | End: 2021-03-30

## 2021-03-30 RX ORDER — BUMETANIDE 2 MG/1
2 TABLET ORAL DAILY
Status: DISCONTINUED | OUTPATIENT
Start: 2021-03-31 | End: 2021-04-01

## 2021-03-30 RX ADMIN — BENZOCAINE AND MENTHOL 1 LOZENGE: 15; 3.6 LOZENGE ORAL at 11:54

## 2021-03-30 RX ADMIN — BUMETANIDE 2 MG: 2 TABLET ORAL at 15:49

## 2021-03-30 RX ADMIN — BENZOCAINE AND MENTHOL 1 LOZENGE: 15; 3.6 LOZENGE ORAL at 14:08

## 2021-03-30 RX ADMIN — Medication 1 PACKET: at 14:08

## 2021-03-30 RX ADMIN — AMITRIPTYLINE HYDROCHLORIDE 25 MG: 25 TABLET, FILM COATED ORAL at 21:33

## 2021-03-30 RX ADMIN — ACETAMINOPHEN 650 MG: 325 TABLET, FILM COATED ORAL at 15:49

## 2021-03-30 RX ADMIN — AMIODARONE HYDROCHLORIDE 200 MG: 200 TABLET ORAL at 20:11

## 2021-03-30 RX ADMIN — QUETIAPINE FUMARATE 25 MG: 25 TABLET ORAL at 21:33

## 2021-03-30 RX ADMIN — Medication 1 PACKET: at 20:13

## 2021-03-30 RX ADMIN — Medication 220 MG: at 11:45

## 2021-03-30 RX ADMIN — BUMETANIDE 2 MG: 0.25 INJECTION INTRAMUSCULAR; INTRAVENOUS at 07:49

## 2021-03-30 RX ADMIN — Medication 5 ML: at 07:48

## 2021-03-30 RX ADMIN — Medication 1 PACKET: at 07:49

## 2021-03-30 RX ADMIN — PANTOPRAZOLE SODIUM 40 MG: 40 TABLET, DELAYED RELEASE ORAL at 07:49

## 2021-03-30 RX ADMIN — Medication 10 MG: at 17:32

## 2021-03-30 RX ADMIN — METOPROLOL TARTRATE 25 MG: 25 TABLET, FILM COATED ORAL at 07:49

## 2021-03-30 RX ADMIN — AMIODARONE HYDROCHLORIDE 200 MG: 200 TABLET ORAL at 07:49

## 2021-03-30 RX ADMIN — METOPROLOL TARTRATE 25 MG: 25 TABLET, FILM COATED ORAL at 20:11

## 2021-03-30 RX ADMIN — ACETAMINOPHEN 650 MG: 325 TABLET, FILM COATED ORAL at 07:48

## 2021-03-30 RX ADMIN — WARFARIN SODIUM 5 MG: 5 TABLET ORAL at 17:31

## 2021-03-30 RX ADMIN — ACETAMINOPHEN 650 MG: 325 TABLET, FILM COATED ORAL at 23:22

## 2021-03-30 RX ADMIN — ASPIRIN 81 MG CHEWABLE TABLET 81 MG: 81 TABLET CHEWABLE at 07:48

## 2021-03-30 ASSESSMENT — MIFFLIN-ST. JEOR: SCORE: 1525.92

## 2021-03-30 ASSESSMENT — ACTIVITIES OF DAILY LIVING (ADL)
ADLS_ACUITY_SCORE: 21

## 2021-03-30 NOTE — PROGRESS NOTES
Calorie Count  Intake recorded for: 3/29  Total Kcals: 186 Total Protein: 5g  Kcals from Hospital Food: 186  Protein: 5g  Kcals from Outside Food (average):0 Protein: 0g  # Meals Ordered from Kitchen: 2 meals   # Meals Recorded: 1 meal - 50% pudding, milk, coffee w/ sugar & cream, ice cream  # Supplements Recorded: 0

## 2021-03-30 NOTE — PROGRESS NOTES
Cardiovascular Surgery Progress Note  03/30/2021         Assessment and Plan:     Jin Garrett is a 80 year old male with PMH of HTN, AAA, HLD and rheumatoid arthritis. He presented to an OSH with shortness of breath and atypical chest pain. TTE showed severe MR with concern for posterior flail leaflet, EF was preserved. On arrival at [facility] (3/4/21) an Impella was emergently placed in cath lab. He was found to have a ruptured posterior papillary muscle causing severe MR.   Jin is now s/p mitral valve replacement and single vessel CABG with Dr. Porras on 3/5/21. Returned from the OR on central ECMO with an open chest, intubated and sedated. Returned to the OR for a wash out on 3/9, found to have a thrombus on his mitral valve so he had the valve replaced, decannulated central ECMO, bronchoscopy and placement of temporary L subclavian HD line. Exchanged Left subclavian for Right IJ Tunneled dialysis line. Return of UOP and delirium cleared 3/27.      Cardiovascular:   Hx of HTN, HLD, AAA  Acute severe MR due to ruptured papillary muscle, s/p tissue MVR  Cardiogenic shock secondary to severe MR  S/p Impella placement and conversion to central VA ECMO  Weaned off mechanical support, ECMO was utilized from 3/5 to 3/9.   CAD, mid LCx with 100% occlusion, s/p 1 vessel CAB   S/P redo MVR due to thrombus in left atrium and on bioprosthetic MV  Most recent echo showed LV EF 55-60%.    ASA 81 mg, atorvastatin on hold due to elevated LFTs.   LUE US 3/14 with nonocclusive thrombus in right cephalic vein, no UE DVT.   Metoprolol 25 mg PO BID.   Pre and post-op Atrial fibrillation   Has been mostly rate controlled and anticoagulated. Hep gtt held since 3/22 due to epistaxis. EKG 3/22 confirming A-fib ('s), likely contributing to hypotension, given Amio 150 mg IV bolus x 1 dose 3/22 and restarted 400 mg PO BID 3/23 AM with return to NSR, now reduced to 200 mg PO BID 3/29.    Hypotension (resolved)  Patient requiring  midodrine 15 mg TID to maintain MAPS > 60. Improved since amiodarone restart and conversion to NSR. Stopped midodrine 3/27 am.      Pulmonary:  Acute respiratory failure secondary to acute MR (resolved)  - Extubated POD 3 (chest closure and ecmo decan) to 4 lpm via NC; now saturating well on RA   - Supplemental O2 PRN to keep sats > 92%. Wean off as tolerated.  - Confusion impeding progress with pulm toilet, IS, activity and deep breathing     Neurology / MSK:  Delirium  Insomnia    - Had extended time on sedation due to need for central VA ECMO. Avoiding narcs and benadryl.   - Continues on 1:1 sitter and PRN hand mitts. Now improving, OFF sitter and no hand mitts since 3/28.   - Melatonin 10 mg q PM. Seroquel 50 mg q HS (reduced 3/23 and day-time off since 3/24). Psych consult 3/25, tried zyprexa 5 mg but did not tolerate it, returned to Seroquel q HS.   - Acute post-operative pain; pain well controlled with acetaminophen and tramadol.   Chronic back pain   Recent hip surgery  - No weight bearing or activity restrictions related to recent hip surgery per family (records not available).    - Aqua K pad helping.      HEENT:   Hemoptysis  Epistaxis    - Suspecting hemoptysis is secondary epistaxis due to trauma to posterior nasal septum from the NG tube bridle and him tugging on it.    - Has hand mitts PRN. Tube is no longer bridled.        / Renal:  ADAN secondary to cardiogenic shock  Hyperkalemia   - No Hx of renal disease. Most recent creatinine 4.9, trending up but electrolytes stable.   - Was oliguric, has condom catheter. Now making good urine, 1,675 ml 3/29, already 1.5L today. Continue Bumex 2 mg IV BID per Nephrology.    - Nephrology following; most likely cardiogenic shock induced ATN. CRRT transitioned to intermittent HD, fluid removal impeded by hypoTN. HD runs have now been able to pull 1 L of fluid on 3/23, 3/24, 3/26. Dialysis being assessed daily based on exam by nephrology. Last dialysis 3/26.       GI / FEN:   Dysphagia and Delirium   - Speech team following and recommending Regular Diet with thin liquids.  - NJ in place for all nutrition supplement. Started Calorie counts 3/29.   - Ask nutrition to cycle tube feeding.   - Rectal tube removed, no further persistent loose stools.  C diff negative 3/18.   - Replace electrolytes as needed, hepatic enzymes improving.  Melena   - First noticed 3/23 am. Had epistaxis 2 days prior and likely swallowed some of the blood. He is also on Iron supplement and this can cause stools to darken. Will continue to monitor Hgb, consider GI consult if Hgb not stable. Hgb stable at 8.2     Endocrine:  Stress induced hyperglycemia  - Initially managed on insulin drip, transitioned to sliding scale and now off insulin (3/16). No acute needs.       Infectious Disease:  Stress induced leukocytosis vs other  - Completed perioperative antibiotics.   - WBC WNL, no longer having febrile episodes, no other signs or symptoms of infection. UA on 3/22 and 3/23 were clean and CRP has been slowly trending down.      Hematology:   Acute blood loss anemia  Epistaxis, resolved   - Hgb 5.7 on 3/22. Given 2 units PRBC and 1 unit plasma on 3/22, one unit PRBC 3/23.  Hgb 8.2 and stable. Will plan to give 1 unit if < 7.5.    - Plt WNL, no persistent signs or symptoms of active bleeding, melena 3/22 overnight could be residual from previous epistaxis.   - LUE US 3/14 with non-occlusive thrombus in right cephalic vein, no UE DVT.     Anticoagulation:   - Coumadin for atrial fibrillation and left atrial thrombus, INR goal 2-3. Most recent INR 2.61.    - Low intensity heparin gtt bridging to therapeutic INR, HELD since 3/22 am due to epistaxis and melena. Restarted straight rate 3/25 to see if he is tolerating it now that Hgb is improving. Advanced to low intensity Hep gtt 3/27 and off 3/28 due to INR 1.8 and rising quickly in setting of recent epistaxis.      Prophylaxis:   - Stress ulcer prophylaxis:  "Pantoprazole 40 mg daily for 30 days  - DVT prophylaxis: Subcutaneous heparin, SCD     Disposition:   - Transferred to  on 3/19   - Therapies recommending discharge to TCU. OFF his 1:1 sitter.    - Family hopes to transfer back up to [place] but [facility]'s will not accept as inpatient. They recommend ARU in [place] when stable and if appropriate. Therapies saying TCU.   - Patient having more urine and renal recovery. Awaiting rec's from renal and dialysis plan.     Discussed with CVTS Fellow as needed.  Staff surgeons have been informed of changes through both verbal and written communication.      Saadia Parker PA-C  Cardiothoracic Surgery  Pager [phone]  March 30, 2021          Interval History:     No overnight events.  States pain is well managed on current regimen. Slept well overnight.  Tolerating diet and tube feeds, is passing flatus, + BM. No nausea or vomiting.  Breathing well without complaints.   Working with therapies and starting to walk with heavy assistance.   Denies chest pain, palpitations, dizziness, syncopal symptoms, fevers, chills, myalgias, or sternal popping/clicking.         Physical Exam:   Blood pressure 127/78, pulse 91, temperature 98.1  F (36.7  C), temperature source Oral, resp. rate 20, height 1.803 m (5' 11\"), weight 81.4 kg (179 lb 8 oz), SpO2 98 %.  Vitals:    03/27/21 0517 03/28/21 0200 03/29/21 0640   Weight: 83.9 kg (184 lb 14.4 oz) 83.5 kg (184 lb 1.6 oz) 81.4 kg (179 lb 8 oz)      Last Weight; + 2.4 kg since admit and trending down.   24 hr Fluid status; net loss 1.675 L.   MAPs: 86 - 93     Gen: A&Ox4, NAD  Neuro: no focal deficits, weak but neuro intact  CV: RRR, normal S1 S2, no murmurs, rubs or gallops.   Pulm: CTA, no wheezing or rhonchi, normal breathing on RA  Abd: nondistended, normal BS, soft, nontender  Ext: no peripheral edema  Incision: clean, dry, intact, no erythema, sternum stable  Tubes/drain sites: dressing clean and dry         Data:    Imaging:  " reviewed recent imaging, no acute concerns    Labs:  [facility]  Recent Labs   Lab 03/30/21  0609 03/29/21  0535 03/28/21  0446 03/27/21  0554    136 134 136   POTASSIUM 3.9 4.0 3.8 3.8   CHLORIDE 100 101 101 100   PALLAVI 9.1 8.7 8.2* 8.3*   CO2 27 26 26 26   * 113* 90* 64*   CR 4.90* 4.49* 3.96* 3.11*   GLC 98 111* 126* 116*     CBC  Recent Labs   Lab 03/30/21  0609 03/29/21  0535 03/28/21  0446 03/27/21  0554   WBC 10.1 8.6 9.0 6.9   RBC 2.91* 2.92* 2.74* 2.81*   HGB 8.2* 8.3* 7.8* 8.2*   HCT 26.5* 26.8* 25.3* 26.4*   MCV 91 92 92 94   MCH 28.2 28.4 28.5 29.2   MCHC 30.9* 31.0* 30.8* 31.1*   RDW 16.7* 16.7* 16.8* 16.4*    243 240 234     INR  Recent Labs   Lab 03/30/21  0609 03/29/21  0535 03/28/21  0446 03/27/21  0554   INR 2.61* 2.14* 1.86* 1.42*      Liver Function Studies -   Recent Labs   Lab Test 03/29/21  0535   PROTTOTAL 5.7*   ALBUMIN 2.1*   BILITOTAL 0.4   ALKPHOS 191*   AST 32   ALT 32     GLUCOSE:   Recent Labs   Lab 03/30/21  0609 03/29/21  0535 03/28/21  0446 03/27/21  0554 03/26/21  2317 03/26/21  1540 03/26/21  0646 03/25/21  0604   GLC 98 111* 126* 116*  --   --  94 116*   BGM  --   --   --   --  108* 89  --   --

## 2021-03-30 NOTE — PROGRESS NOTES
"  Nephrology Progress Note  03/30/2021    Assessment & Recommendations:  Jin Garrett is a 80 year old with PMH HTN, AAA, HLD, RA who presented to OSH with SOB and atypical CP. TTE showed severe MR with ?posterior flail leaflet, EF preserved. On arrival at [facility] an Impella was emergently placed. S/p MVR and single vessel CABG on 3/5/21. Returned from the OR on central ECMO with an open chest, now s/p decannulation on 3/9. Nephrology consulted for ADAN. Initiated on CRRT 3/6, transitioned to iHD 3/13.    ADAN in the setting of cardiogenic shock, was on ECMO, s/p decannulation on 3/9  Unknown baseline, no h/o CKD per daughter.  Etiology likely ATN in the setting of shock, no obstruction per CT and urinalysis showed granular casts which is c/w ATN.  Access: RIJ TDC.    BP - stable, has needed midodrine but stopped since 3/27  Hypervolemia - improving  No acute electrolyte issues  No acute acid base issues  Anemia - Stable    Recommendations:  - Will reassess daily for HD, UOP improving and hopeful about renal recovery  - Agree with PO diuretic  - Renal diet  - Daily weights  - Strict I/Os   - Plan is to transfer to ARU in [place] once medically able     Recommendations were communicated to primary team verbally     Seen and discussed with Dr. Freddy Potts MD  [phone]    Interval History:  Nursing and provider notes from last 24 hours reviewed.  In the last 24 hours Jin Garrett continued to have excellent UOP but Cr and BUN continue to rise although Cr not suggestive of anephric rise. Continues to feel well, some SOB with activity but otherwise no new concerns    Review of Systems:   ROS neg as above    Physical Exam:   I/O last 3 completed shifts:  In: 1890 [P.O.:540; NG/GT:360]  Out: 3075 [Urine:3075]   /73 (BP Location: Left arm)   Pulse 93   Temp 98.1  F (36.7  C) (Oral)   Resp 16   Ht 1.803 m (5' 11\")   Wt 79.4 kg (175 lb)   SpO2 96%   BMI 24.41 kg/m       GENERAL " APPEARANCE: NAD  EYES: no scleral icterus, pupils equal  PULM: breathing non-labored  CV: regular rhythm, normal rate     -edema trace  GI: soft, nondistended  NEURO: AOx3, speech normal  Access RI TD    Labs:   All labs reviewed by me  Electrolytes/Renal -   Recent Labs   Lab Test 03/30/21  0609 03/29/21  0535 03/28/21  0446    136 134   POTASSIUM 3.9 4.0 3.8   CHLORIDE 100 101 101   CO2 27 26 26   * 113* 90*   CR 4.90* 4.49* 3.96*   GLC 98 111* 126*   PALLAVI 9.1 8.7 8.2*   MAG 2.4* 2.4* 2.4*   PHOS 4.9* 4.7* 4.7*       CBC -   Recent Labs   Lab Test 03/30/21  0609 03/29/21  0535 03/28/21  0446   WBC 10.1 8.6 9.0   HGB 8.2* 8.3* 7.8*    243 240       LFTs -   Recent Labs   Lab Test 03/30/21  0609 03/29/21  0535 03/28/21  0446   ALKPHOS 197* 191* 176*   BILITOTAL 0.5 0.4 0.3   ALT 42 32 27   AST 43 32 26   PROTTOTAL 6.0* 5.7* 5.3*   ALBUMIN 2.4* 2.1* 2.0*       Iron Panel -   Recent Labs   Lab Test 03/20/21  0637   IRON 11*   IRONSAT 6*   RAYNE 1,302*       Imaging:  All imaging studies reviewed by me.     Current Medications:    acetaminophen  650 mg Oral or Feeding Tube Q8H     amiodarone  200 mg Oral BID     amitriptyline  25 mg Oral or Feeding Tube At Bedtime     artificial saliva  15 mL Swish & Spit 4x Daily     aspirin  81 mg Oral or NG Tube Daily     B and C vitamin Complex with folic acid  5 mL Oral Daily     bumetanide  2 mg Oral BID     ferrous sulfate  220 mg Oral Daily     fiber modular (NUTRISOURCE FIBER)  1 packet Per Feeding Tube TID     lidocaine  2 patch Transdermal Q24h    And     lidocaine   Transdermal Q8H     magnesium sulfate  2 g Intravenous Once     melatonin  10 mg Oral or Feeding Tube QPM     metoprolol tartrate  25 mg Oral or Feeding Tube BID     pantoprazole  40 mg Oral Daily     protein modular  1 packet Per Feeding Tube TID     QUEtiapine  25 mg Oral At Bedtime     warfarin ANTICOAGULANT  5 mg Oral ONCE at 18:00       - MEDICATION INSTRUCTIONS -       Warfarin Therapy  Reminder       Ramses Potts MD

## 2021-03-30 NOTE — PLAN OF CARE
Patient admitted 3/4 with chest pain, dyspnea on exertion found to have mitral valve regurgitation. Patient s/p mitral valve replacement and single vessel CABG on 3/5. Hx: HTN, AAA, HLD and rheumatoid arthritis.    Neuro: A&O x4, intermittently confused.  Cardiac: VSS. SR. Afebrile.   Respiratory: Room air, short of breath with activity.  GI/: Adequate urine output. LBM 3/30. Incontinent at times. Denies nausea.   Diet: Continuous tube feed at 55mL/hr. Regular diet with thin liquids, calorie counts. Poor appetite.  Skin: No new issues noted.   LDAs: NJ in place with tube feeds running. Right double lumen PICC, saline locked. Right CVC for dialysis.  Activity: Assist x2 with gait belt and walker. Lift needed occasionally.  Pain: Denies.     Plan: Continue to monitor.

## 2021-03-30 NOTE — PLAN OF CARE
"/73 (BP Location: Left arm)   Pulse 93   Temp 98.1  F (36.7  C) (Oral)   Resp 16   Ht 1.803 m (5' 11\")   Wt 79.4 kg (175 lb)   SpO2 96%   BMI 24.41 kg/m      Neuro: A&Ox3. Unable to tell date/year. Answers other questions appropriately.    Cardiac: SR. VSS.   Respiratory: Sating >95% on RA.  GI/: Adequate urine output. BM X1  Diet/appetite: Tolerating regular diet with thin liquids. Poor appetite. Continuous TF at 55ml/hr. Encouraged to eat food brought from home and food from kitchen. Talked to MD about possibly switching to cyclic tube feeds. No new orders.   Activity:  Assist of 1-2, up to chair x2 and in halls with therapy.  Pain: At acceptable level on current regimen.   Skin: No new deficits noted.  LDA's: PICC - SL. NJ with continuous TF.     Plan: Continue with POC. Notify primary team with changes.    "

## 2021-03-30 NOTE — PROGRESS NOTES
Care Management Follow Up    Length of Stay (days): 26    Expected Discharge Date: 04/02/21     Concerns to be Addressed: discharge planning     Patient plan of care discussed at interdisciplinary rounds: Yes    Anticipated Discharge Disposition: ARU     Anticipated Discharge Services:  TBD  Anticipated Discharge DME:  TBD    Patient/family educated on Medicare website which has current facility and service quality ratings: no  Education Provided on the Discharge Plan:  yes  Patient/Family in Agreement with the Plan: yes    Referrals Placed by CM/SW:  None at this time  Private pay costs discussed: transportation costs    Additional Information:  Received request to speak with pt's dtbrian Santiago. Spoke with Pamela via phone (ph [phone]). Pamela wondering when pt will be transported back to [facility]. Discussed that [facility] has refused to accept pt back d/t stating they feel his insurance will not cover it. Discussed that OT worked with pt today and is recommending ARU and that this could be pursued at [facility] when pt is medically stable. Discussed difference between TCU and ARU. Discussed the 2 ARUs in [facility] and [facility]. Pamela states she and pt are agreeable with either location. Awaiting updated PT assessment to see if ARU recommended and will initiate referrals.    LEONEL Barton, LICSW     [facility]  Pager [phone]  Phone [phone]

## 2021-03-31 ENCOUNTER — APPOINTMENT (OUTPATIENT)
Dept: OCCUPATIONAL THERAPY | Facility: CLINIC | Age: 81
DRG: 003 | End: 2021-03-31
Attending: INTERNAL MEDICINE
Payer: MEDICARE

## 2021-03-31 ENCOUNTER — APPOINTMENT (OUTPATIENT)
Dept: GENERAL RADIOLOGY | Facility: CLINIC | Age: 81
DRG: 003 | End: 2021-03-31
Attending: PHYSICIAN ASSISTANT
Payer: MEDICARE

## 2021-03-31 ENCOUNTER — APPOINTMENT (OUTPATIENT)
Dept: PHYSICAL THERAPY | Facility: CLINIC | Age: 81
DRG: 003 | End: 2021-03-31
Attending: INTERNAL MEDICINE
Payer: MEDICARE

## 2021-03-31 LAB
ALBUMIN SERPL-MCNC: 2.3 G/DL (ref 3.4–5)
ALP SERPL-CCNC: 199 U/L (ref 40–150)
ALT SERPL W P-5'-P-CCNC: 40 U/L (ref 0–70)
ANION GAP SERPL CALCULATED.3IONS-SCNC: 10 MMOL/L (ref 3–14)
AST SERPL W P-5'-P-CCNC: 33 U/L (ref 0–45)
BILIRUB SERPL-MCNC: 0.4 MG/DL (ref 0.2–1.3)
BUN SERPL-MCNC: 143 MG/DL (ref 7–30)
CALCIUM SERPL-MCNC: 8.9 MG/DL (ref 8.5–10.1)
CHLORIDE SERPL-SCNC: 98 MMOL/L (ref 94–109)
CO2 SERPL-SCNC: 28 MMOL/L (ref 20–32)
CREAT SERPL-MCNC: 5 MG/DL (ref 0.66–1.25)
ERYTHROCYTE [DISTWIDTH] IN BLOOD BY AUTOMATED COUNT: 16.8 % (ref 10–15)
GFR SERPL CREATININE-BSD FRML MDRD: 10 ML/MIN/{1.73_M2}
GLUCOSE SERPL-MCNC: 112 MG/DL (ref 70–99)
HCT VFR BLD AUTO: 26.7 % (ref 40–53)
HGB BLD-MCNC: 8.4 G/DL (ref 13.3–17.7)
INR PPP: 2.69 (ref 0.86–1.14)
LDH SERPL L TO P-CCNC: 388 U/L (ref 85–227)
MAGNESIUM SERPL-MCNC: 2.4 MG/DL (ref 1.6–2.3)
MCH RBC QN AUTO: 29.2 PG (ref 26.5–33)
MCHC RBC AUTO-ENTMCNC: 31.5 G/DL (ref 31.5–36.5)
MCV RBC AUTO: 93 FL (ref 78–100)
PHOSPHATE SERPL-MCNC: 5.7 MG/DL (ref 2.5–4.5)
PLATELET # BLD AUTO: 266 10E9/L (ref 150–450)
POTASSIUM SERPL-SCNC: 3.4 MMOL/L (ref 3.4–5.3)
PROT SERPL-MCNC: 5.8 G/DL (ref 6.8–8.8)
RBC # BLD AUTO: 2.88 10E12/L (ref 4.4–5.9)
SODIUM SERPL-SCNC: 136 MMOL/L (ref 133–144)
WBC # BLD AUTO: 11.6 10E9/L (ref 4–11)

## 2021-03-31 PROCEDURE — 74018 RADEX ABDOMEN 1 VIEW: CPT | Mod: 26 | Performed by: RADIOLOGY

## 2021-03-31 PROCEDURE — 84100 ASSAY OF PHOSPHORUS: CPT | Performed by: SURGERY

## 2021-03-31 PROCEDURE — 80053 COMPREHEN METABOLIC PANEL: CPT | Performed by: SURGERY

## 2021-03-31 PROCEDURE — 36592 COLLECT BLOOD FROM PICC: CPT | Performed by: SURGERY

## 2021-03-31 PROCEDURE — 71045 X-RAY EXAM CHEST 1 VIEW: CPT

## 2021-03-31 PROCEDURE — 83735 ASSAY OF MAGNESIUM: CPT | Performed by: SURGERY

## 2021-03-31 PROCEDURE — 250N000013 HC RX MED GY IP 250 OP 250 PS 637: Performed by: INTERNAL MEDICINE

## 2021-03-31 PROCEDURE — 71045 X-RAY EXAM CHEST 1 VIEW: CPT | Mod: 26 | Performed by: RADIOLOGY

## 2021-03-31 PROCEDURE — 85610 PROTHROMBIN TIME: CPT | Performed by: SURGERY

## 2021-03-31 PROCEDURE — 97530 THERAPEUTIC ACTIVITIES: CPT | Mod: GP | Performed by: PHYSICAL THERAPIST

## 2021-03-31 PROCEDURE — 250N000013 HC RX MED GY IP 250 OP 250 PS 637: Performed by: SURGERY

## 2021-03-31 PROCEDURE — 97530 THERAPEUTIC ACTIVITIES: CPT | Mod: GO

## 2021-03-31 PROCEDURE — 85027 COMPLETE CBC AUTOMATED: CPT | Performed by: SURGERY

## 2021-03-31 PROCEDURE — 250N000013 HC RX MED GY IP 250 OP 250 PS 637: Performed by: THORACIC SURGERY (CARDIOTHORACIC VASCULAR SURGERY)

## 2021-03-31 PROCEDURE — 250N000009 HC RX 250: Performed by: PHYSICIAN ASSISTANT

## 2021-03-31 PROCEDURE — 94640 AIRWAY INHALATION TREATMENT: CPT

## 2021-03-31 PROCEDURE — 999N000157 HC STATISTIC RCP TIME EA 10 MIN

## 2021-03-31 PROCEDURE — 250N000013 HC RX MED GY IP 250 OP 250 PS 637

## 2021-03-31 PROCEDURE — 97116 GAIT TRAINING THERAPY: CPT | Mod: GP | Performed by: PHYSICAL THERAPIST

## 2021-03-31 PROCEDURE — 250N000013 HC RX MED GY IP 250 OP 250 PS 637: Performed by: PHYSICIAN ASSISTANT

## 2021-03-31 PROCEDURE — 214N000001 HC R&B CCU UMMC

## 2021-03-31 PROCEDURE — 250N000013 HC RX MED GY IP 250 OP 250 PS 637: Performed by: NURSE PRACTITIONER

## 2021-03-31 PROCEDURE — 74018 RADEX ABDOMEN 1 VIEW: CPT

## 2021-03-31 PROCEDURE — 83615 LACTATE (LD) (LDH) ENZYME: CPT | Performed by: SURGERY

## 2021-03-31 PROCEDURE — 99233 SBSQ HOSP IP/OBS HIGH 50: CPT | Mod: GC | Performed by: INTERNAL MEDICINE

## 2021-03-31 PROCEDURE — 999N000127 HC STATISTIC PERIPHERAL IV START W US GUIDANCE

## 2021-03-31 PROCEDURE — 97535 SELF CARE MNGMENT TRAINING: CPT | Mod: GO

## 2021-03-31 RX ORDER — IPRATROPIUM BROMIDE AND ALBUTEROL SULFATE 2.5; .5 MG/3ML; MG/3ML
3 SOLUTION RESPIRATORY (INHALATION)
Status: DISCONTINUED | OUTPATIENT
Start: 2021-03-31 | End: 2021-04-02

## 2021-03-31 RX ORDER — WARFARIN SODIUM 5 MG/1
5 TABLET ORAL
Status: COMPLETED | OUTPATIENT
Start: 2021-03-31 | End: 2021-03-31

## 2021-03-31 RX ORDER — POTASSIUM CHLORIDE 1.5 G/1.58G
20 POWDER, FOR SOLUTION ORAL ONCE
Status: COMPLETED | OUTPATIENT
Start: 2021-03-31 | End: 2021-03-31

## 2021-03-31 RX ORDER — TRAZODONE HYDROCHLORIDE 50 MG/1
50 TABLET, FILM COATED ORAL AT BEDTIME
Status: DISCONTINUED | OUTPATIENT
Start: 2021-03-31 | End: 2021-04-05 | Stop reason: HOSPADM

## 2021-03-31 RX ADMIN — Medication 1 PACKET: at 15:24

## 2021-03-31 RX ADMIN — ACETAMINOPHEN 650 MG: 325 TABLET, FILM COATED ORAL at 07:39

## 2021-03-31 RX ADMIN — AMIODARONE HYDROCHLORIDE 200 MG: 200 TABLET ORAL at 19:53

## 2021-03-31 RX ADMIN — Medication 1 PACKET: at 20:01

## 2021-03-31 RX ADMIN — POTASSIUM CHLORIDE 20 MEQ: 1.5 POWDER, FOR SOLUTION ORAL at 10:09

## 2021-03-31 RX ADMIN — ACETAMINOPHEN 650 MG: 325 TABLET, FILM COATED ORAL at 15:25

## 2021-03-31 RX ADMIN — METOPROLOL TARTRATE 25 MG: 25 TABLET, FILM COATED ORAL at 07:39

## 2021-03-31 RX ADMIN — Medication 1 PACKET: at 07:41

## 2021-03-31 RX ADMIN — BUMETANIDE 2 MG: 2 TABLET ORAL at 07:39

## 2021-03-31 RX ADMIN — BENZOCAINE AND MENTHOL 1 LOZENGE: 15; 3.6 LOZENGE ORAL at 17:41

## 2021-03-31 RX ADMIN — Medication 1 PACKET: at 20:00

## 2021-03-31 RX ADMIN — METOPROLOL TARTRATE 25 MG: 25 TABLET, FILM COATED ORAL at 19:53

## 2021-03-31 RX ADMIN — TRAZODONE HYDROCHLORIDE 50 MG: 50 TABLET ORAL at 20:10

## 2021-03-31 RX ADMIN — Medication 220 MG: at 11:07

## 2021-03-31 RX ADMIN — IPRATROPIUM BROMIDE AND ALBUTEROL SULFATE 3 ML: .5; 3 SOLUTION RESPIRATORY (INHALATION) at 21:24

## 2021-03-31 RX ADMIN — ACETAMINOPHEN 650 MG: 325 TABLET, FILM COATED ORAL at 23:32

## 2021-03-31 RX ADMIN — Medication 5 ML: at 07:40

## 2021-03-31 RX ADMIN — ASPIRIN 81 MG CHEWABLE TABLET 81 MG: 81 TABLET CHEWABLE at 07:39

## 2021-03-31 RX ADMIN — Medication 10 MG: at 19:53

## 2021-03-31 RX ADMIN — AMIODARONE HYDROCHLORIDE 200 MG: 200 TABLET ORAL at 07:39

## 2021-03-31 RX ADMIN — PANTOPRAZOLE SODIUM 40 MG: 40 TABLET, DELAYED RELEASE ORAL at 07:39

## 2021-03-31 RX ADMIN — LIDOCAINE 2 PATCH: 560 PATCH PERCUTANEOUS; TOPICAL; TRANSDERMAL at 19:53

## 2021-03-31 RX ADMIN — AMITRIPTYLINE HYDROCHLORIDE 25 MG: 25 TABLET, FILM COATED ORAL at 19:56

## 2021-03-31 RX ADMIN — WARFARIN SODIUM 5 MG: 5 TABLET ORAL at 17:41

## 2021-03-31 ASSESSMENT — ACTIVITIES OF DAILY LIVING (ADL)
ADLS_ACUITY_SCORE: 21
ADLS_ACUITY_SCORE: 20

## 2021-03-31 ASSESSMENT — MIFFLIN-ST. JEOR: SCORE: 1510.05

## 2021-03-31 NOTE — PROGRESS NOTES
CLINICAL NUTRITION SERVICES - BRIEF NOTE     Nutrition Prescription    RECOMMENDATIONS FOR MDs/PROVIDERS TO ORDER:  Hold EN, restart at goal volume at 6pm (discussed with RN)     Malnutrition Status:    See previous RD ntoe    Recommendations already ordered by Registered Dietitian (RD):  Novasource renal via ND-tube @ 70 mL/hr x 18 hrs (6pm-12pm) providing 1260 mL, 2520 kcal (27.6 kcal/kg), 115 g protein (1.3 g/kg), 230 g CHO, 0 g fiber, and 918 mL free water per DW of 91 kg.  Water Flushes: 30 mL q 4 hrs (TF + Flushes = 1098 mL water).  Continue Prosource (1 pkt TID, 120kcal, 33g protein) and nutrisource fiber (1 pkt TID, 45kcal, 9g fiber)     Future/Additional Recommendations:  If pt tolerates new rate advance to Novasource renal via ND-tube @ 95 mL/hr x 14 hrs (6pm-8am) providing 1330 mL, 2660 kcal (29 kcal/kg), 121 g protein (1.3 g/kg), 243 g CHO, 0 g fiber, and 954 mL free water per DW of 91 kg  Recommend maintaining FT until pt consuming 1700 kcal and 100 g protein daily (ie 75% minimum needs)   Post CABG/Heart healthy diet education prior to discharge as appropriate      Findings  Pt tolerating goal EN infusion, team requesting transition to cycled feeding.     3/29       Total Kcals: 186       Total Protein: 5g  3/30       Total Kcals: 362       Total Protein: 9g  Calorie count continues through 3/31    INTERVENTIONS  Implementation  Novasource renal via ND-tube @ 70 mL/hr x 18 hrs (6pm-12pm) providing 1260 mL, 2520 kcal (27.6 kcal/kg), 115 g protein (1.3 g/kg), 230 g CHO, 0 g fiber, and 918 mL free water per DW of 91 kg.  Water Flushes: 30 mL q 4 hrs (TF + Flushes = 1098 mL water).  Continue Prosource (1 pkt TID, 120kcal, 33g protein) and nutrisource fiber (1 pkt TID, 45kcal, 9g fiber     Follow up/Monitoring  Progress toward goals will be monitored and evaluated per protocol.     Emily Larios MS, RD, LDN  Unit Pager [phone]

## 2021-03-31 NOTE — PROGRESS NOTES
Calorie Count  Intake recorded for: 3/30  Total Kcals: 362 Total Protein: 9g  Kcals from Hospital Food: 212  Protein: 7g  Kcals from Outside Food (average):150 Protein: 2g  # Meals Ordered from Kitchen: 1 meal + food from outside the hospital   # Meals Recorded: 2 meals (First - 100% ice cream, 30% tuna salad sandwich)      (Second - 50% apple fritter - from bakery)  # Supplements Recorded: 0

## 2021-03-31 NOTE — PROGRESS NOTES
"  Nephrology Progress Note  03/31/2021    Assessment & Recommendations:  Jin Garrett is a 80 year old with PMH HTN, AAA, HLD, RA who presented to OSH with SOB and atypical CP. TTE showed severe MR with ?posterior flail leaflet, EF preserved. On arrival at [facility] an Impella was emergently placed. S/p MVR and single vessel CABG on 3/5/21. Returned from the OR on central ECMO with an open chest, now s/p decannulation on 3/9. Nephrology consulted for ADAN. Initiated on CRRT 3/6, transitioned to iHD 3/13.    ADAN in the setting of cardiogenic shock, was on ECMO, s/p decannulation on 3/9  Unknown baseline, no h/o CKD per daughter.  Etiology likely ATN in the setting of shock, no obstruction per CT and urinalysis showed granular casts which is c/w ATN.  Access: RIJ TDC.    BP - stable, has needed midodrine but stopped since 3/27  Hypervolemia - improving  No acute electrolyte issues  No acute acid base issues  Anemia - Stable    Recommendations:  - Will reassess daily for HD, UOP improving and hopeful about renal recovery  - Agree with PO diuretic  - Renal diet  - Daily weights  - Strict I/Os   - Plan is to transfer to ARU in [place] once medically able    Recommendations were communicated to primary team verbally    Seen and discussed with Dr. Freddy Potts MD  [phone]    Interval History:  Nursing and provider notes from last 24 hours reviewed.  In the last 24 hours Jin Garrett remained clinically stable. UOP remains excellent on PO diuretic. Denies any new concerns.    Review of Systems:   ROS neg as above    Physical Exam:  I/O last 3 completed shifts:  In: 1685 [P.O.:120; NG/GT:300]  Out: 2550 [Urine:2550]  /79 (BP Location: Left arm)   Pulse 94   Temp 97.6  F (36.4  C) (Oral)   Resp 16   Ht 1.803 m (5' 11\")   Wt 77.8 kg (171 lb 8 oz)   SpO2 97%   BMI 23.92 kg/m      GENERAL APPEARANCE: NAD  EYES: no scleral icterus, pupils equal  PULM: breathing non-labored  CV: regular rhythm, normal " rate     -edema trace  GI: soft, nondistended  NEURO: AOx3, speech normal  Access RI TDC    Labs:   All labs reviewed by me  Electrolytes/Renal -   Recent Labs   Lab Test 03/31/21  0646 03/30/21  0609 03/29/21  0535    139 136   POTASSIUM 3.4 3.9 4.0   CHLORIDE 98 100 101   CO2 28 27 26   * 134* 113*   CR 5.00* 4.90* 4.49*   * 98 111*   PALLAVI 8.9 9.1 8.7   MAG 2.4* 2.4* 2.4*   PHOS 5.7* 4.9* 4.7*       CBC -   Recent Labs   Lab Test 03/31/21  0646 03/30/21  0609 03/29/21  0535   WBC 11.6* 10.1 8.6   HGB 8.4* 8.2* 8.3*    268 243       LFTs -   Recent Labs   Lab Test 03/31/21  0646 03/30/21  0609 03/29/21  0535   ALKPHOS 199* 197* 191*   BILITOTAL 0.4 0.5 0.4   ALT 40 42 32   AST 33 43 32   PROTTOTAL 5.8* 6.0* 5.7*   ALBUMIN 2.3* 2.4* 2.1*       Iron Panel -   Recent Labs   Lab Test 03/20/21  0637   IRON 11*   IRONSAT 6*   RAYNE 1,302*       Imaging:  All imaging studies reviewed by me.     Current Medications:    acetaminophen  650 mg Oral or Feeding Tube Q8H     amiodarone  200 mg Oral BID     amitriptyline  25 mg Oral or Feeding Tube At Bedtime     artificial saliva  15 mL Swish & Spit 4x Daily     aspirin  81 mg Oral or NG Tube Daily     B and C vitamin Complex with folic acid  5 mL Oral Daily     bumetanide  2 mg Oral Daily     ferrous sulfate  220 mg Oral Daily     fiber modular (NUTRISOURCE FIBER)  1 packet Per Feeding Tube TID     lidocaine  2 patch Transdermal Q24h    And     lidocaine   Transdermal Q8H     magnesium sulfate  2 g Intravenous Once     melatonin  10 mg Oral or Feeding Tube QPM     metoprolol tartrate  25 mg Oral or Feeding Tube BID     pantoprazole  40 mg Oral Daily     protein modular  1 packet Per Feeding Tube TID     QUEtiapine  25 mg Oral At Bedtime     warfarin ANTICOAGULANT  5 mg Oral ONCE at 18:00       - MEDICATION INSTRUCTIONS -       Warfarin Therapy Reminder       Ramses Potts MD

## 2021-03-31 NOTE — PLAN OF CARE
Patient admitted 3/4 with chest pain, dyspnea on exertion found to have mitral valve regurgitation. Patient s/p mitral valve replacement and single vessel CABG on 3/5. Hx: HTN, AAA, HLD and rheumatoid arthritis.     Neuro: A&O x3, disoriented to time.  Cardiac: VSS. SR. Afebrile.   Respiratory: Room air, short of breath with activity.  GI/: Adequate urine output, using urinal. Incontinent at times. LBM 3/30. Denies nausea.   Diet: Continuous tube feed at goal rate of 55mL/hr. Regular diet with thin liquids, calorie counts. Poor appetite.  Skin: Incisions healing well. No new issues noted.  LDAs: NJ in place with tube feeds running. Right double lumen PICC, saline locked. CVC for dialysis.  Activity: Assist x1-2 with gait belt and walker.  Pain: Incisional pain controlled well with scheduled tylenol.     Plan: Continue to monitor. Transfer to ARU in [place] pending medical stability.

## 2021-03-31 NOTE — PROGRESS NOTES
Cardiovascular Surgery Progress Note  03/31/2021           Assessment and Plan:   Jin Garrett is a 80 year old male with PMH of HTN, AAA, HLD and rheumatoid arthritis. He presented to an OSH with shortness of breath and atypical chest pain. TTE showed severe MR with concern for posterior flail leaflet, EF was preserved. On arrival at [facility] (3/4/21) an Impella was emergently placed in cath lab. He was found to have a ruptured posterior papillary muscle causing severe MR.   Jin is now s/p mitral valve replacement and single vessel CABG with Dr. Porras on 3/5/21. Returned from the OR on central ECMO with an open chest, intubated and sedated. Returned to the OR for a wash out on 3/9, found to have a thrombus on his mitral valve so he had the valve replaced, decannulated central ECMO, bronchoscopy and placement of temporary L subclavian HD line. Exchanged Left subclavian for Right IJ Tunneled dialysis line. Return of UOP and delirium cleared 3/27.     Plans for Today (3/31):  1. Discontinue Seroquel  2. Start Trazodone 50 mg PO qPM  3. Obtain PIV access  4. Remove Right sided PICC line  5. Cycle tube feeds  6. Continue Calorie counts through 4/1    Cardiovascular:   Hx of HTN, HLD, AAA  Acute severe MR due to ruptured papillary muscle, s/p tissue MVR  Cardiogenic shock secondary to severe MR  S/p Impella placement and conversion to central VA ECMO  Weaned off mechanical support, ECMO was utilized from 3/5 to 3/9.   CAD, mid LCx with 100% occlusion, s/p 1 vessel CAB   S/P redo MVR due to thrombus in left atrium and on bioprosthetic MV  Most recent echo showed LV EF 55-60%. LUE US 3/14 with nonocclusive thrombus in right cephalic vein, no UE DVT.     - ASA 81 mg PO Daily  - Metoprolol 25 mg PO BID  - atorvastatin on hold due to elevated LFTs.   Pre and post-op Atrial fibrillation   Has been mostly rate controlled and anticoagulated. Hep gtt held since 3/22 due to epistaxis. EKG 3/22 confirming A-fib ('s), likely  contributing to hypotension, given Amio 150 mg IV bolus x 1 dose 3/22 and restarted 400 mg PO BID 3/23 AM with return to NSR, now reduced to 200 mg PO BID 3/29.    Hypotension now resolved. Patient requiring midodrine 15 mg TID to maintain MAPS > 60. Improved since amiodarone restart and conversion to NSR. Stopped midodrine 3/27 am.   - amiodarone 200 mg PO daily  - Plan for repeat ECHO prior to discharge     Pulmonary:  Acute respiratory failure secondary to acute MR (resolved)  - Extubated POD 3 (chest closure and ecmo decan) to 4 lpm via NC; now saturating well on 2 lpm.   - Supplemental O2 PRN to keep sats > 92%. Wean off as tolerated.  - Pulm hygiene, IS, activity and deep breathing     Neurology / MSK:  Delirium Had extended time on sedation due to need for central VA ECMO. Avoiding narcs and benadryl. Improving, no bedside attendent and no hand mitts since 3/28. Psych consult 3/25, tried zyprexa 5 mg but did not tolerate it. Returned to Seroquel and tolerated it well.   Insomnia    - Melatonin 10 mg q PM  - Stop Seroquel and try Trazodone 50 mg PO tonight 3/31  - Acute post-operative pain; pain well controlled with acetaminophen and tramadol.   Chronic back pain   Recent hip surgery  - No weight bearing or activity restrictions related to recent hip surgery per family (records not available).    - Aqua K pad helping.      HEENT:   Hemoptysis  Epistaxis    - Suspecting hemoptysis is secondary epistaxis due to trauma to posterior nasal septum from the NG tube bridle and him tugging on it.    - Has hand mitts PRN. Tube is no longer bridled.        / Renal:  ADAN secondary to cardiogenic shock  Hyperkalemia   - No Hx of renal disease. Most recent creatinine 2.9 and improving.   - Oliguric, has condom catheter. Bumex 3 mg IV challenge on 3/22 am, minimal UOP.  Bumex 4 mg IV on non-HD days. Now making much more urine.  Bumex 2 mg PO daily per Nephrology.   - Nephrology following; most likely cardiogenic shock  induced ATN. CRRT transitioned to intermittent HD, fluid removal impeded by hypoTN. HD runs have now been able to pull 1 L of fluid on 3/23, 3/24, 3/26. Last HD run 3/26. Still need to monitor BUN/Creat before deciding to pull HD line.      GI / FEN:   Melena: Noticed 3/23. Had epistaxis 2 days prior and likely swallowed some of the blood. No episodes since.  Dysphagia:  Poor appetite, not meeting nutritional needs from oral intake.  - Regular Diet with thin liquids per speech  - NJ in place for all nutrition supplement. Cycle Tube Feeds 3/31  - Calorie counts 3/29 to 4/1  - Replace electrolytes as needed, hepatic enzymes improving.     Endocrine:  Stress induced hyperglycemia  - Initially managed on insulin drip, transitioned to sliding scale and now off insulin (3/16). No acute needs.       Infectious Disease:   Leukocytosis: C diff negative 3/18, UA on 3/22 and 3/23 were clean and CRP has been slowly trending down.  - Completed perioperative antibiotics.   - WBC WNL, no longer having febrile episodes, no other signs or symptoms of infection.      Hematology:   Acute blood loss anemia  Epistaxis, resolved   - Hgb 5.7 on 3/22. Given 2 units PRBC and 1 unit plasma on 3/22, one unit PRBC 3/23.  Hgb 8.4 and stable. Will plan to give 1 unit if < 7.5.    - Plt WNL, no persistent signs or symptoms of active bleeding, melena 3/22 overnight could be residual from previous epistaxis.   - LUE US 3/14 with non-occlusive thrombus in right cephalic vein, no UE DVT.      Anticoagulation:   - Coumadin for atrial fibrillation and left atrial thrombus, INR goal 2-3. Most recent INR 2.69.    - Low intensity heparin gtt bridging to therapeutic INR, HELD since 3/22 am due to epistaxis and melena. Restarted straight rate 3/25 to see if he is tolerating it now that Hgb is improving. Advanced to low intensity Hep gtt 3/27 and off 3/28 due to INR 1.8 and rising quickly in setting of recent epistaxis.      Prophylaxis:   - Stress ulcer  prophylaxis: Pantoprazole 40 mg daily for 30 days  - DVT prophylaxis: Subcutaneous heparin, SCD     Disposition:   - Transferred to  on 3/19   - Therapies recommending discharge to TCU vs. ARU.  - Family hopes to transfer back up to [place]. Will know more about dialysis needs on 4/1.     Staff surgeons have been informed of changes through both verbal and written communication.       Ros[facility]-NP Student  Cardiothoracic Surgery    I have seen and examined this patient with the NP student, I agree with the above findings.  Patient discussed with staff.     Bhavik Lemus PA-C  Cardiothoracic Surgery  Pager [phone]    2:29 PM   March 31, 2021          Interval History:   No acute events overnight. States pain is well managed on current regimen, Breathing is non labored on room air. Tolerating diet, is passing flatus, + BM. Denies chest pain, palpitations, dizziness, syncopal symptoms, chills, myalgias or sternal popping/clicking. Reports having a difficult time sleeping due to being cold and uncomfortable in the bed.          Medications:       acetaminophen  650 mg Oral or Feeding Tube Q8H     amiodarone  200 mg Oral BID     amitriptyline  25 mg Oral or Feeding Tube At Bedtime     artificial saliva  15 mL Swish & Spit 4x Daily     aspirin  81 mg Oral or NG Tube Daily     B and C vitamin Complex with folic acid  5 mL Oral Daily     bumetanide  2 mg Oral Daily     ferrous sulfate  220 mg Oral Daily     fiber modular (NUTRISOURCE FIBER)  1 packet Per Feeding Tube TID     lidocaine  2 patch Transdermal Q24h    And     lidocaine   Transdermal Q8H     magnesium sulfate  2 g Intravenous Once     melatonin  10 mg Oral or Feeding Tube QPM     metoprolol tartrate  25 mg Oral or Feeding Tube BID     pantoprazole  40 mg Oral Daily     protein modular  1 packet Per Feeding Tube TID     QUEtiapine  25 mg Oral At Bedtime     warfarin ANTICOAGULANT  5 mg Oral ONCE at 18:00     albumin human, albumin human, alum &  "mag hydroxide-simethicone, sore throat lozenge, bisacodyl, hydrALAZINE, loperamide, - MEDICATION INSTRUCTIONS -, midodrine, naloxone **OR** naloxone **OR** naloxone **OR** naloxone, ondansetron **OR** ondansetron, QUEtiapine, senna-docusate, sodium chloride (PF), sodium chloride (PF), Warfarin Therapy Reminder          Physical Exam:   Vitals were reviewed  Blood pressure 121/79, pulse 94, temperature 97.6  F (36.4  C), temperature source Oral, resp. rate 16, height 1.803 m (5' 11\"), weight 77.8 kg (171 lb 8 oz), SpO2 97 %.  Vitals:    03/29/21 0640 03/30/21 1230 03/31/21 0608   Weight: 81.4 kg (179 lb 8 oz) 79.4 kg (175 lb) 77.8 kg (171 lb 8 oz)      I/O last 3 completed shifts:  In: 1685 [P.O.:120; NG/GT:300]  Out: 2550 [Urine:2550]    Gen: A&Ox4, NAD  CV: RRR, normal S1, S2, no murmurs, rubs or gallops   Pulm: CTA, No wheezing or rhonchi  Abd: Soft, NT, ND, +BS  Ext: No LE edema  Neuro:  Nonfocal, PERRLA  Incisions: c/d/i, no erythema, sternum stable  Tubes/drains: Right PICC dressing clean and dry, Right tunneled HD catheter dressing clean and dry.          Data:    All labs and imaging reviewed by me.  Labs:    Data   BMP  Recent Labs   Lab 03/31/21  0646 03/30/21  0609 03/29/21  0535 03/28/21  0446    139 136 134   POTASSIUM 3.4 3.9 4.0 3.8   CHLORIDE 98 100 101 101   PALLAVI 8.9 9.1 8.7 8.2*   CO2 28 27 26 26   * 134* 113* 90*   CR 5.00* 4.90* 4.49* 3.96*   * 98 111* 126*     CBC  Recent Labs   Lab 03/31/21  0646 03/30/21  0609 03/29/21  0535 03/28/21  0446   WBC 11.6* 10.1 8.6 9.0   RBC 2.88* 2.91* 2.92* 2.74*   HGB 8.4* 8.2* 8.3* 7.8*   HCT 26.7* 26.5* 26.8* 25.3*   MCV 93 91 92 92   MCH 29.2 28.2 28.4 28.5   MCHC 31.5 30.9* 31.0* 30.8*   RDW 16.8* 16.7* 16.7* 16.8*    268 243 240     INR  Recent Labs   Lab 03/31/21  0646 03/30/21  0609 03/29/21  0535 03/28/21  0446   INR 2.69* 2.61* 2.14* 1.86*      Hepatic Panel   Recent Labs   Lab 03/31/21  0646 03/30/21  0609 03/29/21  0535 " 03/28/21  0446   AST 33 43 32 26   ALT 40 42 32 27   ALKPHOS 199* 197* 191* 176*   BILITOTAL 0.4 0.5 0.4 0.3   ALBUMIN 2.3* 2.4* 2.1* 2.0*     Glucose  Recent Labs   Lab 03/31/21  0646 03/30/21  0609 03/29/21  0535 03/28/21  0446 03/27/21  0554 03/26/21  2317 03/26/21  1540 03/26/21  0646   * 98 111* 126* 116*  --   --  94   BGM  --   --   --   --   --  108* 89  --        Imaging:  No results found for this or any previous visit (from the past 24 hour(s)).

## 2021-03-31 NOTE — PROGRESS NOTES
Neuro: A&Ox4.   Cardiac: Afebrile, VSS. A-fib rate control on warfarin.    Respiratory: RA. Persistent cough with pain. Pt splinting well; dry nonproductive, chest and abdomen xrays taken. Lung sounds clear, no SOB reported. Cepacol lozenges PRN   GI/: Voiding spontaneously. 1 BM this shift.   Diet/appetite: Tolerating regular thin liquids diet. Denies nausea  Activity: Up with 1 assist    Pain: c/o chest pain upon coughing.  Skin: No new deficits noted.  Lines: R PIV SL   Drains: none   Replacement: potassium    TF running at 55mL/hr between 6pm-12pm    Pt has been resting comfortably, will continue to monitor and follow plan of care.

## 2021-03-31 NOTE — PROGRESS NOTES
Care Management Follow Up     Length of Stay (days): 27     Expected Discharge Date: 04/02/21     Concerns to be Addressed: discharge planning     Patient plan of care discussed at interdisciplinary rounds: Yes     Anticipated Discharge Disposition: ARU     Anticipated Discharge Services:  TBD  Anticipated Discharge DME:  TBD     Patient/family educated on Medicare website which has current facility and service quality ratings: no  Education Provided on the Discharge Plan:  yes  Patient/Family in Agreement with the Plan: yes     Referrals Placed by CM/SW:  None at this time  Private pay costs discussed: transportation costs     Additional Information:  Met with pt and pt's riley Santiago at bedside to discuss ARU referrals. Pamela and pt agreeable with referrals to the 2 ARUs in [place]. Faxed referrals.    Referral status:  1) [facility] (ph [phone], fax [phone])- faxed referral  2) [facility] (ph [phone], fax [phone])- faxed referral; admissions notes no beds until next week     LEONEL Barton, LICSW     [facility]  Pager [phone]  Phone [phone]

## 2021-04-01 ENCOUNTER — APPOINTMENT (OUTPATIENT)
Dept: CARDIOLOGY | Facility: CLINIC | Age: 81
DRG: 003 | End: 2021-04-01
Attending: PHYSICIAN ASSISTANT
Payer: MEDICARE

## 2021-04-01 ENCOUNTER — APPOINTMENT (OUTPATIENT)
Dept: OCCUPATIONAL THERAPY | Facility: CLINIC | Age: 81
DRG: 003 | End: 2021-04-01
Attending: INTERNAL MEDICINE
Payer: MEDICARE

## 2021-04-01 ENCOUNTER — APPOINTMENT (OUTPATIENT)
Dept: PHYSICAL THERAPY | Facility: CLINIC | Age: 81
DRG: 003 | End: 2021-04-01
Attending: INTERNAL MEDICINE
Payer: MEDICARE

## 2021-04-01 LAB
ALBUMIN SERPL-MCNC: 2.5 G/DL (ref 3.4–5)
ALBUMIN UR-MCNC: 10 MG/DL
ALP SERPL-CCNC: 212 U/L (ref 40–150)
ALT SERPL W P-5'-P-CCNC: 36 U/L (ref 0–70)
ANION GAP SERPL CALCULATED.3IONS-SCNC: 10 MMOL/L (ref 3–14)
ANION GAP SERPL CALCULATED.3IONS-SCNC: 11 MMOL/L (ref 3–14)
APPEARANCE UR: CLEAR
AST SERPL W P-5'-P-CCNC: 26 U/L (ref 0–45)
BASOPHILS # BLD AUTO: 0.1 10E9/L (ref 0–0.2)
BASOPHILS NFR BLD AUTO: 0.4 %
BILIRUB SERPL-MCNC: 0.4 MG/DL (ref 0.2–1.3)
BILIRUB UR QL STRIP: NEGATIVE
BUN SERPL-MCNC: 146 MG/DL (ref 7–30)
BUN SERPL-MCNC: 149 MG/DL (ref 7–30)
CALCIUM SERPL-MCNC: 8.6 MG/DL (ref 8.5–10.1)
CALCIUM SERPL-MCNC: 8.8 MG/DL (ref 8.5–10.1)
CHLORIDE SERPL-SCNC: 97 MMOL/L (ref 94–109)
CHLORIDE SERPL-SCNC: 99 MMOL/L (ref 94–109)
CO2 SERPL-SCNC: 26 MMOL/L (ref 20–32)
CO2 SERPL-SCNC: 29 MMOL/L (ref 20–32)
COLOR UR AUTO: ABNORMAL
CREAT SERPL-MCNC: 4.58 MG/DL (ref 0.66–1.25)
CREAT SERPL-MCNC: 4.79 MG/DL (ref 0.66–1.25)
CRP SERPL-MCNC: 32 MG/L (ref 0–8)
DIFFERENTIAL METHOD BLD: ABNORMAL
EOSINOPHIL # BLD AUTO: 0.8 10E9/L (ref 0–0.7)
EOSINOPHIL NFR BLD AUTO: 5.9 %
ERYTHROCYTE [DISTWIDTH] IN BLOOD BY AUTOMATED COUNT: 16.6 % (ref 10–15)
GFR SERPL CREATININE-BSD FRML MDRD: 11 ML/MIN/{1.73_M2}
GFR SERPL CREATININE-BSD FRML MDRD: 11 ML/MIN/{1.73_M2}
GLUCOSE SERPL-MCNC: 123 MG/DL (ref 70–99)
GLUCOSE SERPL-MCNC: 131 MG/DL (ref 70–99)
GLUCOSE UR STRIP-MCNC: NEGATIVE MG/DL
HCT VFR BLD AUTO: 27.6 % (ref 40–53)
HGB BLD-MCNC: 8.4 G/DL (ref 13.3–17.7)
HGB UR QL STRIP: NEGATIVE
IMM GRANULOCYTES # BLD: 0.1 10E9/L (ref 0–0.4)
IMM GRANULOCYTES NFR BLD: 1 %
INR PPP: 2.72 (ref 0.86–1.14)
KETONES UR STRIP-MCNC: NEGATIVE MG/DL
LABORATORY COMMENT REPORT: NORMAL
LDH SERPL L TO P-CCNC: 389 U/L (ref 85–227)
LEUKOCYTE ESTERASE UR QL STRIP: NEGATIVE
LYMPHOCYTES # BLD AUTO: 1.1 10E9/L (ref 0.8–5.3)
LYMPHOCYTES NFR BLD AUTO: 8.4 %
MCH RBC QN AUTO: 28.3 PG (ref 26.5–33)
MCHC RBC AUTO-ENTMCNC: 30.4 G/DL (ref 31.5–36.5)
MCV RBC AUTO: 93 FL (ref 78–100)
MONOCYTES # BLD AUTO: 1 10E9/L (ref 0–1.3)
MONOCYTES NFR BLD AUTO: 7.4 %
MUCOUS THREADS #/AREA URNS LPF: PRESENT /LPF
NEUTROPHILS # BLD AUTO: 10.4 10E9/L (ref 1.6–8.3)
NEUTROPHILS NFR BLD AUTO: 76.9 %
NITRATE UR QL: NEGATIVE
PH UR STRIP: 5.5 PH (ref 5–7)
PLATELET # BLD AUTO: 283 10E9/L (ref 150–450)
POTASSIUM SERPL-SCNC: 3.2 MMOL/L (ref 3.4–5.3)
POTASSIUM SERPL-SCNC: 3.2 MMOL/L (ref 3.4–5.3)
PROCALCITONIN SERPL-MCNC: 0.29 NG/ML
PROT SERPL-MCNC: 6.2 G/DL (ref 6.8–8.8)
RBC # BLD AUTO: 2.97 10E12/L (ref 4.4–5.9)
RBC #/AREA URNS AUTO: <1 /HPF (ref 0–2)
SARS-COV-2 RNA RESP QL NAA+PROBE: NEGATIVE
SODIUM SERPL-SCNC: 136 MMOL/L (ref 133–144)
SODIUM SERPL-SCNC: 136 MMOL/L (ref 133–144)
SOURCE: ABNORMAL
SP GR UR STRIP: 1.01 (ref 1–1.03)
SPECIMEN SOURCE: NORMAL
SQUAMOUS #/AREA URNS AUTO: 0 /HPF (ref 0–1)
UROBILINOGEN UR STRIP-MCNC: NORMAL MG/DL (ref 0–2)
WBC # BLD AUTO: 13.6 10E9/L (ref 4–11)
WBC #/AREA URNS AUTO: 1 /HPF (ref 0–5)

## 2021-04-01 PROCEDURE — 84145 PROCALCITONIN (PCT): CPT | Performed by: SURGERY

## 2021-04-01 PROCEDURE — 36415 COLL VENOUS BLD VENIPUNCTURE: CPT | Performed by: SURGERY

## 2021-04-01 PROCEDURE — 250N000013 HC RX MED GY IP 250 OP 250 PS 637: Performed by: NURSE PRACTITIONER

## 2021-04-01 PROCEDURE — 99233 SBSQ HOSP IP/OBS HIGH 50: CPT | Mod: GC | Performed by: INTERNAL MEDICINE

## 2021-04-01 PROCEDURE — 250N000013 HC RX MED GY IP 250 OP 250 PS 637: Performed by: INTERNAL MEDICINE

## 2021-04-01 PROCEDURE — 250N000013 HC RX MED GY IP 250 OP 250 PS 637: Performed by: THORACIC SURGERY (CARDIOTHORACIC VASCULAR SURGERY)

## 2021-04-01 PROCEDURE — 86140 C-REACTIVE PROTEIN: CPT | Performed by: SURGERY

## 2021-04-01 PROCEDURE — 36415 COLL VENOUS BLD VENIPUNCTURE: CPT | Performed by: PHYSICIAN ASSISTANT

## 2021-04-01 PROCEDURE — 80053 COMPREHEN METABOLIC PANEL: CPT | Performed by: SURGERY

## 2021-04-01 PROCEDURE — 93308 TTE F-UP OR LMTD: CPT

## 2021-04-01 PROCEDURE — 999N000127 HC STATISTIC PERIPHERAL IV START W US GUIDANCE

## 2021-04-01 PROCEDURE — 93325 DOPPLER ECHO COLOR FLOW MAPG: CPT | Mod: 26 | Performed by: INTERNAL MEDICINE

## 2021-04-01 PROCEDURE — 94640 AIRWAY INHALATION TREATMENT: CPT | Mod: 76

## 2021-04-01 PROCEDURE — 999N000157 HC STATISTIC RCP TIME EA 10 MIN

## 2021-04-01 PROCEDURE — U0003 INFECTIOUS AGENT DETECTION BY NUCLEIC ACID (DNA OR RNA); SEVERE ACUTE RESPIRATORY SYNDROME CORONAVIRUS 2 (SARS-COV-2) (CORONAVIRUS DISEASE [COVID-19]), AMPLIFIED PROBE TECHNIQUE, MAKING USE OF HIGH THROUGHPUT TECHNOLOGIES AS DESCRIBED BY CMS-2020-01-R: HCPCS | Performed by: PHYSICIAN ASSISTANT

## 2021-04-01 PROCEDURE — 250N000013 HC RX MED GY IP 250 OP 250 PS 637: Performed by: SURGERY

## 2021-04-01 PROCEDURE — 214N000001 HC R&B CCU UMMC

## 2021-04-01 PROCEDURE — 250N000009 HC RX 250: Performed by: PHYSICIAN ASSISTANT

## 2021-04-01 PROCEDURE — 250N000013 HC RX MED GY IP 250 OP 250 PS 637

## 2021-04-01 PROCEDURE — 85027 COMPLETE CBC AUTOMATED: CPT | Performed by: SURGERY

## 2021-04-01 PROCEDURE — 85610 PROTHROMBIN TIME: CPT | Performed by: SURGERY

## 2021-04-01 PROCEDURE — 85004 AUTOMATED DIFF WBC COUNT: CPT | Performed by: SURGERY

## 2021-04-01 PROCEDURE — 97535 SELF CARE MNGMENT TRAINING: CPT | Mod: GO | Performed by: OCCUPATIONAL THERAPIST

## 2021-04-01 PROCEDURE — 94640 AIRWAY INHALATION TREATMENT: CPT

## 2021-04-01 PROCEDURE — 93308 TTE F-UP OR LMTD: CPT | Mod: 26 | Performed by: INTERNAL MEDICINE

## 2021-04-01 PROCEDURE — 93321 DOPPLER ECHO F-UP/LMTD STD: CPT | Mod: 26 | Performed by: INTERNAL MEDICINE

## 2021-04-01 PROCEDURE — 97110 THERAPEUTIC EXERCISES: CPT | Mod: GO | Performed by: OCCUPATIONAL THERAPIST

## 2021-04-01 PROCEDURE — 250N000013 HC RX MED GY IP 250 OP 250 PS 637: Performed by: PHYSICIAN ASSISTANT

## 2021-04-01 PROCEDURE — 250N000011 HC RX IP 250 OP 636: Performed by: THORACIC SURGERY (CARDIOTHORACIC VASCULAR SURGERY)

## 2021-04-01 PROCEDURE — 80048 BASIC METABOLIC PNL TOTAL CA: CPT | Performed by: PHYSICIAN ASSISTANT

## 2021-04-01 PROCEDURE — 83615 LACTATE (LD) (LDH) ENZYME: CPT | Performed by: SURGERY

## 2021-04-01 PROCEDURE — 81001 URINALYSIS AUTO W/SCOPE: CPT | Performed by: PHYSICIAN ASSISTANT

## 2021-04-01 PROCEDURE — 97116 GAIT TRAINING THERAPY: CPT | Mod: GP | Performed by: PHYSICAL THERAPIST

## 2021-04-01 PROCEDURE — 250N000011 HC RX IP 250 OP 636

## 2021-04-01 PROCEDURE — 97530 THERAPEUTIC ACTIVITIES: CPT | Mod: GP | Performed by: PHYSICAL THERAPIST

## 2021-04-01 PROCEDURE — U0005 INFEC AGEN DETEC AMPLI PROBE: HCPCS | Performed by: PHYSICIAN ASSISTANT

## 2021-04-01 RX ORDER — WARFARIN SODIUM 3 MG/1
3 TABLET ORAL
Status: COMPLETED | OUTPATIENT
Start: 2021-04-01 | End: 2021-04-01

## 2021-04-01 RX ORDER — POTASSIUM CHLORIDE 7.45 MG/ML
10 INJECTION INTRAVENOUS
Status: DISCONTINUED | OUTPATIENT
Start: 2021-04-01 | End: 2021-04-02

## 2021-04-01 RX ORDER — POTASSIUM CHLORIDE 1.5 G/1.58G
20 POWDER, FOR SOLUTION ORAL ONCE
Status: COMPLETED | OUTPATIENT
Start: 2021-04-01 | End: 2021-04-01

## 2021-04-01 RX ORDER — BENZONATATE 100 MG/1
200 CAPSULE ORAL 3 TIMES DAILY
Status: DISCONTINUED | OUTPATIENT
Start: 2021-04-01 | End: 2021-04-04

## 2021-04-01 RX ORDER — WARFARIN SODIUM 5 MG/1
5 TABLET ORAL
Status: DISCONTINUED | OUTPATIENT
Start: 2021-04-01 | End: 2021-04-01

## 2021-04-01 RX ADMIN — Medication 1 PACKET: at 08:25

## 2021-04-01 RX ADMIN — BENZOCAINE AND MENTHOL 1 LOZENGE: 15; 3.6 LOZENGE ORAL at 05:11

## 2021-04-01 RX ADMIN — AMITRIPTYLINE HYDROCHLORIDE 25 MG: 25 TABLET, FILM COATED ORAL at 21:00

## 2021-04-01 RX ADMIN — WARFARIN SODIUM 3 MG: 3 TABLET ORAL at 18:02

## 2021-04-01 RX ADMIN — LIDOCAINE 2 PATCH: 560 PATCH PERCUTANEOUS; TOPICAL; TRANSDERMAL at 20:26

## 2021-04-01 RX ADMIN — Medication 15 ML: at 20:27

## 2021-04-01 RX ADMIN — METOPROLOL TARTRATE 25 MG: 25 TABLET, FILM COATED ORAL at 20:25

## 2021-04-01 RX ADMIN — Medication 220 MG: at 12:19

## 2021-04-01 RX ADMIN — Medication 1 PACKET: at 13:31

## 2021-04-01 RX ADMIN — PANTOPRAZOLE SODIUM 40 MG: 40 TABLET, DELAYED RELEASE ORAL at 07:28

## 2021-04-01 RX ADMIN — AMIODARONE HYDROCHLORIDE 200 MG: 200 TABLET ORAL at 20:25

## 2021-04-01 RX ADMIN — ASPIRIN 81 MG CHEWABLE TABLET 81 MG: 81 TABLET CHEWABLE at 07:28

## 2021-04-01 RX ADMIN — IPRATROPIUM BROMIDE AND ALBUTEROL SULFATE 3 ML: .5; 3 SOLUTION RESPIRATORY (INHALATION) at 15:30

## 2021-04-01 RX ADMIN — BENZONATATE 200 MG: 100 CAPSULE ORAL at 10:19

## 2021-04-01 RX ADMIN — BENZONATATE 200 MG: 100 CAPSULE ORAL at 13:31

## 2021-04-01 RX ADMIN — BENZONATATE 200 MG: 100 CAPSULE ORAL at 20:24

## 2021-04-01 RX ADMIN — Medication 5 ML: at 07:29

## 2021-04-01 RX ADMIN — Medication 1 PACKET: at 20:27

## 2021-04-01 RX ADMIN — AMIODARONE HYDROCHLORIDE 200 MG: 200 TABLET ORAL at 07:28

## 2021-04-01 RX ADMIN — BUMETANIDE 2 MG: 2 TABLET ORAL at 07:28

## 2021-04-01 RX ADMIN — POTASSIUM CHLORIDE 10 MEQ: 7.46 INJECTION, SOLUTION INTRAVENOUS at 22:51

## 2021-04-01 RX ADMIN — METOPROLOL TARTRATE 25 MG: 25 TABLET, FILM COATED ORAL at 07:28

## 2021-04-01 RX ADMIN — POTASSIUM CHLORIDE 20 MEQ: 1.5 POWDER, FOR SOLUTION ORAL at 08:25

## 2021-04-01 RX ADMIN — ACETAMINOPHEN 650 MG: 325 TABLET, FILM COATED ORAL at 15:51

## 2021-04-01 RX ADMIN — ACETAMINOPHEN 650 MG: 325 TABLET, FILM COATED ORAL at 07:29

## 2021-04-01 RX ADMIN — Medication 10 MG: at 20:26

## 2021-04-01 RX ADMIN — ACETAMINOPHEN 650 MG: 325 TABLET, FILM COATED ORAL at 23:35

## 2021-04-01 RX ADMIN — IPRATROPIUM BROMIDE AND ALBUTEROL SULFATE 3 ML: .5; 3 SOLUTION RESPIRATORY (INHALATION) at 12:07

## 2021-04-01 RX ADMIN — TRAZODONE HYDROCHLORIDE 50 MG: 50 TABLET ORAL at 21:00

## 2021-04-01 ASSESSMENT — ACTIVITIES OF DAILY LIVING (ADL)
ADLS_ACUITY_SCORE: 21
ADLS_ACUITY_SCORE: 21
ADLS_ACUITY_SCORE: 18
ADLS_ACUITY_SCORE: 19
ADLS_ACUITY_SCORE: 18
ADLS_ACUITY_SCORE: 18

## 2021-04-01 NOTE — PROGRESS NOTES
Nephrology Progress Note  04/01/2021    Assessment & Recommendations:  Jin Garrett is a 80 year old with PMH HTN, AAA, HLD, RA who presented to OSH with SOB and atypical CP. TTE showed severe MR with ?posterior flail leaflet, EF preserved. On arrival at [facility] an Impella was emergently placed. S/p MVR and single vessel CABG on 3/5/21. Returned from the OR on central ECMO with an open chest, now s/p decannulation on 3/9. Nephrology consulted for ADAN. Initiated on CRRT 3/6, transitioned to iHD 3/13.     ADAN in the setting of cardiogenic shock, was on ECMO, s/p decannulation on 3/9  Unknown baseline, no h/o CKD per daughter.  Etiology likely ATN in the setting of shock, no obstruction per CT and urinalysis showed granular casts which is c/w ATN.  Access: RIJ TDC.    BP - stable, has needed midodrine but stopped since 3/27  Hypervolemia - improving  Hypokalemia - replace prn, this is low likely in the setting of diuretic  No acute acid base issues  Anemia - Stable     Recommendations:  - UOP improving and Cr now decreasing as well, would tentatively alert IR to plan for TDC removal tomorrow if metabolic panel continues to show improvement  - Agree with PO diuretic, if pt continues to remain net neg can even consider holding or halving dose depending on UOP and continue to monitor while here  - Renal diet  - Daily weights  - Strict I/Os   - Plan is to transfer to ARU in [place] once medically able    Pt will need follow-up with Nephrology 3-4 wks after discharge in [place].     Recommendations were communicated to primary team verbally     Seen and discussed with Dr. Freddy Potts MD  [phone]    Interval History:  Nursing and provider notes from last 24 hours reviewed.  In the last 24 hours Jin Garrett remained clinically stable. UOP remains excellent on PO bumex. Denies any new concerns.    Review of Systems:  ROS neg as above    Physical Exam:  I/O last 3 completed shifts:  In: 1375 [P.O.:240;  "NG/GT:240]  Out: 2425 [Urine:2425]  /73 (BP Location: Left arm)   Pulse 93   Temp 98.2  F (36.8  C) (Oral)   Resp 18   Ht 1.803 m (5' 11\")   Wt 77.8 kg (171 lb 8 oz)   SpO2 96%   BMI 23.92 kg/m      GENERAL APPEARANCE: NAD  EYES: no scleral icterus, pupils equal  PULM: breathing non-labored  CV: regular rhythm, normal rate     -edema trace  GI: soft, nondistended  NEURO: AOx3, speech normal  Access RI TDC    Labs:   All labs reviewed by me  Electrolytes/Renal -   Recent Labs   Lab Test 04/01/21  0612 03/31/21  0646 03/30/21  0609 03/29/21  0535    136 139 136   POTASSIUM 3.2* 3.4 3.9 4.0   CHLORIDE 99 98 100 101   CO2 26 28 27 26   * 143* 134* 113*   CR 4.79* 5.00* 4.90* 4.49*   * 112* 98 111*   PALLAVI 8.6 8.9 9.1 8.7   MAG  --  2.4* 2.4* 2.4*   PHOS  --  5.7* 4.9* 4.7*       CBC -   Recent Labs   Lab Test 04/01/21  0612 03/31/21  0646 03/30/21  0609   WBC 13.6* 11.6* 10.1   HGB 8.4* 8.4* 8.2*    266 268       LFTs -   Recent Labs   Lab Test 04/01/21  0612 03/31/21  0646 03/30/21  0609   ALKPHOS 212* 199* 197*   BILITOTAL 0.4 0.4 0.5   ALT 36 40 42   AST 26 33 43   PROTTOTAL 6.2* 5.8* 6.0*   ALBUMIN 2.5* 2.3* 2.4*       Iron Panel -   Recent Labs   Lab Test 03/20/21  0637   IRON 11*   IRONSAT 6*   RAYNE 1,302*     Imaging:  All imaging studies reviewed by me.     Current Medications:    acetaminophen  650 mg Oral or Feeding Tube Q8H     amiodarone  200 mg Oral BID     amitriptyline  25 mg Oral or Feeding Tube At Bedtime     artificial saliva  15 mL Swish & Spit 4x Daily     aspirin  81 mg Oral or NG Tube Daily     B and C vitamin Complex with folic acid  5 mL Oral Daily     benzonatate  200 mg Oral TID     bumetanide  2 mg Oral Daily     ferrous sulfate  220 mg Oral Daily     fiber modular (NUTRISOURCE FIBER)  1 packet Per Feeding Tube TID     ipratropium - albuterol 0.5 mg/2.5 mg/3 mL  3 mL Nebulization 4x daily     lidocaine  2 patch Transdermal Q24h    And     lidocaine   " Transdermal Q8H     magnesium sulfate  2 g Intravenous Once     melatonin  10 mg Oral or Feeding Tube QPM     metoprolol tartrate  25 mg Oral or Feeding Tube BID     pantoprazole  40 mg Oral Daily     protein modular  1 packet Per Feeding Tube TID     traZODone  50 mg Oral or Feeding Tube At Bedtime     warfarin ANTICOAGULANT  5 mg Oral ONCE at 18:00       - MEDICATION INSTRUCTIONS -       Warfarin Therapy Reminder       Ramses Potts MD

## 2021-04-01 NOTE — PLAN OF CARE
Patient admitted 3/4 with chest pain, dyspnea on exertion found to have mitral valve regurgitation. Patient s/p mitral valve replacement and single vessel CABG on 3/5. Hx: HTN, AAA, HLD and rheumatoid arthritis.     Neuro: A&O x3-4, intermittently disoriented to time.  Cardiac: VSS. SR. Afebrile.   Respiratory: Room air, short of breath with activity. Persistent dry, nonproductive cough; splinting well. PRN Cepacol lozenges.  GI/: Adequate urine output, using urinal. LBM 3/31. Denies nausea.   Diet: Cyclic tube feed from 6pm to 12pm at goal rate of 70mL/hr. Regular diet with thin liquids, calorie counts.  Skin: Incisions healing well. No new issues noted.  LDAs: NJ in place with tube feeds running. Right PIV, saline locked. CVC for dialysis if needed.  Activity: Assist x1 with gait belt and walker.  Pain: Incisional pain controlled well with scheduled tylenol and lidocaine patches.     Plan: Continue to monitor. Transfer to ARU in [place] pending medical stability.

## 2021-04-01 NOTE — PROGRESS NOTES
Care Management Follow Up     Length of Stay (days): 28     Expected Discharge Date: TBD     Concerns to be Addressed: discharge planning     Patient plan of care discussed at interdisciplinary rounds: Yes     Anticipated Discharge Disposition: ARU     Anticipated Discharge Services:  TBD  Anticipated Discharge DME:  TBD     Patient/family educated on Medicare website which has current facility and service quality ratings: no  Education Provided on the Discharge Plan:  yes  Patient/Family in Agreement with the Plan: yes     Referrals Placed by CM/SW:  None at this time  Private pay costs discussed: transportation costs     Additional Information:  Following up on ARU referrals. Spoke with Leticia from [facility] ARU and she states they can accept pt. They would prefer HD line be removed prior to d/c if he is not going to need ongoing HD. Leticia also prefers feeding tube be removed if possible prior to d/c but they can manage NJ/NG if needed. They do not accept admissions on the weekend. Able to accept pt Monday. If pt to d/c Monday Leticia states COVID test will need to be drawn Sunday PM.    Received request to update pt's daughter Pamela. Spoke with Pamela and provided update. Pamela agreeable to d/c to [facility] and states pt prefers this option. Discussed potential private pay cost of w/c van and that pre-payment may be required- Pamela agreeable with this.      Referral status:  1) [facility] (ph [phone], fax [phone])- accepted for Monday if pt stable  2) [facility] (ph [phone], fax [phone])- faxed referral; admissions notes no beds until next week     LEONEL Barton, LICSW  6C   [facility]  Pager [phone]  Phone [phone]

## 2021-04-01 NOTE — PROGRESS NOTES
Admitted 3/5 from OSH for CABGx1 and MVR. PMH a-fib, AAA, HTN, HLD, CAD, rheumatoid arthritis.     Neuro: A&Ox4.   Cardiac: Afebrile, VSS. SR with occassional PAC's.   Respiratory: RA   GI/: Voiding spontaneously. No BM this shift.   Diet/appetite: Tolerating regular thin liquid diet. Denies nausea. TF running at 70ml/hr from 6pm-noon tomorrow (cyclic feeds). Calorie counting. Free water flushes 30ml Q4H.    Activity: Up with 1 assist    Pain: C/o chest incision pain, x-ray obtained. Given tylenol as scheduled, tessalon for cough.    Skin: No new deficits noted.   Lines: R PIV SL.  Drains: none   Replacement: potassium replaced.     Bed alarm on per pt confusion at end of shift. Delirium still clearing.     Pt has been resting comfortably, will continue to monitor and follow plan of care. CVTS pt.

## 2021-04-01 NOTE — PROGRESS NOTES
Calorie Count  Intake recorded for: 3/31  Total Kcals: 54 Total Protein: 2g  Kcals from Hospital Food: 54   Protein: 2g  Kcals from Outside Food (average):0 Protein: 0g  # Meals Ordered from Kitchen: 1 meal   # Meals Recorded: 1 meal - less than 25% orange, hamburger  # Supplements Recorded: 0

## 2021-04-01 NOTE — PROGRESS NOTES
Cardiovascular Surgery Progress Note  04/01/2021           Assessment and Plan:   Jin Garrett is a 80 year old male with PMH of HTN, AAA, HLD and rheumatoid arthritis. He presented to an OSH with shortness of breath and atypical chest pain. TTE showed severe MR with concern for posterior flail leaflet, EF was preserved. On arrival at [facility] (3/4/21) an Impella was emergently placed in cath lab. He was found to have a ruptured posterior papillary muscle causing severe MR.   Jin is now s/p mitral valve replacement and single vessel CABG with Dr. Porras on 3/5/21. Returned from the OR on central ECMO with an open chest, intubated and sedated. Returned to the OR for a wash out on 3/9, found to have a thrombus on his mitral valve so he had the valve replaced, decannulated central ECMO, bronchoscopy and placement of temporary L subclavian HD line. Exchanged Left subclavian for Right IJ Tunneled dialysis line. Return of UOP and delirium cleared 3/27.     Plans for Today (4/1):  1. Post operative TTE  2. Discuss HD line removal with Nephrology  3. Extend Calorie Counts through 4/4  4. Continue trazodone for insomnia, seems to be helping   5. CRP, ProCal, WBC diff, and UA for leukocytosis  6. 2-view Chest xray for xiphoid pain during coughing spell, ? Broken wire  7. Routine post-op ECHO for follow up      Cardiovascular:   Hx of HTN, HLD, AAA  Acute severe MR due to ruptured papillary muscle, s/p tissue MVR  Cardiogenic shock secondary to severe MR  S/p Impella placement and conversion to central VA ECMO  Weaned off mechanical support, ECMO was utilized from 3/5 to 3/9.   CAD, mid LCx with 100% occlusion, s/p 1 vessel CAB   S/P redo MVR due to thrombus in left atrium and on bioprosthetic MV  Most recent echo showed LV EF 55-60%. LUE US 3/14 with nonocclusive thrombus in right cephalic vein, no UE DVT.     - ASA 81 mg PO Daily  - Metoprolol 25 mg PO BID  - atorvastatin on hold due to elevated LFTs.   Pre and post-op Atrial  fibrillation   Has been mostly rate controlled and anticoagulated. Hep gtt held since 3/22 due to epistaxis. EKG 3/22 confirming A-fib ('s), likely contributing to hypotension, given Amio 150 mg IV bolus x 1 dose 3/22 and restarted 400 mg PO BID 3/23 AM with return to NSR, now reduced to 200 mg PO BID 3/29.    Hypotension now resolved. Patient requiring midodrine 15 mg TID to maintain MAPS > 60. Improved since amiodarone restart and conversion to NSR. Stopped midodrine 3/27 am.   - amiodarone 200 mg PO daily  - Post-operative TTE to be completed today (4/1)     Pulmonary:  Acute respiratory failure secondary to acute MR (resolved)  - Extubated POD 3 (chest closure and ecmo decan) to 4 lpm via NC; now saturating well on 2 lpm.   - Supplemental O2 PRN to keep sats > 92%. Wean off as tolerated.  - Pulm hygiene, IS, activity and deep breathing     Neurology / MSK:  Delirium Had extended time on sedation due to need for central VA ECMO. Avoiding narcs and benadryl. Improving, no bedside attendent and no hand mitts since 3/28. Psych consult 3/25, tried zyprexa 5 mg but did not tolerate it. Returned to Seroquel and tolerated it well. Stop Seroquel (3/31).  Insomnia  - Sleep improved with addition of trazodone w/o noticeable side effects.   - Melatonin 10 mg q PM  - Trazodone 50 mg PO q PM  - Acute post-operative pain; pain well controlled with acetaminophen and tramadol.   Chronic back pain   Recent hip surgery  - No weight bearing or activity restrictions related to recent hip surgery per family (records not available).    - Aqua K pad helping.      HEENT:   Hemoptysis  Epistaxis    - Suspecting hemoptysis is secondary epistaxis due to trauma to posterior nasal septum from the NG tube bridle and him tugging on it.    - Has hand mitts PRN. Tube is no longer bridled.        / Renal:  ADAN secondary to cardiogenic shock  Hyperkalemia   - No Hx of renal disease. Most recent creatinine 2.9 and improving.   - Oliguric,  has condom catheter. Bumex 3 mg IV challenge on 3/22 am, minimal UOP.  Bumex 4 mg IV on non-HD days. Now making much more urine.  Bumex 2 mg PO daily per Nephrology. Holding since 4/1 afternoon.   - Nephrology following; most likely cardiogenic shock induced ATN. CRRT transitioned to intermittent HD, fluid removal impeded by hypoTN. HD runs have now been able to pull 1 L of fluid on 3/23, 3/24, 3/26. Last HD run 3/26. Still need to monitor BUN/Creat before deciding to pull HD line. Likely pull 4/2.     GI / FEN:   Melena: Noticed 3/23. Had epistaxis 2 days prior and likely swallowed some of the blood. No episodes since.  Dysphagia:  Poor appetite, not meeting nutritional needs from oral intake.  - Regular Diet with thin liquids per speech  - NJ in place for all nutrition supplement. Cycle Tube Feeds 3/31  - Calorie counts 4/1 to 4/4  - Replace electrolytes as needed, hepatic enzymes improving.     Endocrine:  Stress induced hyperglycemia  - Initially managed on insulin drip, transitioned to sliding scale and now off insulin (3/16). No acute needs.       Infectious Disease:   Leukocytosis: C diff negative 3/18, UA on 3/22 and 3/23 were clean and CRP has been slowly trending down.  - Completed perioperative antibiotics.   - WBC trending up, 13.6 today, no longer having febrile episodes, no other signs or symptoms of infection. Patient did have coiled PICC line which was removed 3/31 and may be a contributing factor, continue to trend.   - Collect UA, will trend Procal and CRP.      Hematology:   Acute blood loss anemia  Epistaxis, resolved   - Hgb 5.7 on 3/22. Given 2 units PRBC and 1 unit plasma on 3/22, one unit PRBC 3/23.  Hgb 8.4 and stable. Will plan to give 1 unit if < 7.5.    - Plt WNL, no persistent signs or symptoms of active bleeding, melena 3/22 overnight could be residual from previous epistaxis.   - LUE US 3/14 with non-occlusive thrombus in right cephalic vein, no UE DVT.      Anticoagulation:   -  Coumadin for atrial fibrillation and left atrial thrombus, INR goal 2-3. Most recent INR 2.72.    - Low intensity heparin gtt bridging to therapeutic INR, HELD since 3/22 am due to epistaxis and melena. Restarted straight rate 3/25 to see if he is tolerating it now that Hgb is improving. Advanced to low intensity Hep gtt 3/27 and off 3/28 due to INR 1.8 and rising quickly in setting of recent epistaxis.      Prophylaxis:   - Stress ulcer prophylaxis: Pantoprazole 40 mg daily for 30 days  - DVT prophylaxis: Subcutaneous heparin, SCD     Disposition:   - Transferred to  on 3/19   - Therapies recommending discharge to TCU vs. ARU.  - Family hopes to transfer back up to [place]. Will know more about dialysis needs upon discussion with nephrology team.       Roshan [facility]-NP Student  Cardiothoracic Surgery    I have seen and examined this patient with the PA student, I agree with the above findings.  Patient discussed with staff.     Bhavik Lemus PA-C  Cardiothoracic Surgery  Pager [phone]    9:44 AM   April 1, 2021        Interval History:   No acute events overnight. States pain is well managed on current regimen, Breathing is non labored at rest with dyspnea on exertion. Tolerating diet, is passing flatus, + BM. Denies chest pain, palpitations, dizziness, syncopal symptoms, chills, myalgias or sternal popping/clicking. Reports xiphoid pain with coughing spells, xrays indicate stable sternum w/o acute concerns. Irritated by feeding tube and anxious to get it removed, but not impulsively pulling at it. Reports improved sleep overnight and tolerance of new trazodone dose.          Medications:       acetaminophen  650 mg Oral or Feeding Tube Q8H     amiodarone  200 mg Oral BID     amitriptyline  25 mg Oral or Feeding Tube At Bedtime     artificial saliva  15 mL Swish & Spit 4x Daily     aspirin  81 mg Oral or NG Tube Daily     B and C vitamin Complex with folic acid  5 mL Oral Daily     bumetanide  2 mg  "Oral Daily     ferrous sulfate  220 mg Oral Daily     fiber modular (NUTRISOURCE FIBER)  1 packet Per Feeding Tube TID     ipratropium - albuterol 0.5 mg/2.5 mg/3 mL  3 mL Nebulization 4x daily     lidocaine  2 patch Transdermal Q24h    And     lidocaine   Transdermal Q8H     magnesium sulfate  2 g Intravenous Once     melatonin  10 mg Oral or Feeding Tube QPM     metoprolol tartrate  25 mg Oral or Feeding Tube BID     pantoprazole  40 mg Oral Daily     potassium chloride  20 mEq Oral or Feeding Tube Once     protein modular  1 packet Per Feeding Tube TID     traZODone  50 mg Oral or Feeding Tube At Bedtime     albumin human, albumin human, alum & mag hydroxide-simethicone, sore throat lozenge, bisacodyl, hydrALAZINE, loperamide, - MEDICATION INSTRUCTIONS -, midodrine, naloxone **OR** naloxone **OR** naloxone **OR** naloxone, ondansetron **OR** ondansetron, QUEtiapine, senna-docusate, sodium chloride (PF), sodium chloride (PF), Warfarin Therapy Reminder          Physical Exam:   Vitals were reviewed  Blood pressure 118/73, pulse 93, temperature 98.2  F (36.8  C), temperature source Oral, resp. rate 18, height 1.803 m (5' 11\"), weight 77.8 kg (171 lb 8 oz), SpO2 96 %.  Vitals:    03/29/21 0640 03/30/21 1230 03/31/21 0608   Weight: 81.4 kg (179 lb 8 oz) 79.4 kg (175 lb) 77.8 kg (171 lb 8 oz)      I/O last 3 completed shifts:  In: 1375 [P.O.:240; NG/GT:240]  Out: 2425 [Urine:2425]    Gen: A&Oriented to all but the year, NAD  CV: RRR, normal S1, S2, no murmurs, rubs or gallops   Pulm:  LS clear, No wheezing or rhonchi  Abd: Soft, NT, ND, +BS  Ext: No LE edema  Neuro:  Nonfocal, PERRLA, reports sternal pain  Incision: c/d/i, no erythema, sternum stable         Data:    All labs and imaging reviewed by me.  Labs:    Data   BMP  Recent Labs   Lab 04/01/21  0612 03/31/21  0646 03/30/21  0609 03/29/21  0535    136 139 136   POTASSIUM 3.2* 3.4 3.9 4.0   CHLORIDE 99 98 100 101   PALLAVI 8.6 8.9 9.1 8.7   CO2 26 28 27 26 "   * 143* 134* 113*   CR 4.79* 5.00* 4.90* 4.49*   * 112* 98 111*     CBC  Recent Labs   Lab 04/01/21  0612 03/31/21  0646 03/30/21  0609 03/29/21  0535   WBC 13.6* 11.6* 10.1 8.6   RBC 2.97* 2.88* 2.91* 2.92*   HGB 8.4* 8.4* 8.2* 8.3*   HCT 27.6* 26.7* 26.5* 26.8*   MCV 93 93 91 92   MCH 28.3 29.2 28.2 28.4   MCHC 30.4* 31.5 30.9* 31.0*   RDW 16.6* 16.8* 16.7* 16.7*    266 268 243     INR  Recent Labs   Lab 04/01/21  0612 03/31/21  0646 03/30/21  0609 03/29/21  0535   INR 2.72* 2.69* 2.61* 2.14*      Hepatic Panel   Recent Labs   Lab 04/01/21  0612 03/31/21  0646 03/30/21  0609 03/29/21  0535   AST 26 33 43 32   ALT 36 40 42 32   ALKPHOS 212* 199* 197* 191*   BILITOTAL 0.4 0.4 0.5 0.4   ALBUMIN 2.5* 2.3* 2.4* 2.1*     Glucose  Recent Labs   Lab 04/01/21  0612 03/31/21  0646 03/30/21  0609 03/29/21  0535 03/28/21  0446 03/27/21  0554 03/26/21  2317 03/26/21  1540   * 112* 98 111* 126* 116*  --   --    BGM  --   --   --   --   --   --  108* 89       Imaging:  Recent Results (from the past 24 hour(s))   XR Abdomen Port 1 View    Narrative    EXAMINATION:  XR ABDOMEN PORT 1 VW 3/31/2021 5:30 PM     COMPARISON: Fluoroscopy 3/29/2021, radiograph 3/27/2021.    HISTORY: Epigastric pain.    TECHNIQUE: Frontal view of the abdomen.    FINDINGS: Feeding tube tip projects over the proximal jejunum.   Partial visualization of external cardiac pacing lead in similar  orientation compared to prior. No abnormally dilated loops of bowel.   No pneumatosis or portal venous gas.  The visualized left lung base is  unremarkable. Scoliosis and spondylosis of the thoracic and lumbar  spine. Left sacroiliac joint fusion.      Impression    IMPRESSION:   1. Feeding tube tip projects over the proximal jejunum  2. Nonobstructive bowel gas pattern.    I have personally reviewed the examination and initial interpretation  and I agree with the findings.    KATHIE PLAZA MD   XR Chest Port 1 View    Narrative     EXAM: XR CHEST PORT 1 VW  3/31/2021 5:31 PM     HISTORY:  xiphoid pain after coughing today       COMPARISON:  Radiographs 3/29/2021    TECHNIQUE: Single frontal radiograph of the chest    FINDINGS:   Right IJ central line tip is stable, tip projecting over the high  right atrium. Enteric tube courses through the stomach and tip extends  in the field-of-view. Right upper extremity PICC line has been  removed. Intact sternotomy wires. Atherosclerotic calcification of the  aortic arch.    Increased density medially if clinically in the right upper lobe.  Well-defined cardiomediastinal silhouette. Distinct pulmonary  vasculature. No consolidation, pleural effusion or appreciable  pneumothorax.      Impression    IMPRESSION:   1. No acute consolidation, pleural effusion, or pneumothorax.  2. Increased density in the right upper lobe medially apically. Right  upper lobe atelectasis versus projectional as the patient is slightly  GUERRIER.  If clinically indicated, obtain CT scan to clarify.  4. Right upper extremity PICC line is removed. Remaining support  devices stable.    I have personally reviewed the examination and initial interpretation  and I agree with the findings.    KATHIE PLAZA MD

## 2021-04-01 NOTE — PROGRESS NOTES
CVTS:     May have HD line out tomorrow.   Reduced Coumadin dose today in anticipation.     Bhavik Lemus PA-C  Cardiothoracic Surgery  Pager [phone]    5:55 PM   April 1, 2021

## 2021-04-02 ENCOUNTER — APPOINTMENT (OUTPATIENT)
Dept: OCCUPATIONAL THERAPY | Facility: CLINIC | Age: 81
DRG: 003 | End: 2021-04-02
Attending: INTERNAL MEDICINE
Payer: MEDICARE

## 2021-04-02 ENCOUNTER — APPOINTMENT (OUTPATIENT)
Dept: INTERVENTIONAL RADIOLOGY/VASCULAR | Facility: CLINIC | Age: 81
DRG: 003 | End: 2021-04-02
Attending: NURSE PRACTITIONER
Payer: MEDICARE

## 2021-04-02 ENCOUNTER — APPOINTMENT (OUTPATIENT)
Dept: PHYSICAL THERAPY | Facility: CLINIC | Age: 81
DRG: 003 | End: 2021-04-02
Attending: INTERNAL MEDICINE
Payer: MEDICARE

## 2021-04-02 ENCOUNTER — APPOINTMENT (OUTPATIENT)
Dept: GENERAL RADIOLOGY | Facility: CLINIC | Age: 81
DRG: 003 | End: 2021-04-02
Attending: PHYSICIAN ASSISTANT
Payer: MEDICARE

## 2021-04-02 LAB
ALBUMIN SERPL-MCNC: 2.4 G/DL (ref 3.4–5)
ALP SERPL-CCNC: 174 U/L (ref 40–150)
ALT SERPL W P-5'-P-CCNC: 32 U/L (ref 0–70)
ANION GAP SERPL CALCULATED.3IONS-SCNC: 9 MMOL/L (ref 3–14)
AST SERPL W P-5'-P-CCNC: 26 U/L (ref 0–45)
BILIRUB SERPL-MCNC: 0.4 MG/DL (ref 0.2–1.3)
BUN SERPL-MCNC: 145 MG/DL (ref 7–30)
CALCIUM SERPL-MCNC: 9 MG/DL (ref 8.5–10.1)
CHLORIDE SERPL-SCNC: 99 MMOL/L (ref 94–109)
CO2 SERPL-SCNC: 29 MMOL/L (ref 20–32)
CREAT SERPL-MCNC: 4.45 MG/DL (ref 0.66–1.25)
ERYTHROCYTE [DISTWIDTH] IN BLOOD BY AUTOMATED COUNT: 16.7 % (ref 10–15)
GFR SERPL CREATININE-BSD FRML MDRD: 12 ML/MIN/{1.73_M2}
GLUCOSE SERPL-MCNC: 99 MG/DL (ref 70–99)
HCT VFR BLD AUTO: 26.4 % (ref 40–53)
HGB BLD-MCNC: 8.4 G/DL (ref 13.3–17.7)
INR PPP: 2.76 (ref 0.86–1.14)
LDH SERPL L TO P-CCNC: 380 U/L (ref 85–227)
MCH RBC QN AUTO: 29.7 PG (ref 26.5–33)
MCHC RBC AUTO-ENTMCNC: 31.8 G/DL (ref 31.5–36.5)
MCV RBC AUTO: 93 FL (ref 78–100)
PHOSPHATE SERPL-MCNC: 5.2 MG/DL (ref 2.5–4.5)
PLATELET # BLD AUTO: 269 10E9/L (ref 150–450)
POTASSIUM SERPL-SCNC: 3.4 MMOL/L (ref 3.4–5.3)
PROT SERPL-MCNC: 6.1 G/DL (ref 6.8–8.8)
RBC # BLD AUTO: 2.83 10E12/L (ref 4.4–5.9)
SODIUM SERPL-SCNC: 136 MMOL/L (ref 133–144)
WBC # BLD AUTO: 14.1 10E9/L (ref 4–11)

## 2021-04-02 PROCEDURE — 250N000013 HC RX MED GY IP 250 OP 250 PS 637

## 2021-04-02 PROCEDURE — 0JPT3XZ REMOVAL OF TUNNELED VASCULAR ACCESS DEVICE FROM TRUNK SUBCUTANEOUS TISSUE AND FASCIA, PERCUTANEOUS APPROACH: ICD-10-PCS | Performed by: PHYSICIAN ASSISTANT

## 2021-04-02 PROCEDURE — 71046 X-RAY EXAM CHEST 2 VIEWS: CPT | Mod: 26 | Performed by: RADIOLOGY

## 2021-04-02 PROCEDURE — 250N000013 HC RX MED GY IP 250 OP 250 PS 637: Performed by: STUDENT IN AN ORGANIZED HEALTH CARE EDUCATION/TRAINING PROGRAM

## 2021-04-02 PROCEDURE — 84100 ASSAY OF PHOSPHORUS: CPT | Performed by: SURGERY

## 2021-04-02 PROCEDURE — 83615 LACTATE (LD) (LDH) ENZYME: CPT | Performed by: SURGERY

## 2021-04-02 PROCEDURE — 94640 AIRWAY INHALATION TREATMENT: CPT

## 2021-04-02 PROCEDURE — 999N000156 HC STATISTIC RCP CONSULT EA 30 MIN

## 2021-04-02 PROCEDURE — 97535 SELF CARE MNGMENT TRAINING: CPT | Mod: GO

## 2021-04-02 PROCEDURE — 71046 X-RAY EXAM CHEST 2 VIEWS: CPT

## 2021-04-02 PROCEDURE — 250N000013 HC RX MED GY IP 250 OP 250 PS 637: Performed by: PHYSICIAN ASSISTANT

## 2021-04-02 PROCEDURE — 214N000001 HC R&B CCU UMMC

## 2021-04-02 PROCEDURE — 97530 THERAPEUTIC ACTIVITIES: CPT | Mod: GP | Performed by: PHYSICAL THERAPIST

## 2021-04-02 PROCEDURE — 85610 PROTHROMBIN TIME: CPT | Performed by: SURGERY

## 2021-04-02 PROCEDURE — 80053 COMPREHEN METABOLIC PANEL: CPT | Performed by: SURGERY

## 2021-04-02 PROCEDURE — 97530 THERAPEUTIC ACTIVITIES: CPT | Mod: GO

## 2021-04-02 PROCEDURE — 250N000013 HC RX MED GY IP 250 OP 250 PS 637: Performed by: INTERNAL MEDICINE

## 2021-04-02 PROCEDURE — 999N000127 HC STATISTIC PERIPHERAL IV START W US GUIDANCE

## 2021-04-02 PROCEDURE — 97116 GAIT TRAINING THERAPY: CPT | Mod: GP | Performed by: PHYSICAL THERAPIST

## 2021-04-02 PROCEDURE — 250N000013 HC RX MED GY IP 250 OP 250 PS 637: Performed by: NURSE PRACTITIONER

## 2021-04-02 PROCEDURE — 99232 SBSQ HOSP IP/OBS MODERATE 35: CPT | Mod: GC | Performed by: INTERNAL MEDICINE

## 2021-04-02 PROCEDURE — 250N000013 HC RX MED GY IP 250 OP 250 PS 637: Performed by: THORACIC SURGERY (CARDIOTHORACIC VASCULAR SURGERY)

## 2021-04-02 PROCEDURE — 250N000013 HC RX MED GY IP 250 OP 250 PS 637: Performed by: SURGERY

## 2021-04-02 PROCEDURE — 36589 REMOVAL TUNNELED CV CATH: CPT | Performed by: PHYSICIAN ASSISTANT

## 2021-04-02 PROCEDURE — 36589 REMOVAL TUNNELED CV CATH: CPT

## 2021-04-02 PROCEDURE — 250N000009 HC RX 250: Performed by: PHYSICIAN ASSISTANT

## 2021-04-02 PROCEDURE — 85027 COMPLETE CBC AUTOMATED: CPT | Performed by: SURGERY

## 2021-04-02 PROCEDURE — 36415 COLL VENOUS BLD VENIPUNCTURE: CPT | Performed by: SURGERY

## 2021-04-02 RX ORDER — POTASSIUM CHLORIDE 1.5 G/1.58G
20 POWDER, FOR SOLUTION ORAL ONCE
Status: COMPLETED | OUTPATIENT
Start: 2021-04-02 | End: 2021-04-02

## 2021-04-02 RX ORDER — WARFARIN SODIUM 3 MG/1
3 TABLET ORAL
Status: COMPLETED | OUTPATIENT
Start: 2021-04-02 | End: 2021-04-02

## 2021-04-02 RX ORDER — IPRATROPIUM BROMIDE AND ALBUTEROL SULFATE 2.5; .5 MG/3ML; MG/3ML
3 SOLUTION RESPIRATORY (INHALATION) 2 TIMES DAILY
Status: DISCONTINUED | OUTPATIENT
Start: 2021-04-02 | End: 2021-04-05 | Stop reason: HOSPADM

## 2021-04-02 RX ORDER — POTASSIUM CHLORIDE 20MEQ/15ML
20 LIQUID (ML) ORAL ONCE
Status: DISCONTINUED | OUTPATIENT
Start: 2021-04-02 | End: 2021-04-05 | Stop reason: HOSPADM

## 2021-04-02 RX ADMIN — BENZONATATE 200 MG: 100 CAPSULE ORAL at 09:10

## 2021-04-02 RX ADMIN — AMIODARONE HYDROCHLORIDE 200 MG: 200 TABLET ORAL at 09:10

## 2021-04-02 RX ADMIN — WARFARIN SODIUM 3 MG: 3 TABLET ORAL at 17:26

## 2021-04-02 RX ADMIN — PANTOPRAZOLE SODIUM 40 MG: 40 TABLET, DELAYED RELEASE ORAL at 09:10

## 2021-04-02 RX ADMIN — IPRATROPIUM BROMIDE AND ALBUTEROL SULFATE 3 ML: .5; 3 SOLUTION RESPIRATORY (INHALATION) at 08:03

## 2021-04-02 RX ADMIN — TRAZODONE HYDROCHLORIDE 50 MG: 50 TABLET ORAL at 21:21

## 2021-04-02 RX ADMIN — ASPIRIN 81 MG CHEWABLE TABLET 81 MG: 81 TABLET CHEWABLE at 09:10

## 2021-04-02 RX ADMIN — METOPROLOL TARTRATE 25 MG: 25 TABLET, FILM COATED ORAL at 20:24

## 2021-04-02 RX ADMIN — BENZOCAINE AND MENTHOL 1 LOZENGE: 15; 3.6 LOZENGE ORAL at 00:23

## 2021-04-02 RX ADMIN — AMIODARONE HYDROCHLORIDE 200 MG: 200 TABLET ORAL at 20:23

## 2021-04-02 RX ADMIN — Medication 5 ML: at 09:10

## 2021-04-02 RX ADMIN — QUETIAPINE 25 MG: 25 TABLET ORAL at 03:23

## 2021-04-02 RX ADMIN — POTASSIUM CHLORIDE 20 MEQ: 1.5 POWDER, FOR SOLUTION ORAL at 00:34

## 2021-04-02 RX ADMIN — AMITRIPTYLINE HYDROCHLORIDE 25 MG: 25 TABLET, FILM COATED ORAL at 21:21

## 2021-04-02 RX ADMIN — BENZOCAINE AND MENTHOL 1 LOZENGE: 15; 3.6 LOZENGE ORAL at 21:21

## 2021-04-02 RX ADMIN — BENZONATATE 200 MG: 100 CAPSULE ORAL at 20:24

## 2021-04-02 RX ADMIN — LIDOCAINE 2 PATCH: 560 PATCH PERCUTANEOUS; TOPICAL; TRANSDERMAL at 20:24

## 2021-04-02 RX ADMIN — Medication 15 ML: at 20:25

## 2021-04-02 RX ADMIN — Medication 220 MG: at 13:30

## 2021-04-02 RX ADMIN — Medication 10 MG: at 20:23

## 2021-04-02 RX ADMIN — METOPROLOL TARTRATE 25 MG: 25 TABLET, FILM COATED ORAL at 09:10

## 2021-04-02 RX ADMIN — ACETAMINOPHEN 650 MG: 325 TABLET, FILM COATED ORAL at 17:26

## 2021-04-02 RX ADMIN — ACETAMINOPHEN 650 MG: 325 TABLET, FILM COATED ORAL at 09:10

## 2021-04-02 ASSESSMENT — ACTIVITIES OF DAILY LIVING (ADL)
ADLS_ACUITY_SCORE: 18
ADLS_ACUITY_SCORE: 19

## 2021-04-02 ASSESSMENT — MIFFLIN-ST. JEOR: SCORE: 1507.33

## 2021-04-02 NOTE — PROCEDURES
Interventional Radiology Brief Post Procedure Note    Procedure: IR Tunneled Line Removal-RIGHT     Proceduralist: Gudelia Perrin PA-C     Assistant: Elmo Salcido PA-C    Time Out: Prior to the start of the procedure and with procedural staff participation, I verbally confirmed the patient s identity using two indicators, relevant allergies, that the procedure was appropriate and matched the consent or emergent situation, and that the correct equipment/implants were available. Immediately prior to starting the procedure I conducted the Time Out with the procedural staff and re-confirmed the patient s name, procedure, and site/side. (The Joint Commission universal protocol was followed.)  Yes    Sedation: None.     Findings: Removal of RIGHT tunneled central venous catheter    Estimated Blood Loss: Minimal    SPECIMENS: None    Complications: 1. None     Condition: Stable    Plan: Transport to inpatient floor for recovery    Comments: See dictated procedure note for full details.    Gudelia Perrin PA-C

## 2021-04-02 NOTE — CONSULTS
"    Interventional Radiology Consult Service Note    Patient is on IR schedule 4/2 for a RIJ TCVC removal.   Labs WNL for procedure. COVID neg.  No NPO required.  Consent will be done prior to procedure.     Please contact the IR charge RN at [phone] for estimated time of procedure.     Case discussed with . This is a 80 year old with PMH HTN, AAA, HLD, RA who presented to OSH with SOB and atypical CP. TTE showed severe MR with ?posterior flail leaflet, EF preserved. On arrival at [facility] an Impella was emergently placed. S/p MVR and single vessel CABG on 3/5/21. Returned from the OR on central ECMO with an open chest, now s/p decannulation on 3/9. Nephrology consulted for ADAN. Initiated on CRRT 3/6, transitioned to iHD 3/13. IR placed TCVC 3/22. Patient now with improving UOP and decreasing Cr. IR is consulted for TCVC removal.     Expected date of discharge: TBD    Vitals:   /79 (BP Location: Right arm)   Pulse 93   Temp 98.3  F (36.8  C) (Oral)   Resp 16   Ht 1.803 m (5' 11\")   Wt 77.5 kg (170 lb 14.4 oz)   SpO2 98%   BMI 23.84 kg/m      Pertinent Labs:     Lab Results   Component Value Date    WBC 14.1 (H) 04/02/2021    WBC 13.6 (H) 04/01/2021    WBC 11.6 (H) 03/31/2021       Lab Results   Component Value Date    HGB 8.4 04/02/2021    HGB 8.4 04/01/2021    HGB 8.4 03/31/2021       Lab Results   Component Value Date     04/02/2021     04/01/2021     03/31/2021       Lab Results   Component Value Date    INR 2.76 (H) 04/02/2021    PTT 42 (H) 03/27/2021       Lab Results   Component Value Date    POTASSIUM 3.4 04/02/2021        TIMOTHY Alves CNP  Interventional Radiology  Pager: [phone]    "

## 2021-04-02 NOTE — PROGRESS NOTES
Care Management Follow Up    Length of Stay (days): 29    Expected Discharge Date: 04/05/21     Concerns to be Addressed: discharge planning     Patient plan of care discussed at interdisciplinary rounds: Yes    Anticipated Discharge Disposition: Acute Rehab- [facility]     Anticipated Discharge Services: None  Anticipated Discharge DME: None    Patient/family educated on Medicare website which has current facility and service quality ratings: (N/A)  Education Provided on the Discharge Plan: yes   Patient/Family in Agreement with the Plan: yes    Referrals Placed by CM/SW: Post Acute Facilities  Private pay costs discussed: transportation costs    Additional Information:  Planning for discharge Monday to [facility] in [place]. Updated primary team that pt will require COVID test Sunday PM for d/c Monday AM.    Called [email or web address] (ph [phone]) at 9:00am to schedule ride for Monday at 10am. Received call from billing stating pt's family will need to pay in advance for ride. Updated pt's dtr Pamela (ph [phone]). Received call back from [email or web address] stating ride can be scheduled as dtr paid. Confirmed ride is scheduled for 10am Monday, 4/5.     Paged PT and OT to ensure pt can physically tolerate w/c van ride. Pt's delirium will need to clear as well; if pt requires supervision during ride will require stretcher. OT worked with pt and they feel pt can physically tolerate ride. PT to work with pt later today.    Spoke with Leticia at [facility] (ph [phone]). Leticia confirms they can accept pt Monday with 10am ride. Transport to go to [facility] at [address] in [place]- updated [email or web address].      to send Leticia updated notes on Monday AM and confirm pt is still appropriate for discharge.    RN to call report to [phone] on Monday AM.  No provider report required.    Met with pt at bedside and provided update on plan for Monday. Spoke with pt's daughter via phone and  provided update. Updated primary team on plan and that ride is a w/c van not a stretcher.    LEONEL Barton, Down East Community HospitalSW  6C   [facility]  Pager [phone]  Phone [phone]

## 2021-04-02 NOTE — PROGRESS NOTES
Cardiovascular Surgery Progress Note  04/02/2021           Assessment and Plan:   Jin Garrett is a 80 year old male with PMH of HTN, AAA, HLD and rheumatoid arthritis. He presented to an OSH with shortness of breath and atypical chest pain. TTE showed severe MR with concern for posterior flail leaflet, EF was preserved. On arrival at [facility] (3/4/21) an Impella was emergently placed in cath lab. He was found to have a ruptured posterior papillary muscle causing severe MR.   Jin is now s/p mitral valve replacement and single vessel CABG with Dr. Porras on 3/5/21. Returned from the OR on central ECMO with an open chest, intubated and sedated. Returned to the OR for a wash out on 3/9, found to have a thrombus on his mitral valve so he had the valve replaced, decannulated central ECMO, bronchoscopy and placement of temporary L subclavian HD line. Exchanged Left subclavian for Right IJ Tunneled dialysis line. Return of UOP and delirium cleared 3/27.     Plans for Today (4/1):  1. Post operative TTE  2. Discuss HD line removal with Nephrology  3. Extend Calorie Counts through 4/4  4. Continue trazodone for insomnia, seems to be helping   5. CRP, ProCal, WBC diff, and UA for leukocytosis    Cardiovascular:   Hx of HTN, HLD, AAA  Acute severe MR due to ruptured papillary muscle, s/p tissue MVR  Cardiogenic shock secondary to severe MR  S/p Impella placement and conversion to central VA ECMO  Weaned off mechanical support, ECMO was utilized from 3/5 to 3/9.   CAD, mid LCx with 100% occlusion, s/p 1 vessel CAB   S/P redo MVR due to thrombus in left atrium and on bioprosthetic MV  Most recent echo showed LV EF 55-60%. LUE US 3/14 with nonocclusive thrombus in right cephalic vein, no UE DVT.     - ASA 81 mg PO Daily  - Metoprolol 25 mg PO BID  - atorvastatin on hold due to elevated LFTs.   Pre and post-op Atrial fibrillation   Has been mostly rate controlled and anticoagulated. Hep gtt held since 3/22 due to epistaxis. EKG  3/22 confirming A-fib ('s), likely contributing to hypotension, given Amio 150 mg IV bolus x 1 dose 3/22 and restarted 400 mg PO BID 3/23 AM with return to NSR, now reduced to 200 mg PO BID 3/29.    Hypotension resolved. Required  midodrine 15 mg TID to maintain MAPS > 60, has since improved and was stopped 3/27 am.   - amiodarone 200 mg PO daily, having atrial fib   - Post-operative TTE 4/1: LVEF 35-40%, diffuse hypokinesis; RV not well visualized appears mildly reduced function. MV is well seated, mean gradient 6 mmHg   - CXR today with increased chest pain with coughing, results pending      Pulmonary:  Acute respiratory failure secondary to acute MR (resolved)  - Extubated POD 3 (chest closure and ecmo decan) to 4 lpm via NC; now saturating well on 2 lpm.   - Supplemental O2 PRN to keep sats > 92%. Wean off as tolerated.  - Pulm hygiene, IS, activity and deep breathing     Neurology / MSK:  Delirium Had extended time on sedation due to need for central VA ECMO. Avoiding narcs and benadryl. Improving, no bedside attendent and no hand mitts since 3/28. Psych consult 3/25, tried zyprexa 5 mg but did not tolerate it. Returned to Seroquel and tolerated it well. Stop Seroquel (3/31).  Insomnia  - Sleep improved with addition of trazodone w/o noticeable side effects.   - Melatonin 10 mg q PM  - Trazodone 50 mg PO q PM  - Acute post-operative pain; pain well controlled with acetaminophen and tramadol.   Chronic back pain   Recent hip surgery  - No weight bearing or activity restrictions related to recent hip surgery per family (records not available).    - Aqua K pad helping.      HEENT:   Hemoptysis  Epistaxis    - Suspecting hemoptysis is secondary epistaxis due to trauma to posterior nasal septum from the NG tube bridle and him tugging on it.    - Has hand mitts PRN. Tube is no longer bridled.        / Renal:  ADAN secondary to cardiogenic shock induced ATN  Hyperkalemia   - No Hx of renal disease. Most recent  creatinine 4.45 and improving.   - Oliguric, has condom catheter. Improved with Bumex challenge.Now making much more urine.    - Bumex 2 mg PO daily held 4/1 per Nephrology with robust diuresis.   - net 24 hour I/O -485, with 2.3 L urine output   - Nephrology following; Was on CRRT transitioned to intermittent HD. Last HD run 3/26.   - Dialysis catheter removedl today per IR.       GI / FEN:   Melena: Noticed 3/23. Had epistaxis 2 days prior and likely swallowed some of the blood. No episodes since.  Dysphagia:  Poor appetite, not meeting nutritional needs from oral intake.  - Regular Diet with thin liquids per speech  - NJ in place for all nutrition supplement. Cycle Tube Feeds 3/31  - Patient removed his NJ overnight, will keep out for now and monitor Calorie counts over the weekend.   - Calorie counts 4/1 to 4/4  - Replace electrolytes as needed, hepatic enzymes improving.     Endocrine:  Stress induced hyperglycemia  - Initially managed on insulin drip, transitioned to sliding scale and now off insulin (3/16). No acute needs.       Infectious Disease:   Leukocytosis: C diff negative 3/18, UA on 3/22 and 3/23 were clean and CRP has been slowly trending down.  - Completed perioperative antibiotics.   - WBC trending up, 14.1 (13.6), no longer having febrile episodes, no other signs or symptoms of infection. Patient did have coiled PICC line which was removed 3/31 and may be a contributing factor, continue to trend.   - CRP 32.0 (actually down from 120 3/25), ProCal 0.29 (slightly elevated), WBC diff, and UA - negative 4/1 for leukocytosis    - continue to monitor WBC, aggressive CDB, spirometer use     Hematology:   Acute blood loss anemia  Epistaxis, resolved   - Hgb 5.7 on 3/22. Given 2 units PRBC and 1 unit plasma on 3/22, one unit PRBC 3/23.  Hgb stable at 8.4 and stable. Will plan to give 1 unit if < 7.5.    - Plt WNL, no persistent signs or symptoms of active bleeding, melena 3/22 overnight could be residual  from previous epistaxis.   - LUE US 3/14 with non-occlusive thrombus in right cephalic vein, no UE DVT.      Anticoagulation:   - Coumadin for atrial fibrillation and left atrial thrombus, INR goal 2-3. Most recent INR 2.76.    - Was on LIH bridge, stopped now.     Prophylaxis:   - Stress ulcer prophylaxis: Pantoprazole 40 mg daily for 30 days  - DVT prophylaxis: coumadin, SCD     Disposition:   - Transferred to  on 3/19   - Therapies recommending discharge to TCU vs. ARU.  - Plan to discharge back to [place], near family Monday, ride arranged for 10:00 am.       Mel Castillo, JOIE, CNP  [facility] Cardiovascular Thoracic Surgery   O: [phone]  Pgr [phone]          Interval History:   More agitated and restless overnight. Was awake until 0600. Pulled out NG.     Sleepy today, more cooperative as per nursing and family. Was sleeping both times I checked on him.   Pain under control. Tolerating po intake.            Medications:       acetaminophen  650 mg Oral or Feeding Tube Q8H     amiodarone  200 mg Oral BID     amitriptyline  25 mg Oral or Feeding Tube At Bedtime     artificial saliva  15 mL Swish & Spit 4x Daily     aspirin  81 mg Oral or NG Tube Daily     B and C vitamin Complex with folic acid  5 mL Oral Daily     benzonatate  200 mg Oral TID     ferrous sulfate  220 mg Oral Daily     ipratropium - albuterol 0.5 mg/2.5 mg/3 mL  3 mL Nebulization BID     lidocaine  2 patch Transdermal Q24h    And     lidocaine   Transdermal Q8H     magnesium sulfate  2 g Intravenous Once     melatonin  10 mg Oral or Feeding Tube QPM     metoprolol tartrate  25 mg Oral or Feeding Tube BID     pantoprazole  40 mg Oral Daily     potassium chloride  20 mEq Oral or Feeding Tube Once     traZODone  50 mg Oral or Feeding Tube At Bedtime     albumin human, albumin human, alum & mag hydroxide-simethicone, sore throat lozenge, bisacodyl, hydrALAZINE, loperamide, - MEDICATION INSTRUCTIONS -, midodrine, naloxone **OR** naloxone **OR**  "naloxone **OR** naloxone, ondansetron **OR** ondansetron, QUEtiapine, senna-docusate, sodium chloride (PF), sodium chloride (PF), Warfarin Therapy Reminder          Physical Exam:   Vitals were reviewed  Blood pressure 114/79, pulse 101, temperature 98.3  F (36.8  C), temperature source Oral, resp. rate 16, height 1.803 m (5' 11\"), weight 77.5 kg (170 lb 14.4 oz), SpO2 98 %.  Vitals:    03/30/21 1230 03/31/21 0608 04/02/21 0215   Weight: 79.4 kg (175 lb) 77.8 kg (171 lb 8 oz) 77.5 kg (170 lb 14.4 oz)      I/O last 3 completed shifts:  In: 2000 [P.O.:1000; NG/GT:90]  Out: 1775 [Urine:1625; Emesis/NG output:150]    Gen: more cooperative and sleepy.   CV: RRR, normal S1, S2, no murmurs, rubs or gallops   Pulm:  LS clear, No wheezing or rhonchi  Abd: Soft, NT, ND, +BS  Ext: No LE edema  Neuro:  Nonfocal, PERRLA, reports sternal pain  Incision: c/d/i, no erythema, sternum stable         Data:    All labs and imaging reviewed by me.  Labs:    Data   BMP  Recent Labs   Lab 04/02/21 0826 04/01/21 2059 04/01/21  0612 03/31/21  0646    136 136 136   POTASSIUM 3.4 3.2* 3.2* 3.4   CHLORIDE 99 97 99 98   PALLAVI 9.0 8.8 8.6 8.9   CO2 29 29 26 28   * 149* 146* 143*   CR 4.45* 4.58* 4.79* 5.00*   GLC 99 131* 123* 112*     CBC  Recent Labs   Lab 04/02/21  0826 04/01/21  0612 03/31/21  0646 03/30/21  0609   WBC 14.1* 13.6* 11.6* 10.1   RBC 2.83* 2.97* 2.88* 2.91*   HGB 8.4* 8.4* 8.4* 8.2*   HCT 26.4* 27.6* 26.7* 26.5*   MCV 93 93 93 91   MCH 29.7 28.3 29.2 28.2   MCHC 31.8 30.4* 31.5 30.9*   RDW 16.7* 16.6* 16.8* 16.7*    283 266 268     INR  Recent Labs   Lab 04/02/21  0826 04/01/21  0612 03/31/21  0646 03/30/21  0609   INR 2.76* 2.72* 2.69* 2.61*      Hepatic Panel   Recent Labs   Lab 04/02/21  0826 04/01/21  0612 03/31/21  0646 03/30/21  0609   AST 26 26 33 43   ALT 32 36 40 42   ALKPHOS 174* 212* 199* 197*   BILITOTAL 0.4 0.4 0.4 0.5   ALBUMIN 2.4* 2.5* 2.3* 2.4*     Glucose  Recent Labs   Lab 04/02/21  0826 " 04/01/21 2059 04/01/21  0612 03/31/21  0646 03/30/21  0609 03/29/21  0535 03/26/21  2317 03/26/21  2317 03/26/21  1540   GLC 99 131* 123* 112* 98 111*   < >  --   --    BGM  --   --   --   --   --   --   --  108* 89    < > = values in this interval not displayed.       Imaging:  Recent Results (from the past 24 hour(s))   XR Chest 2 Views    Narrative    Exam: XR CHEST 2 VW, 4/2/2021 10:58 AM    Comparison: Chest x-ray 3/31/2021    History: s/p MVR/CAB with epigastric pain    Findings:  Upright AP and lateral views of the chest are obtained. Postsurgical  changes of mitral valve replacement and coronary artery bypass  grafting, median sternotomy wires are intact. Right internal jugular  catheter tip terminates at the cavoatrial junction.    Trachea is midline. Mediastinum is within normal limits. Calcification  aortic knob. Cardiopulmonary silhouette is within normal limits. Trace  bilateral pleural effusions and associated atelectasis. There is no  pneumothorax. The upper abdomen is unremarkable.      Impression    Impression:   Trace bilateral pleural effusions and associated atelectasis.    I have personally reviewed the examination and initial interpretation  and I agree with the findings.    HERMELINDA KANG MD

## 2021-04-02 NOTE — PROGRESS NOTES
Calorie Count  Intake recorded for: 4/1  Total Kcals: 476 Total Protein: 15g  Kcals from Hospital Food: 476  Protein: 15g  Kcals from Outside Food (average):0 Protein: 0g  # Meals Ordered from Kitchen: 3 meals   # Meals Recorded: 3 meals (First - 100% white toast, 50% milk)      (Second - 100% ice cream)      (Third - 75% apple juice, 50% milk, 33% mashed potatoes, less than 25% meatloaf)  # Supplements Recorded: 0

## 2021-04-02 NOTE — PLAN OF CARE
D: Pt admit 3/4/21 from OSH presenting with SOB and atypical chest pain. found to have a ruptured posterior papillary muscle causing severe MR. Pt now s/p CABG x1 and MVR 3/5/21. Pt out of OR on ECMO w/open chest and returned to OR for washout 3/9 found to have thrombus on mitral valve s/p MVR and ECMO decannulation. PMH HTN, AAA, HLD, RA     I/A:   Neuro: A&Ox2 pt disoriented to place & situation. Bed alarm on, pt set off bed alarm every 30 minutes-1 hr overnight. Pt agitated/unable to sleep but easily redirected and pleasant. PRN seroquel administered x1 with some improvement. Pt pulled out IV and ND tube overnight. Could benefit from attendant.  VS: VSS. RA  Tele: SR w/PAC's  Pain: pt c/o generalized back pain controlled with scheduled tylenol. Pt also endorsed intermittent chest pain associated with coughing controlled with scheduled lidocaine patches as well as frequent reminders for sternal precautions.   GI/: Urinating adequately into bedside urinal. No BM this shift. Pt had emesis occurrence around 2120 following PM medications. Pt likely did not digest scheduled trazodone and amitriptyline. CVTS cross cover notified, continue to monitor pt status/VS & notify team of any changes. No PM medications re ordered following emesis occurrence. Writer attempted to administer PRN zofran however IV was painful and pt declined oral meds d/t emesis. Sips of clear liquids given.   Diet: regular diet with thin liquids. Pt has orders for TF running via ND tube in L nostril at goal rate of 70 ml/hr [id number] with 30 ml flushes q4 hr. Pt pulled out ND tube this AM.  IV/Drips: R PIV SL.   Activity: A x1 with GB and walker. Frequent reminders needed for pt to call for staff assistance before moving.   Skin/drains: Midline/L groin incision CDI. R tunneled internal jugular CVC to be removed today. Blood noted coming from urethra this AM, pt declined any discomfort.  BMP drawn at 2100 showed K+ 3.2. ~10 mEq administered via  PIV, however pt removed line during infusion. 20 mEq K+ then administered via ND tube. Pt tolerated well. K+ lab not yet resulted this AM.     P: Plan for possible CVC removal. Continue to monitor pt status and report changes to CVTS.     Miguelina Waggoner RN

## 2021-04-02 NOTE — PROGRESS NOTES
CLINICAL NUTRITION SERVICES - REASSESSMENT NOTE     Nutrition Prescription    RECOMMENDATIONS FOR MDs/PROVIDERS TO ORDER:  Recommend replacing FT if pt is not meeting 75% minimum needs (1700 kcal and 82 g protein daily)    Malnutrition Status:    Non-severe malnutrition in the context of acute on chronic illness     Recommendations already ordered by Registered Dietitian (RD):  Calorie count  Discontinued EN orders (prosource, nutrisource fiber, FWF, formula)  Glucerna BID  Magic cup q day    Future/Additional Recommendations:  Monitor intakes and phos, consider lower phos supplement vs renal diet vs binders  Post CABG/Heart healthy diet education prior to discharge as appropriate   Should feedign tube be replaced: Novasource Renal @ 55 ml/hr (1320 ml) + Prosource (1 pkt TID) provides 2760 kcal (30 kcal/kg), 153 g pro (1.7 g/kg), 242 g CHO, 946 ml free water, and 0 g fiber daily. 30 ml FWF q 4 hrs     EVALUATION OF THE PROGRESS TOWARD GOALS   Diet: Regular  Supplements: Nepro 10am and 2pm  Nutrition Support: Novasource renal via ND-tube @ 70 mL/hr x 18 hrs (6pm-12pm) providing 1260 mL, 2520 kcal (27.6 kcal/kg), 115 g protein (1.3 g/kg), 230 g CHO, 0 g fiber, and 918 mL free water per DW of 91 kg.  Water Flushes: 30 mL q 4 hrs (TF + Flushes = 1098 mL water).  Continue Prosource (1 pkt TID, 120kcal, 33g protein) and nutrisource fiber (1 pkt TID, 45kcal, 9g fiber)   Intake:   3/29       Total Kcals: 186       Total Protein: 5g-1 meal ordered, 1 meal + outside food recorded, 0 supplements   3/30       Total Kcals: 362       Total Protein: 9g-1 meal ordered, 1 meal recorded, 0 supplements   4/1         Total Kcals: 476       Total Protein: 15g-3 meals ordered, 3 recorded, 0 supplements  3 day average PO intake = 341 kcal (15% minimum energy needs) and 10 g protein (8% minimum protein needs)  Averaging 978ml (1956kcal, 89g protein) EN over the past 5 days     NEW FINDINGS   Pt removed NDT overnight. Intakes remain poor,  pt has not been drinking the nepro d/t disliking it. He takes a ONS at home (pt unsure of the name) that he enjoys. Has been receiving nepro d/t elevated phos, recommend trying glucerna BID (250mg phos vs 170mg) and magic cup (150mg) . Plan to hold replacement of FT through the weekend and to monitor calorie counts.  Last HD 3/26  Meds: nephronex, ferrous sulfate, protonix, potassium chloride (40 mEq 4/2), coumadin, PRN dulcolax, imodium, zofran, senokot    GI: LBM 4/1  Labs: K 3.2 4/2-> 3.4, creeat 4.45 (H), phos 5.2 (H)  Wt Difficult to assess changed d/t fluctuations in fluid status   04/02/21  77.5 kg (170 lb 14.4 oz)   03/31/21  77.8 kg (171 lb 8 oz)   03/30/21  79.4 kg (175 lb)   03/29/21  81.4 kg (179 lb 8 oz)   03/28/21  83.5 kg (184 lb 1.6 oz)   03/27/21  83.9 kg (184 lb 14.4 oz)   03/26/21  92.4 kg (203 lb 11.3 oz)   03/19/21  86.9 kg (191 lb 9.3 oz)   03/18/21  85.8 kg (189 lb 2.5 oz)   03/18/21  85.6 kg (188 lb 11.4 oz)   03/16/21  85.2 kg (187 lb 12.8 oz)   03/15/21  82 kg (180 lb 11.2 oz)   03/14/21  81 kg (178 lb 8 oz)   03/13/21  81.9 kg (180 lb 8.9 oz)   03/12/21  84.1 kg (185 lb 6.5 oz)   03/11/21  86.9 kg (191 lb 9.3 oz)   03/06/21  91.1 kg (200 lb 13.4 oz)   03/04/21  79 kg (174 lb 2.6 oz)     MALNUTRITION  % Intake: Decreased intake does not meet criteria (meeting needs with EN and PO)  % Weight Loss: Difficult to assess d/t fluctuations in fluid status/diuresis   Subcutaneous Fat Loss: Facial region:  mild  Muscle Loss: Temporal, Thoracic region (clavicle, acromium bone, deltoid, trapezius, pectoral):  mild  Fluid Accumulation/Edema: 1+ (trace)   Malnutrition Diagnosis: Non-severe malnutrition in the context of acute on chronic illness     Previous Goals   Diet adv v nutrition support within 2-3 days.  Evaluation: Met  Total avg nutritional intake to meet a minimum of 25 kcal/kg and 1.2-1.5 g PRO/kg daily (per dosing wt 91 kg).  Evaluation: Not met    Previous Nutrition Diagnosis  Inadequate  oral intake related to unsafe swallow as evidenced by NPO status dependent on enteral nutrition support to meet 100% estimated needs.  Evaluation: Improving    CURRENT NUTRITION DIAGNOSIS  Inadequate oral intake related to decreased appetite as evidenced by pt consuming 341 kcal (15% minimum energy needs) and 10 g protein (8% minimum protein needs) per calorie count.       INTERVENTIONS  Implementation  Calorie count  Discontinued EN orders (prosource, nutrisource fiber, FWF, formula)  Glucerna BID  Magic cup q day    Goals  Patient to consume at least 75% minimum needs (1700 kcal and 82 g protein daily)    Monitoring/Evaluation  Progress toward goals will be monitored and evaluated per protocol.    Emily Larios MS, RD, LDN  Unit Pager [phone]

## 2021-04-02 NOTE — PROGRESS NOTES
D: Jin Garrett is a 80 year old male with PMH of HTN, AAA, HLD and rheumatoid arthritis. He presented to an OSH with shortness of breath and atypical chest pain. TTE showed severe MR with concern for posterior flail leaflet, EF was preserved. On [facility] at [facility] (3/4/[facility]) an Impella was emergently placed in cath lab. He was found to have a ruptured posterior papillary muscle causing severe MR.   Jin is now s/p mitral valve replacement and single vessel CABG with Dr. Porras on 3/5/21.     I: Monitored vitals and assessed pt status.   Changed: R internal jugular tunneled dialysis line removed today.        A: A0x2( disoriented to situation and place). VSS. Afebrile. Urinating adequately. SR. Regular diet, calorie count. A x1 with walker. R PIV      P: Continue to monitor Pt status and report changes to treatment team.       Jah Portillo RN on 4/2/2021 at 5:48 PM

## 2021-04-02 NOTE — PROGRESS NOTES
0800  Mel WELLS notified that pt pulled his ND tube overnight, NP said no need to replace it now, will keep eye on how he tolerates his regular diet. Will continue to monitor.

## 2021-04-02 NOTE — PROGRESS NOTES
Nephrology Progress Note  04/02/2021    Assessment & Recommendations:  Jin Garrett is a 80 year old with PMH HTN, AAA, HLD, RA who presented to OSH with SOB and atypical CP. TTE showed severe MR with ?posterior flail leaflet, EF preserved. On arrival at [facility] an Impella was emergently placed. S/p MVR and single vessel CABG on 3/5/21. Returned from the OR on central ECMO with an open chest, now s/p decannulation on 3/9. Nephrology consulted for ADAN. Initiated on CRRT 3/6, transitioned to iHD 3/13.    ADAN in the setting of cardiogenic shock, was on ECMO, s/p decannulation on 3/9  Unknown baseline, no h/o CKD per daughter.  Etiology likely ATN in the setting of shock, no obstruction per CT and urinalysis showed granular casts which is c/w ATN.  Access: RIJ TDC.     BP - stable, has needed midodrine but stopped since 3/27  Hypervolemia - improving  Hypokalemia - replace prn, this is low likely in the setting of diuretic  No acute acid base issues  Anemia - Stable     Recommendations:  - UOP improving and Cr now decreasing as well, would remove TDC today per IR  - Holding PO diuretic for now, please monitor UOP closely and would consider resuming if decreasing and hypervolemia develops. Given reduced EF and pending degree of renal recovery pt may well need diuretic long-term for volume maintenance  - Daily weights  - Strict I/Os   - Plan is to transfer to ARU in [place] once medically able     Pt will need follow-up with Nephrology 3-4 wks after discharge in [place].     Recommendations were communicated to primary team verbally     Nephrology will follow peripherally, please page with questions    Seen and discussed with Dr. Freddy Potts MD  [phone]    Interval History:  Nursing and provider notes from last 24 hours reviewed.  In the last 24 hours Jin Garrett remained clinically stable. UOP remained excellent yesterday with PO diuretic. Cr/BUN both decreasing. Denies any new concerns    Review of  "Systems:  ROS neg as above    Physical Exam:  I/O last 3 completed shifts:  In: 2000 [P.O.:1000; NG/GT:90]  Out: 1775 [Urine:1625; Emesis/NG output:150]  /79 (BP Location: Right arm)   Pulse 93   Temp 98.3  F (36.8  C) (Oral)   Resp 16   Ht 1.803 m (5' 11\")   Wt 77.5 kg (170 lb 14.4 oz)   SpO2 98%   BMI 23.84 kg/m      GENERAL APPEARANCE: NAD  EYES: no scleral icterus, pupils equal  PULM: breathing non-labored  CV: regular rhythm, normal rate     -edema none  GI: soft, nondistended  NEURO: AOx3, speech normal  Access RIJ TD    Labs:   All labs reviewed by me  Electrolytes/Renal -   Recent Labs   Lab Test 04/02/21 0826 04/01/21 2059 04/01/21  0612 03/31/21  0646 03/30/21  0609 03/29/21  0535    136 136 136 139 136   POTASSIUM 3.4 3.2* 3.2* 3.4 3.9 4.0   CHLORIDE 99 97 99 98 100 101   CO2 29 29 26 28 27 26   * 149* 146* 143* 134* 113*   CR 4.45* 4.58* 4.79* 5.00* 4.90* 4.49*   GLC 99 131* 123* 112* 98 111*   PALLAVI 9.0 8.8 8.6 8.9 9.1 8.7   MAG  --   --   --  2.4* 2.4* 2.4*   PHOS 5.2*  --   --  5.7* 4.9* 4.7*       CBC -   Recent Labs   Lab Test 04/02/21 0826 04/01/21  0612 03/31/21  0646   WBC 14.1* 13.6* 11.6*   HGB 8.4* 8.4* 8.4*    283 266       LFTs -   Recent Labs   Lab Test 04/02/21 0826 04/01/21  0612 03/31/21  0646   ALKPHOS 174* 212* 199*   BILITOTAL 0.4 0.4 0.4   ALT 32 36 40   AST 26 26 33   PROTTOTAL 6.1* 6.2* 5.8*   ALBUMIN 2.4* 2.5* 2.3*       Iron Panel -   Recent Labs   Lab Test 03/20/21  0637   IRON 11*   IRONSAT 6*   RAYNE 1,302*       Imaging:  All imaging studies reviewed by me.     Current Medications:    acetaminophen  650 mg Oral or Feeding Tube Q8H     amiodarone  200 mg Oral BID     amitriptyline  25 mg Oral or Feeding Tube At Bedtime     artificial saliva  15 mL Swish & Spit 4x Daily     aspirin  81 mg Oral or NG Tube Daily     B and C vitamin Complex with folic acid  5 mL Oral Daily     benzonatate  200 mg Oral TID     ferrous sulfate  220 mg Oral Daily     " ipratropium - albuterol 0.5 mg/2.5 mg/3 mL  3 mL Nebulization BID     lidocaine  2 patch Transdermal Q24h    And     lidocaine   Transdermal Q8H     magnesium sulfate  2 g Intravenous Once     melatonin  10 mg Oral or Feeding Tube QPM     metoprolol tartrate  25 mg Oral or Feeding Tube BID     pantoprazole  40 mg Oral Daily     potassium chloride  20 mEq Oral or Feeding Tube Once     traZODone  50 mg Oral or Feeding Tube At Bedtime     warfarin ANTICOAGULANT  3 mg Oral ONCE at 18:00       - MEDICATION INSTRUCTIONS -       Warfarin Therapy Reminder       Ramses Potts MD

## 2021-04-02 NOTE — IR NOTE
Patient Name: Jin Garrett  Medical Record Number: [id number]  Today's Date: 4/2/2021    Procedure: Removal of Large Double Lumen Large Bore Tunneled Central Line  Proceduralist: Aydin Salcido      Sedation Notes: N/A     Procedure start time: 1448  Procedure end time: 1452    Report given to: Jah ESPINOZA   : n/a    Other Notes: Pt arrived to IR room 6 from inpatient unit. Consent reviewed, pt confirmed. Pt denies any questions or concerns regarding procedure. Pt positioned supine, HOB 30 degrees and monitored per protocol. Site cleansed and dressed per protocol. Pt tolerated procedure without any noted complications. Pt transferred back to inpatient unit.

## 2021-04-03 ENCOUNTER — APPOINTMENT (OUTPATIENT)
Dept: OCCUPATIONAL THERAPY | Facility: CLINIC | Age: 81
DRG: 003 | End: 2021-04-03
Attending: INTERNAL MEDICINE
Payer: MEDICARE

## 2021-04-03 LAB
ALBUMIN SERPL-MCNC: 2.4 G/DL (ref 3.4–5)
ALP SERPL-CCNC: 143 U/L (ref 40–150)
ALT SERPL W P-5'-P-CCNC: 31 U/L (ref 0–70)
ANION GAP SERPL CALCULATED.3IONS-SCNC: 7 MMOL/L (ref 3–14)
AST SERPL W P-5'-P-CCNC: 23 U/L (ref 0–45)
BILIRUB SERPL-MCNC: 0.5 MG/DL (ref 0.2–1.3)
BUN SERPL-MCNC: 148 MG/DL (ref 7–30)
CALCIUM SERPL-MCNC: 9.1 MG/DL (ref 8.5–10.1)
CHLORIDE SERPL-SCNC: 100 MMOL/L (ref 94–109)
CO2 SERPL-SCNC: 30 MMOL/L (ref 20–32)
CREAT SERPL-MCNC: 4.19 MG/DL (ref 0.66–1.25)
ERYTHROCYTE [DISTWIDTH] IN BLOOD BY AUTOMATED COUNT: 16.8 % (ref 10–15)
GFR SERPL CREATININE-BSD FRML MDRD: 12 ML/MIN/{1.73_M2}
GLUCOSE BLDC GLUCOMTR-MCNC: 105 MG/DL (ref 70–99)
GLUCOSE SERPL-MCNC: 88 MG/DL (ref 70–99)
HCT VFR BLD AUTO: 25.4 % (ref 40–53)
HGB BLD-MCNC: 7.9 G/DL (ref 13.3–17.7)
INR PPP: 2.9 (ref 0.86–1.14)
LDH SERPL L TO P-CCNC: 435 U/L (ref 85–227)
MAGNESIUM SERPL-MCNC: 2.3 MG/DL (ref 1.6–2.3)
MCH RBC QN AUTO: 28.4 PG (ref 26.5–33)
MCHC RBC AUTO-ENTMCNC: 31.1 G/DL (ref 31.5–36.5)
MCV RBC AUTO: 91 FL (ref 78–100)
PLATELET # BLD AUTO: 307 10E9/L (ref 150–450)
POTASSIUM SERPL-SCNC: 3.6 MMOL/L (ref 3.4–5.3)
PROT SERPL-MCNC: 6.2 G/DL (ref 6.8–8.8)
RBC # BLD AUTO: 2.78 10E12/L (ref 4.4–5.9)
SODIUM SERPL-SCNC: 137 MMOL/L (ref 133–144)
WBC # BLD AUTO: 12.1 10E9/L (ref 4–11)

## 2021-04-03 PROCEDURE — 97530 THERAPEUTIC ACTIVITIES: CPT | Mod: GO

## 2021-04-03 PROCEDURE — 250N000013 HC RX MED GY IP 250 OP 250 PS 637: Performed by: INTERNAL MEDICINE

## 2021-04-03 PROCEDURE — 250N000013 HC RX MED GY IP 250 OP 250 PS 637: Performed by: NURSE PRACTITIONER

## 2021-04-03 PROCEDURE — 83735 ASSAY OF MAGNESIUM: CPT | Performed by: SURGERY

## 2021-04-03 PROCEDURE — 250N000013 HC RX MED GY IP 250 OP 250 PS 637: Performed by: SURGERY

## 2021-04-03 PROCEDURE — 94640 AIRWAY INHALATION TREATMENT: CPT | Mod: 76

## 2021-04-03 PROCEDURE — 85027 COMPLETE CBC AUTOMATED: CPT | Performed by: SURGERY

## 2021-04-03 PROCEDURE — 85610 PROTHROMBIN TIME: CPT | Performed by: SURGERY

## 2021-04-03 PROCEDURE — 999N000157 HC STATISTIC RCP TIME EA 10 MIN

## 2021-04-03 PROCEDURE — 250N000013 HC RX MED GY IP 250 OP 250 PS 637: Performed by: THORACIC SURGERY (CARDIOTHORACIC VASCULAR SURGERY)

## 2021-04-03 PROCEDURE — 250N000013 HC RX MED GY IP 250 OP 250 PS 637: Performed by: PHYSICIAN ASSISTANT

## 2021-04-03 PROCEDURE — 214N000001 HC R&B CCU UMMC

## 2021-04-03 PROCEDURE — 36415 COLL VENOUS BLD VENIPUNCTURE: CPT | Performed by: SURGERY

## 2021-04-03 PROCEDURE — 97535 SELF CARE MNGMENT TRAINING: CPT | Mod: GO

## 2021-04-03 PROCEDURE — 83615 LACTATE (LD) (LDH) ENZYME: CPT | Performed by: SURGERY

## 2021-04-03 PROCEDURE — 250N000009 HC RX 250

## 2021-04-03 PROCEDURE — 80053 COMPREHEN METABOLIC PANEL: CPT | Performed by: SURGERY

## 2021-04-03 PROCEDURE — 250N000013 HC RX MED GY IP 250 OP 250 PS 637

## 2021-04-03 PROCEDURE — 94640 AIRWAY INHALATION TREATMENT: CPT

## 2021-04-03 PROCEDURE — 999N001017 HC STATISTIC GLUCOSE BY METER IP

## 2021-04-03 RX ORDER — ATORVASTATIN CALCIUM 20 MG/1
20 TABLET, FILM COATED ORAL EVERY EVENING
Status: DISCONTINUED | OUTPATIENT
Start: 2021-04-03 | End: 2021-04-05 | Stop reason: HOSPADM

## 2021-04-03 RX ORDER — POTASSIUM CHLORIDE 750 MG/1
10 TABLET, EXTENDED RELEASE ORAL ONCE
Status: COMPLETED | OUTPATIENT
Start: 2021-04-03 | End: 2021-04-03

## 2021-04-03 RX ORDER — WARFARIN SODIUM 3 MG/1
3 TABLET ORAL
Status: COMPLETED | OUTPATIENT
Start: 2021-04-03 | End: 2021-04-03

## 2021-04-03 RX ADMIN — TRAZODONE HYDROCHLORIDE 50 MG: 50 TABLET ORAL at 21:14

## 2021-04-03 RX ADMIN — LIDOCAINE 2 PATCH: 560 PATCH PERCUTANEOUS; TOPICAL; TRANSDERMAL at 19:56

## 2021-04-03 RX ADMIN — AMITRIPTYLINE HYDROCHLORIDE 25 MG: 25 TABLET, FILM COATED ORAL at 21:14

## 2021-04-03 RX ADMIN — BENZONATATE 200 MG: 100 CAPSULE ORAL at 19:56

## 2021-04-03 RX ADMIN — PANTOPRAZOLE SODIUM 40 MG: 40 TABLET, DELAYED RELEASE ORAL at 07:56

## 2021-04-03 RX ADMIN — BENZOCAINE AND MENTHOL 1 LOZENGE: 15; 3.6 LOZENGE ORAL at 20:02

## 2021-04-03 RX ADMIN — AMIODARONE HYDROCHLORIDE 200 MG: 200 TABLET ORAL at 19:56

## 2021-04-03 RX ADMIN — IPRATROPIUM BROMIDE AND ALBUTEROL SULFATE 3 ML: .5; 3 SOLUTION RESPIRATORY (INHALATION) at 20:29

## 2021-04-03 RX ADMIN — Medication 10 MG: at 21:16

## 2021-04-03 RX ADMIN — Medication 5 ML: at 07:55

## 2021-04-03 RX ADMIN — BENZOCAINE AND MENTHOL 1 LOZENGE: 15; 3.6 LOZENGE ORAL at 07:57

## 2021-04-03 RX ADMIN — METOPROLOL TARTRATE 25 MG: 25 TABLET, FILM COATED ORAL at 07:57

## 2021-04-03 RX ADMIN — BENZOCAINE AND MENTHOL 1 LOZENGE: 15; 3.6 LOZENGE ORAL at 14:11

## 2021-04-03 RX ADMIN — ACETAMINOPHEN 650 MG: 325 TABLET, FILM COATED ORAL at 01:07

## 2021-04-03 RX ADMIN — ATORVASTATIN CALCIUM 20 MG: 20 TABLET, FILM COATED ORAL at 19:56

## 2021-04-03 RX ADMIN — ACETAMINOPHEN 650 MG: 325 TABLET, FILM COATED ORAL at 15:40

## 2021-04-03 RX ADMIN — BENZOCAINE AND MENTHOL 1 LOZENGE: 15; 3.6 LOZENGE ORAL at 18:15

## 2021-04-03 RX ADMIN — BENZONATATE 200 MG: 100 CAPSULE ORAL at 14:29

## 2021-04-03 RX ADMIN — BENZONATATE 200 MG: 100 CAPSULE ORAL at 07:57

## 2021-04-03 RX ADMIN — BENZOCAINE AND MENTHOL 1 LOZENGE: 15; 3.6 LOZENGE ORAL at 12:14

## 2021-04-03 RX ADMIN — Medication 220 MG: at 12:06

## 2021-04-03 RX ADMIN — Medication 15 ML: at 08:03

## 2021-04-03 RX ADMIN — IPRATROPIUM BROMIDE AND ALBUTEROL SULFATE 3 ML: .5; 3 SOLUTION RESPIRATORY (INHALATION) at 08:25

## 2021-04-03 RX ADMIN — ACETAMINOPHEN 650 MG: 325 TABLET, FILM COATED ORAL at 07:55

## 2021-04-03 RX ADMIN — Medication 15 ML: at 12:07

## 2021-04-03 RX ADMIN — WARFARIN SODIUM 3 MG: 3 TABLET ORAL at 18:15

## 2021-04-03 RX ADMIN — BENZOCAINE AND MENTHOL 1 LOZENGE: 15; 3.6 LOZENGE ORAL at 15:40

## 2021-04-03 RX ADMIN — POTASSIUM CHLORIDE 10 MEQ: 750 TABLET, EXTENDED RELEASE ORAL at 07:57

## 2021-04-03 RX ADMIN — METOPROLOL TARTRATE 25 MG: 25 TABLET, FILM COATED ORAL at 19:56

## 2021-04-03 RX ADMIN — ASPIRIN 81 MG CHEWABLE TABLET 81 MG: 81 TABLET CHEWABLE at 07:55

## 2021-04-03 RX ADMIN — AMIODARONE HYDROCHLORIDE 200 MG: 200 TABLET ORAL at 07:55

## 2021-04-03 ASSESSMENT — ACTIVITIES OF DAILY LIVING (ADL)
ADLS_ACUITY_SCORE: 19
ADLS_ACUITY_SCORE: 18
ADLS_ACUITY_SCORE: 19
ADLS_ACUITY_SCORE: 18
ADLS_ACUITY_SCORE: 19
ADLS_ACUITY_SCORE: 19

## 2021-04-03 ASSESSMENT — MIFFLIN-ST. JEOR: SCORE: 1498.26

## 2021-04-03 NOTE — PROGRESS NOTES
D: Pt admit 3/4/21 from OSH presenting with SOB and atypical chest pain. found to have a ruptured posterior papillary muscle causing severe MR. Pt now s/p CABG x1 and MVR 3/5/21. Pt out of OR on ECMO w/open chest and returned to OR for washout 3/9 found to have thrombus on mitral valve s/p MVR and ECMO decannulation. PMH HTN, AAA, HLD, RA      I: Monitored vitals and assessed pt status.   Changed: Nephrology signed off  Running:   PRN: benzocaine-menthol lozenge x3     Vitals:  Temp: 97.6  F (36.4  C) Temp src: Oral BP: 102/63 Pulse: 85   Resp: 17 SpO2: 97 % O2 Device: None (Room air)        A:   Neuro: A&O x 4, forgetful at times. Neurologically intact, denies headache, dizziness, lightheadedness, numbness and tingling, Calls appropriately. Bed alarm on for safety.    Cardiac: SR, occasional PAC. VSS. Denies chest pain  Respiratory: sating >92% on RA. Denies SOB, SEYMOUR. Pt complaint of left nostril congestion interfering with exhalation. LS clear. Fine crackles at bases.   Diet/appetite: Regular diet, thin liquids, fair appetite.  GI/: BM 4/3 x1 per pt. Denies abdominal pain, N/V, bowel sounds normoactive in all quadrants. Voids without difficulty.  Activity: Assist +1 to bedside commode.  Pain: Generalized pain managed with scheduled tylenol.  Skin: Bruising on L forearm, R upper arm.   LDAs: R PIV saline locked.  Labs:  Potassium 3.6, replaced per protocol.     P: Continue to monitor Pt status and report changes to treatment team.

## 2021-04-03 NOTE — PROGRESS NOTES
Calorie Count  Intake recorded for: 4/2  Total Kcals: 0 Total Protein: 0g  Kcals from Hospital Food: 0    Protein: 0g  Kcals from Outside Food (average):0  Protein: 0g  # Meals Ordered from Kitchen: 3 meals  # Meals Recorded: No food intake recorded  # Supplements Recorded: No intake recorded    Note: Per Epic food record, pt consumed 75% at 10:30am, but not enough information was given to calculate calories/protein.

## 2021-04-03 NOTE — PROGRESS NOTES
Nephrology    Cr decreased to 4.19. Dialysis catheter out. Resolving ADAN.  Will sign off.  Harsh Hayes MD  [phone]

## 2021-04-03 NOTE — PROGRESS NOTES
Cardiovascular Surgery Progress Note  04/03/2021           Assessment and Plan:   Jin Garrett is a 80 year old male with PMH of HTN, AAA, HLD and rheumatoid arthritis. He presented to an OSH with shortness of breath and atypical chest pain. TTE showed severe MR with concern for posterior flail leaflet, EF was preserved. On arrival at [facility] (3/4/21) an Impella was emergently placed in cath lab. He was found to have a ruptured posterior papillary muscle causing severe MR.   Jin is now s/p mitral valve replacement and single vessel CABG with Dr. Porras on 3/5/21. Returned from the OR on central ECMO with an open chest, intubated and sedated. Returned to the OR for a wash out on 3/9, found to have a thrombus on his mitral valve so he had the valve replaced, decannulated central ECMO, bronchoscopy and placement of temporary L subclavian HD line. Exchanged Left subclavian for Right IJ Tunneled dialysis line. Return of UOP and delirium cleared 3/27.     Cardiovascular:   Hx of HTN, HLD, AAA  Acute severe MR due to ruptured papillary muscle, s/p tissue MVR  Cardiogenic shock secondary to severe MR  S/p Impella placement and conversion to central VA ECMO  Weaned off mechanical support, ECMO was utilized from 3/5 to 3/9.   CAD, mid LCx with 100% occlusion, s/p 1 vessel CAB   S/P redo MVR due to thrombus in left atrium and on bioprosthetic MV  Most recent echo showed LV EF 55-60%. LUE US 3/14 with nonocclusive thrombus in right cephalic vein, no UE DVT.     - ASA 81 mg PO Daily  - Metoprolol 25 mg PO BID  - atorvastatin were on hold due to elevated LFTs, improved.   - Start Lipitor 20 mg daily with known CAD   Pre and post-op Atrial fibrillation   Has been mostly rate controlled and anticoagulated. Hep gtt held since 3/22 due to epistaxis. EKG 3/22 confirming A-fib ('s), likely contributing to hypotension, given Amio 150 mg IV bolus x 1 dose 3/22 and restarted 400 mg PO BID 3/23 AM with return to NSR, now reduced to  200 mg PO BID 3/29.    Hypotension resolved. Required  midodrine 15 mg TID to maintain MAPS > 60, has since improved and was stopped 3/27 am.   - amiodarone 200 mg PO daily, currently SR   - Post-operative TTE 4/1: LVEF 35-40%, diffuse hypokinesis; RV not well visualized appears mildly reduced function. MV is well seated, mean gradient 6 mmHg      Pulmonary:  Acute respiratory failure secondary to acute MR (resolved)  - Extubated POD 3 (chest closure and ecmo decan) to 4 lpm via NC; now saturating well on room air    - Supplemental O2 PRN to keep sats > 92%. Wean off as tolerated.  - Pulm hygiene, IS, activity and deep breathing  - CXR 4/2 - Impression: Trace bilateral pleural effusions and associated atelectasis.     Neurology / MSK:  Delirium Had extended time on sedation due to need for central VA ECMO. Avoiding narcs and benadryl. Improving, no bedside attendent and no hand mitts since 3/28. Psych consult 3/25, tried zyprexa 5 mg but did not tolerate it. Returned to Seroquel and tolerated it well. Stop Seroquel (3/31).  Insomnia  - Sleep improved with addition of trazodone w/o noticeable side effects.   - Melatonin 10 mg q PM  - Trazodone 50 mg PO q PM  - Acute post-operative pain; pain well controlled with acetaminophen and tramadol.   Chronic back pain   Recent hip surgery  - No weight bearing or activity restrictions related to recent hip surgery per family (records not available).    - Aqua K pad helping.      HEENT:   Hemoptysis  Epistaxis    - Suspecting hemoptysis is secondary epistaxis due to trauma to posterior nasal septum from the NG tube bridle and him tugging on it.    - Has hand mitts PRN. Tube is no longer bridled.        / Renal:  ADAN secondary to cardiogenic shock induced ATN  Hyperkalemia   - No Hx of renal disease. Most recent creatinine 4.19 (4.45) and improving.   - Oliguric, has condom catheter. Improved with Bumex challenge.Now making much more urine.    - holding diuretic with robust  diuresis.   - net 24 hour I/O net even, weight down a kg today   - Nephrology following; Was on CRRT transitioned to intermittent HD. Last HD run 3/26.   - Dialysis catheter removed per IR 4/2     GI / FEN:   Melena: Noticed 3/23. Had epistaxis 2 days prior and likely swallowed some of the blood. No episodes since.  Dysphagia:  Poor appetite, not meeting nutritional needs from oral intake.  - Regular Diet with thin liquids per speech  - Patient removed his NJ overnight, will keep out for now and monitor Calorie counts over the weekend.   - Calorie counts 4/1 to 4/4  - Replace electrolytes as needed, hepatic enzymes improving.     Endocrine:  Stress induced hyperglycemia  - Initially managed on insulin drip, transitioned to sliding scale and now off insulin (3/16). No acute needs.       Infectious Disease:   Leukocytosis: C diff negative 3/18, UA on 3/22 and 3/23 were clean and CRP has been slowly trending down.  - Completed perioperative antibiotics.   - WBC 12.1, trending down with removal of tunneled dialysis catheter, afebrile, no other signs or symptoms of infection. Patient did have coiled PICC line which was removed 3/31  - CRP 32.0 (actually down from 120 3/25), ProCal 0.29 (slightly elevated), WBC diff, and UA - negative 4/1 for leukocytosis      Hematology:   Acute blood loss anemia  Epistaxis, resolved   - Hgb 5.7 on 3/22. Given 2 units PRBC and 1 unit plasma on 3/22, one unit PRBC 3/23.  Hgb stable at 7.9 and stable. Will plan to give 1 unit if < 7.5.    - Plt WNL, no persistent signs or symptoms of active bleeding, melena 3/22 overnight could be residual from previous epistaxis.   - LUE US 3/14 with non-occlusive thrombus in right cephalic vein, no UE DVT.      Anticoagulation:   - Coumadin for atrial fibrillation and left atrial thrombus, INR goal 2-3. Most recent INR 2.90.    - Was on LIH bridge, stopped now.     Prophylaxis:   - Stress ulcer prophylaxis: Pantoprazole 40 mg daily for 30 days  - DVT  "prophylaxis: coumadin, SCD     Disposition:   - Transferred to  on 3/19   - Therapies recommending discharge to TCU vs. ARU.  - Plan to discharge back to [place], near family Monday, ride arranged for 10:00 am.       Mel Castillo DNP, CNP  [facility] Cardiovascular Thoracic Surgery   O: [phone]  Pgr [phone]          Interval History:   Had a much better night last night. Slept good.   Alert and oriented, working with therapies.   Tolerating diet, eating more. No nausea or emesis. + BM  Pain well controlled. No sternum click or movement.          Medications:       acetaminophen  650 mg Oral or Feeding Tube Q8H     amiodarone  200 mg Oral BID     amitriptyline  25 mg Oral or Feeding Tube At Bedtime     artificial saliva  15 mL Swish & Spit 4x Daily     aspirin  81 mg Oral or NG Tube Daily     atorvastatin  20 mg Oral QPM     B and C vitamin Complex with folic acid  5 mL Oral Daily     benzonatate  200 mg Oral TID     ferrous sulfate  220 mg Oral Daily     ipratropium - albuterol 0.5 mg/2.5 mg/3 mL  3 mL Nebulization BID     lidocaine  2 patch Transdermal Q24h    And     lidocaine   Transdermal Q8H     magnesium sulfate  2 g Intravenous Once     melatonin  10 mg Oral or Feeding Tube QPM     metoprolol tartrate  25 mg Oral or Feeding Tube BID     pantoprazole  40 mg Oral Daily     potassium chloride  20 mEq Oral or Feeding Tube Once     traZODone  50 mg Oral or Feeding Tube At Bedtime     albumin human, albumin human, alum & mag hydroxide-simethicone, sore throat lozenge, bisacodyl, hydrALAZINE, loperamide, - MEDICATION INSTRUCTIONS -, midodrine, naloxone **OR** naloxone **OR** naloxone **OR** naloxone, ondansetron **OR** ondansetron, QUEtiapine, senna-docusate, sodium chloride (PF), sodium chloride (PF), Warfarin Therapy Reminder          Physical Exam:   Vitals were reviewed  Blood pressure 117/70, pulse 91, temperature 98.1  F (36.7  C), temperature source Oral, resp. rate 18, height 1.803 m (5' 11\"), weight " 76.6 kg (168 lb 14.4 oz), SpO2 99 %.  Vitals:    03/31/21 0608 04/02/21 0215 04/03/21 0107   Weight: 77.8 kg (171 lb 8 oz) 77.5 kg (170 lb 14.4 oz) 76.6 kg (168 lb 14.4 oz)      I/O last 3 completed shifts:  In: 310 [P.O.:240]  Out: 1050 [Urine:1050]    Gen: Alert and oriented today. NAD.    CV: RRR, normal S1, S2, no murmurs, rubs or gallops   Pulm:  LS clear, No wheezing or rhonchi  Abd: Soft, NT, ND, +BS  Ext: No LE edema  Neuro:  Nonfocal, PERRLA, reports sternal pain  Incision: c/d/i, no erythema, sternum stable         Data:    All labs and imaging reviewed by me.  Labs:    Data   BMP  Recent Labs   Lab 04/03/21 0558 04/02/21 0826 04/01/21 2059 04/01/21  0612    136 136 136   POTASSIUM 3.6 3.4 3.2* 3.2*   CHLORIDE 100 99 97 99   PALLAVI 9.1 9.0 8.8 8.6   CO2 30 29 29 26   * 145* 149* 146*   CR 4.19* 4.45* 4.58* 4.79*   GLC 88 99 131* 123*     CBC  Recent Labs   Lab 04/03/21 0558 04/02/21  0826 04/01/21 0612 03/31/21  0646   WBC 12.1* 14.1* 13.6* 11.6*   RBC 2.78* 2.83* 2.97* 2.88*   HGB 7.9* 8.4* 8.4* 8.4*   HCT 25.4* 26.4* 27.6* 26.7*   MCV 91 93 93 93   MCH 28.4 29.7 28.3 29.2   MCHC 31.1* 31.8 30.4* 31.5   RDW 16.8* 16.7* 16.6* 16.8*    269 283 266     INR  Recent Labs   Lab 04/03/21  0558 04/02/21  0826 04/01/21 0612 03/31/21  0646   INR 2.90* 2.76* 2.72* 2.69*      Hepatic Panel   Recent Labs   Lab 04/03/21  0558 04/02/21  0826 04/01/21  0612 03/31/21  0646   AST 23 26 26 33   ALT 31 32 36 40   ALKPHOS 143 174* 212* 199*   BILITOTAL 0.5 0.4 0.4 0.4   ALBUMIN 2.4* 2.4* 2.5* 2.3*     Glucose  Recent Labs   Lab 04/03/21  0558 04/02/21  0826 04/01/21 2059 04/01/21  0612 03/31/21  0646 03/30/21  0609   GLC 88 99 131* 123* 112* 98       Imaging:  Recent Results (from the past 24 hour(s))   XR Chest 2 Views    Narrative    Exam: XR CHEST 2 VW, 4/2/2021 10:58 AM    Comparison: Chest x-ray 3/31/2021    History: s/p MVR/CAB with epigastric pain    Findings:  Upright AP and lateral views of  the chest are obtained. Postsurgical  changes of mitral valve replacement and coronary artery bypass  grafting, median sternotomy wires are intact. Right internal jugular  catheter tip terminates at the cavoatrial junction.    Trachea is midline. Mediastinum is within normal limits. Calcification  aortic knob. Cardiopulmonary silhouette is within normal limits. Trace  bilateral pleural effusions and associated atelectasis. There is no  pneumothorax. The upper abdomen is unremarkable.      Impression    Impression:   Trace bilateral pleural effusions and associated atelectasis.    I have personally reviewed the examination and initial interpretation  and I agree with the findings.    HERMELINDA KANG MD

## 2021-04-03 NOTE — PLAN OF CARE
D: Pt admit 3/4/21 from OSH presenting with SOB and atypical chest pain. found to have a ruptured posterior papillary muscle causing severe MR. Pt now s/p CABG x1 and MVR 3/5/21. Pt out of OR on ECMO w/open chest and returned to OR for washout 3/9 found to have thrombus on mitral valve s/p MVR and ECMO decannulation. PMH HTN, AAA, HLD, RA      I/A:   Neuro: A&Ox2 pt disoriented to place & situation. Bed alarm on for safety. Pt tolerated PM meds and slept comfortably through the night- delirium improved significantly from previous night.   VS: VSS. RA  Tele: SR w/PAC's  Pain: pt c/o generalized back pain controlled with scheduled tylenol. PRN lozenge x1 for throat discomfort/coughing.   GI/: Urinating adequately into bedside commode/urinal. Frequent toileting offered to prevent falls. Small soft BM x1.  Diet: regular diet with thin liquids. Kcal counts through 4/4.  IV/Drips: R PIV SL.   Activity: A x1 with GB and walker. Frequent reminders needed for pt to call for staff assistance before moving.   Skin/drains: Midline/L groin incision CDI. Old CVC site covered with bandage CDI.      P: Plan for discharge to ARU in [place]. Ride set up for 10 AM Monday 4/5 per  note. Continue to monitor pt status and report changes to CVTS.      Miguelina Waggoner RN

## 2021-04-04 LAB
ALBUMIN SERPL-MCNC: 2.4 G/DL (ref 3.4–5)
ALP SERPL-CCNC: 132 U/L (ref 40–150)
ALT SERPL W P-5'-P-CCNC: 26 U/L (ref 0–70)
ANION GAP SERPL CALCULATED.3IONS-SCNC: 6 MMOL/L (ref 3–14)
AST SERPL W P-5'-P-CCNC: 19 U/L (ref 0–45)
BILIRUB SERPL-MCNC: 0.5 MG/DL (ref 0.2–1.3)
BUN SERPL-MCNC: 117 MG/DL (ref 7–30)
CALCIUM SERPL-MCNC: 9 MG/DL (ref 8.5–10.1)
CHLORIDE SERPL-SCNC: 102 MMOL/L (ref 94–109)
CO2 SERPL-SCNC: 30 MMOL/L (ref 20–32)
CREAT SERPL-MCNC: 3.92 MG/DL (ref 0.66–1.25)
ERYTHROCYTE [DISTWIDTH] IN BLOOD BY AUTOMATED COUNT: 16.6 % (ref 10–15)
GFR SERPL CREATININE-BSD FRML MDRD: 14 ML/MIN/{1.73_M2}
GLUCOSE SERPL-MCNC: 112 MG/DL (ref 70–99)
HCT VFR BLD AUTO: 24.8 % (ref 40–53)
HGB BLD-MCNC: 7.8 G/DL (ref 13.3–17.7)
INR PPP: 3.49 (ref 0.86–1.14)
LABORATORY COMMENT REPORT: NORMAL
LDH SERPL L TO P-CCNC: 357 U/L (ref 85–227)
MAGNESIUM SERPL-MCNC: 2.2 MG/DL (ref 1.6–2.3)
MCH RBC QN AUTO: 29.3 PG (ref 26.5–33)
MCHC RBC AUTO-ENTMCNC: 31.5 G/DL (ref 31.5–36.5)
MCV RBC AUTO: 93 FL (ref 78–100)
PLATELET # BLD AUTO: 294 10E9/L (ref 150–450)
POTASSIUM SERPL-SCNC: 3.6 MMOL/L (ref 3.4–5.3)
PROT SERPL-MCNC: 6.3 G/DL (ref 6.8–8.8)
RBC # BLD AUTO: 2.66 10E12/L (ref 4.4–5.9)
SARS-COV-2 RNA RESP QL NAA+PROBE: NEGATIVE
SODIUM SERPL-SCNC: 138 MMOL/L (ref 133–144)
SPECIMEN SOURCE: NORMAL
WBC # BLD AUTO: 10.1 10E9/L (ref 4–11)

## 2021-04-04 PROCEDURE — 250N000013 HC RX MED GY IP 250 OP 250 PS 637: Performed by: INTERNAL MEDICINE

## 2021-04-04 PROCEDURE — 80053 COMPREHEN METABOLIC PANEL: CPT | Performed by: SURGERY

## 2021-04-04 PROCEDURE — U0003 INFECTIOUS AGENT DETECTION BY NUCLEIC ACID (DNA OR RNA); SEVERE ACUTE RESPIRATORY SYNDROME CORONAVIRUS 2 (SARS-COV-2) (CORONAVIRUS DISEASE [COVID-19]), AMPLIFIED PROBE TECHNIQUE, MAKING USE OF HIGH THROUGHPUT TECHNOLOGIES AS DESCRIBED BY CMS-2020-01-R: HCPCS | Performed by: NURSE PRACTITIONER

## 2021-04-04 PROCEDURE — 250N000013 HC RX MED GY IP 250 OP 250 PS 637: Performed by: NURSE PRACTITIONER

## 2021-04-04 PROCEDURE — 250N000009 HC RX 250

## 2021-04-04 PROCEDURE — 94640 AIRWAY INHALATION TREATMENT: CPT

## 2021-04-04 PROCEDURE — 83735 ASSAY OF MAGNESIUM: CPT | Performed by: SURGERY

## 2021-04-04 PROCEDURE — 250N000013 HC RX MED GY IP 250 OP 250 PS 637: Performed by: PHYSICIAN ASSISTANT

## 2021-04-04 PROCEDURE — 250N000013 HC RX MED GY IP 250 OP 250 PS 637

## 2021-04-04 PROCEDURE — 85610 PROTHROMBIN TIME: CPT | Performed by: SURGERY

## 2021-04-04 PROCEDURE — 250N000013 HC RX MED GY IP 250 OP 250 PS 637: Performed by: THORACIC SURGERY (CARDIOTHORACIC VASCULAR SURGERY)

## 2021-04-04 PROCEDURE — 214N000001 HC R&B CCU UMMC

## 2021-04-04 PROCEDURE — 999N000157 HC STATISTIC RCP TIME EA 10 MIN

## 2021-04-04 PROCEDURE — 94640 AIRWAY INHALATION TREATMENT: CPT | Mod: 76

## 2021-04-04 PROCEDURE — 83615 LACTATE (LD) (LDH) ENZYME: CPT | Performed by: SURGERY

## 2021-04-04 PROCEDURE — 36415 COLL VENOUS BLD VENIPUNCTURE: CPT | Performed by: SURGERY

## 2021-04-04 PROCEDURE — U0005 INFEC AGEN DETEC AMPLI PROBE: HCPCS | Performed by: NURSE PRACTITIONER

## 2021-04-04 PROCEDURE — 250N000013 HC RX MED GY IP 250 OP 250 PS 637: Performed by: SURGERY

## 2021-04-04 PROCEDURE — 250N000009 HC RX 250: Performed by: NURSE PRACTITIONER

## 2021-04-04 PROCEDURE — 85027 COMPLETE CBC AUTOMATED: CPT | Performed by: SURGERY

## 2021-04-04 RX ORDER — BENZONATATE 100 MG/1
100 CAPSULE ORAL EVERY 6 HOURS
Status: DISCONTINUED | OUTPATIENT
Start: 2021-04-04 | End: 2021-04-05 | Stop reason: HOSPADM

## 2021-04-04 RX ORDER — OXYMETAZOLINE HYDROCHLORIDE 0.05 G/100ML
2 SPRAY NASAL 2 TIMES DAILY
Status: DISCONTINUED | OUTPATIENT
Start: 2021-04-04 | End: 2021-04-05 | Stop reason: HOSPADM

## 2021-04-04 RX ORDER — POTASSIUM CHLORIDE 750 MG/1
10 TABLET, EXTENDED RELEASE ORAL ONCE
Status: DISCONTINUED | OUTPATIENT
Start: 2021-04-04 | End: 2021-04-04

## 2021-04-04 RX ORDER — POTASSIUM CHLORIDE 750 MG/1
10 TABLET, EXTENDED RELEASE ORAL DAILY
Status: DISCONTINUED | OUTPATIENT
Start: 2021-04-04 | End: 2021-04-05 | Stop reason: HOSPADM

## 2021-04-04 RX ORDER — ACETAMINOPHEN 325 MG/1
650 TABLET ORAL EVERY 4 HOURS PRN
Status: DISCONTINUED | OUTPATIENT
Start: 2021-04-04 | End: 2021-04-05 | Stop reason: HOSPADM

## 2021-04-04 RX ADMIN — ASPIRIN 81 MG CHEWABLE TABLET 81 MG: 81 TABLET CHEWABLE at 08:10

## 2021-04-04 RX ADMIN — BENZONATATE 100 MG: 100 CAPSULE ORAL at 15:59

## 2021-04-04 RX ADMIN — BENZOCAINE AND MENTHOL 1 LOZENGE: 15; 3.6 LOZENGE ORAL at 05:44

## 2021-04-04 RX ADMIN — ATORVASTATIN CALCIUM 20 MG: 20 TABLET, FILM COATED ORAL at 19:35

## 2021-04-04 RX ADMIN — TRAZODONE HYDROCHLORIDE 50 MG: 50 TABLET ORAL at 22:06

## 2021-04-04 RX ADMIN — IPRATROPIUM BROMIDE AND ALBUTEROL SULFATE 3 ML: .5; 3 SOLUTION RESPIRATORY (INHALATION) at 09:09

## 2021-04-04 RX ADMIN — ACETAMINOPHEN 650 MG: 325 TABLET, FILM COATED ORAL at 00:14

## 2021-04-04 RX ADMIN — Medication 15 ML: at 08:18

## 2021-04-04 RX ADMIN — BENZOCAINE AND MENTHOL 1 LOZENGE: 15; 3.6 LOZENGE ORAL at 14:24

## 2021-04-04 RX ADMIN — OXYMETAZOLINE HYDROCHLORIDE 2 SPRAY: 0.05 SPRAY NASAL at 10:58

## 2021-04-04 RX ADMIN — BENZONATATE 100 MG: 100 CAPSULE ORAL at 22:06

## 2021-04-04 RX ADMIN — Medication 5 ML: at 08:10

## 2021-04-04 RX ADMIN — BENZOCAINE AND MENTHOL 1 LOZENGE: 15; 3.6 LOZENGE ORAL at 19:36

## 2021-04-04 RX ADMIN — ACETAMINOPHEN 650 MG: 325 TABLET, FILM COATED ORAL at 19:35

## 2021-04-04 RX ADMIN — ACETAMINOPHEN 650 MG: 325 TABLET, FILM COATED ORAL at 14:24

## 2021-04-04 RX ADMIN — AMITRIPTYLINE HYDROCHLORIDE 25 MG: 25 TABLET, FILM COATED ORAL at 22:06

## 2021-04-04 RX ADMIN — BENZONATATE 200 MG: 100 CAPSULE ORAL at 08:15

## 2021-04-04 RX ADMIN — BENZOCAINE AND MENTHOL 1 LOZENGE: 15; 3.6 LOZENGE ORAL at 15:59

## 2021-04-04 RX ADMIN — Medication 15 ML: at 11:57

## 2021-04-04 RX ADMIN — METOPROLOL TARTRATE 25 MG: 25 TABLET, FILM COATED ORAL at 19:35

## 2021-04-04 RX ADMIN — BENZOCAINE AND MENTHOL 1 LOZENGE: 15; 3.6 LOZENGE ORAL at 17:08

## 2021-04-04 RX ADMIN — BENZONATATE 100 MG: 100 CAPSULE ORAL at 10:58

## 2021-04-04 RX ADMIN — Medication 15 ML: at 19:37

## 2021-04-04 RX ADMIN — IPRATROPIUM BROMIDE AND ALBUTEROL SULFATE 3 ML: .5; 3 SOLUTION RESPIRATORY (INHALATION) at 20:56

## 2021-04-04 RX ADMIN — OXYMETAZOLINE HYDROCHLORIDE 2 SPRAY: 0.05 SPRAY NASAL at 19:36

## 2021-04-04 RX ADMIN — LIDOCAINE 2 PATCH: 560 PATCH PERCUTANEOUS; TOPICAL; TRANSDERMAL at 19:37

## 2021-04-04 RX ADMIN — PANTOPRAZOLE SODIUM 40 MG: 40 TABLET, DELAYED RELEASE ORAL at 08:10

## 2021-04-04 RX ADMIN — AMIODARONE HYDROCHLORIDE 200 MG: 200 TABLET ORAL at 08:10

## 2021-04-04 RX ADMIN — BENZOCAINE AND MENTHOL 1 LOZENGE: 15; 3.6 LOZENGE ORAL at 08:10

## 2021-04-04 RX ADMIN — AMIODARONE HYDROCHLORIDE 200 MG: 200 TABLET ORAL at 19:35

## 2021-04-04 RX ADMIN — BENZOCAINE AND MENTHOL 1 LOZENGE: 15; 3.6 LOZENGE ORAL at 11:59

## 2021-04-04 RX ADMIN — Medication 220 MG: at 11:57

## 2021-04-04 RX ADMIN — POTASSIUM CHLORIDE 10 MEQ: 750 TABLET, EXTENDED RELEASE ORAL at 10:58

## 2021-04-04 RX ADMIN — Medication 10 MG: at 19:35

## 2021-04-04 RX ADMIN — METOPROLOL TARTRATE 25 MG: 25 TABLET, FILM COATED ORAL at 08:10

## 2021-04-04 RX ADMIN — ACETAMINOPHEN 650 MG: 325 TABLET, FILM COATED ORAL at 08:10

## 2021-04-04 RX ADMIN — Medication 15 ML: at 15:59

## 2021-04-04 RX ADMIN — BENZOCAINE AND MENTHOL 1 LOZENGE: 15; 3.6 LOZENGE ORAL at 00:14

## 2021-04-04 ASSESSMENT — ACTIVITIES OF DAILY LIVING (ADL)
ADLS_ACUITY_SCORE: 19
ADLS_ACUITY_SCORE: 18
ADLS_ACUITY_SCORE: 19
ADLS_ACUITY_SCORE: 18
ADLS_ACUITY_SCORE: 19
ADLS_ACUITY_SCORE: 18

## 2021-04-04 ASSESSMENT — MIFFLIN-ST. JEOR: SCORE: 1498.71

## 2021-04-04 NOTE — PROGRESS NOTES
Calorie Count  Intake recorded for: 4/3  Total Kcals: 1103 Total Protein: 14g  Kcals from Hospital Food: 668   Protein: 3g  Kcals from Outside Food (average):435  Protein: 11g  # Meals Ordered from Kitchen: 2 meals  # Meals Recorded: 1 meal (100% 24oz orange juice, 3 popsicles, 75% vanilla malt (from [employer]), 50% fruit plate, 25% [place] toast)  # Supplements Recorded: No intake recorded

## 2021-04-04 NOTE — PROGRESS NOTES
Cardiovascular Surgery Progress Note  04/04/2021           Assessment and Plan:   Jin Garrett is a 80 year old male with PMH of HTN, AAA, HLD and rheumatoid arthritis. He presented to an OSH with shortness of breath and atypical chest pain. TTE showed severe MR with concern for posterior flail leaflet, EF was preserved. On arrival at [facility] (3/4/21) an Impella was emergently placed in cath lab. He was found to have a ruptured posterior papillary muscle causing severe MR.   Jin is now s/p mitral valve replacement and single vessel CABG with Dr. Porras on 3/5/21. Returned from the OR on central ECMO with an open chest, intubated and sedated. Returned to the OR for a wash out on 3/9, found to have a thrombus on his mitral valve so he had the valve replaced, decannulated central ECMO, bronchoscopy and placement of temporary L subclavian HD line. Exchanged Left subclavian for Right IJ Tunneled dialysis line. Return of UOP and delirium cleared 3/27.     Cardiovascular:   Hx of HTN, HLD, AAA  Acute severe MR due to ruptured papillary muscle, s/p tissue MVR  Cardiogenic shock secondary to severe MR  S/p Impella placement and conversion to central VA ECMO  Weaned off mechanical support, ECMO was utilized from 3/5 to 3/9.   CAD, mid LCx with 100% occlusion, s/p 1 vessel CAB   S/P redo MVR due to thrombus in left atrium and on bioprosthetic MV  Most recent echo showed LV EF 55-60%. LUE US 3/14 with nonocclusive thrombus in right cephalic vein, no UE DVT.     - ASA 81 mg PO Daily  - Metoprolol 25 mg PO BID  - atorvastatin were on hold due to elevated LFTs, improved.   - Start Lipitor 20 mg daily with known CAD   Pre and post-op Atrial fibrillation   Has been mostly rate controlled and anticoagulated. Hep gtt held since 3/22 due to epistaxis. EKG 3/22 confirming A-fib ('s), likely contributing to hypotension, given Amio 150 mg IV bolus x 1 dose 3/22 and restarted 400 mg PO BID 3/23 AM with return to NSR, now reduced to  200 mg PO BID 3/29.    Hypotension resolved. Required  midodrine 15 mg TID to maintain MAPS > 60, has since improved and was stopped 3/27 am.   - amiodarone 200 mg PO daily, currently SR   - Post-operative TTE 4/1: LVEF 35-40%, diffuse hypokinesis; RV not well visualized appears mildly reduced function. MV is well seated, mean gradient 6 mmHg      Pulmonary:  Acute respiratory failure secondary to acute MR (resolved)  - Extubated POD 3 (chest closure and ecmo decan) to 4 lpm via NC; now saturating well on room air    - Supplemental O2 PRN to keep sats > 92%. Wean off as tolerated.  - Pulm hygiene, IS, activity and deep breathing  - CXR 4/2 - Impression: Trace bilateral pleural effusions and associated atelectasis.  - Having frequent dry cough, now with nasal congestion this morning.   - change tessalon pearls to Q6hr prn and add Afrin nasal spray BID     Neurology / MSK:  Delirium Had extended time on sedation due to need for central VA ECMO. Avoiding narcs and benadryl. Improving, no bedside attendent and no hand mitts since 3/28. Psych consult 3/25, tried zyprexa 5 mg but did not tolerate it. Returned to Seroquel and tolerated it well. Stop Seroquel (3/31).  Insomnia  - Sleep improved with addition of trazodone w/o noticeable side effects.   - Melatonin 10 mg q PM  - Trazodone 50 mg PO q PM  - Acute post-operative pain; pain well controlled with acetaminophen and tramadol.   Chronic back pain   Recent hip surgery  - No weight bearing or activity restrictions related to recent hip surgery per family (records not available).    - Aqua K pad helping.      HEENT:   Hemoptysis  Epistaxis    - Suspecting hemoptysis is secondary epistaxis due to trauma to posterior nasal septum from the NG tube bridle and him tugging on it.    - Has hand mitts PRN. Tube is no longer bridled.        / Renal:  ADAN secondary to cardiogenic shock induced ATN  Hyperkalemia   - No Hx of renal disease. Most recent creatinine 3.92 (4.19  yesterday) and improving.   - Oliguric, has condom catheter. Improved with Bumex challenge.Now making much more urine.    - holding diuretic with robust diuresis, last bumex 4/1.   - net 24 hour I/O net -1L, weight unchanged   - K= 3.6 without any diuretic for a few days. Was taking potassium PTA per patient, no dose noted.   - Give potassium 10 mEq daily and monitor K+ closely  - Nephrology following; Was on CRRT transitioned to intermittent HD. Last HD run 3/26.   - Dialysis catheter removed per IR 4/2     GI / FEN:   Melena: Noticed 3/23. Had epistaxis 2 days prior and likely swallowed some of the blood. No episodes since.  Dysphagia:  Poor appetite, not meeting nutritional needs from oral intake.  - Regular Diet with thin liquids per speech  - Patient removed his NJ 4/2 overnight, will keep out for now and monitor Calorie counts over the weekend.   - Calorie counts 4/1 to 4/4  - Replace electrolytes as needed, hepatic enzymes improving.     Endocrine:  Stress induced hyperglycemia  - Initially managed on insulin drip, transitioned to sliding scale and now off insulin (3/16). No acute needs.       Infectious Disease:   Leukocytosis: C diff negative 3/18, UA on 3/22 and 3/23 were clean and CRP has been slowly trending down.  - Completed perioperative antibiotics.   - WBC 10.1, trending down with removal of tunneled dialysis catheter, afebrile, no other signs or symptoms of infection. Patient did have coiled PICC line which was removed 3/31  - CRP 32.0 (actually down from 120 3/25), ProCal 0.29 (slightly elevated), WBC diff, and UA - negative 4/1 for leukocytosis      Hematology:   Acute blood loss anemia  Epistaxis, resolved   - Hgb 5.7 on 3/22. Given 2 units PRBC and 1 unit plasma on 3/22, one unit PRBC 3/23.  Hgb stable at 7.8 and stable. Will plan to give 1 unit if < 7.5.    - Plt WNL, no persistent signs or symptoms of active bleeding, melena 3/22 overnight could be residual from previous epistaxis.   - ANSON CHAN  3/14 with non-occlusive thrombus in right cephalic vein, no UE DVT.      Anticoagulation:   - Coumadin for atrial fibrillation and left atrial thrombus, INR goal 2-3. Most recent INR 3.49, hold coumadin tonight as per pharmacy.    - Was on LIH bridge, stopped now.     Prophylaxis:   - Stress ulcer prophylaxis: Pantoprazole 40 mg daily for 30 days  - DVT prophylaxis: coumadin, SCD     Disposition:   - Transferred to  on 3/19   - Therapies recommending discharge to TCU vs. ARU.  - Plan to discharge back to [facility] near [facility] Monday, ride arranged for 10:00 am.   - Covid check today for transfer.       Mel Castillo, DNP, CNP  [facility] Cardiovascular Thoracic Surgery   O: [phone]  Pgr [phone]          Interval History:   Frequent non-productive cough that kept him awake last nigh. Fell asleep around 3 am.   Feel congested this am. Uses nasal spray at home from time to time.   Alert and oriented, working with therapies.   Tolerating diet, eating more. No nausea or emesis. + BM  Pain well controlled. No sternum click or movement.          Medications:       acetaminophen  650 mg Oral or Feeding Tube Q8H     amiodarone  200 mg Oral BID     amitriptyline  25 mg Oral or Feeding Tube At Bedtime     artificial saliva  15 mL Swish & Spit 4x Daily     aspirin  81 mg Oral or NG Tube Daily     atorvastatin  20 mg Oral QPM     B and C vitamin Complex with folic acid  5 mL Oral Daily     benzonatate  200 mg Oral TID     ferrous sulfate  220 mg Oral Daily     ipratropium - albuterol 0.5 mg/2.5 mg/3 mL  3 mL Nebulization BID     lidocaine  2 patch Transdermal Q24h    And     lidocaine   Transdermal Q8H     magnesium sulfate  2 g Intravenous Once     melatonin  10 mg Oral or Feeding Tube QPM     metoprolol tartrate  25 mg Oral or Feeding Tube BID     pantoprazole  40 mg Oral Daily     potassium chloride  20 mEq Oral or Feeding Tube Once     traZODone  50 mg Oral or Feeding Tube At Bedtime     albumin human, albumin human, alum  "& mag hydroxide-simethicone, sore throat lozenge, bisacodyl, hydrALAZINE, loperamide, - MEDICATION INSTRUCTIONS -, midodrine, naloxone **OR** naloxone **OR** naloxone **OR** naloxone, ondansetron **OR** ondansetron, QUEtiapine, senna-docusate, sodium chloride (PF), sodium chloride (PF), Warfarin Therapy Reminder          Physical Exam:   Vitals were reviewed  Blood pressure 112/76, pulse 86, temperature 97.9  F (36.6  C), resp. rate 18, height 1.803 m (5' 11\"), weight 76.7 kg (169 lb), SpO2 95 %.  Vitals:    04/02/21 0215 04/03/21 0107 04/04/21 0100   Weight: 77.5 kg (170 lb 14.4 oz) 76.6 kg (168 lb 14.4 oz) 76.7 kg (169 lb)      I/O last 3 completed shifts:  In: 500 [P.O.:500]  Out: 1675 [Urine:1675]    Gen: Alert and oriented today. NAD.    CV: RRR, normal S1, S2, no murmurs, rubs or gallops   Pulm:  LS clear, No wheezing or rhonchi  Abd: Soft, NT, ND, +BS  Ext: No LE edema  Neuro:  Nonfocal, PERRLA, reports sternal pain  Incision: c/d/i, no erythema, sternum stable         Data:    All labs and imaging reviewed by me.  Labs:    Data   BMP  Recent Labs   Lab 04/04/21  0550 04/03/21  0558 04/02/21  0826 04/01/21  2059    137 136 136   POTASSIUM 3.6 3.6 3.4 3.2*   CHLORIDE 102 100 99 97   PALLAVI 9.0 9.1 9.0 8.8   CO2 30 30 29 29   * 148* 145* 149*   CR 3.92* 4.19* 4.45* 4.58*   * 88 99 131*     CBC  Recent Labs   Lab 04/04/21  0550 04/03/21  0558 04/02/21  0826 04/01/21  0612   WBC 10.1 12.1* 14.1* 13.6*   RBC 2.66* 2.78* 2.83* 2.97*   HGB 7.8* 7.9* 8.4* 8.4*   HCT 24.8* 25.4* 26.4* 27.6*   MCV 93 91 93 93   MCH 29.3 28.4 29.7 28.3   MCHC 31.5 31.1* 31.8 30.4*   RDW 16.6* 16.8* 16.7* 16.6*    307 269 283     INR  Recent Labs   Lab 04/04/21  0550 04/03/21  0558 04/02/21  0826 04/01/21  0612   INR 3.49* 2.90* 2.76* 2.72*      Hepatic Panel   Recent Labs   Lab 04/04/21  0550 04/03/21  0558 04/02/21  0826 04/01/21  0612   AST 19 23 26 26   ALT 26 31 32 36   ALKPHOS 132 143 174* 212*   BILITOTAL " 0.5 0.5 0.4 0.4   ALBUMIN 2.4* 2.4* 2.4* 2.5*     Glucose  Recent Labs   Lab 04/04/21  0550 04/03/21  1636 04/03/21  0558 04/02/21  0826 04/01/21  2059 04/01/21  0612 03/31/21  0646   *  --  88 99 131* 123* 112*   BGM  --  105*  --   --   --   --   --        Imaging:  No results found for this or any previous visit (from the past 24 hour(s)).

## 2021-04-04 NOTE — PLAN OF CARE
Pt admitted 3/4/21 from OSH presenting with SOB and atypical chest pain. found to have a ruptured posterior papillary muscle causing severe MR. Pt now s/p CABG x1 and MVR 3/5/21. Pt out of OR on ECMO w/open chest and returned to OR for washout 3/9 found to have thrombus on mitral valve s/p MVR and ECMO decannulation. PMH HTN, AAA, HLD, RA.  SR 80s-90s with 0-5 PVCs,0-9 PACs. VSS. Frequent cough causes some CP. Lidocaine patches applied to the chest, scheduled tylenol given, and lozenges given q 2 hours. Up to BR with assist of 1, walker, and gait belt. A&O, calls appropriately, but alarm on for some impulsiveness. All incisions healing well. Ate 1/2 his supper of fruit. Daughter here for several hours.

## 2021-04-04 NOTE — PROGRESS NOTES
D: Pt admit 3/4/21 from OSH presenting with SOB and atypical chest pain. found to have a ruptured posterior papillary muscle causing severe MR. Pt now s/p CABG x1 and MVR 3/5/21. Pt out of OR on ECMO w/open chest and returned to OR for washout 3/9 found to have thrombus on mitral valve s/p MVR and ECMO decannulation. PMH HTN, AAA, HLD, RA     I: Monitored vitals and assessed pt status.   Changed:   -Tessalon dose change to 100 mg q6h  -Held Warfarin today, INR 3.49  Running:   PRN: Benzocaine-menthol lozenge x5     Vitals: Temp: 98.3  F (36.8  C) Temp src: Oral BP: 115/70 Pulse: 81   Resp: 16 SpO2: 97 % O2 Device: None (Room air)          A:   Neuro: A&O x 4. Forgetful at times. Bed alarm for safety precautions. Neurologically intact. Denies headache, lightheadedness, dizziness, numbness and tingling calls appropriately     Cardiac: VSS. SR, denies chest pain.   Respiratory: sating >92% on RA. Denies SOB, SEYMOUR. LS diminished at bases.  Diet/appetite: Regular diet. Appetite fair.  GI/: Denies N/V and abdominal pain. Normoactive bowel sounds in all quadrants. Voids without difficulty, LBM x 1 4/4  Activity: Assist +1, up to side of the bed, ambulated to bathroom with gait belt and walker.  Pain: Complaints of pain in ribs with cough, managed with PRN Tylenol  Skin: Redness on sacrum, covered with mepilex. Bruising on L forearm and R upper arm.  LDAs: R PIV saline locked.  Labs:  Potassium 3.6, replaced with one time order 10mEq. COVID Negative 4/4.      P: Discharge on 4/5 to [facility] in [place]. Ride schedules for with [employer] transportation at 1000. Continue to monitor Pt status and report changes to treatment team.

## 2021-04-04 NOTE — PLAN OF CARE
Pt admit 3/4/21 from OSH presenting with SOB and atypical chest pain. found to have a ruptured posterior papillary muscle causing severe MR. Pt now s/p CABG x1 and MVR 3/5/21. Pt out of OR on ECMO w/open chest and returned to OR for washout 3/9 found to have thrombus on mitral valve s/p MVR and ECMO decannulation. PMH HTN, AAA, HLD, RA.  Neuro: A&O x 4, forgetful at times. Neurologically intact, denies headache, dizziness, lightheadedness, numbness and tingling, Calls appropriately. Bed alarm on for safety.     Cardiac: SR, occasional PAC. VSS. Denies chest pain  Respiratory: sating WNL on RA. Denies SOB, SEYMOUR.  Fine crackles at bases. Frequent cough. No prn meds for cough ordered. [facility] texted X2 for cough medicine. No new order.  Diet/appetite: Regular diet, thin liquids, fair appetite.  GI/:  Denies  N/V. Voiding in adequate amount. No BM overnight.  Activity: Assist +1   Pain: Generalized pain managed with scheduled tylenol.  Skin: Bruising on L forearm, R upper arm.   LDAs: R PIV saline locked.       P: Continue to monitor Pt status and report changes to treatment team.

## 2021-04-05 VITALS
HEART RATE: 95 BPM | HEIGHT: 71 IN | RESPIRATION RATE: 16 BRPM | SYSTOLIC BLOOD PRESSURE: 119 MMHG | DIASTOLIC BLOOD PRESSURE: 70 MMHG | BODY MASS INDEX: 23.32 KG/M2 | TEMPERATURE: 98.2 F | OXYGEN SATURATION: 99 % | WEIGHT: 166.6 LBS

## 2021-04-05 LAB
ALBUMIN SERPL-MCNC: 2.4 G/DL (ref 3.4–5)
ALP SERPL-CCNC: 128 U/L (ref 40–150)
ALT SERPL W P-5'-P-CCNC: 24 U/L (ref 0–70)
ANION GAP SERPL CALCULATED.3IONS-SCNC: 6 MMOL/L (ref 3–14)
AST SERPL W P-5'-P-CCNC: 18 U/L (ref 0–45)
BILIRUB SERPL-MCNC: 0.5 MG/DL (ref 0.2–1.3)
BUN SERPL-MCNC: 96 MG/DL (ref 7–30)
CALCIUM SERPL-MCNC: 8.9 MG/DL (ref 8.5–10.1)
CHLORIDE SERPL-SCNC: 103 MMOL/L (ref 94–109)
CO2 SERPL-SCNC: 30 MMOL/L (ref 20–32)
CREAT SERPL-MCNC: 3.51 MG/DL (ref 0.66–1.25)
ERYTHROCYTE [DISTWIDTH] IN BLOOD BY AUTOMATED COUNT: 16.5 % (ref 10–15)
GFR SERPL CREATININE-BSD FRML MDRD: 15 ML/MIN/{1.73_M2}
GLUCOSE SERPL-MCNC: 101 MG/DL (ref 70–99)
HCT VFR BLD AUTO: 25 % (ref 40–53)
HGB BLD-MCNC: 7.8 G/DL (ref 13.3–17.7)
INR PPP: 4.05 (ref 0.86–1.14)
LDH SERPL L TO P-CCNC: 345 U/L (ref 85–227)
MCH RBC QN AUTO: 29 PG (ref 26.5–33)
MCHC RBC AUTO-ENTMCNC: 31.2 G/DL (ref 31.5–36.5)
MCV RBC AUTO: 93 FL (ref 78–100)
PLATELET # BLD AUTO: 303 10E9/L (ref 150–450)
POTASSIUM SERPL-SCNC: 3.9 MMOL/L (ref 3.4–5.3)
PROT SERPL-MCNC: 6.2 G/DL (ref 6.8–8.8)
RBC # BLD AUTO: 2.69 10E12/L (ref 4.4–5.9)
SODIUM SERPL-SCNC: 140 MMOL/L (ref 133–144)
WBC # BLD AUTO: 9.7 10E9/L (ref 4–11)

## 2021-04-05 PROCEDURE — 94640 AIRWAY INHALATION TREATMENT: CPT

## 2021-04-05 PROCEDURE — 250N000013 HC RX MED GY IP 250 OP 250 PS 637: Performed by: INTERNAL MEDICINE

## 2021-04-05 PROCEDURE — 83615 LACTATE (LD) (LDH) ENZYME: CPT | Performed by: SURGERY

## 2021-04-05 PROCEDURE — 85610 PROTHROMBIN TIME: CPT | Performed by: SURGERY

## 2021-04-05 PROCEDURE — 250N000013 HC RX MED GY IP 250 OP 250 PS 637

## 2021-04-05 PROCEDURE — 999N000157 HC STATISTIC RCP TIME EA 10 MIN

## 2021-04-05 PROCEDURE — 250N000013 HC RX MED GY IP 250 OP 250 PS 637: Performed by: NURSE PRACTITIONER

## 2021-04-05 PROCEDURE — 80053 COMPREHEN METABOLIC PANEL: CPT | Performed by: SURGERY

## 2021-04-05 PROCEDURE — 250N000013 HC RX MED GY IP 250 OP 250 PS 637: Performed by: THORACIC SURGERY (CARDIOTHORACIC VASCULAR SURGERY)

## 2021-04-05 PROCEDURE — 36415 COLL VENOUS BLD VENIPUNCTURE: CPT | Performed by: SURGERY

## 2021-04-05 PROCEDURE — 85027 COMPLETE CBC AUTOMATED: CPT | Performed by: SURGERY

## 2021-04-05 PROCEDURE — 250N000013 HC RX MED GY IP 250 OP 250 PS 637: Performed by: PHYSICIAN ASSISTANT

## 2021-04-05 PROCEDURE — 250N000013 HC RX MED GY IP 250 OP 250 PS 637: Performed by: SURGERY

## 2021-04-05 PROCEDURE — 250N000009 HC RX 250

## 2021-04-05 RX ORDER — AMIODARONE HYDROCHLORIDE 200 MG/1
200 TABLET ORAL 2 TIMES DAILY
DISCHARGE
Start: 2021-04-05

## 2021-04-05 RX ORDER — POTASSIUM CHLORIDE 750 MG/1
10 TABLET, EXTENDED RELEASE ORAL DAILY
DISCHARGE
Start: 2021-04-06

## 2021-04-05 RX ORDER — B COMPLEX C NO.10/FOLIC ACID 900MCG/5ML
5 LIQUID (ML) ORAL DAILY
DISCHARGE
Start: 2021-04-06

## 2021-04-05 RX ORDER — PANTOPRAZOLE SODIUM 40 MG/1
40 TABLET, DELAYED RELEASE ORAL DAILY
DISCHARGE
Start: 2021-04-06

## 2021-04-05 RX ORDER — ATORVASTATIN CALCIUM 20 MG/1
20 TABLET, FILM COATED ORAL EVERY EVENING
DISCHARGE
Start: 2021-04-05

## 2021-04-05 RX ORDER — TRAZODONE HYDROCHLORIDE 50 MG/1
50 TABLET, FILM COATED ORAL AT BEDTIME
DISCHARGE
Start: 2021-04-05

## 2021-04-05 RX ORDER — ASPIRIN 81 MG/1
81 TABLET, CHEWABLE ORAL DAILY
DISCHARGE
Start: 2021-04-06

## 2021-04-05 RX ORDER — IPRATROPIUM BROMIDE AND ALBUTEROL SULFATE 2.5; .5 MG/3ML; MG/3ML
3 SOLUTION RESPIRATORY (INHALATION) 2 TIMES DAILY
DISCHARGE
Start: 2021-04-05

## 2021-04-05 RX ORDER — BENZONATATE 100 MG/1
100 CAPSULE ORAL EVERY 6 HOURS
DISCHARGE
Start: 2021-04-05

## 2021-04-05 RX ORDER — METOPROLOL TARTRATE 25 MG/1
25 TABLET, FILM COATED ORAL 2 TIMES DAILY
DISCHARGE
Start: 2021-04-05

## 2021-04-05 RX ORDER — AMOXICILLIN 250 MG
1-2 CAPSULE ORAL 2 TIMES DAILY PRN
DISCHARGE
Start: 2021-04-05

## 2021-04-05 RX ORDER — OXYMETAZOLINE HYDROCHLORIDE 0.05 G/100ML
2 SPRAY NASAL 2 TIMES DAILY
DISCHARGE
Start: 2021-04-05

## 2021-04-05 RX ORDER — ACETAMINOPHEN 325 MG/1
650 TABLET ORAL EVERY 4 HOURS PRN
DISCHARGE
Start: 2021-04-05

## 2021-04-05 RX ADMIN — BENZONATATE 100 MG: 100 CAPSULE ORAL at 04:23

## 2021-04-05 RX ADMIN — POTASSIUM CHLORIDE 10 MEQ: 750 TABLET, EXTENDED RELEASE ORAL at 07:52

## 2021-04-05 RX ADMIN — ASPIRIN 81 MG CHEWABLE TABLET 81 MG: 81 TABLET CHEWABLE at 07:52

## 2021-04-05 RX ADMIN — PANTOPRAZOLE SODIUM 40 MG: 40 TABLET, DELAYED RELEASE ORAL at 07:52

## 2021-04-05 RX ADMIN — Medication 15 ML: at 07:52

## 2021-04-05 RX ADMIN — METOPROLOL TARTRATE 25 MG: 25 TABLET, FILM COATED ORAL at 07:52

## 2021-04-05 RX ADMIN — IPRATROPIUM BROMIDE AND ALBUTEROL SULFATE 3 ML: .5; 3 SOLUTION RESPIRATORY (INHALATION) at 08:39

## 2021-04-05 RX ADMIN — BENZOCAINE AND MENTHOL 1 LOZENGE: 15; 3.6 LOZENGE ORAL at 08:29

## 2021-04-05 RX ADMIN — OXYMETAZOLINE HYDROCHLORIDE 2 SPRAY: 0.05 SPRAY NASAL at 07:52

## 2021-04-05 RX ADMIN — Medication 5 ML: at 07:52

## 2021-04-05 RX ADMIN — AMIODARONE HYDROCHLORIDE 200 MG: 200 TABLET ORAL at 07:52

## 2021-04-05 ASSESSMENT — ACTIVITIES OF DAILY LIVING (ADL)
ADLS_ACUITY_SCORE: 18

## 2021-04-05 ASSESSMENT — MIFFLIN-ST. JEOR
SCORE: 1487.82
SCORE: 1507.78

## 2021-04-05 NOTE — PLAN OF CARE
Occupational Therapy Discharge Summary    Reason for therapy discharge:    Discharged to acute rehabilitation facility.    Progress towards therapy goal(s). See goals on Care Plan in [facility] electronic health record for goal details.  Goals partially met.  Barriers to achieving goals:   discharge from facility.    Therapy recommendation(s):    Continued therapy is recommended.  Rationale/Recommendations:  Recommend pt continue with skilled OT services at ARU to progress independence w/ I/ADLs.

## 2021-04-05 NOTE — PLAN OF CARE
D: Pt admitted on 3/4/21 from OSH with SOB and atypical chest pain. found to have a ruptured posterior papillary muscle causing severe MR. s/p CABG x1 and MVR 3/5/21. Pt out of OR on ECMO w/open chest and returned to OR for washout 3/9 found to have thrombus on mitral valve s/p MVR and ECMO decannulation. PMH HTN, AAA, HLD, and  RA      I: Monitored vitals and assessed pt status.   Changed:   -Tessalon dose change to 100 mg q6h     PRN:   - lozenge x6  -Tylenol X3        Vitals: T  Temp: 98  F (36.7  C) Temp src: Oral BP: 118/68 Pulse: 93   Resp: 16 SpO2: 97 % O2 Device: None (Room air)       A:   Neuro: A&O x 4.. Neurologically intact. Denies headache, lightheadedness, dizziness, numbness and tingling. calls appropriately.  Forgetful at times. Bed alarm for safety    Cardiac: VSS. SR, denies chest pain.   Respiratory: sating >92% on RA. Denies SOB, SEYMOUR. LS clear,  diminished at bases.  Diet/appetite: Regular diet. Fair appetite  GI/:   BM x1, denies abdominal pain and nausea. Good urine output, Voids without difficulty,   Activity: Assist of 1.  with gait belt and walker.  Pain: report rib/chest pain from cough. Pain managed with PRN Tylenol   Skin:  no new deficit noted.   LDAs: R PIV .  Labs:  Potassium 3.6, replaced per order.  COVID Negative 4/4.      P: Discharge on 4/5 to [facility]U in [place]. Ride schedules for with [employer] transportation at 10:00. Continue to monitor Pt status and report changes to treatment team

## 2021-04-05 NOTE — DISCHARGE INSTRUCTIONS
AFTER YOU GO HOME FROM YOUR HEART SURGERY  (mitral valve replacement and CABG X1, and redo MVR 3/9)    You had a sternotomy, avoid lifting anything greater than ten pounds for 6 weeks after surgery and then less than 20 pounds for an additional 6 weeks. Do not reach backwards or use arms to push out of chair. Do not let people pull on your arms to assist with standing. Avoid twisting or reaching too far across your body.  Avoid strenuous activities such as bowling, vacuuming, raking, shoveling, golf or tennis for 12 weeks after your surgery. It is okay to resume sex if you feel comfortable in doing so. You may have to try different positions with your partner.  Splint your chest incision by hugging a pillow or bringing your arms across your chest when coughing or sneezing.     No driving for 4 weeks after surgery or while on pain medication. If you had a minimally invasive procedure, you may drive after 2 weeks if not taking pain medication.      Shower or wash your incisions daily with soap and water (or as instructed), pat dry. Keep wound clean and dry, showers are okay after discharge, but don't let spray hit directly on incision. No baths or swimming for 1 month. Cover chest tube sites with gauze until they stop draining, then leave open to air. It is not abnormal for chest tube sites to drain yellowish/clear fluid for up to 2-3 weeks after surgery.   Watch for signs of infection: increased redness, tenderness, warmth or any drainage that appears infected (pus like) or is persistent.  Also a temperature > 100.5 F or chills. Call your surgeon or primary care provider's office immediately. Remove any skin glue left on incisions after 10-14 days. This will not affect your incision and can speed up healing.    Exercise is very important in your recovery. Please follow the guidelines set up for you in your cardiac rehab classes at the hospital. If outpatient cardiac rehab was ordered for you, we highly recommend  "you participate. If you have problems arranging your cardiac rehab, please call [phone] for all locations, with the exception of [place], please call [phone] and [place], please call [phone].    Avoid sitting for prolonged periods of time, try to walk every hour during the day. If you have a leg incision, elevate your leg often when you are not walking.    Check your weight when you get home from the hospital and continue to check it daily through your recovery for at least a month. If you notice a weight gain of 2-3 pounds in a week, notify your primary care physician, cardiologist or surgeon.    Bowel activity may be slow after surgery. If necessary, you may take an over the counter laxative such as Milk of Magnesia or Miralax. You may have stool softeners prescribed (docusate sodium, Senokot). We recommend using stool softeners while using narcotics for pain (oxycodone/percocet, hydrocodone/vicodin, hydromorphone/dilaudid).      Wean OFF of narcotics (oxycodone, dilaudid, hydrocodone) as soon as possible. You should continue taking acetaminophen as long as you have any surgical pain as the first choice for pain control and add narcotics as necessary for pain to be tolerable.      DENTAL VISITS AFTER SURGERY  If you have had your heart valve repaired or replaced, we do not recommend having any dental work done for 6 months and you will need to take an antibiotic prior to dental visits from now on.  Please notify your dentist before any procedure for the proper treatment needed. The antibiotic is taken by mouth one hour prior to visit. This includes routine cleanings.  You can sometimes hear a mechanical valve \"clicking,\" this is normal and not a sign of something wrong.    DO NOT SMOKE.  IF YOU NEED HELP QUITTING, PLEASE TALK WITH YOUR CARDIOLOGIST OR PRIMARY DOCTOR.    You are on a blood thinner, follow the instructions you were given in the hospital and DO NOT SKIP this medication. Try and take " it the same time everyday. Your primary care physician or coumadin clinic will manage the dosing. INR goal is 2-3.    REGARDING PRESCRIPTION REFILLS.  If you need a refill on your pain medication contact us to discuss your pain and a possible one time refill.   All other medications will be adjusted, discontinued and re-filled by your primary care physician and/or your cardiologist as they were prior to your surgery. We have given you enough for one to three month with possibly one refill.    POST-OPERATIVE CLINIC VISITS  You have a follow up Telephone visit with [facility] Surgery Advance Care Practitioners on Tuesday, April 20, 2021 at 2:00 pm. Office will call.   You will then return to the care of your primary provider and your cardiologist. Future medication refills should come from your PCP or Cardiologist.   INR check 4/6, with INR goal 2-3.  You should see your primary care provider in 1-2 weeks after discharge.   Follow up with Nephrology in 3-4 weeks in [place].   It is important to see your cardiologist about 4-6 weeks after discharge.    If you do not hear from a  in 7 days, please call [phone] (choose option 1) and request to be seen with a general cardiologist or someone that you have seen in the past.   If there is a need to return to see CT Surgery please call our  at [phone].    SURGICAL QUESTIONS  Please call Heidy Hernandez or Sandy Riley with surgical recovery and medication questions, their phone numbers are listed below.  They will assist you with your needs and contact other surgery care team members as indicated.    On weekends or after hours, please call [phone] and ask the  to   page the Cardiothoracic Surgery fellow on call.      Thank you,    Your Cardiothoracic Surgery Team  Heidy Hernandez RN Care Coordinator-  [phone]   Sandy Riley RN Care Coordinator-  [phone]

## 2021-04-05 NOTE — PLAN OF CARE
Neuro: ao*4, forgetful at times   Cardiac/Tele:  SR  Respiratory: room air, frequent dry cough  GI/: urinating via urinal , LBM 4/5  Diet/Appetite: regular  Skin: chest and abdominal incisions CDI  Activity: assist of 1 with gait belt and walker  Pain: denies     Plan: discharge to rehab     Isadora White, RN  Time cared for 0700 - discharge

## 2021-04-05 NOTE — DISCHARGE SUMMARY
[facility], [place]   Cardiothoracic Surgery Hospital Discharge Summary     Jin Garrett MRN# [id number]   Age: 80 year old YOB: 1940     Admitting Physician:  Ron Galarza MD  Discharge Physician:  TIMOTHY Terry CNP  Primary Care Physician:        Ritesh Kumar     DATE OF ADMISSION: 3/4/2021      DATE OF DISCHARGE: April 5, 2021     Admit Wt: 79 kg   Discharge Wt: 75.6 kg          Primary Diagnoses:   1. 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 CAD  3. S/p MVR with tissue valve 3/5/2021  4. S/P Redo MVR with tissue valve due to thrombosed valve 3/9/2021  5. S/P CABG X1 3/5/2021  6. Cardiogenic shock, s/p ECMO 3/5/21 - 3/9/21  7. Post-op atrial fibrillation   8. Acute delirium, resolved   9. Insomnia   10. Cough, non-productive   11. Acute kidney injury, improving   12. Epistaxis, resolved   13. Anticoagulation, INR goal 2-3   14. Dysphagia, resolved           Secondary Diagnoses:   1. HTN  2. HLD  3. Recent hip surgery  4. Chronic back pain   5. Hypokalemia     PROCEDURES PERFORMED:   Date: 3/5/2021.  Surgeon: Dr. Daquan Porras.   PROCEDURE:  1) Mitral valve replacement - 31mm St. Tee Epic   2) Coronary artery bypass grafting x 1 - reversed saphenous vein graft to obtuse marginal artery 3  3) Endoscopic vein harvest left leg  4) Removal of Impella and repair of left femoral artery  5) Transesophageal echocardiogram  6) Conversion to central VA-ECMO    OPERATIVE FINDINGS:  1) 100% occlusion of mid-circumflex artery  2) Ruptured posterior papillary muscle head  3) Flail segment of P2 mitral valve leaflet  4) Severe mitral insufficiency     Date: 3/9/2021. Surgeon: Dr. Daquan Porras   PROCEDURE:  1) Redo mitral valve replacement - 31mm St Tee Epic mitral valve  2) VA ECMO decannulation  3) Trans-esophageal echocardiogram  4) Flexible bronchoscopy  5) Placement of left subclavian Mahurkar dialysis catheter    OPERATIVE FINDINGS:  1) Thrombosis of  prosthetic mitral valve    INTRAOPERATIVE COMPLICATIONS:  none    PATHOLOGY RESULTS:    3/5/2021: SPECIMEN(S):   A: Mitral valve papillary muscle   B: Posterior mitral valve leaflet     FINAL DIAGNOSIS:   A. Mitral valve papillary muscle:   - Coagulative necrosis, consistent with infarction   B. Posterior mitral valve leaflet:   - Fibrosis and myxoid change     3/9/2021: SPECIMEN(S):   A: Thrombus, left atrial   B: Thrombus, mitral valve     FINAL DIAGNOSIS:   A. Thrombus, left atrial:   - Laminated blood clot  B. Thrombus, mitral valve:   - Laminated blood clot     CULTURE RESULTS:  None  3/18/2021: C-Diff negative   3/23/2021: BC negative  4/4/2021: Covid-19 negative     CONSULTS:    1. PT/OT  2. Vascular surgery   3. Nephrology   4. [facility] nurse   5.    6. Interventional Radiology   7. Psychiatry   8. Behavioral Health     BRIEF HISTORY OF ILLNESS:  Jin Garrett is a 80 year old male with PMH of HTN, AAA, HLD and rheumatoid arthritis. He presented to an OSH with shortness of breath and atypical chest pain. TTE showed severe MR with concern for posterior flail leaflet, EF was preserved. On arrival at [facility] (3/4/21) an Impella was emergently placed in cath lab. He was found to have a ruptured posterior papillary muscle causing severe MR.     He emergently underwent a mitral valve replacement and single vessel CABG with Dr. Porras on 3/5/21. He required high doses of pressors, the Impella was removed and VA ECMO was placed. He returned from the OR on central ECMO with an open chest, intubated and sedated. When he returned to the OR for a wash out on 3/9, he was found to have a thrombus on his mitral valve so he had a redo mitral valve replacement with epicardial valve, decannulated central ECMO, bronchoscopy and placement of temporary L subclavian HD line.    HOSPITAL COURSE: Mr Garrett was admitted to the CVICU and had a prolonged hospitalization due to respiratory failure, pressor requirements and acute  delirium. He was extubated on POD # 3 after second MVR 3/9 to 4 lpm via NC with ongoing delirium.  He had acute kidney injury requiring CRRT with transition to hemodialysis while in the ICU. His last HD run was 3/26 and his kidney function began to improve with good diuresis with diuretics. His tunneled HD catheter was later removed by IR 4/2.    He was transferred to the post-surgical telemetry unit on POD # 10, 3/19/2024. He had a complex hospital stay as noted below:     Cardiovascular:   Hx of HTN, HLD, AAA  Acute severe MR due to ruptured papillary muscle, s/p tissue MVR  Cardiogenic shock secondary to severe MR  S/p Impella placement and conversion to central VA ECMO  Weaned off mechanical support, ECMO was utilized from 3/5 to 3/9.   CAD, mid LCx with 100% occlusion, s/p 1 vessel CAB   S/P redo MVR due to thrombus in left atrium and on bioprosthetic MV  Most recent echo 4/1/2021 showed LV EF 35-40%, Well seated 31 St Tee mitral valve, gradient 6 mmHg. Mildly reduced RV function.   LUE US 3/14 with nonocclusive thrombus in right cephalic vein, no UE DVT.     - ASA 81 mg PO Daily  - Metoprolol 25 mg PO BID, plan to up titrate as able as outpatient    - No ACE or ARB due to ADAN  - Start Lipitor 20 mg daily with known CAD   Pre and post-op Atrial fibrillation   Received IV Amio 150 mg IV bolus x 1 dose 3/22 and restarted 400 mg PO BID 3/23 AM with return to NSR, now reduced to 200 mg PO BID 3/29.    Hypotension resolved. Required  midodrine 15 mg TID to maintain MAPS > 60, has since improved and was stopped 3/27 am.      Pulmonary:  Acute respiratory failure secondary to acute MR (resolved)  - Extubated POD #3 (post chest closure and ecmo decan) to 4 lpm via NC; now saturating well on room air  - CXR 4/2 - Impression: Trace bilateral pleural effusions and associated atelectasis.  - Was having frequent dry cough and nasal congestion 4/4   - continue tessalon pearls Q6hr prn and Afrin nasal spray BID  - continues  nebs BID      Neurology / MSK:  Delirium Had extended time on sedation due to need for central VA ECMO. Avoiding narcs and benadryl. - Psych consult 3/25, tried zyprexa 5 mg but did not tolerate it. Returned to Seroquel and tolerated it well. Stop Seroquel (3/31).  Insomnia  - Sleep improved with addition of trazodone w/o noticeable side effects.   - Melatonin 10 mg q PM - will stop at discharge  - Trazodone 50 mg PO q PM  - Acute post-operative pain; pain well controlled with acetaminophen and tramadol.   Chronic back pain   Recent hip surgery  - No weight bearing or activity restrictions related to recent hip surgery per family (records not available).    - Aqua K pad helping.      HEENT:   Hemoptysis  Epistaxis    - Suspecting hemoptysis is secondary epistaxis due to trauma to posterior nasal septum from the NG tube bridle and him tugging on it.         / Renal:  ADAN secondary to cardiogenic shock induced ATN  Oliguric, resolved   Hyperkalemia in setting of ADAN  Hypokalemia    - No Hx of renal disease. Most recent creatinine 3.51, trending down.    - Was getting IV bumex, but has been auto diuresing now   - K= 3.9 today with daily potassium 10 mEq daily, will need to monitor K+ closely  - Nephrology following; Was on CRRT transitioned to intermittent HD. Last HD run 3/26.   - Dialysis catheter removed per IR 4/2     GI / FEN:   Melena: Noticed 3/23. Had epistaxis 2 days prior and likely swallowed some of the blood. No episodes since.     Endocrine:  Stress induced hyperglycemia  - Initially managed on insulin drip, transitioned to sliding scale and now off insulin (3/16). No acute needs.       Infectious Disease:   Leukocytosis: C diff negative 3/18, UA on 3/22 and 3/23 were clean and CRP has been slowly trending down.  - Completed perioperative antibiotics.   - WBC 9.7, trending down with removal of tunneled dialysis catheter, afebrile, no other signs or symptoms of infection.      Hematology:   Acute blood  "loss anemia  Epistaxis, resolved   - Hgb 5.7 on 3/22. Given 2 units PRBC and 1 unit plasma on 3/22, one unit PRBC 3/23.    - Hgb stable at 7.8 and stable.   - LUE US 3/14 with non-occlusive thrombus in right cephalic vein, no UE DVT.      Anticoagulation:   - Coumadin for atrial fibrillation and left atrial thrombus, INR goal 2-3.   - Most recent INR 4.05, hold coumadin tonight, check INR 4/6.        Prophylaxis:   - Stress ulcer prophylaxis: Pantoprazole 40 mg daily for 30 days     Prior to discharge, his pain was controlled well, he was able to perform most ADLs and ambulate with minimal difficulty, and had full return of bowel and bladder function.  On April 5, 2021, he was discharged to a TCU in stable condition.    Patient discharged on aspirin:  Yes 81 mg  Patient discharged on beta blocker: yes    Patient discharged on ACE Inhibitor/ARB: no  Due to ADAN            Discharge Disposition:     Discharged to rehabilitation facility            Condition on Discharge:     Discharge condition: Stable   Discharge vitals: Blood pressure 119/70, pulse 95, temperature 98.2  F (36.8  C), temperature source Oral, resp. rate 16, height 1.803 m (5' 11\"), weight 75.6 kg (166 lb 9.6 oz), SpO2 99 %.     Code status on discharge: Full Code     Vitals:    04/04/21 0100 04/05/21 0000 04/05/21 0026   Weight: 76.7 kg (169 lb) 77.6 kg (171 lb) 75.6 kg (166 lb 9.6 oz)       DAY OF DISCHARGE PHYSICAL EXAM:    Blood pressure 120/76, pulse 93, temperature 98  F (36.7  C), resp. rate 16, height 1.803 m (5' 11\"), weight 75.6 kg (166 lb 9.6 oz), SpO2 95 %.  Vitals:    04/04/21 0100 04/05/21 0000 04/05/21 0026   Weight: 76.7 kg (169 lb) 77.6 kg (171 lb) 75.6 kg (166 lb 9.6 oz)      Weight; -3.4 kg since admit   24 hr Fluid status; net even.   MAPs: 80-90's    Gen: A&Ox4, NAD. Daughter is at the bedside.   Neuro: no focal deficits   CV: RRR, normal S1 S2, no murmurs, rubs or gallops. no JVD  Pulm: CTA, no wheezing or rhonchi, normal breathing " on RA  Abd: nondistended, normal BS, soft, nontender  Ext: no peripheral edema  Incision: clean, dry, intact, no erythema, sternum stable  Tubes/drain sites: Sites WDI      BMP  Recent Labs   Lab 04/05/21  0529 04/04/21  0550 04/03/21  0558 04/02/21  0826    138 137 136   POTASSIUM 3.9 3.6 3.6 3.4   CHLORIDE 103 102 100 99   PALLAVI 8.9 9.0 9.1 9.0   CO2 30 30 30 29   BUN 96* 117* 148* 145*   CR 3.51* 3.92* 4.19* 4.45*   * 112* 88 99     CBC  Recent Labs   Lab 04/05/21  0529 04/04/21  0550 04/03/21  0558 04/02/21  0826   WBC 9.7 10.1 12.1* 14.1*   RBC 2.69* 2.66* 2.78* 2.83*   HGB 7.8* 7.8* 7.9* 8.4*   HCT 25.0* 24.8* 25.4* 26.4*   MCV 93 93 91 93   MCH 29.0 29.3 28.4 29.7   MCHC 31.2* 31.5 31.1* 31.8   RDW 16.5* 16.6* 16.8* 16.7*    294 307 269     INR  Recent Labs   Lab 04/05/21  0529 04/04/21  0550 04/03/21  0558 04/02/21  0826   INR 4.05* 3.49* 2.90* 2.76*      Liver Function Studies -   Recent Labs   Lab Test 04/05/21  0529   PROTTOTAL 6.2*   ALBUMIN 2.4*   BILITOTAL 0.5   ALKPHOS 128   AST 18   ALT 24     Recent Labs   Lab 04/05/21  0529 04/04/21  0550 04/03/21  1636 04/03/21  0558 04/02/21  0826 04/01/21 2059 04/01/21  0612   * 112*  --  88 99 131* 123*   BGM  --   --  105*  --   --   --   --        ECHOCARDIOGRAM, 4/1/2021-   Interpretation Summary  31mm St Tee Epic mitral valve replacement on 3/9/21  The valve is well seated with normal hemodynamic assessment; mean gradient is  6 mmHg at 88bpm.  Moderately (EF 35-40%) reduced left ventricular function is present. Mild  diffuse hypokinesis is present.  Right ventricle is not well visualized but global function appears mildly  reduced on limited views.     Mitral valve gradient is similar to the intraoperative values. There has been  interval removal of the impella device noted on 3/5/21 echo.    CXR 4/2/2021-   Impression:   Trace bilateral pleural effusions and associated atelectasis.    DISCHARGE INSTRUCTIONS:  You had a  sternotomy, avoid lifting anything greater than ten pounds for 6 weeks after surgery and then less than 20 pounds for an additional 6 weeks. Do not reach backwards or use arms to push out of chair. Do not let people pull on your arms to assist with standing. Avoid twisting or reaching too far across your body.  Avoid strenuous activities such as bowling, vacuuming, raking, shoveling, golf or tennis for 12 weeks after your surgery. It is okay to resume sex if you feel comfortable in doing so. You may have to try different positions with your partner.  Splint your chest incision by hugging a pillow or bringing your arms across your chest when coughing or sneezing.      No driving for 4 weeks after surgery or while on pain medication. If you had a minimally invasive procedure, you may drive after 2 weeks if not taking pain medication.       Shower or wash your incisions daily with soap and water (or as instructed), pat dry. Keep wound clean and dry, showers are okay after discharge, but don't let spray hit directly on incision. No baths or swimming for 1 month. Cover chest tube sites with gauze until they stop draining, then leave open to air. It is not abnormal for chest tube sites to drain yellowish/clear fluid for up to 2-3 weeks after surgery.   Watch for signs of infection: increased redness, tenderness, warmth or any drainage that appears infected (pus like) or is persistent.  Also a temperature > 100.5 F or chills. Call your surgeon or primary care provider's office immediately. Remove any skin glue left on incisions after 10-14 days. This will not affect your incision and can speed up healing.     Exercise is very important in your recovery. Please follow the guidelines set up for you in your cardiac rehab classes at the hospital. If outpatient cardiac rehab was ordered for you, we highly recommend you participate. If you have problems arranging your cardiac rehab, please call [phone] for all locations,  "with the exception of [place], please call [phone] and [place], please call [phone].     Avoid sitting for prolonged periods of time, try to walk every hour during the day. If you have a leg incision, elevate your leg often when you are not walking.     Check your weight when you get home from the hospital and continue to check it daily through your recovery for at least a month. If you notice a weight gain of 2-3 pounds in a week, notify your primary care physician, cardiologist or surgeon.     Bowel activity may be slow after surgery. If necessary, you may take an over the counter laxative such as Milk of Magnesia or Miralax. You may have stool softeners prescribed (docusate sodium, Senokot). We recommend using stool softeners while using narcotics for pain (oxycodone/percocet, hydrocodone/vicodin, hydromorphone/dilaudid).       Wean OFF of narcotics (oxycodone, dilaudid, hydrocodone) as soon as possible. You should continue taking acetaminophen as long as you have any surgical pain as the first choice for pain control and add narcotics as necessary for pain to be tolerable.       DENTAL VISITS AFTER SURGERY  If you have had your heart valve repaired or replaced, we do not recommend having any dental work done for 6 months and you will need to take an antibiotic prior to dental visits from now on.  Please notify your dentist before any procedure for the proper treatment needed. The antibiotic is taken by mouth one hour prior to visit. This includes routine cleanings.  You can sometimes hear a mechanical valve \"clicking,\" this is normal and not a sign of something wrong.     DO NOT SMOKE.  IF YOU NEED HELP QUITTING, PLEASE TALK WITH YOUR CARDIOLOGIST OR PRIMARY DOCTOR.     You are on a blood thinner, follow the instructions you were given in the hospital and DO NOT SKIP this medication. Try and take it the same time everyday. Your primary care physician or coumadin clinic will manage the dosing. INR " goal is 2-3.     REGARDING PRESCRIPTION REFILLS.  If you need a refill on your pain medication contact us to discuss your pain and a possible one time refill.   All other medications will be adjusted, discontinued and re-filled by your primary care physician and/or your cardiologist as they were prior to your surgery. We have given you enough for one to three month with possibly one refill.     POST-OPERATIVE CLINIC VISITS  You have a follow up Telephone visit with [facility] Advance Care Practitioners on Tuesday, April 20, 2021 at 2:00 pm. Office will call.   You will then return to the care of your primary provider and your cardiologist. Future medication refills should come from your PCP or Cardiologist.   INR check 4/6, with INR goal 2-3.  You should see your primary care provider in 1-2 weeks after discharge.   It is important to see your cardiologist about 4-6 weeks after discharge.    Follow up with Nephrology in 3-4 weeks in [place].   If you do not hear from a  in 7 days, please call [phone] (choose option 1) and request to be seen with a general cardiologist or someone that you have seen in the past.   If there is a need to return to see CT Surgery please call our  at [phone].    PRE-ADMISSION MEDICATIONS:  No current facility-administered medications on file prior to encounter.   amitriptyline (ELAVIL) 25 MG tablet, Take 25 mg by mouth At Bedtime  amLODIPine (NORVASC) 5 MG tablet, Take 5 mg by mouth daily  losartan-hydrochlorothiazide (HYZAAR) 100-25 MG tablet, Take 1 tablet by mouth daily  methocarbamol (ROBAXIN) 750 MG tablet, Take 750 mg by mouth 4 times daily as needed for muscle spasms . . Filled 2/23/2021 x60 tablets  metoprolol succinate ER (TOPROL-XL) 25 MG 24 hr tablet, Take 25 mg by mouth daily  oxyCODONE (ROXICODONE) 5 MG tablet, Take 5-10 mg by mouth every 6 hours as needed for severe pain . Filled 2/23/2021 x30 tablets         DISCHARGE MEDICATIONS:    Gerry  Jin   Home Medication Instructions MICHELLE:[id number]    Printed on:04/05/21 8529   Medication Information      acetaminophen (TYLENOL) 325 MG tablet  Take 2 tablets (650 mg) by mouth every 4 hours as needed for mild pain or fever     amiodarone (PACERONE) 200 MG tablet  Take 1 tablet (200 mg) by mouth 2 times daily     amitriptyline (ELAVIL) 25 MG tablet  1 tablet (25 mg) by Oral or Feeding Tube route At Bedtime     aspirin (ASA) 81 MG chewable tablet  1 tablet (81 mg) by Oral or NG Tube route daily     atorvastatin (LIPITOR) 20 MG tablet  Take 1 tablet (20 mg) by mouth every evening     B and C vitamin Complex with folic acid (NEPHRONEX) 0.9 MG/5ML LIQD liquid  Take 5 mLs by mouth daily     benzocaine-menthol (CEPACOL) 15-3.6 MG lozenge  Place 1 lozenge inside cheek every hour as needed for sore throat     benzonatate (TESSALON) 100 MG capsule  Take 1 capsule (100 mg) by mouth every 6 hours     ferrous sulfate 220 (44 Fe) MG/5ML ELIX  Take 5 mLs (220 mg) by mouth daily     ipratropium - albuterol 0.5 mg/2.5 mg/3 mL (DUONEB) 0.5-2.5 (3) MG/3ML neb solution  Take 1 vial (3 mLs) by nebulization 2 times daily     metoprolol tartrate (LOPRESSOR) 25 MG tablet  Take 1 tablet (25 mg) by mouth 2 times daily     oxymetazoline (AFRIN) 0.05 % nasal spray  Spray 2 sprays into both nostrils 2 times daily     pantoprazole (PROTONIX) 40 MG EC tablet  Take 1 tablet (40 mg) by mouth daily     potassium chloride ER (KLOR-CON M) 10 MEQ CR tablet  Take 1 tablet (10 mEq) by mouth daily     senna-docusate (SENOKOT-S/PERICOLACE) 8.6-50 MG tablet  Take 1-2 tablets by mouth 2 times daily as needed for constipation     traZODone (DESYREL) 50 MG tablet  Take 1 tablet (50 mg) by mouth At Bedtime           CC:Ritesh Coffey      [facility]   Cardiothoracic Surgery  Office phone: [phone]  Office fax: [phone]

## 2021-04-05 NOTE — PLAN OF CARE
Pt admitted on 3/4/21 from OSH with SOB and atypical chest pain. found to have a ruptured posterior papillary muscle causing severe MR. s/p CABG x1 and MVR 3/5/21. Pt out of OR on ECMO w/open chest and returned to OR for washout 3/9 found to have thrombus on mitral valve s/p MVR and ECMO decannulation. PMH HTN, AAA, HLD, and  RA      Neuro: A&O x 4, forgetful at times.  Bed alarm on for safety.  Cardiac: SR, occasional PAC. VSS. Denies chest pain  Respiratory: sating WNL on RA. Denies SOB, SEYMOUR.  Cough less frequent than the night before with scheduled tessalon.  Diet/appetite: Regular diet, thin liquids.  GI/:  Denies  N/V. Voiding in adequate amount. No BM overnight.  Activity: Assist +1   Pain: Generalized pain managed with scheduled tylenol.  Skin: Bruising on L forearm, R upper arm.   LDAs: R PIV saline locked  Plan: To transfer to [facility] today.

## 2021-04-05 NOTE — PROGRESS NOTES
Pt discharged to: ARU [place]   Via: [facility]     Report given to ARU RN at 1006, writer answered any questions and concerns from ARU RN.     Education provided to patient and jenna (pt daughter).  Prescriptions sent to patients preferred pharmacy. Appropriate LDA's removed from pt, telemetry discontinued. All belonging sent with patient and patient verifies taking all belongings with them.   Patient exhibits understanding of discharge instruction and all questions and/or concerns were answered.     Isadora White, RN

## 2021-04-05 NOTE — PLAN OF CARE
Physical Therapy Discharge Summary    Reason for therapy discharge:    Discharged to acute rehabilitation facility.    Progress towards therapy goal(s). See goals on Care Plan in [facility] electronic health record for goal details.  Goals partially met.  Barriers to achieving goals:   discharge from facility.    Therapy recommendation(s):    Continued therapy is recommended.  Rationale/Recommendations:  continued skilled PT at ARU to increase IND with mobility and transfers in order for anticipated discharge to home.

## 2021-04-05 NOTE — PLAN OF CARE
D: Pt admit 3/4/21 from OSH presenting with SOB and atypical chest pain. found to have a ruptured posterior papillary muscle causing severe MR. Pt now s/p CABG x1 and MVR 3/5/21. Pt out of OR on ECMO w/open chest and returned to OR for washout 3/9 found to have thrombus on mitral valve s/p MVR and ECMO decannulation. PMH HTN, AAA, HLD, RA     I: Monitored vitals and assessed pt status.   Changed:   -Tessalon dose change to 100 mg q6h  -Held Warfarin today, INR 3.49  Running:   PRN: Benzocaine-menthol lozenge x5     Vitals: Temp: 98.3  F (36.8  C) Temp src: Oral BP: 115/70 Pulse: 81   Resp: 16 SpO2: 97 % O2 Device: None (Room air)          A:   Neuro: A&O x 4. Forgetful at times. Bed alarm for safety precautions. Neurologically intact. Denies headache, lightheadedness, dizziness, numbness and tingling calls appropriately     Cardiac: VSS. SR, denies chest pain.   Respiratory: sating >92% on RA. Denies SOB, SEYMOUR. LS diminished at bases.  Diet/appetite: Regular diet. Appetite fair.  GI/: Denies N/V and abdominal pain. Normoactive bowel sounds in all quadrants. Voids without difficulty, LBM x 1 4/4  Activity: Assist +1, up to side of the bed, ambulated to bathroom with gait belt and walker.  Pain: Complaints of pain in ribs with cough, managed with PRN Tylenol  Skin: Redness on sacrum, covered with mepilex. Bruising on L forearm and R upper arm.  LDAs: R PIV saline locked.  Labs:  Potassium 3.6, replaced with one time order 10mEq. COVID Negative 4/4.      P: Discharge on 4/5 to [facility] in [place]. Ride schedules for with [employer] transportation at 1000. Continue to monitor Pt status and report changes to treatment team.

## 2021-04-20 ENCOUNTER — VIRTUAL VISIT (OUTPATIENT)
Dept: CARDIOLOGY | Facility: CLINIC | Age: 81
End: 2021-04-20
Attending: THORACIC SURGERY (CARDIOTHORACIC VASCULAR SURGERY)
Payer: MEDICARE

## 2021-04-20 DIAGNOSIS — Z95.3 S/P MITRAL VALVE REPLACEMENT WITH BIOPROSTHETIC VALVE: Primary | ICD-10-CM

## 2021-04-20 PROCEDURE — 99024 POSTOP FOLLOW-UP VISIT: CPT | Mod: 95 | Performed by: PHYSICIAN ASSISTANT

## 2021-04-20 NOTE — PROGRESS NOTES
CARDIOTHORACIC SURGERY FOLLOW-UP VISIT     Jin Garrett   1940   [id number]      TELEPHONE VISIT-  START TIME:   2:02 pm   END TIME:       2:16 pm     Reason for visit: Post-Op MVR with Dr. Daquan Porras on 3/5/2021    HPI: Jin Garrett is a 80 year old year old male seen in clinic for a routine follow-up appointment after surgery. Patient has past medical history of CAD, HTN, HLD, chronic back pain, and hypokalemia. 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 Patient was discharged to inpatient rehab on 4/5/21.    Patient has been doing well since discharge and is called for a telephone follow up visit.     Patient reports incision is healing well.    Patient denies any fever, chills, chest pain, palpitations, edema, SOB, lightheadedness and nausea.    Patient is having Normal bowel movements and voiding without problems.    Has been attending cardiac rehabilitation and that is going to start soon.    Patient is on Coumadin and INR is therapeutic.  Weight has been up a little overall since discharge.      PAST MEDICAL HISTORY:  No past medical history on file.    PAST SURGICAL HISTORY:  Past Surgical History:   Procedure Laterality Date     BRONCHOSCOPY FLEXIBLE AND RIGID N/A 03/09/2021    Procedure: Flexible Bronchoscopy;  Surgeon: Daquan Porras MD;  Location: [facility]     BYPASS GRAFT ARTERY CORONARY N/A 03/05/2021    Procedure: CORONARY ARTERY BYPASS GRAFT (CABG);  Surgeon: Daquan Porras MD;  [facility]     CV CORONARY ANGIOGRAM N/A 3/4/2021    Procedure: Coronary Angiogram + IABP;  Surgeon: Epi Hurtado MD;  Location: [facility]     CV IMPELLA INSERT N/A 3/4/2021    Procedure: Impella Insert;  Surgeon: Epi Hurtado MD;  [facility]     CV PCI ASPIRATION THROMECTOMY N/A 3/4/2021    Procedure:  Percutaneous Coronary Intervention Aspiration Thrombectomy;  Surgeon: Epi Hurtado MD;  Location:  [facility] CARDIAC CATH LAB     CV SWAN BIBI PROCEDURE N/A 3/4/2021    Procedure: [place] Bibi Procedure;  Surgeon: Epi Hurtado MD;  Location:  HE[facility] CARDIAC CATH LAB     INCISION AND DRAINAGE CHEST WASHOUT, COMBINED N/A 03/09/2021    Procedure: INCISION AND DRAINAGE, WOUND, CHEST, WITH IRRIGATION;  Surgeon: Daquan Porras MD;  [facility]     IR CVC TUNNEL PLACEMENT > 5 YRS OF AGE  3/22/2021     IR CVC TUNNEL REMOVAL RIGHT  4/2/2021     PICC DOUBLE LUMEN PLACEMENT Right 03/14/2021    BRACH     REMOVE EXTRACORPORAL MEMBRANE OXYGENATOR ADULT N/A 03/09/2021    Procedure: REMOVAL, CANNULA, ADULT, FOR Extracorporeal membrane oxygenator, Chest Closure, Cardiopulmonary Bypass;  Surgeon: Daquan Porras MD;  [facility]     REPLACE VALVE MITRAL N/A 03/09/2021    Procedure: Mitral Valve Replace with 31mm St. Tee Epic Valve;  Surgeon: Daquan Porras MD;  [facility]     REPLACE VALVE MITRAL N/A 03/05/2021    Procedure: REPLACEMENT, MITRAL VALVE OPEN ENDOSCOPIC VEIN HARVEST LEFT LEG CORONARY ARTERY BYPASS GRAFT X1 AND REMOVAL OF IMPELLA PLACEMENT OF EXTRACORPOREAL MEMBRANE OXYGENATOR AND REPAIR OF LEFT FEMORAL ARTERY;  Surgeon: Daquan Porras MD;  [facility]       CURRENT MEDICATIONS:   Current Outpatient Medications   Medication     acetaminophen (TYLENOL) 325 MG tablet     amiodarone (PACERONE) 200 MG tablet     amitriptyline (ELAVIL) 25 MG tablet     aspirin (ASA) 81 MG chewable tablet     atorvastatin (LIPITOR) 20 MG tablet     B and C vitamin Complex with folic acid (NEPHRONEX) 0.9 MG/5ML LIQD liquid     benzonatate (TESSALON) 100 MG capsule     ferrous sulfate 220 (44 Fe) MG/5ML ELIX     ipratropium - albuterol 0.5 mg/2.5 mg/3 mL (DUONEB) 0.5-2.5 (3) MG/3ML neb solution     metoprolol tartrate (LOPRESSOR) 25 MG tablet     pantoprazole (PROTONIX) 40 MG EC tablet      potassium chloride ER (KLOR-CON M) 10 MEQ CR tablet     senna-docusate (SENOKOT-S/PERICOLACE) 8.6-50 MG tablet     traZODone (DESYREL) 50 MG tablet     benzocaine-menthol (CEPACOL) 15-3.6 MG lozenge     oxymetazoline (AFRIN) 0.05 % nasal spray     No current facility-administered medications for this visit.        ALLERGIES:      Allergies   Allergen Reactions     Oxycodone      hallucinations     ROS:  Review of symptoms otherwise negative unless commented about in HPI.     LABS:  Last Basic Metabolic Panel:  Lab Results   Component Value Date     04/05/2021      Lab Results   Component Value Date    POTASSIUM 3.9 04/05/2021     Lab Results   Component Value Date    CHLORIDE 103 04/05/2021     Lab Results   Component Value Date    PALLAVI 8.9 04/05/2021     Lab Results   Component Value Date    CO2 30 04/05/2021     Lab Results   Component Value Date    BUN 96 04/05/2021     Lab Results   Component Value Date    CR 3.51 04/05/2021     Lab Results   Component Value Date     04/05/2021       Last CBC:   Lab Results   Component Value Date    WBC 9.7 04/05/2021     Lab Results   Component Value Date    RBC 2.69 04/05/2021     Lab Results   Component Value Date    HGB 7.8 04/05/2021     Lab Results   Component Value Date    HCT 25.0 04/05/2021     No components found for: MCT  Lab Results   Component Value Date    MCV 93 04/05/2021     Lab Results   Component Value Date    MCH 29.0 04/05/2021     Lab Results   Component Value Date    MCHC 31.2 04/05/2021     Lab Results   Component Value Date    RDW 16.5 04/05/2021     Lab Results   Component Value Date     04/05/2021       INR:  Lab Results   Component Value Date    INR 4.05 04/05/2021    INR 3.49 04/04/2021    INR 2.90 04/03/2021     IMAGING:  None    PHYSICAL EXAM:   There were no vitals taken for this visit.  General: alert and oriented x 3, pleasant, no acute distress, normal mood and affect  Neuro: no focal deficits, answering questions  appropriately   CV: feels like he is regular rate and rhythm, no peripheral edema.   Pulm:  easy work of breathing on RA   Incision: incisions clean dry and intact without erythema, swelling or drainage    PROCEDURES: None       ASSESSMENT/PLAN:  Jin Garrett is a 80 year old year old male status post MVR who returns to clinic for postop visit.     1. Surgically doing well overall.  Incisions are healing well with no signs of infection. Increasing activity and strength overall.   2. Hemodynamics are stable. No medication changes were needed today.  3. Follow up with your cardiologist, TBD. Has Echo scheduled for next week. Discuss A-fib.   4. Continue Outpatient Cardiac Rehab until completed.   5. Continue sternal precautions for 12 weeks from surgery date.   6. No driving for 4 weeks from surgery date.   7. Follow up with Dr Ritesh Kumar for all medication refills.   8. 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 Being followed for INR with local team. Most recent INR was 2.9.    9. ADAN (resolving), has follow up with Nephrology.   10. Continue cardiac rehab until recommended to stop.     The total time spent with the patient was 14 minutes, > 50% of which was spent in counseling.    CC  Ritesh Kumar

## 2021-04-20 NOTE — PROGRESS NOTES
Jin Garrett is a 80 year old who is being evaluated via a billable telephone visit.      What phone number would you like to be contacted at? [phone]  How would you like to obtain your AVS? Mail a copy    Vitals - Patient Reported  Pain Score: No Pain (0)(No SOB)    Vitals were taken and medications reconciled.    C[place] Jack, EMT  1:24 PM

## 2021-04-20 NOTE — LETTER
4/20/2021      RE: Jin Garrett  [address]  [facility] [address]       Jin Garrett is a 80 year old who is being evaluated via a billable telephone visit.      What phone number would you like to be contacted at? [phone]  How would you like to obtain your AVS? Mail a copy    Vitals - Patient Reported  Pain Score: No Pain (0)(No SOB)    Vitals were taken and medications reconciled.    [place] Jack, EMT  1:24 PM    CARDIOTHORACIC SURGERY FOLLOW-UP VISIT     Jin Garrett   1940   [id number]      TELEPHONE VISIT-  START TIME:   2:02 pm   END TIME:       2:16 pm     Reason for visit: Post-Op MVR with Dr. Daquan Porras on 3/5/2021    HPI: Jin Garrett is a 80 year old year old male seen in clinic for a routine follow-up appointment after surgery. Patient has past medical history of CAD, HTN, HLD, chronic back pain, and hypokalemia. 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 Patient was discharged to inpatient rehab on 4/5/21.    Patient has been doing well since discharge and is called for a telephone follow up visit.     Patient reports incision is healing well.    Patient denies any fever, chills, chest pain, palpitations, edema, SOB, lightheadedness and nausea.    Patient is having Normal bowel movements and voiding without problems.    Has been attending cardiac rehabilitation and that is going to start soon.    Patient is on Coumadin and INR is therapeutic.  Weight has been up a little overall since discharge.      PAST MEDICAL HISTORY:  No past medical history on file.    PAST SURGICAL HISTORY:  Past Surgical History:   Procedure Laterality Date     BRONCHOSCOPY FLEXIBLE AND RIGID N/A 03/09/2021    Procedure: Flexible Bronchoscopy;  Surgeon: Daquan Porras MD;  [facility]     BYPASS GRAFT ARTERY CORONARY N/A 03/05/2021    Procedure: CORONARY  ARTERY BYPASS GRAFT (CABG);  Surgeon: Daquan Porras MD;  [facility]     CV CORONARY ANGIOGRAM N/A 3/4/2021    Procedure: Coronary Angiogram + IABP;  Surgeon: Epi Hurtado MD;  Location:  [facility]ARD[facility]ATH [facility]     CV IMPELLA INSERT N/A 3/4/2021    Procedure: Impella Insert;  Surgeon: Epi Hurtado MD;  Location:  [facility]ARD[facility] CATH [facility]     CV PCI ASPIRATION THROMECTOMY N/A 3/4/2021    Procedure: Percutaneous Coronary Intervention Aspiration Thrombectomy;  Surgeon: Epi Hurtado MD;  Location:  [facility] CARDIAC CATH LAB     CV SWAN BIBI PROCEDURE N/A 3/4/2021    Procedure: [place] Bibi Procedure;  Surgeon: Epi Hurtado MD;  [facility]: [facility] CARDIAC CATH [facility]     INCISION AND DRAINAGE CHEST WASHOUT, COMBINED N/A 03/09/2021    Procedure: INCISION AND DRAINAGE, WOUND, CHEST, WITH IRRIGATION;  Surgeon: Daquan Porras MD;  [facility]     IR CVC TUNNEL PLACEMENT > 5 YRS OF AGE  3/22/2021     IR CVC TUNNEL REMOVAL RIGHT  4/2/2021     PICC DOUBLE LUMEN PLACEMENT Right 03/14/2021    BRACH     REMOVE EXTRACORPORAL MEMBRANE OXYGENATOR ADULT N/A 03/09/2021    Procedure: REMOVAL, CANNULA, ADULT, FOR Extracorporeal membrane oxygenator, Chest Closure, Cardiopulmonary Bypass;  Surgeon: Daquan Porras MD;  [facility]     REPLACE VALVE MITRAL N/A 03/09/2021    Procedure: Mitral Valve Replace with 31mm St. Tee Epic Valve;  Surgeon: Daquan Porras MD;  [facility]     REPLACE VALVE MITRAL N/A 03/05/2021    Procedure: REPLACEMENT, MITRAL VALVE OPEN ENDOSCOPIC VEIN HARVEST LEFT LEG CORONARY ARTERY BYPASS GRAFT X1 AND REMOVAL OF IMPELLA PLACEMENT OF EXTRACORPOREAL MEMBRANE OXYGENATOR AND REPAIR OF LEFT FEMORAL ARTERY;  Surgeon: Daquan Porras MD;  [facility]       CURRENT MEDICATIONS:   Current Outpatient Medications   Medication     acetaminophen (TYLENOL) 325 MG tablet     amiodarone (PACERONE) 200 MG tablet     amitriptyline (ELAVIL) 25 MG tablet      aspirin (ASA) 81 MG chewable tablet     atorvastatin (LIPITOR) 20 MG tablet     B and C vitamin Complex with folic acid (NEPHRONEX) 0.9 MG/5ML LIQD liquid     benzonatate (TESSALON) 100 MG capsule     ferrous sulfate 220 (44 Fe) MG/5ML ELIX     ipratropium - albuterol 0.5 mg/2.5 mg/3 mL (DUONEB) 0.5-2.5 (3) MG/3ML neb solution     metoprolol tartrate (LOPRESSOR) 25 MG tablet     pantoprazole (PROTONIX) 40 MG EC tablet     potassium chloride ER (KLOR-CON M) 10 MEQ CR tablet     senna-docusate (SENOKOT-S/PERICOLACE) 8.6-50 MG tablet     traZODone (DESYREL) 50 MG tablet     benzocaine-menthol (CEPACOL) 15-3.6 MG lozenge     oxymetazoline (AFRIN) 0.05 % nasal spray     No current facility-administered medications for this visit.        ALLERGIES:      Allergies   Allergen Reactions     Oxycodone      hallucinations     ROS:  Review of symptoms otherwise negative unless commented about in HPI.     LABS:  Last Basic Metabolic Panel:  Lab Results   Component Value Date     04/05/2021      Lab Results   Component Value Date    POTASSIUM 3.9 04/05/2021     Lab Results   Component Value Date    CHLORIDE 103 04/05/2021     Lab Results   Component Value Date    PALLAVI 8.9 04/05/2021     Lab Results   Component Value Date    CO2 30 04/05/2021     Lab Results   Component Value Date    BUN 96 04/05/2021     Lab Results   Component Value Date    CR 3.51 04/05/2021     Lab Results   Component Value Date     04/05/2021       Last CBC:   Lab Results   Component Value Date    WBC 9.7 04/05/2021     Lab Results   Component Value Date    RBC 2.69 04/05/2021     Lab Results   Component Value Date    HGB 7.8 04/05/2021     Lab Results   Component Value Date    HCT 25.0 04/05/2021     No components found for: MCT  Lab Results   Component Value Date    MCV 93 04/05/2021     Lab Results   Component Value Date    MCH 29.0 04/05/2021     Lab Results   Component Value Date    MCHC 31.2 04/05/2021     Lab Results   Component Value Date     RDW 16.5 04/05/2021     Lab Results   Component Value Date     04/05/2021       INR:  Lab Results   Component Value Date    INR 4.05 04/05/2021    INR 3.49 04/04/2021    INR 2.90 04/03/2021     IMAGING:  None    PHYSICAL EXAM:   There were no vitals taken for this visit.  General: alert and oriented x 3, pleasant, no acute distress, normal mood and affect  Neuro: no focal deficits, answering questions appropriately   CV: feels like he is regular rate and rhythm, no peripheral edema.   Pulm:  easy work of breathing on RA   Incision: incisions clean dry and intact without erythema, swelling or drainage    PROCEDURES: None       ASSESSMENT/PLAN:  Jin Garrett is a 80 year old year old male status post MVR who returns to clinic for postop visit.     1. Surgically doing well overall.  Incisions are healing well with no signs of infection. Increasing activity and strength overall.   2. Hemodynamics are stable. No medication changes were needed today.  3. Follow up with your cardiologist, TBD. Has Echo scheduled for next week. Discuss A-fib.   4. Continue Outpatient Cardiac Rehab until completed.   5. Continue sternal precautions for 12 weeks from surgery date.   6. No driving for 4 weeks from surgery date.   7. Follow up with Dr Ritesh Kumar for all medication refills.   8. 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 Being followed for INR with local team. Most recent INR was 2.9.    9. ADAN (resolving), has follow up with Nephrology.   10. Continue cardiac rehab until recommended to stop.     The total time spent with the patient was 14 minutes, > 50% of which was spent in counseling.    CC  Ritesh Kumar         Cardiovascular Thoracic Surgery

## 2021-04-20 NOTE — LETTER
4/20/2021      RE: Jin Garrett  [address]  [facility] [address]       Dear Colleague,    Thank you for the opportunity to participate in the care of your patient, Jin Garrett, at the [facility]. Please see a copy of my visit note below.    Jin Garrett is a 80 year old who is being evaluated via a billable telephone visit.      What phone number would you like to be contacted at? [phone]  How would you like to obtain your AVS? Mail a copy    Vitals - Patient Reported  Pain Score: No Pain (0)(No SOB)    Vitals were taken and medications reconciled.    C[place] Jack, EMT  1:24 PM    CARDIOTHORACIC SURGERY FOLLOW-UP VISIT     Jin Garrett   1940   [id number]      TELEPHONE VISIT-  START TIME:   2:02 pm   END TIME:       2:16 pm     Reason for visit: Post-Op MVR with Dr. Daquan Porras on 3/5/2021    HPI: Jin Garrett is a 80 year old year old male seen in clinic for a routine follow-up appointment after surgery. Patient has past medical history of CAD, HTN, HLD, chronic back pain, and hypokalemia. 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 Patient was discharged to inpatient rehab on 4/5/21.    Patient has been doing well since discharge and is called for a telephone follow up visit.     Patient reports incision is healing well.    Patient denies any fever, chills, chest pain, palpitations, edema, SOB, lightheadedness and nausea.    Patient is having Normal bowel movements and voiding without problems.    Has been attending cardiac rehabilitation and that is going to start soon.    Patient is on Coumadin and INR is therapeutic.  Weight has been up a little overall since discharge.      PAST MEDICAL HISTORY:  No past medical history on file.    PAST SURGICAL HISTORY:  Past  Surgical History:   Procedure Laterality Date     BRONCHOSCOPY FLEXIBLE AND RIGID N/A 03/09/2021    Procedure: Flexible Bronchoscopy;  Surgeon: Daquan Porras MD;  [facility]     BYPASS GRAFT ARTERY CORONARY N/A 03/05/2021    Procedure: CORONARY ARTERY BYPASS GRAFT (CABG);  Surgeon: Daquan Porras MD;  [facility]     CV CORONARY ANGIOGRAM N/A 3/4/2021    Procedure: Coronary Angiogram + IABP;  Surgeon: Epi Hurtado MD;  Location:  [facility] CARD[facility] LAB     CV IMPELLA INSERT N/A 3/4/2021    Procedure: Impella Insert;  Surgeon: Epi Hurtado MD;  Location:  [facility] CARDI[facility] CATH LAB     CV PCI ASPIRATION THROMECTOMY N/A 3/4/2021    Procedure: Percutaneous Coronary Intervention Aspiration Thrombectomy;  Surgeon: Epi Hurtado MD;  Location:  [facility] CARD[facility] CATH LAB     CV SWAN BIBI PROCEDURE N/A 3/4/2021    Procedure: [place] Bibi Procedure;  Surgeon: Epi Hurtado MD;  Location:  [facility] CARDIAC CATH LAB     INCISION AND DRAINAGE CHEST WASHOUT, COMBINED N/A 03/09/2021    Procedure: INCISION AND DRAINAGE, WOUND, CHEST, WITH IRRIGATION;  Surgeon: Daquan Porras MD;  [facility]: [facility]     IR CVC TUNNEL PLACEMENT > 5 YRS OF AGE  3/22/2021     IR CVC TUNNEL REMOVAL RIGHT  4/2/2021     PICC DOUBLE LUMEN PLACEMENT Right 03/14/2021    BRACH     REMOVE EXTRACORPORAL MEMBRANE OXYGENATOR ADULT N/A 03/09/2021    Procedure: REMOVAL, CANNULA, ADULT, FOR Extracorporeal membrane oxygenator, Chest Closure, Cardiopulmonary Bypass;  Surgeon: Daquan Porras MD;  [facility]     REPLACE VALVE MITRAL N/A 03/09/2021    Procedure: Mitral Valve Replace with 31mm St. Tee Epic Valve;  Surgeon: Daquan Porras MD;  [facility]     REPLACE VALVE MITRAL N/A 03/05/2021    Procedure: REPLACEMENT, MITRAL VALVE OPEN ENDOSCOPIC VEIN HARVEST LEFT LEG CORONARY ARTERY BYPASS GRAFT X1 AND REMOVAL OF IMPELLA PLACEMENT OF EXTRACORPOREAL MEMBRANE OXYGENATOR AND REPAIR OF LEFT FEMORAL  ARTERY;  Surgeon: Daquan Porras MD;  [facility]: [facility]       CURRENT MEDICATIONS:   Current Outpatient Medications   Medication     acetaminophen (TYLENOL) 325 MG tablet     amiodarone (PACERONE) 200 MG tablet     amitriptyline (ELAVIL) 25 MG tablet     aspirin (ASA) 81 MG chewable tablet     atorvastatin (LIPITOR) 20 MG tablet     B and C vitamin Complex with folic acid (NEPHRONEX) 0.9 MG/5ML LIQD liquid     benzonatate (TESSALON) 100 MG capsule     ferrous sulfate 220 (44 Fe) MG/5ML ELIX     ipratropium - albuterol 0.5 mg/2.5 mg/3 mL (DUONEB) 0.5-2.5 (3) MG/3ML neb solution     metoprolol tartrate (LOPRESSOR) 25 MG tablet     pantoprazole (PROTONIX) 40 MG EC tablet     potassium chloride ER (KLOR-CON M) 10 MEQ CR tablet     senna-docusate (SENOKOT-S/PERICOLACE) 8.6-50 MG tablet     traZODone (DESYREL) 50 MG tablet     benzocaine-menthol (CEPACOL) 15-3.6 MG lozenge     oxymetazoline (AFRIN) 0.05 % nasal spray     No current facility-administered medications for this visit.        ALLERGIES:      Allergies   Allergen Reactions     Oxycodone      hallucinations     ROS:  Review of symptoms otherwise negative unless commented about in HPI.     LABS:  Last Basic Metabolic Panel:  Lab Results   Component Value Date     04/05/2021      Lab Results   Component Value Date    POTASSIUM 3.9 04/05/2021     Lab Results   Component Value Date    CHLORIDE 103 04/05/2021     Lab Results   Component Value Date    PALLAVI 8.9 04/05/2021     Lab Results   Component Value Date    CO2 30 04/05/2021     Lab Results   Component Value Date    BUN 96 04/05/2021     Lab Results   Component Value Date    CR 3.51 04/05/2021     Lab Results   Component Value Date     04/05/2021       Last CBC:   Lab Results   Component Value Date    WBC 9.7 04/05/2021     Lab Results   Component Value Date    RBC 2.69 04/05/2021     Lab Results   Component Value Date    HGB 7.8 04/05/2021     Lab Results   Component Value Date    HCT 25.0  04/05/2021     No components found for: MCT  Lab Results   Component Value Date    MCV 93 04/05/2021     Lab Results   Component Value Date    MCH 29.0 04/05/2021     Lab Results   Component Value Date    MCHC 31.2 04/05/2021     Lab Results   Component Value Date    RDW 16.5 04/05/2021     Lab Results   Component Value Date     04/05/2021       INR:  Lab Results   Component Value Date    INR 4.05 04/05/2021    INR 3.49 04/04/2021    INR 2.90 04/03/2021     IMAGING:  None    PHYSICAL EXAM:   There were no vitals taken for this visit.  General: alert and oriented x 3, pleasant, no acute distress, normal mood and affect  Neuro: no focal deficits, answering questions appropriately   CV: feels like he is regular rate and rhythm, no peripheral edema.   Pulm:  easy work of breathing on RA   Incision: incisions clean dry and intact without erythema, swelling or drainage    PROCEDURES: None       ASSESSMENT/PLAN:  Jin Garrett is a 80 year old year old male status post MVR who returns to clinic for postop visit.     1. Surgically doing well overall.  Incisions are healing well with no signs of infection. Increasing activity and strength overall.   2. Hemodynamics are stable. No medication changes were needed today.  3. Follow up with your cardiologist, TBD. Has Echo scheduled for next week. Discuss A-fib.   4. Continue Outpatient Cardiac Rehab until completed.   5. Continue sternal precautions for 12 weeks from surgery date.   6. No driving for 4 weeks from surgery date.   7. Follow up with Dr Ritesh Kumar for all medication refills.   8. 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 Being followed for INR with local team. Most recent INR was 2.9.    9. ADAN (resolving), has follow up with Nephrology.   10. Continue cardiac rehab until recommended to stop.     The total time spent with the patient was 14 minutes, > 50% of which was spent in counseling.    CC  Ritesh Kumar         Please do not  hesitate to contact me if you have any questions/concerns.     Sincerely,     Cardiovascular Thoracic Surgery

## 2021-06-28 LAB — INTERPRETATION ECG - MUSE: NORMAL

## 2022-03-23 NOTE — ANESTHESIA CARE TRANSFER NOTE
[facility] discharge antibiotic prescribed: None (On Cephalexin since 3/14/22)  Incision and Drainage performed in [facility] [Yes or No]: No  Recommendations in treatment per [facility] ED Lab Result culture protocol   Patient: Jin Garrett    Procedure(s):  REMOVAL, CANNULA, ADULT, FOR ECMO, Chest Closure  INCISION AND DRAINAGE, WOUND, CHEST, WITH IRRIGATION  Mitral Valve Replace with 31mm St[employer] Tee Epic Valve  Flexible Bronchoscopy    Diagnosis: Status post cardiac surgery [Z98.890]  Diagnosis Additional Information: No value filed.    Anesthesia Type:   General     Note:    Oropharynx: ventilatory support  Level of Consciousness: iatrogenic sedation    Level of Supplemental Oxygen (L/min / FiO2): 10  Independent Airway: airway patency not satisfactory and stable  Dentition: dentition unchanged  Vital Signs Stable: post-procedure vital signs reviewed and stable  Report to RN Given: handoff report given  Patient transferred to: ICU    ICU Handoff: Call for PAUSE to initiate/utilize ICU HANDOFF, Identified Patient, Identified Responsible Provider, Reviewed the Pertinent Medical History, Discussed Surgical Course, Reviewed Intra-OP Anesthesia Management and Issues during Anesthesia, Set Expectations for Post Procedure Period and Allowed Opportunity for Questions and Acknowledgement of Understanding      Vitals: (Last set prior to Anesthesia Care Transfer)  CRNA VITALS  3/9/2021 1929 - 3/9/2021 2011      3/9/2021             Resp Rate (observed):  12    Resp Rate (set):  12        Electronically Signed By: Karen Dyer MD  March 9, 2021  8:11 PM

## 2022-09-12 ENCOUNTER — DOCUMENTATION ONLY (OUTPATIENT)
Dept: PHYSICAL MEDICINE AND REHAB | Facility: CLINIC | Age: 82
End: 2022-09-12

## 2022-09-12 NOTE — PROGRESS NOTES
[facility] rehab, completed plan of care faxed to [phone].    Thanks ,  John

## (undated) DEVICE — CONNECTOR SIMS TUBING FOR CHEST TUBES 361

## (undated) DEVICE — ADPT 5 IN 1 360

## (undated) DEVICE — KIT HAND CONTROL ACIST 014644 AR-P54

## (undated) DEVICE — CONNECTOR DRAIN CHEST Y EXTENSION SET 19909

## (undated) DEVICE — CATH IMPELLA 3.5 CP PUMP SET 0048-0003

## (undated) DEVICE — Device

## (undated) DEVICE — LEAD PACER MYOCARDIAL BIPOLAR TEMPORARY 53CM 6495F

## (undated) DEVICE — BLOWER/MISTER CLEARVIEW 22150

## (undated) DEVICE — CATH ANGIO INFINITI PIGTAIL 145 6 SH 6FRX110CM  534-652S

## (undated) DEVICE — INTRO SHEATH AVANTI 4FRX23CM 504604T

## (undated) DEVICE — SU VICRYL 0 CTX 36" J370H

## (undated) DEVICE — ESU ELEC BLADE 6" COATED E1450-6

## (undated) DEVICE — INSERT FOGARTY 61MM TRACTION HYDRAJAW HYDRA61

## (undated) DEVICE — DRAPE IOBAN INCISE 23X17" 6650EZ

## (undated) DEVICE — TIES BANDING T50R

## (undated) DEVICE — BLADE SAW STERNAL 20X30MM KM-32

## (undated) DEVICE — GUIDEWIRE VASC 0.014INX180CM RUNTHROUGH 25-1011

## (undated) DEVICE — PREP POVIDONE IODINE SOLUTION 10% 4OZ

## (undated) DEVICE — BLADE KNIFE SURG 15C 371716

## (undated) DEVICE — SU PROLENE 6-0 C-1DA 4X24" M8726

## (undated) DEVICE — NDL COUNTER 20CT 31142493

## (undated) DEVICE — DRAIN CHEST TUBE 32FR STR 8032

## (undated) DEVICE — TOURNIQUET VASCULAR KIT 7 1/2" 79012

## (undated) DEVICE — KIT CATHETER INDIGO RX 140CM WITH LG LUMEN ASP TUBING

## (undated) DEVICE — DRSG PRIMAPORE 02X3" 7133

## (undated) DEVICE — SU SILK 0 TIE 6X30" A306H

## (undated) DEVICE — PREP CHLORAPREP 26ML TINTED ORANGE  260815

## (undated) DEVICE — CATH BALLOON EMERGE 2.0X12MM H7493918912200

## (undated) DEVICE — CATH ANGIO INFINITI JL5 4FRX100CM 538422

## (undated) DEVICE — BNDG ELASTIC 4"X5YDS STERILE 6611-4S

## (undated) DEVICE — CANNULA VESSEL DLP  30001

## (undated) DEVICE — SU ETHIBOND 2-0 SHDA 30" X563H

## (undated) DEVICE — SUCTION MANIFOLD NEPTUNE 2 SYS 4 PORT 0702-020-000

## (undated) DEVICE — SU PROLENE 3-0 SHDA 36" 8522H

## (undated) DEVICE — WIRE GUIDE HI-TRQ PILOT 50 JTIP 0.014"X190CM 1010480-HJ

## (undated) DEVICE — INTRO SHEATH 6FRX25CM PINNACLE RSS606

## (undated) DEVICE — DRAPE POUCH INSTRUMENT 1018

## (undated) DEVICE — SUCTION TIP YANKAUER STR K87

## (undated) DEVICE — DRSG DRAIN 4X4" 7086

## (undated) DEVICE — PREP CHLORHEXIDINE 4% 4OZ (HIBICLENS) 57504

## (undated) DEVICE — SU PROLENE 4-0 SHDA 36" 8521H

## (undated) DEVICE — SU ETHIBOND 2-0 V-7 DA 10X30" PXX77

## (undated) DEVICE — BONE WAX 2.5GM W31G

## (undated) DEVICE — KIT ACCESSORY INTRO INFLATION SYS 20/30 PRIORITY 1000186-115

## (undated) DEVICE — LINEN TOWEL PACK X6 WHITE 5487

## (undated) DEVICE — WIRE GUIDE 0.018"X260CM PLATINUM PLUS SHORT TAPER M001466050

## (undated) DEVICE — GLIDEWIRE TERUMO .035X180CM 1.5,, J-TIP GR3525

## (undated) DEVICE — GLOVE SENSICARE 7.5 MSG1075 LATEX FREE

## (undated) DEVICE — SYR BULB IRRIG 50ML LATEX FREE 0035280

## (undated) DEVICE — SOL NACL 0.9% 10ML VIAL 0409-4888-02

## (undated) DEVICE — CANISTER ENGINE FOR INDIGO SYSTEM

## (undated) DEVICE — CONNECTOR Y 3/8" 369

## (undated) DEVICE — INTRO SHEATH 9FRX10CM PINNACLE RSS902

## (undated) DEVICE — CLIP HORIZON LG ORANGE 004200

## (undated) DEVICE — TUBING SUCTION 10'X3/16" N510

## (undated) DEVICE — SU ETHIBOND 0 CT-1 CR 8X18" CX21D

## (undated) DEVICE — SOL NACL 0.9% IRRIG 3000ML BAG 2B7477

## (undated) DEVICE — DRSG ABDOMINAL 07 1/2X8" 7197D

## (undated) DEVICE — WIRE GUIDE 0.018"X180CM PLATINUM PLUS H74917531

## (undated) DEVICE — PREP DURAPREP 26ML APL 8630

## (undated) DEVICE — SU STEEL 6 CCS 4X18" M654G

## (undated) DEVICE — DEFIB PRO-PADZ LVP LQD GEL ADULT 8900-2105-01

## (undated) DEVICE — LINEN GOWN XLG 5407

## (undated) DEVICE — BNDG ESMARK 6" STERILE LF 820-3612

## (undated) DEVICE — BLADE CLIPPER SGL USE 9680

## (undated) DEVICE — SOL NACL 0.9% IRRIG 1000ML BOTTLE 2F7124

## (undated) DEVICE — SUCTION DRY CHEST DRAIN OASIS 3600-100

## (undated) DEVICE — ESU ELEC BLADE 2.75" COATED/INSULATED E1455

## (undated) DEVICE — NDL COUNTER 40CT  31142311

## (undated) DEVICE — PREP POVIDONE IODINE SCRUB 7.5% 4OZ APL82212

## (undated) DEVICE — DRAPE SHEET REV FOLD 3/4 9349

## (undated) DEVICE — SUCTION SLEEVE NEPTUNE 2 125MM 0703-005-125

## (undated) DEVICE — VESSEL LOOPS BLUE SUPERMAXI 011022PBX

## (undated) DEVICE — DRSG GAUZE FLUFTEX RADIOPAQUE 4.5"X4.1YD 11-020

## (undated) DEVICE — SU PLEDGET SOFT TFE 3/8"X3/26"X1/16" PCP40

## (undated) DEVICE — PACK HEART RIGHT CUSTOM SAN32RHF18

## (undated) DEVICE — TUBING INSUFFLATION W/FILTER CPC TO LUER 620-030-301

## (undated) DEVICE — ESU PENCIL W/COATED BLADE E2450H

## (undated) DEVICE — DRSG TELFA 3X8" 1238

## (undated) DEVICE — DRSG TEGADERM 8X12" 1629

## (undated) DEVICE — DRAIN CHEST TUBE RIGHT ANGLED 28FR 8128

## (undated) DEVICE — WIPES FOLEY CARE SURESTEP PROVON DFC100

## (undated) DEVICE — 0.035IN X 260CM, EMERALD DIAGNOSTIC GUIDEWIRE, FIXED-CORE PTFE COATED, STANDARD, 3MM EXCHANGE J-TIP (EA/1)

## (undated) DEVICE — SU PROLENE 4-0 RB-1DA 36" 8557H

## (undated) DEVICE — PROTECTOR ARM ONE-STEP TRENDELENBURG 40418

## (undated) DEVICE — TAPE MEDIPORE 4"X2YD 2864

## (undated) DEVICE — ESU PENCIL SMOKE EVAC W/ROCKER SWITCH 0703-047-000

## (undated) DEVICE — SU PROLENE 7-0 BV-1DA 4X24" M8702

## (undated) DEVICE — SUTURE BOOTS 051003PBX

## (undated) DEVICE — BLADE KNIFE BEAVER MICROSHARP GREEN 377515

## (undated) DEVICE — INSERT FOGARTY 86MM TRACTION DBL SAFEJAW DSAFE86

## (undated) DEVICE — CATH GUIDING BLUE YELLOW PTFE XB4 6FRX100CM 67005600

## (undated) DEVICE — SU PROLENE 6-0 C-1DA 30" 8706H

## (undated) DEVICE — DRAPE TIBURON CARDIOVASCULAR PERI-GROIN LF 9154

## (undated) DEVICE — SU VICRYL 3-0 FS-1 27" J442H

## (undated) DEVICE — SUCTION CATH AIRLIFE TRI-FLO W/CONTROL PORT 14FR  T60C

## (undated) DEVICE — TOURNIQUET VASCULAR KIT ARGYLE 8888-585000

## (undated) DEVICE — CATH ANGIO INFINITI 3DRC 4FRX100CM 538476

## (undated) DEVICE — ADH SKIN CLOSURE PREMIERPRO EXOFIN 1.0ML 3470

## (undated) DEVICE — CLIP HORIZON MED BLUE 002200

## (undated) DEVICE — KIT INTRODUCER DL 9FR 11.5CM J-TIP 0.35"X45CM CDC-21242-1A

## (undated) DEVICE — CLIP HORIZON SM RED WIDE SLOT 001201

## (undated) DEVICE — SU VICRYL 2-0 CT-1 27" UND J259H

## (undated) DEVICE — DRAPE SLUSH/WARMER 66X44" ORS-320

## (undated) DEVICE — BNDG ELASTIC 6"X5YDS STERILE 6611-6S

## (undated) DEVICE — PACK ADULT HEART UMMC PV15CG92D

## (undated) DEVICE — LINEN TOWEL PACK X30 5481

## (undated) DEVICE — SU PROLENE 5-0 RB-2DA 30" 8710H

## (undated) DEVICE — WAND SUCTION LP SOFT 15.2CM SU-22702

## (undated) DEVICE — BLADE KNIFE BEAVER MINI STR BEAVER6900

## (undated) DEVICE — SU ETHIBOND 3-0 BBDA 36" X588H

## (undated) DEVICE — PREP SKIN SCRUB TRAY 4461A

## (undated) DEVICE — ESU GROUND PAD ADULT W/CORD E7507

## (undated) DEVICE — KIT MICRO-INTRODUCER STIFFEN 4FR 7274V

## (undated) DEVICE — DRSG KERLIX 4 1/2"X4YDS ROLL 6730

## (undated) DEVICE — SU VICRYL 3-0 KS 27" UND J663H

## (undated) DEVICE — DRAIN CHEST TUBE EXTENDED STR LF 19907

## (undated) DEVICE — CLIP SPRING FOGARTY SOFTJAW CSOFT6

## (undated) DEVICE — PUNCH AORTIC 4.0MMX8" RCB40

## (undated) DEVICE — CATH ANGIO INFINITI JL4 4FRX100CM 538420

## (undated) DEVICE — CATH SWAN CCO/SV02/CEDV 8FR 110CM W/HEP 777F8

## (undated) DEVICE — ESU HOLSTER PLASTIC DISP E2400

## (undated) DEVICE — SPECIMEN CONTAINER 5OZ STERILE 2600SA

## (undated) RX ORDER — ASPIRIN 81 MG/1
TABLET, CHEWABLE ORAL
Status: DISPENSED
Start: 2021-03-04

## (undated) RX ORDER — FENTANYL CITRATE 50 UG/ML
INJECTION, SOLUTION INTRAMUSCULAR; INTRAVENOUS
Status: DISPENSED
Start: 2021-03-09

## (undated) RX ORDER — HEPARIN SODIUM 1000 [USP'U]/ML
INJECTION, SOLUTION INTRAVENOUS; SUBCUTANEOUS
Status: DISPENSED
Start: 2021-03-09

## (undated) RX ORDER — EPINEPHRINE IN SOD CHLOR,ISO 1 MG/10 ML
SYRINGE (ML) INTRAVENOUS
Status: DISPENSED
Start: 2021-03-05

## (undated) RX ORDER — CEFAZOLIN SODIUM 2 G/100ML
INJECTION, SOLUTION INTRAVENOUS
Status: DISPENSED
Start: 2021-03-22

## (undated) RX ORDER — FENTANYL CITRATE-0.9 % NACL/PF 10 MCG/ML
PLASTIC BAG, INJECTION (ML) INTRAVENOUS
Status: DISPENSED
Start: 2021-03-05

## (undated) RX ORDER — ALBUMIN, HUMAN INJ 5% 5 %
SOLUTION INTRAVENOUS
Status: DISPENSED
Start: 2021-03-09

## (undated) RX ORDER — LIDOCAINE HYDROCHLORIDE 10 MG/ML
INJECTION, SOLUTION EPIDURAL; INFILTRATION; INTRACAUDAL; PERINEURAL
Status: DISPENSED
Start: 2021-03-22

## (undated) RX ORDER — LIDOCAINE HYDROCHLORIDE 20 MG/ML
SOLUTION OROPHARYNGEAL
Status: DISPENSED
Start: 2021-03-29

## (undated) RX ORDER — LIDOCAINE HYDROCHLORIDE 20 MG/ML
INJECTION, SOLUTION EPIDURAL; INFILTRATION; INTRACAUDAL; PERINEURAL
Status: DISPENSED
Start: 2021-03-05

## (undated) RX ORDER — MANNITOL 20 G/100ML
INJECTION, SOLUTION INTRAVENOUS
Status: DISPENSED
Start: 2021-03-05

## (undated) RX ORDER — HEPARIN SODIUM 10000 [USP'U]/100ML
INJECTION, SOLUTION INTRAVENOUS
Status: DISPENSED
Start: 2021-03-05

## (undated) RX ORDER — ESMOLOL HYDROCHLORIDE 10 MG/ML
INJECTION INTRAVENOUS
Status: DISPENSED
Start: 2021-03-05

## (undated) RX ORDER — NOREPINEPHRINE BITARTRATE 0.06 MG/ML
INJECTION, SOLUTION INTRAVENOUS
Status: DISPENSED
Start: 2021-03-04

## (undated) RX ORDER — EPHEDRINE SULFATE 50 MG/ML
INJECTION, SOLUTION INTRAMUSCULAR; INTRAVENOUS; SUBCUTANEOUS
Status: DISPENSED
Start: 2021-03-05

## (undated) RX ORDER — HEPARIN SODIUM 1000 [USP'U]/ML
INJECTION, SOLUTION INTRAVENOUS; SUBCUTANEOUS
Status: DISPENSED
Start: 2021-03-05

## (undated) RX ORDER — FENTANYL CITRATE 50 UG/ML
INJECTION, SOLUTION INTRAMUSCULAR; INTRAVENOUS
Status: DISPENSED
Start: 2021-03-22

## (undated) RX ORDER — HEPARIN SODIUM 1000 [USP'U]/ML
INJECTION, SOLUTION INTRAVENOUS; SUBCUTANEOUS
Status: DISPENSED
Start: 2021-03-22

## (undated) RX ORDER — PROPOFOL 10 MG/ML
INJECTION, EMULSION INTRAVENOUS
Status: DISPENSED
Start: 2021-03-05

## (undated) RX ORDER — CALCIUM CHLORIDE 100 MG/ML
INJECTION INTRAVENOUS; INTRAVENTRICULAR
Status: DISPENSED
Start: 2021-03-05

## (undated) RX ORDER — LIDOCAINE HYDROCHLORIDE 10 MG/ML
INJECTION, SOLUTION EPIDURAL; INFILTRATION; INTRACAUDAL; PERINEURAL
Status: DISPENSED
Start: 2021-03-04

## (undated) RX ORDER — CEFAZOLIN SODIUM 1 G/3ML
INJECTION, POWDER, FOR SOLUTION INTRAMUSCULAR; INTRAVENOUS
Status: DISPENSED
Start: 2021-03-09